# Patient Record
Sex: FEMALE | Race: WHITE | Employment: OTHER
[De-identification: names, ages, dates, MRNs, and addresses within clinical notes are randomized per-mention and may not be internally consistent; named-entity substitution may affect disease eponyms.]

---

## 2018-04-04 ENCOUNTER — RX ONLY (RX ONLY)
Age: 73
End: 2018-04-04

## 2018-04-04 ENCOUNTER — OPTICAL OFFICE (OUTPATIENT)
Dept: URBAN - METROPOLITAN AREA CLINIC 146 | Facility: CLINIC | Age: 73
Setting detail: OPHTHALMOLOGY
End: 2018-04-04
Payer: COMMERCIAL

## 2018-04-04 ENCOUNTER — DOCTOR'S OFFICE (OUTPATIENT)
Dept: URBAN - METROPOLITAN AREA CLINIC 137 | Facility: CLINIC | Age: 73
Setting detail: OPHTHALMOLOGY
End: 2018-04-04
Payer: COMMERCIAL

## 2018-04-04 DIAGNOSIS — H52.4: ICD-10-CM

## 2018-04-04 DIAGNOSIS — H52.223: ICD-10-CM

## 2018-04-04 PROCEDURE — 92004 COMPRE OPH EXAM NEW PT 1/>: CPT | Performed by: OPHTHALMOLOGY

## 2018-04-04 PROCEDURE — V2020 VISION SVCS FRAMES PURCHASES: HCPCS | Performed by: OPHTHALMOLOGY

## 2018-04-04 PROCEDURE — V2203 LENS SPHCYL BIFOCAL 4.00D/.1: HCPCS | Performed by: OPHTHALMOLOGY

## 2018-04-04 PROCEDURE — V2781 PROGRESSIVE LENS PER LENS: HCPCS | Performed by: OPHTHALMOLOGY

## 2018-04-04 PROCEDURE — V2025 EYEGLASSES DELUX FRAMES: HCPCS | Performed by: OPHTHALMOLOGY

## 2018-04-04 PROCEDURE — V2750 ANTI-REFLECTIVE COATING: HCPCS | Performed by: OPHTHALMOLOGY

## 2018-04-04 ASSESSMENT — REFRACTION_OUTSIDERX
OS_VA1: 20/25
OS_VA2: 20/25(J1)
OD_VA2: 20/25(J1)
OD_VA3: 20/
OU_VA: 20/
OD_VA1: 20/25
OD_SPHERE: -3.25
OD_CYLINDER: +0.50
OD_AXIS: 100
OD_ADD: +2.50
OS_SPHERE: -2.25
OS_CYLINDER: +0.50
OS_VA3: 20/
OS_ADD: +2.50
OS_AXIS: 150

## 2018-04-04 ASSESSMENT — REFRACTION_MANIFEST
OD_VA1: 20/
OU_VA: 20/
OU_VA: 20/
OS_VA1: 20/
OS_VA2: 20/
OS_VA1: 20/
OD_VA3: 20/
OD_VA2: 20/
OD_VA2: 20/
OD_VA3: 20/
OS_VA3: 20/
OS_VA2: 20/
OD_VA1: 20/
OS_VA3: 20/

## 2018-04-04 ASSESSMENT — REFRACTION_CURRENTRX
OS_AXIS: 161
OS_OVR_VA: 20/
OD_CYLINDER: +1.00
OD_OVR_VA: 20/
OS_CYLINDER: +0.25
OD_SPHERE: -3.75
OS_SPHERE: -2.25
OS_OVR_VA: 20/
OD_OVR_VA: 20/
OD_OVR_VA: 20/
OS_OVR_VA: 20/
OD_AXIS: 122

## 2018-04-04 ASSESSMENT — VISUAL ACUITY
OD_BCVA: 20/30-1
OS_BCVA: 20/30-2

## 2018-04-04 ASSESSMENT — REFRACTION_AUTOREFRACTION
OS_SPHERE: -2.25
OD_AXIS: 092
OS_AXIS: 148
OD_CYLINDER: +0.25
OS_CYLINDER: +0.50
OD_SPHERE: -3.25

## 2018-04-04 ASSESSMENT — SPHEQUIV_DERIVED
OS_SPHEQUIV: -2
OD_SPHEQUIV: -3.125

## 2018-04-04 ASSESSMENT — CONFRONTATIONAL VISUAL FIELD TEST (CVF)
OD_FINDINGS: FULL
OS_FINDINGS: FULL

## 2018-06-19 ENCOUNTER — TRANSCRIBE ORDERS (OUTPATIENT)
Dept: ADMINISTRATIVE | Facility: HOSPITAL | Age: 73
End: 2018-06-19

## 2018-06-19 DIAGNOSIS — R06.02 SOB (SHORTNESS OF BREATH): Primary | ICD-10-CM

## 2018-06-29 ENCOUNTER — HOSPITAL ENCOUNTER (OUTPATIENT)
Dept: NON INVASIVE DIAGNOSTICS | Facility: CLINIC | Age: 73
Discharge: HOME | End: 2018-06-29
Payer: COMMERCIAL

## 2018-06-29 DIAGNOSIS — R06.02 SOB (SHORTNESS OF BREATH): ICD-10-CM

## 2018-06-29 PROCEDURE — 93306 TTE W/DOPPLER COMPLETE: CPT | Performed by: INTERNAL MEDICINE

## 2018-06-29 PROCEDURE — 93306 TTE W/DOPPLER COMPLETE: CPT

## 2018-08-20 ENCOUNTER — TRANSCRIBE ORDERS (OUTPATIENT)
Dept: ADMINISTRATIVE | Facility: HOSPITAL | Age: 73
End: 2018-08-20

## 2018-08-20 DIAGNOSIS — I71.2 THORACIC AORTIC ANEURYSM WITHOUT RUPTURE (HCC): Primary | ICD-10-CM

## 2018-08-28 ENCOUNTER — DOCTOR'S OFFICE (OUTPATIENT)
Dept: URBAN - METROPOLITAN AREA CLINIC 137 | Facility: CLINIC | Age: 73
Setting detail: OPHTHALMOLOGY
End: 2018-08-28
Payer: MEDICARE

## 2018-08-28 DIAGNOSIS — H02.403: ICD-10-CM

## 2018-08-28 PROCEDURE — 92083 EXTENDED VISUAL FIELD XM: CPT | Performed by: OPHTHALMOLOGY

## 2018-09-11 ENCOUNTER — HOSPITAL ENCOUNTER (OUTPATIENT)
Dept: CT IMAGING | Facility: HOSPITAL | Age: 73
Discharge: HOME | End: 2018-09-11
Payer: COMMERCIAL

## 2018-09-11 DIAGNOSIS — I71.2 THORACIC AORTIC ANEURYSM WITHOUT RUPTURE (HCC): ICD-10-CM

## 2018-09-11 DIAGNOSIS — Z82.49 FAMILY HX OF AORTIC ANEURYSM: ICD-10-CM

## 2018-09-11 PROCEDURE — 74160 CT ABDOMEN W/CONTRAST: CPT

## 2018-09-11 RX ADMIN — IOHEXOL 100 ML: 350 INJECTION, SOLUTION INTRAVENOUS at 14:00

## 2018-09-19 ENCOUNTER — TRANSCRIBE ORDERS (OUTPATIENT)
Dept: ADMINISTRATIVE | Facility: HOSPITAL | Age: 73
End: 2018-09-19

## 2018-09-19 DIAGNOSIS — R92.2 BREAST DENSITY: Primary | ICD-10-CM

## 2018-09-28 ENCOUNTER — TRANSCRIBE ORDERS (OUTPATIENT)
Dept: MAMMOGRAPHY | Facility: CLINIC | Age: 73
End: 2018-09-28

## 2018-09-28 ENCOUNTER — HOSPITAL ENCOUNTER (OUTPATIENT)
Dept: ULTRASOUND IMAGING | Facility: CLINIC | Age: 73
Discharge: HOME | End: 2018-09-28
Payer: COMMERCIAL

## 2018-09-28 ENCOUNTER — HOSPITAL ENCOUNTER (OUTPATIENT)
Dept: MAMMOGRAPHY | Facility: CLINIC | Age: 73
Discharge: HOME | End: 2018-09-28
Payer: COMMERCIAL

## 2018-09-28 DIAGNOSIS — R92.8 ABNORMAL MAMMOGRAM: Primary | ICD-10-CM

## 2018-09-28 DIAGNOSIS — R92.2 BREAST DENSITY: ICD-10-CM

## 2018-09-28 PROCEDURE — 76642 ULTRASOUND BREAST LIMITED: CPT

## 2018-09-28 PROCEDURE — 77066 DX MAMMO INCL CAD BI: CPT

## 2019-03-29 ENCOUNTER — HOSPITAL ENCOUNTER (OUTPATIENT)
Dept: MAMMOGRAPHY | Facility: CLINIC | Age: 74
Discharge: HOME | End: 2019-03-29
Payer: COMMERCIAL

## 2019-03-29 ENCOUNTER — HOSPITAL ENCOUNTER (OUTPATIENT)
Dept: ULTRASOUND IMAGING | Facility: CLINIC | Age: 74
Discharge: HOME | End: 2019-03-29
Payer: COMMERCIAL

## 2019-03-29 VITALS — HEIGHT: 62 IN | WEIGHT: 205 LBS | BODY MASS INDEX: 37.73 KG/M2

## 2019-03-29 DIAGNOSIS — R92.8 ABNORMAL MAMMOGRAM: ICD-10-CM

## 2019-03-29 DIAGNOSIS — R92.8 ABNORMAL FINDINGS ON DIAGNOSTIC IMAGING OF BREAST: ICD-10-CM

## 2019-03-29 PROCEDURE — 77065 DX MAMMO INCL CAD UNI: CPT

## 2019-03-29 PROCEDURE — 76642 ULTRASOUND BREAST LIMITED: CPT

## 2020-05-11 ENCOUNTER — APPOINTMENT (EMERGENCY)
Dept: RADIOLOGY | Facility: HOSPITAL | Age: 75
End: 2020-05-11
Payer: COMMERCIAL

## 2020-05-11 ENCOUNTER — HOSPITAL ENCOUNTER (EMERGENCY)
Facility: HOSPITAL | Age: 75
Discharge: HOME | End: 2020-05-11
Attending: EMERGENCY MEDICINE | Admitting: EMERGENCY MEDICINE
Payer: COMMERCIAL

## 2020-05-11 VITALS
TEMPERATURE: 97.8 F | SYSTOLIC BLOOD PRESSURE: 111 MMHG | RESPIRATION RATE: 18 BRPM | HEIGHT: 62 IN | DIASTOLIC BLOOD PRESSURE: 54 MMHG | WEIGHT: 226.19 LBS | OXYGEN SATURATION: 93 % | HEART RATE: 52 BPM | BODY MASS INDEX: 41.62 KG/M2

## 2020-05-11 DIAGNOSIS — R06.00 DYSPNEA: Primary | ICD-10-CM

## 2020-05-11 DIAGNOSIS — R07.9 CHEST PAIN: ICD-10-CM

## 2020-05-11 LAB
ANION GAP SERPL CALCULATED.3IONS-SCNC: 10 MMOL/L (ref 4–13)
APTT PPP: 27 SECONDS (ref 23–37)
BASOPHILS # BLD AUTO: 0.02 THOUSANDS/ΜL (ref 0–0.1)
BASOPHILS NFR BLD AUTO: 0 % (ref 0–1)
BUN SERPL-MCNC: 38 MG/DL (ref 5–25)
CALCIUM SERPL-MCNC: 9.2 MG/DL (ref 8.3–10.1)
CHLORIDE SERPL-SCNC: 104 MMOL/L (ref 100–108)
CO2 SERPL-SCNC: 28 MMOL/L (ref 21–32)
CREAT SERPL-MCNC: 1.4 MG/DL (ref 0.6–1.3)
EOSINOPHIL # BLD AUTO: 0.1 THOUSAND/ΜL (ref 0–0.61)
EOSINOPHIL NFR BLD AUTO: 2 % (ref 0–6)
ERYTHROCYTE [DISTWIDTH] IN BLOOD BY AUTOMATED COUNT: 15 % (ref 11.6–15.1)
GFR SERPL CREATININE-BSD FRML MDRD: 37 ML/MIN/1.73SQ M
GLUCOSE SERPL-MCNC: 142 MG/DL (ref 65–140)
HCT VFR BLD AUTO: 40.9 % (ref 34.8–46.1)
HGB BLD-MCNC: 13.3 G/DL (ref 11.5–15.4)
IMM GRANULOCYTES # BLD AUTO: 0.02 THOUSAND/UL (ref 0–0.2)
IMM GRANULOCYTES NFR BLD AUTO: 0 % (ref 0–2)
INR PPP: 1.1 (ref 0.84–1.19)
LYMPHOCYTES # BLD AUTO: 1.09 THOUSANDS/ΜL (ref 0.6–4.47)
LYMPHOCYTES NFR BLD AUTO: 19 % (ref 14–44)
MCH RBC QN AUTO: 30.6 PG (ref 26.8–34.3)
MCHC RBC AUTO-ENTMCNC: 32.5 G/DL (ref 31.4–37.4)
MCV RBC AUTO: 94 FL (ref 82–98)
MONOCYTES # BLD AUTO: 0.49 THOUSAND/ΜL (ref 0.17–1.22)
MONOCYTES NFR BLD AUTO: 8 % (ref 4–12)
NEUTROPHILS # BLD AUTO: 4.13 THOUSANDS/ΜL (ref 1.85–7.62)
NEUTS SEG NFR BLD AUTO: 71 % (ref 43–75)
NRBC BLD AUTO-RTO: 0 /100 WBCS
PLATELET # BLD AUTO: 147 THOUSANDS/UL (ref 149–390)
PMV BLD AUTO: 10.7 FL (ref 8.9–12.7)
POTASSIUM SERPL-SCNC: 3.5 MMOL/L (ref 3.5–5.3)
PROTHROMBIN TIME: 13.6 SECONDS (ref 11.6–14.5)
RBC # BLD AUTO: 4.35 MILLION/UL (ref 3.81–5.12)
SARS-COV-2 RNA RESP QL NAA+PROBE: NEGATIVE
SODIUM SERPL-SCNC: 142 MMOL/L (ref 136–145)
TROPONIN I SERPL-MCNC: <0.02 NG/ML
WBC # BLD AUTO: 5.85 THOUSAND/UL (ref 4.31–10.16)

## 2020-05-11 PROCEDURE — 85025 COMPLETE CBC W/AUTO DIFF WBC: CPT | Performed by: EMERGENCY MEDICINE

## 2020-05-11 PROCEDURE — 85730 THROMBOPLASTIN TIME PARTIAL: CPT | Performed by: EMERGENCY MEDICINE

## 2020-05-11 PROCEDURE — 87635 SARS-COV-2 COVID-19 AMP PRB: CPT | Performed by: EMERGENCY MEDICINE

## 2020-05-11 PROCEDURE — 93005 ELECTROCARDIOGRAM TRACING: CPT

## 2020-05-11 PROCEDURE — 36415 COLL VENOUS BLD VENIPUNCTURE: CPT | Performed by: EMERGENCY MEDICINE

## 2020-05-11 PROCEDURE — 84484 ASSAY OF TROPONIN QUANT: CPT | Performed by: EMERGENCY MEDICINE

## 2020-05-11 PROCEDURE — 99283 EMERGENCY DEPT VISIT LOW MDM: CPT | Performed by: EMERGENCY MEDICINE

## 2020-05-11 PROCEDURE — 80048 BASIC METABOLIC PNL TOTAL CA: CPT | Performed by: EMERGENCY MEDICINE

## 2020-05-11 PROCEDURE — 71045 X-RAY EXAM CHEST 1 VIEW: CPT

## 2020-05-11 PROCEDURE — 99285 EMERGENCY DEPT VISIT HI MDM: CPT

## 2020-05-11 PROCEDURE — 85610 PROTHROMBIN TIME: CPT | Performed by: EMERGENCY MEDICINE

## 2020-05-11 RX ORDER — TRIAMTERENE AND HYDROCHLOROTHIAZIDE 37.5; 25 MG/1; MG/1
1 TABLET ORAL DAILY
COMMUNITY

## 2020-05-11 RX ORDER — ALBUTEROL SULFATE 90 UG/1
2 AEROSOL, METERED RESPIRATORY (INHALATION) EVERY 4 HOURS PRN
COMMUNITY

## 2020-05-11 RX ORDER — ATORVASTATIN CALCIUM 20 MG/1
20 TABLET, FILM COATED ORAL DAILY
COMMUNITY

## 2020-05-11 RX ORDER — METOPROLOL SUCCINATE 25 MG/1
25 TABLET, EXTENDED RELEASE ORAL DAILY
COMMUNITY
End: 2020-06-18

## 2020-05-11 RX ORDER — AMLODIPINE BESYLATE 10 MG/1
5 TABLET ORAL DAILY
COMMUNITY
End: 2020-09-21 | Stop reason: DRUGHIGH

## 2020-05-11 RX ORDER — LOSARTAN POTASSIUM 100 MG/1
25 TABLET ORAL DAILY
COMMUNITY

## 2020-05-11 RX ORDER — ASPIRIN 81 MG/1
81 TABLET ORAL DAILY
COMMUNITY

## 2020-05-11 RX ORDER — LEVOTHYROXINE SODIUM 0.12 MG/1
125 TABLET ORAL DAILY
COMMUNITY

## 2020-05-13 LAB
ATRIAL RATE: 59 BPM
P AXIS: 55 DEGREES
PR INTERVAL: 182 MS
QRS AXIS: -25 DEGREES
QRSD INTERVAL: 102 MS
QT INTERVAL: 424 MS
QTC INTERVAL: 419 MS
T WAVE AXIS: 36 DEGREES
VENTRICULAR RATE: 59 BPM

## 2020-05-13 PROCEDURE — 93010 ELECTROCARDIOGRAM REPORT: CPT | Performed by: INTERNAL MEDICINE

## 2020-05-19 ENCOUNTER — TRANSCRIBE ORDERS (OUTPATIENT)
Dept: ADMINISTRATIVE | Facility: HOSPITAL | Age: 75
End: 2020-05-19

## 2020-05-19 DIAGNOSIS — I25.10 ATHEROSCLEROSIS OF NATIVE CORONARY ARTERY OF NATIVE HEART WITHOUT ANGINA PECTORIS: Primary | ICD-10-CM

## 2020-05-28 ENCOUNTER — TRANSCRIBE ORDERS (OUTPATIENT)
Dept: ADMINISTRATIVE | Facility: HOSPITAL | Age: 75
End: 2020-05-28

## 2020-05-28 DIAGNOSIS — I25.10 ATHEROSCLEROSIS OF NATIVE CORONARY ARTERY OF NATIVE HEART WITHOUT ANGINA PECTORIS: Primary | ICD-10-CM

## 2020-06-01 ENCOUNTER — HOSPITAL ENCOUNTER (OUTPATIENT)
Dept: NON INVASIVE DIAGNOSTICS | Facility: CLINIC | Age: 75
Discharge: HOME | End: 2020-06-01
Payer: COMMERCIAL

## 2020-06-01 DIAGNOSIS — I25.10 ATHEROSCLEROSIS OF NATIVE CORONARY ARTERY OF NATIVE HEART WITHOUT ANGINA PECTORIS: ICD-10-CM

## 2020-06-01 PROCEDURE — A9502 TC99M TETROFOSMIN: HCPCS

## 2020-06-01 PROCEDURE — 93016 CV STRESS TEST SUPVJ ONLY: CPT | Performed by: INTERNAL MEDICINE

## 2020-06-01 PROCEDURE — 78452 HT MUSCLE IMAGE SPECT MULT: CPT

## 2020-06-01 PROCEDURE — 78452 HT MUSCLE IMAGE SPECT MULT: CPT | Performed by: INTERNAL MEDICINE

## 2020-06-01 PROCEDURE — 93017 CV STRESS TEST TRACING ONLY: CPT

## 2020-06-01 PROCEDURE — 93018 CV STRESS TEST I&R ONLY: CPT | Performed by: INTERNAL MEDICINE

## 2020-06-01 RX ADMIN — REGADENOSON 0.4 MG: 0.08 INJECTION, SOLUTION INTRAVENOUS at 13:58

## 2020-06-05 LAB
CHEST PAIN STATEMENT: NORMAL
MAX DIASTOLIC BP: 80 MMHG
MAX HEART RATE: 110 BPM
MAX PREDICTED HEART RATE: 146 BPM
MAX. SYSTOLIC BP: 180 MMHG
PROTOCOL NAME: NORMAL
REASON FOR TERMINATION: NORMAL
TARGET HR FORMULA: NORMAL
TEST INDICATION: NORMAL
TIME IN EXERCISE PHASE: NORMAL

## 2020-06-18 ENCOUNTER — OFFICE VISIT (OUTPATIENT)
Dept: CARDIOLOGY CLINIC | Facility: CLINIC | Age: 75
End: 2020-06-18
Payer: COMMERCIAL

## 2020-06-18 VITALS
HEART RATE: 68 BPM | HEIGHT: 62 IN | WEIGHT: 215 LBS | DIASTOLIC BLOOD PRESSURE: 64 MMHG | OXYGEN SATURATION: 97 % | BODY MASS INDEX: 39.56 KG/M2 | SYSTOLIC BLOOD PRESSURE: 144 MMHG

## 2020-06-18 DIAGNOSIS — I25.118 CORONARY ARTERY DISEASE INVOLVING NATIVE HEART WITH OTHER FORM OF ANGINA PECTORIS, UNSPECIFIED VESSEL OR LESION TYPE (HCC): ICD-10-CM

## 2020-06-18 DIAGNOSIS — J45.909 MODERATE ASTHMA WITHOUT COMPLICATION, UNSPECIFIED WHETHER PERSISTENT: ICD-10-CM

## 2020-06-18 DIAGNOSIS — R06.02 SOB (SHORTNESS OF BREATH): Primary | ICD-10-CM

## 2020-06-18 DIAGNOSIS — E78.5 DYSLIPIDEMIA: ICD-10-CM

## 2020-06-18 DIAGNOSIS — I10 ESSENTIAL HYPERTENSION: ICD-10-CM

## 2020-06-18 DIAGNOSIS — I35.0 NONRHEUMATIC AORTIC VALVE STENOSIS: ICD-10-CM

## 2020-06-18 PROBLEM — I25.10 CAD (CORONARY ARTERY DISEASE): Status: ACTIVE | Noted: 2020-06-18

## 2020-06-18 PROCEDURE — 99204 OFFICE O/P NEW MOD 45 MIN: CPT | Performed by: INTERNAL MEDICINE

## 2020-06-18 PROCEDURE — 93000 ELECTROCARDIOGRAM COMPLETE: CPT | Performed by: INTERNAL MEDICINE

## 2020-06-18 RX ORDER — CLONIDINE 0.2 MG/24H
1 PATCH, EXTENDED RELEASE TRANSDERMAL WEEKLY
COMMUNITY

## 2020-06-18 RX ORDER — FLUTICASONE PROPIONATE 50 MCG
1 SPRAY, SUSPENSION (ML) NASAL DAILY
COMMUNITY

## 2020-07-21 ENCOUNTER — TELEPHONE (OUTPATIENT)
Dept: PULMONOLOGY | Facility: CLINIC | Age: 75
End: 2020-07-21

## 2020-07-21 PROBLEM — R06.02 SHORTNESS OF BREATH: Status: ACTIVE | Noted: 2020-07-21

## 2020-07-21 NOTE — PROGRESS NOTES
Assessment/Plan:  Problem List Items Addressed This Visit        Cardiovascular and Mediastinum    CAD (coronary artery disease)    Nonrheumatic aortic valve stenosis       Other    Shortness of breath - Primary    Relevant Medications    fluticasone-vilanterol (BREO ELLIPTA) 100-25 mcg/inh inhaler    Other Relevant Orders    Complete PFT with post Bronchodilator and Six Minute walk      Other Visit Diagnoses     Uncomplicated asthma, unspecified asthma severity, unspecified whether persistent        Relevant Medications    fluticasone-vilanterol (BREO ELLIPTA) 100-25 mcg/inh inhaler    Other Relevant Orders    POCT spirometry            Plan for Today and Follow-up needs for next visit:    In office spirometry:  Demonstrates suggestive restriction as well as obstructive process    Shortness of breath / probable asthmatic process:  -obtain complete pulmonary function test  -start Breo 100 1 puff daily  -use rescue inhaler as needed  -obtain complete pulmonary function test  -if restrictive process will then pursue HRCT    Moderate aortic stenosis:  -obtain up-to-date echocardiogram  -follow-up evaluation with Cardiology    History sarcoidosis:  -likely noncontributory  -can obtain follow-up CT scan if see PFT demonstrated of restrictive process    Follow-up in approximately 1 month        Return in about 1 month (around 8/22/2020)  All questions are answered to the patient's satisfaction and understanding  She verbalizes understanding  She is encouraged to call with any further questions or concerns  ______________________________________________________________________    Chief Complaint:   Chief Complaint   Patient presents with    \[facility]\"" Care        Patient ID: Svitlana Novoa is a 76 y o  y o  female has a past medical history of CAD (coronary artery disease), Cancer ([address] ), H/O heart artery stent, History of sarcoidosis, Hyperlipidemia, and Hypertension      7/22/2020  She presents today for initial evaluation: For shortness of breath  She has had ongoing shortness of breath last 2 years with notable remote history of coronary artery disease post stent placement approximately 9-10 years ago by Dr Parisa Ashford at [facility] after having MI during nuclear medicine stress test   Remotely diagnosed history of sarcoidosis 35 years ago by lymph node biopsied [facility] due to lung crystallization on x-ray scan  She reports she was remotely treated with steroids and never had another problem with it after that  Also has a history of occasional bronchitis having been hospitalized twice in the last 10 years  She has a personal history of thyroid cancer with partial thyroid resection approximately 15 years ago  N she states she has a history of sporadic asthma but never had any issues prior to her diagnosis sarcoid  She was diagnosed when she was approximately 36years old  Non never smoker  She is not on any chronic inhaler therapy, however, she reports that on average over the last 6 months she uses her Ventolin inhaler approximately 4 days out of the week morning and nighttime for shortness of breath  She also has a history of obstructive sleep apnea which she reports she was diagnosed with approximately 10-15 years ago and asleep standard [facility] and has been on auto CPAP 10-12 cm for the last several years and uses it religiously  She reports that this is managed through her PCP  Her DME company is [employer]  She also reports she has been following up the breast center for a shadow on her breast on the left side  which she is scheduled for follow-up ultrasound for  She specifically denies weight loss, night sweats, mucus production, wheezing, hemoptysis, or skin rashes  History positive for leg swelling improved with her Maxzide diuretic therapy  Family history thyroid cancer/family history of HOCM      She has ongoing shortness of breath worsening over the last 2 years and particularly worsened over the last 6 months  She has particular difficulty navigating up and down stairs without becoming severely shortness of breath which she reports improves with resting and resolves completely  She has notable difficulty ambulating even moderate distances and describes becoming short of breath just walking across the parking lot to the doctor's office  She recently did follow-up with cardiologist Dr Moni So and overall had a normal nuclear medicine stress test in June of 2019  She did have a repeat echocardiogram performed at [facility] in May of 2020 showing findings consistent with moderate aortic stenosis with aortic valve area 1 2 cm non comparable with previous echocardiogram due to not well visualized aortic area  She did however have significant increase in her peak gradient with peak valvular gradients at 41 mm Hg as opposed her prior echocardiogram with peak gradient of 23 back in 2018  She also does make note that she has gained significant weight over the years and is marginally morbidly obese with a BMI of 39 9  We discussed the possibilities of asthma after review of her in office spirometry with her best attempt with an FEV1/FVC ratio of 63%, FEV1 1 43 L or 73%, FVC 2 26 L or 86% pre bronchodilator  We did discuss trialing inhaler therapy with Breo 100 mics 1 puff daily and obtainining complete pulmonary function test with 6 minutes walk and diffusion capacity  Most recent echocardiogram had only mildly elevated pulmonaryHPI  Pulmonary pressures with mild to moderate tricuspid regurgitation and mild mitral valvular incompetency  We discussed obtaining complete pulmonary function test with 6 minutes walk due to abnormal office spirometry and overall if presents with elements of restrictive defect can further pursue with high-resolution CT scan of the chest for evaluation      I do think that further progression of her peak gradients need possible further evaluation if she still presents with symptomatic dyspnea despite treatment  From a pulmonary standpoint her symptoms are out of proportion to her examination as she has no wheezing  She does have a significant systolic ejection murmur  Ongoing follow-up warranted  Smoking history: Non / Never smoker  Occupational history:   at [employer] History:  No significant environmental exposures  Travel history:  No recent travel or sick contacts  Respiratory History:  History of sarcoidosis diagnosed approximately 35 years ago at [facility] via biopsy lymph node  Oxygen Therapy:  No prior oxygen therapy  PAP Therapy:  Auto CPAP 10-12 cm water times 15 years  DME Company:  [id number]: [id number]  PCP:  Dr Vanessa Langston    Review of Systems   Constitutional: Positive for fatigue  Negative for activity change, appetite change, chills and fever  HENT: Negative for postnasal drip, rhinorrhea, sinus pressure and sinus pain  Eyes: Negative for visual disturbance  Respiratory: Positive for shortness of breath  Negative for apnea, cough, chest tightness, wheezing and stridor  Cardiovascular: Positive for leg swelling  Negative for chest pain  Gastrointestinal: Negative for diarrhea, nausea and vomiting  Endocrine: Negative for cold intolerance and heat intolerance  Musculoskeletal: Negative for arthralgias, back pain, joint swelling and myalgias  Skin: Negative for color change and rash  Allergic/Immunologic: Negative for environmental allergies  Neurological: Negative for syncope and light-headedness  Hematological: Negative for adenopathy  Psychiatric/Behavioral: Negative for confusion and sleep disturbance         The following portions of the patient's history were reviewed and updated as appropriate: allergies, current medications, past family history, past medical history, past social history, past surgical history and problem list     Smoking history: She reports that she has never smoked  She has never used smokeless tobacco   Social history: She reports that she has never smoked  She has never used smokeless tobacco  She reports that she does not use drugs  Past Medical History:   Diagnosis Date    Cancer [facility])     thyroid     Past Surgical History:   Procedure Laterality Date    HYSTERECTOMY      age 37    OOPHORECTOMY Bilateral     age 37     No family history on file  There is no immunization history on file for this patient  Current Outpatient Medications   Medication Sig Dispense Refill    albuterol (PROVENTIL HFA,VENTOLIN HFA) 90 mcg/act inhaler Inhale 2 puffs every 4 (four) hours as needed for wheezing or shortness of breath      amLODIPine (NORVASC) 10 mg tablet Take 10 mg by mouth daily       aspirin (ECOTRIN LOW STRENGTH) 81 mg EC tablet Take 81 mg by mouth daily      atorvastatin (LIPITOR) 20 mg tablet Take 20 mg by mouth daily      cloNIDine (CATAPRES-TTS-2) 0 2 mg/24 hr Place 1 patch on the skin once a week      fluticasone (FLONASE) 50 mcg/act nasal spray 1 spray into each nostril daily      levothyroxine 125 mcg tablet Take 125 mcg by mouth daily      losartan (COZAAR) 100 MG tablet Take 25 mg by mouth daily      triamterene-hydrochlorothiazide (MAXZIDE-25) 37 5-25 mg per tablet Take 1 tablet by mouth daily       No current facility-administered medications for this visit  Allergies: Patient has no known allergies  Objective: There were no vitals filed for this visit     Wt Readings from Last 3 Encounters:   06/18/20 97 5 kg (215 lb)   05/11/20 103 kg (226 lb 3 1 oz)   03/29/19 93 kg (205 lb)     There is no height or weight on file to calculate BMI  Physical Exam   Constitutional: She is oriented to person, place, and time  She appears well-developed and well-nourished  HENT:   Head: Normocephalic and atraumatic  Eyes: Pupils are equal, round, and reactive to light  Conjunctivae are normal    Neck: Normal range of motion  Neck supple  Cardiovascular: Normal rate and regular rhythm  Murmur heard  Systolic murmur is present  Pulmonary/Chest: Effort normal  No respiratory distress  She has decreased breath sounds in the right upper field, the right middle field, the right lower field, the left upper field, the left middle field and the left lower field  She has no wheezes  She has no rales  She exhibits no tenderness  Abdominal: Soft  Bowel sounds are normal    Musculoskeletal: Normal range of motion  Right lower leg: She exhibits edema  Left lower leg: She exhibits edema  +1 lower extremity edema   Neurological: She is alert and oriented to person, place, and time  Skin: Skin is warm and dry  Numerous skin tags   Psychiatric: She has a normal mood and affect  Lab Review: Invalid input(s):  EOSPCT                        ABG:    VBG:          Diagnostics:    No orders to display     2020    CHEST      INDICATION:   Sob with chest tightness      COMPARISON:  None     EXAM PERFORMED/VIEWS:  XR CHEST PORTABLE        FINDINGS:     Cardiomegaly is noted      The lungs are clear    No pneumothorax or pleural effusion      Osseous structures appear within normal limits for patient age      IMPRESSION:     No acute cardiopulmonary disease        PFT:    Echo:    May 2020:  OSLO:    See results in media section    Prior most recent echo 2018  [address]  [phone]     Transthoracic Echocardiogram  2D, M-mode, Doppler, and Color Doppler     Study date:  2018     Patient: Hector Edwards  MR number: [id number]  Account number: [de-identified]  : 1945  Age: 67 years  Gender: Female  Status: Outpatient  Location: [address] [place] [address] [facility]  Height: 62 in  Weight: 213 lb  BP:     Indications: Shortness of breath     Diagnoses: R06 02 - Shortness of breath     Sonographer:  ED Cornejo  Referring Physician:  Bob Bronson MD  Group:  [facility]  Interpreting Physician:  Demetrio Walsh MD     SUMMARY     LEFT VENTRICLE:  Systolic function was vigorous  Ejection fraction was estimated to be 65 %  There were no regional wall motion abnormalities  Wall thickness was increased  Concentric hypertrophy was present  Doppler parameters were consistent with abnormal left ventricular relaxation (grade 1 diastolic dysfunction)      AORTIC VALVE:  There was mild stenosis  Vmax 2 4 m/s, mean gradient 13 mm Hg, maximum gradient 23 mm Hg  There was trace to mild regurgitation      HISTORY: PRIOR HISTORY: Hypertension, high cholesterol, coronary artery disease, stent     PROCEDURE: The study was performed in the [address] [facility] [place]  This was a routine study  The transthoracic approach was used  The study included complete 2D imaging, M-mode, complete spectral Doppler, and color Doppler  This  was a technically difficult study      LEFT VENTRICLE: Size was normal  Systolic function was vigorous  Ejection fraction was estimated to be 65 %  There were no regional wall motion abnormalities  Wall thickness was increased  Concentric hypertrophy was present  DOPPLER: There  was an increased relative contribution of atrial contraction to ventricular filling  Doppler parameters were consistent with abnormal left ventricular relaxation (grade 1 diastolic dysfunction)      RIGHT VENTRICLE: The size was normal  Systolic function was normal      LEFT ATRIUM: Size was normal      RIGHT ATRIUM: Size was normal      MITRAL VALVE: There was annular calcification  Valve structure was normal  There was normal leaflet separation  DOPPLER: There was no evidence for stenosis  There was trace regurgitation      AORTIC VALVE: Leaflets exhibited mildly to moderately increased thickness and mild to moderate calcification  The valve was not well visualized  DOPPLER: There was mild stenosis  Vmax 2 4 m/s, mean gradient 13 mm Hg, maximum gradient 23 mm  Hg  There was trace to mild regurgitation      TRICUSPID VALVE: The valve structure was normal  There was normal leaflet separation  DOPPLER: There was no evidence for stenosis  There was no regurgitation      PULMONIC VALVE: Leaflets exhibited normal thickness, no calcification, and normal cuspal separation  DOPPLER: The transpulmonic velocity was within the normal range  There was mild regurgitation      PERICARDIUM: There was no pericardial effusion  The pericardium was normal in appearance      AORTA: The root exhibited normal size  Not well visualized      SYSTEMIC VEINS: IVC: The inferior vena cava was normal in size and course  Respirophasic changes were normal         [place] [address], [address] [facility]  [phone]     [facility]     Patient: Miya Kohli  MR number: [id number]  Account number: [de-identified]  : 1945  Age: 76 years  Gender: Female  Status: Outpatient  Location: [facility]  Height: 62 in  Weight: 226 lb  BP: 112/ 70 mmHg     Allergies: NO KNOWN ALLERGIES     Diagnosis: I25 10 - Atherosclerotic heart disease of native coronary artery without angina pectoris     Primary Physician:  Gomez Le MD  RN:  Mar Merino  Referring Physician:  Gomez Le MD  Techn[facility]:  [facility]  Group:  [facility]  Report Prepared By[de-identified]  Mar Merino  Interpreting Physician:  Vicki Brown MD     INDICATIONS: Evaluation of known coronary artery disease      HISTORY: The patient is a 76year old  female  Chest pain status: no chest pain  Other symptoms: dyspnea Coronary artery disease risk factors: dyslipidemia, hypertension, and post-menopausal state  Cardiovascular history: coronary  artery disease  Prior cardiovascular procedures: percutaneous transluminal coronary angioplasty (on )  Medications: a beta blocker, a calcium channel blocker, an ACE inhibitor/ARB, a diuretic, aspirin, a lipid lowering agent, and  thyroid medications      PHYSICAL EXAM: Baseline physical exam screening: normal      REST ECG: Normal sinus rhythm      PROCEDURE: The study was performed in the the [facility]  A regadenoson infusion pharmacologic stress test was performed  Systolic blood pressure was 112 mmHg, at the start of the study  Diastolic blood pressure was  70 mmHg, at the start of the study  The heart rate was 62 bpm, at the start of the study  IV double checked  Regadenoson protocol:  HR bpm SBP mmHg DBP mmHg Symptoms  Baseline 62 112 70 none  1 min 110 180 90 mild dyspnea  4 min 86 112 70 none  No medications or fluids given  The patient also performed low level exercise      STRESS SUMMARY: Duration of pharmacologic stress was 3 min  The patient exercised to protocol stage 1  Maximal heart rate during stress was 110 bpm  The heart rate response to stress was normal  There was normal resting blood pressure with  an appropriate response to stress  There was no chest pain during stress  The stress test was terminated due to protocol completion  Pre oxygen saturation: 96 %  Peak oxygen saturation: 96 %  The stress ECG was negative for ischemia and  normal  Arrhythmia during stress: isolated premature ventricular beats      ISOTOPE ADMINISTRATION:  Resting isotope administration Stress isotope administration  Agent Tetrofosmin Tetrofosmin  Dose 10 2 mCi 32 9 mCi  Date 06/01/2020 06/01/2020  Injection-image interval 45 min 34 min     The radiopharmaceutical was injected at the peak effect of pharmacologic stress      MYOCARDIAL PERFUSION IMAGING:  The image quality was good  Rotating projection images reveal mild breast attenuation   Left ventricular size was normal  The TID ratio was 0 88      PERFUSION DEFECTS:  -  There were no perfusion defects      GATED SPECT:  The calculated left ventricular ejection fraction was 75 %  Left ventricular ejection fraction was within normal limits by visual estimate  There was no left ventricular regional abnormality      SUMMARY:  -  Stress results: There was no chest pain during stress  -  ECG conclusions: The stress ECG was negative for ischemia and normal   -  Perfusion imaging: There were no perfusion defects   -  Gated SPECT: The calculated left ventricular ejection fraction was 75 %  Left ventricular ejection fraction was within normal limits by visual estimate  There was no left ventricular regional abnormality      IMPRESSIONS: Normal study  Myocardial perfusion imaging was normal at rest and with stress  Left ventricular systolic function was normal      Prepared and signed by     Nikolas Kirby MD  Signed 06/01/2020 17:11:16        PFT/DICK:     ESS:    No results found  EKG/ECHO:    Portions of the record may have been created with voice recognition software  Occasional wrong word or "sound a like" substitutions may have occurred due to the inherent limitations of voice recognition software  Read the chart carefully and recognize, using context, where substitutions have occurred      Electronically Signed by Refugio Prieto PA-C

## 2020-07-22 ENCOUNTER — OFFICE VISIT (OUTPATIENT)
Dept: PULMONOLOGY | Facility: CLINIC | Age: 75
End: 2020-07-22
Payer: COMMERCIAL

## 2020-07-22 VITALS
HEIGHT: 62 IN | TEMPERATURE: 97.4 F | BODY MASS INDEX: 40.15 KG/M2 | DIASTOLIC BLOOD PRESSURE: 76 MMHG | OXYGEN SATURATION: 96 % | SYSTOLIC BLOOD PRESSURE: 136 MMHG | WEIGHT: 218.2 LBS | HEART RATE: 64 BPM

## 2020-07-22 DIAGNOSIS — J45.909 UNCOMPLICATED ASTHMA, UNSPECIFIED ASTHMA SEVERITY, UNSPECIFIED WHETHER PERSISTENT: ICD-10-CM

## 2020-07-22 DIAGNOSIS — I35.0 NONRHEUMATIC AORTIC VALVE STENOSIS: ICD-10-CM

## 2020-07-22 DIAGNOSIS — R06.02 SHORTNESS OF BREATH: Primary | ICD-10-CM

## 2020-07-22 DIAGNOSIS — I25.10 CORONARY ARTERY DISEASE INVOLVING NATIVE CORONARY ARTERY OF NATIVE HEART WITHOUT ANGINA PECTORIS: ICD-10-CM

## 2020-07-22 PROCEDURE — 99204 OFFICE O/P NEW MOD 45 MIN: CPT | Performed by: INTERNAL MEDICINE

## 2020-07-22 PROCEDURE — 94010 BREATHING CAPACITY TEST: CPT | Performed by: INTERNAL MEDICINE

## 2020-07-22 RX ORDER — FLUTICASONE FUROATE AND VILANTEROL 100; 25 UG/1; UG/1
1 POWDER RESPIRATORY (INHALATION) DAILY
Qty: 1 INHALER | Refills: 3 | Status: SHIPPED | OUTPATIENT
Start: 2020-07-22 | End: 2020-08-03 | Stop reason: SDUPTHER

## 2020-07-22 NOTE — LETTER
July 22, 2020     Kenia Finn MD  [address] [place] [id number]    Patient: Brandi Biggs   YOB: 1945   Date of Visit: 7/22/2020       Dear Dr Kellee Shen: Thank you for referring Brandi Biggs to me for evaluation  Below are my notes for this consultation  If you have questions, please do not hesitate to call me  I look forward to following your patient along with you  Sincerely,        Vinita Cheadle, DO        CC: MD Michelle Adame PA-C  7/22/2020 11:07 AM  Attested  Assessment/Plan:  Problem List Items Addressed This Visit        Cardiovascular and Mediastinum    CAD (coronary artery disease)    Nonrheumatic aortic valve stenosis       Other    Shortness of breath - Primary    Relevant Medications    fluticasone-vilanterol (BREO ELLIPTA) 100-25 mcg/inh inhaler    Other Relevant Orders    Complete PFT with post Bronchodilator and Six Minute walk      Other Visit Diagnoses     Uncomplicated asthma, unspecified asthma severity, unspecified whether persistent        Relevant Medications    fluticasone-vilanterol (BREO ELLIPTA) 100-25 mcg/inh inhaler    Other Relevant Orders    POCT spirometry            Plan for Today and Follow-up needs for next visit:    In office spirometry:  Demonstrates suggestive restriction as well as obstructive process    Shortness of breath / probable asthmatic process:  -obtain complete pulmonary function test  -start Breo 100 1 puff daily  -use rescue inhaler as needed  -obtain complete pulmonary function test  -if restrictive process will then pursue HRCT    Moderate aortic stenosis:  -obtain up-to-date echocardiogram  -follow-up evaluation with Cardiology    History sarcoidosis:  -likely noncontributory  -can obtain follow-up CT scan if see PFT demonstrated of restrictive process    Follow-up in approximately 1 month        Return in about 1 month (around 8/22/2020)    All questions are answered to the patient's satisfaction and understanding  She verbalizes understanding  She is encouraged to call with any further questions or concerns  ______________________________________________________________________    Chief Complaint:   Chief Complaint   Patient presents with    [facility] [place] [facility]        Patient ID: Edgar Delacruz is a 76 y o  y o  female has a past medical history of CAD (coronary artery disease), Cancer ([address] ), H/O heart artery stent, History of sarcoidosis, Hyperlipidemia, and Hypertension  7/22/2020  She presents today for initial evaluation:  For shortness of breath  She has had ongoing shortness of breath last 2 years with notable remote history of coronary artery disease post stent placement approximately 9-10 years ago by Dr Monty De La Fuente at [facility] after having MI during nuclear medicine stress test   Remotely diagnosed history of sarcoidosis 35 years ago by lymph node biopsied [facility] due to lung crystallization on x-ray scan  She reports she was remotely treated with steroids and never had another problem with it after that  Also has a history of occasional bronchitis having been hospitalized twice in the last 10 years  She has a personal history of thyroid cancer with partial thyroid resection approximately 15 years ago  N she states she has a history of sporadic asthma but never had any issues prior to her diagnosis sarcoid  She was diagnosed when she was approximately 36years old  Non never smoker  She is not on any chronic inhaler therapy, however, she reports that on average over the last 6 months she uses her Ventolin inhaler approximately 4 days out of the week morning and nighttime for shortness of breath  She also has a history of obstructive sleep apnea which she reports she was diagnosed with approximately 10-15 years ago and asleep standard [address] and has been on auto CPAP 10-12 cm for the last several years and uses it religiously    She reports that this is managed through her PCP  Her DME company is [address]  She also reports she has been following up the breast center for a shadow on her breast on the left side  which she is scheduled for follow-up ultrasound for  She specifically denies weight loss, night sweats, mucus production, wheezing, hemoptysis, or skin rashes  History positive for leg swelling improved with her Maxzide diuretic therapy  Family history thyroid cancer/family history of HOCM  She has ongoing shortness of breath worsening over the last 2 years and particularly worsened over the last 6 months  She has particular difficulty navigating up and down stairs without becoming severely shortness of breath which she reports improves with resting and resolves completely  She has notable difficulty ambulating even moderate distances and describes becoming short of breath just walking across the parking lot to the doctor's office  She recently did follow-up with cardiologist Dr Etelvina Fischer and overall had a normal nuclear medicine stress test in June of 2019  She did have a repeat echocardiogram performed at [facility] in May of 2020 showing findings consistent with moderate aortic stenosis with aortic valve area 1 2 cm non comparable with previous echocardiogram due to not well visualized aortic area  She did however have significant increase in her peak gradient with peak valvular gradients at 41 mm Hg as opposed her prior echocardiogram with peak gradient of 23 back in 2018  She also does make note that she has gained significant weight over the years and is marginally morbidly obese with a BMI of 39 9  We discussed the possibilities of asthma after review of her in office spirometry with her best attempt with an FEV1/FVC ratio of 63%, FEV1 1 43 L or 73%, FVC 2 26 L or 86% pre bronchodilator    We did discuss trialing inhaler therapy with Breo 100 mics 1 puff daily and obtainining complete pulmonary function test with 6 minutes walk and diffusion capacity  Most recent echocardiogram had only mildly elevated pulmonaryHPI  Pulmonary pressures with mild to moderate tricuspid regurgitation and mild mitral valvular incompetency  We discussed obtaining complete pulmonary function test with 6 minutes walk due to abnormal office spirometry and overall if presents with elements of restrictive defect can further pursue with high-resolution CT scan of the chest for evaluation  I do think that further progression of her peak gradients need possible further evaluation if she still presents with symptomatic dyspnea despite treatment  From a pulmonary standpoint her symptoms are out of proportion to her examination as she has no wheezing  She does have a significant systolic ejection murmur  Ongoing follow-up warranted  Smoking history: Non / Never smoker  Occupational history:   at [employer] History:  No significant environmental exposures  Travel history:  No recent travel or sick contacts  Respiratory History:  History of sarcoidosis diagnosed approximately 35 years ago at [facility] via biopsy lymph node  Oxygen Therapy:  No prior oxygen therapy  PAP Therapy:  Auto CPAP 10-12 cm water times 15 years  DME Company:  [employer]: [id number]  PCP:  Dr Ernesto Waite    Review of Systems   Constitutional: Positive for fatigue  Negative for activity change, appetite change, chills and fever  HENT: Negative for postnasal drip, rhinorrhea, sinus pressure and sinus pain  Eyes: Negative for visual disturbance  Respiratory: Positive for shortness of breath  Negative for apnea, cough, chest tightness, wheezing and stridor  Cardiovascular: Positive for leg swelling  Negative for chest pain  Gastrointestinal: Negative for diarrhea, nausea and vomiting  Endocrine: Negative for cold intolerance and heat intolerance     Musculoskeletal: Negative for arthralgias, back pain, joint swelling and myalgias  Skin: Negative for color change and rash  Allergic/Immunologic: Negative for environmental allergies  Neurological: Negative for syncope and light-headedness  Hematological: Negative for adenopathy  Psychiatric/Behavioral: Negative for confusion and sleep disturbance  The following portions of the patient's history were reviewed and updated as appropriate: allergies, current medications, past family history, past medical history, past social history, past surgical history and problem list     Smoking history: She reports that she has never smoked  She has never used smokeless tobacco   Social history: She reports that she has never smoked  She has never used smokeless tobacco  She reports that she does not use drugs  Past Medical History:   Diagnosis Date    Cancer [facility])     thyroid     Past Surgical History:   Procedure Laterality Date    HYSTERECTOMY      age 37    OOPHORECTOMY Bilateral     age 37     No family history on file  There is no immunization history on file for this patient  Current Outpatient Medications   Medication Sig Dispense Refill    albuterol (PROVENTIL HFA,VENTOLIN HFA) 90 mcg/act inhaler Inhale 2 puffs every 4 (four) hours as needed for wheezing or shortness of breath      amLODIPine (NORVASC) 10 mg tablet Take 10 mg by mouth daily       aspirin (ECOTRIN LOW STRENGTH) 81 mg EC tablet Take 81 mg by mouth daily      atorvastatin (LIPITOR) 20 mg tablet Take 20 mg by mouth daily      cloNIDine (CATAPRES-TTS-2) 0 2 mg/24 hr Place 1 patch on the skin once a week      fluticasone (FLONASE) 50 mcg/act nasal spray 1 spray into each nostril daily      levothyroxine 125 mcg tablet Take 125 mcg by mouth daily      losartan (COZAAR) 100 MG tablet Take 25 mg by mouth daily      triamterene-hydrochlorothiazide (MAXZIDE-25) 37 5-25 mg per tablet Take 1 tablet by mouth daily       No current facility-administered medications for this visit  Allergies: Patient has no known allergies  Objective: There were no vitals filed for this visit     Wt Readings from Last 3 Encounters:   06/18/20 97 5 kg (215 lb)   05/11/20 103 kg (226 lb 3 1 oz)   03/29/19 93 kg (205 lb)     There is no height or weight on file to calculate BMI  Physical Exam   Constitutional: She is oriented to person, place, and time  She appears well-developed and well-nourished  HENT:   Head: Normocephalic and atraumatic  Eyes: Pupils are equal, round, and reactive to light  Conjunctivae are normal    Neck: Normal range of motion  Neck supple  Cardiovascular: Normal rate and regular rhythm  Murmur heard  Systolic murmur is present  Pulmonary/Chest: Effort normal  No respiratory distress  She has decreased breath sounds in the right upper field, the right middle field, the right lower field, the left upper field, the left middle field and the left lower field  She has no wheezes  She has no rales  She exhibits no tenderness  Abdominal: Soft  Bowel sounds are normal    Musculoskeletal: Normal range of motion  Right lower leg: She exhibits edema  Left lower leg: She exhibits edema  +1 lower extremity edema   Neurological: She is alert and oriented to person, place, and time  Skin: Skin is warm and dry  Numerous skin tags   Psychiatric: She has a normal mood and affect  Lab Review: Invalid input(s):  EOSPCT                        ABG:    VBG:          Diagnostics:    No orders to display     5/11/2020    CHEST      INDICATION:   Sob with chest tightness      COMPARISON:  None     EXAM PERFORMED/VIEWS:  XR CHEST PORTABLE        FINDINGS:     Cardiomegaly is noted      The lungs are clear    No pneumothorax or pleural effusion      Osseous structures appear within normal limits for patient age      IMPRESSION:     No acute cardiopulmonary disease        PFT:    Echo:    May 2020:  OSLO:    See results in media section    Prior most recent echo   [facility] [address], [address] [facility]  [phone]     Transthoracic Echocardiogram  2D, M-mode, Doppler, and Color Doppler     Study date:  2018     Patient: Twila London  MR number: [id number]  Account number: [de-identified]  : 1945  Age: 67 years  Gender: Female  Status: Outpatient  Location: [address] [place] [address] [facility] [place]  Height: 62 in  Weight: 213 lb  BP:     Indications: Shortness of breath     Diagnoses: R06 02 - Shortness of breath     Sonographer:  ED Cortes  Referring Physician:  Latha Tellez MD  Group:  [facility]  Interpreting Physician:  Dru Gray MD     SUMMARY     LEFT VENTRICLE:  Systolic function was vigorous  Ejection fraction was estimated to be 65 %  There were no regional wall motion abnormalities  Wall thickness was increased  Concentric hypertrophy was present  Doppler parameters were consistent with abnormal left ventricular relaxation (grade 1 diastolic dysfunction)      AORTIC VALVE:  There was mild stenosis  Vmax 2 4 m/s, mean gradient 13 mm Hg, maximum gradient 23 mm Hg  There was trace to mild regurgitation      HISTORY: PRIOR HISTORY: Hypertension, high cholesterol, coronary artery disease, stent     PROCEDURE: The study was performed in the [address] [facility] [place]  This was a routine study  The transthoracic approach was used  The study included complete 2D imaging, M-mode, complete spectral Doppler, and color Doppler  This  was a technically difficult study      LEFT VENTRICLE: Size was normal  Systolic function was vigorous  Ejection fraction was estimated to be 65 %  There were no regional wall motion abnormalities  Wall thickness was increased  Concentric hypertrophy was present  DOPPLER: There  was an increased relative contribution of atrial contraction to ventricular filling   Doppler parameters were consistent with abnormal left ventricular relaxation (grade 1 diastolic dysfunction)      RIGHT VENTRICLE: The size was normal  Systolic function was normal      LEFT ATRIUM: Size was normal      RIGHT ATRIUM: Size was normal      MITRAL VALVE: There was annular calcification  Valve structure was normal  There was normal leaflet separation  DOPPLER: There was no evidence for stenosis  There was trace regurgitation      AORTIC VALVE: Leaflets exhibited mildly to moderately increased thickness and mild to moderate calcification  The valve was not well visualized  DOPPLER: There was mild stenosis  Vmax 2 4 m/s, mean gradient 13 mm Hg, maximum gradient 23 mm  Hg  There was trace to mild regurgitation      TRICUSPID VALVE: The valve structure was normal  There was normal leaflet separation  DOPPLER: There was no evidence for stenosis  There was no regurgitation      PULMONIC VALVE: Leaflets exhibited normal thickness, no calcification, and normal cuspal separation  DOPPLER: The transpulmonic velocity was within the normal range  There was mild regurgitation      PERICARDIUM: There was no pericardial effusion  The pericardium was normal in appearance      AORTA: The root exhibited normal size  Not well visualized      SYSTEMIC VEINS: IVC: The inferior vena cava was normal in size and course   Respirophasic changes were normal         [place] [address], [address] [place] [address]  [phone]     [facility]     Patient: Annalise Cramer  MR number: [id number]  Account number: [de-identified]  : 1945  Age: 76 years  Gender: Female  Status: Outpatient  Location: [facility]  Height: 62 in  Weight: 226 lb  BP: 112/ 70 mmHg     Allergies: NO KNOWN ALLERGIES     Diagnosis: I25 10 - Atherosclerotic heart disease of native coronary artery without angina pectoris     Primary Physician:  Gwyn Lamas MD  RN:  Aysha Joya  Referring Physician:  Gwyn Lamas MD  Technician:  Robert Casanova  Group:  [facility]  Report Prepared By[de-identified]  Lesly Gray  Interpreting Physician:  Dawn Mcneal MD     INDICATIONS: Evaluation of known coronary artery disease      HISTORY: The patient is a 76year old  female  Chest pain status: no chest pain  Other symptoms: dyspnea Coronary artery disease risk factors: dyslipidemia, hypertension, and post-menopausal state  Cardiovascular history: coronary  artery disease  Prior cardiovascular procedures: percutaneous transluminal coronary angioplasty (on 2008)  Medications: a beta blocker, a calcium channel blocker, an ACE inhibitor/ARB, a diuretic, aspirin, a lipid lowering agent, and  thyroid medications      PHYSICAL EXAM: Baseline physical exam screening: normal      REST ECG: Normal sinus rhythm      PROCEDURE: The study was performed in the the [facility]  A regadenoson infusion pharmacologic stress test was performed  Systolic blood pressure was 112 mmHg, at the start of the study  Diastolic blood pressure was  70 mmHg, at the start of the study  The heart rate was 62 bpm, at the start of the study  IV double checked  Regadenoson protocol:  HR bpm SBP mmHg DBP mmHg Symptoms  Baseline 62 112 70 none  1 min 110 180 90 mild dyspnea  4 min 86 112 70 none  No medications or fluids given  The patient also performed low level exercise      STRESS SUMMARY: Duration of pharmacologic stress was 3 min  The patient exercised to protocol stage 1  Maximal heart rate during stress was 110 bpm  The heart rate response to stress was normal  There was normal resting blood pressure with  an appropriate response to stress  There was no chest pain during stress  The stress test was terminated due to protocol completion  Pre oxygen saturation: 96 %  Peak oxygen saturation: 96 %   The stress ECG was negative for ischemia and  normal  Arrhythmia during stress: isolated premature ventricular beats      ISOTOPE ADMINISTRATION:  Resting isotope administration Stress isotope administration  Agent Tetrofosmin Tetrofosmin  Dose 10 2 mCi 32 9 mCi  Date 06/01/2020 06/01/2020  Injection-image interval 45 min 34 min     The radiopharmaceutical was injected at the peak effect of pharmacologic stress      MYOCARDIAL PERFUSION IMAGING:  The image quality was good  Rotating projection images reveal mild breast attenuation  Left ventricular size was normal  The TID ratio was 0 88      PERFUSION DEFECTS:  -  There were no perfusion defects      GATED SPECT:  The calculated left ventricular ejection fraction was 75 %  Left ventricular ejection fraction was within normal limits by visual estimate  There was no left ventricular regional abnormality      SUMMARY:  -  Stress results: There was no chest pain during stress  -  ECG conclusions: The stress ECG was negative for ischemia and normal   -  Perfusion imaging: There were no perfusion defects   -  Gated SPECT: The calculated left ventricular ejection fraction was 75 %  Left ventricular ejection fraction was within normal limits by visual estimate  There was no left ventricular regional abnormality      IMPRESSIONS: Normal study  Myocardial perfusion imaging was normal at rest and with stress  Left ventricular systolic function was normal      Prepared and signed by     Nikolas Kirby MD  Signed 06/01/2020 17:11:16        PFT/DICK:     ESS:    No results found  EKG/ECHO:    Portions of the record may have been created with voice recognition software  Occasional wrong word or "sound a like" substitutions may have occurred due to the inherent limitations of voice recognition software  Read the chart carefully and recognize, using context, where substitutions have occurred  Electronically Signed by Refugio Prieto PA-C  Attestation signed by Robert Car DO at 7/22/2020 11:07 AM:    Patient was seen examined  Agree with findings, assessment plan as outlined  Labs and radiographs were personally reviewed    Patient has progressively worsening shortness of breath over the past few years  She reads referred by cardiology for further evaluation  She denies dyspnea at rest   She has a Ventolin inhaler which she use up to 2 times daily which provides minimal relief  She has no cough, wheeze or sputum production  She denies chest pain  She has occasional lower extremity edema  She denies palpitations  She denies history of DVT /PE  She has a remote diagnosis of sarcoidosis obtained via lymph node biopsy  She briefly completed a course of prednisone, but no specific treatment since that time  Review of the data shows evidence of moderate stenosis with significant increase in the gradient over the past 2 years  No significant associated pulmonary hypertension  In office spirometry today shows moderate obstruction  Bronchodilator was not administered  Chest x-ray is clear  Assessment/plan:     Dyspnea on exertion of unclear etiology  This is likely multifactorial  Due to underlying cardiac and pulmonary disease  She has evidence of  obstruction on in office spirometry, however I would like to obtain complete PFTs with 6 minutes walk test for further characterization  In the meantime, we will prescribe Breo Ellipta to use once daily to see if it helps with her symptoms  I am concerned that there is underlying cardiac element, particularly given degree of aortic stenosis  She is following with cardiology in that regard  I do not believe that sarcoidosis is playing a role  However, if there is any significant restriction or diffusion impairment on PFTs, could consider high-resolution chest CT for further evaluation  Remainder of plan as outlined  She will follow-up after PFTs

## 2020-07-22 NOTE — PATIENT INSTRUCTIONS
Shortness of breath / probable asthmatic process:  -obtain complete pulmonary function test  -start Breo 100 1 puff daily  -use rescue inhaler as needed  -obtain complete pulmonary function test  -if restrictive process will then pursue HRCT    Moderate aortic stenosis:  -obtain up-to-date echocardiogram  -follow-up evaluation with Cardiology    History sarcoidosis:  -likely noncontributory  -can obtain follow-up CT scan if see PFT demonstrated of restrictive process    Follow-up in approximately 1 month

## 2020-07-27 ENCOUNTER — DOCTOR'S OFFICE (OUTPATIENT)
Dept: URBAN - METROPOLITAN AREA CLINIC 137 | Facility: CLINIC | Age: 75
Setting detail: OPHTHALMOLOGY
End: 2020-07-27
Payer: MEDICARE

## 2020-07-27 DIAGNOSIS — H25.13: ICD-10-CM

## 2020-07-27 DIAGNOSIS — H35.3132: ICD-10-CM

## 2020-07-27 PROBLEM — H52.4 PRESBYOPIA ; BOTH EYES: Status: ACTIVE | Noted: 2018-04-04

## 2020-07-27 PROBLEM — H52.13 MYOPIA OU; BOTH EYES: Status: ACTIVE | Noted: 2020-07-27

## 2020-07-27 PROCEDURE — 92014 COMPRE OPH EXAM EST PT 1/>: CPT | Performed by: OPTOMETRIST

## 2020-07-27 ASSESSMENT — CONFRONTATIONAL VISUAL FIELD TEST (CVF)
OD_FINDINGS: FULL
OS_FINDINGS: FULL

## 2020-07-29 ENCOUNTER — TRANSCRIBE ORDERS (OUTPATIENT)
Dept: ADMINISTRATIVE | Facility: HOSPITAL | Age: 75
End: 2020-07-29

## 2020-07-29 DIAGNOSIS — N63.0 LUMP, BREAST: ICD-10-CM

## 2020-07-29 DIAGNOSIS — R92.8 ABNORMAL MAMMOGRAM: Primary | ICD-10-CM

## 2020-08-03 DIAGNOSIS — R06.02 SHORTNESS OF BREATH: ICD-10-CM

## 2020-08-03 DIAGNOSIS — J45.909 UNCOMPLICATED ASTHMA, UNSPECIFIED ASTHMA SEVERITY, UNSPECIFIED WHETHER PERSISTENT: ICD-10-CM

## 2020-08-03 RX ORDER — FLUTICASONE FUROATE AND VILANTEROL 100; 25 UG/1; UG/1
1 POWDER RESPIRATORY (INHALATION) DAILY
Qty: 3 INHALER | Refills: 3 | Status: SHIPPED | OUTPATIENT
Start: 2020-08-03

## 2020-08-10 ENCOUNTER — HOSPITAL ENCOUNTER (OUTPATIENT)
Dept: ULTRASOUND IMAGING | Facility: CLINIC | Age: 75
Discharge: HOME | End: 2020-08-10
Payer: COMMERCIAL

## 2020-08-10 ENCOUNTER — HOSPITAL ENCOUNTER (OUTPATIENT)
Dept: MAMMOGRAPHY | Facility: CLINIC | Age: 75
Discharge: HOME | End: 2020-08-10
Payer: COMMERCIAL

## 2020-08-10 VITALS — HEIGHT: 62 IN | WEIGHT: 218 LBS | BODY MASS INDEX: 40.12 KG/M2

## 2020-08-10 DIAGNOSIS — R92.8 ABNORMAL MAMMOGRAM: ICD-10-CM

## 2020-08-10 PROCEDURE — G0279 TOMOSYNTHESIS, MAMMO: HCPCS

## 2020-08-10 PROCEDURE — 76642 ULTRASOUND BREAST LIMITED: CPT

## 2020-08-10 PROCEDURE — 77066 DX MAMMO INCL CAD BI: CPT

## 2020-08-10 ASSESSMENT — REFRACTION_MANIFEST
OD_VA2: 20/25(J1)
OD_AXIS: 010
OD_CYLINDER: -0.50
OS_VA2: 20/25(J1)
OS_VA1: 20/25
OS_VA2: 20/25(J1)
OS_ADD: +2.50
OS_VA1: 20/25
OD_SPHERE: -3.25
OS_CYLINDER: -0.50
OD_VA2: 20/25(J1)
OD_SPHERE: -2.75
OS_SPHERE: -2.25
OS_AXIS: 060
OS_AXIS: 150
OD_VA1: 20/25
OD_AXIS: 100
OS_ADD: +2.50
OD_ADD: +2.50
OS_CYLINDER: +0.50
OD_ADD: +2.50
OS_SPHERE: -1.75
OD_VA1: 20/25
OD_CYLINDER: +0.50

## 2020-08-10 ASSESSMENT — REFRACTION_CURRENTRX
OD_OVR_VA: 20/
OD_CYLINDER: -0.75
OS_AXIS: 065
OD_ADD: +2.50
OD_VPRISM_DIRECTION: PROGS
OS_ADD: +2.50
OS_CYLINDER: -0.50
OD_AXIS: 012
OS_SPHERE: -1.75
OD_SPHERE: -2.75
OS_VPRISM_DIRECTION: PROGS
OS_OVR_VA: 20/

## 2020-08-10 ASSESSMENT — REFRACTION_AUTOREFRACTION
OD_CYLINDER: -1.50
OS_AXIS: 162
OS_CYLINDER: -0.25
OD_SPHERE: -2.25
OS_SPHERE: -1.75
OD_AXIS: 008

## 2020-08-10 ASSESSMENT — SPHEQUIV_DERIVED
OS_SPHEQUIV: -2
OS_SPHEQUIV: -2
OD_SPHEQUIV: -3
OS_SPHEQUIV: -1.875

## 2020-08-10 ASSESSMENT — VISUAL ACUITY
OD_BCVA: 20/50
OS_BCVA: 20/40

## 2020-08-24 ENCOUNTER — TELEPHONE (OUTPATIENT)
Dept: ENDOCRINOLOGY | Facility: CLINIC | Age: 75
End: 2020-08-24

## 2020-09-10 ENCOUNTER — HOSPITAL ENCOUNTER (OUTPATIENT)
Dept: PULMONOLOGY | Facility: HOSPITAL | Age: 75
Discharge: HOME | End: 2020-09-10
Payer: COMMERCIAL

## 2020-09-10 DIAGNOSIS — R06.02 SHORTNESS OF BREATH: ICD-10-CM

## 2020-09-10 PROCEDURE — 94761 N-INVAS EAR/PLS OXIMETRY MLT: CPT

## 2020-09-10 PROCEDURE — 94060 EVALUATION OF WHEEZING: CPT | Performed by: INTERNAL MEDICINE

## 2020-09-10 PROCEDURE — 94729 DIFFUSING CAPACITY: CPT | Performed by: INTERNAL MEDICINE

## 2020-09-10 PROCEDURE — 94726 PLETHYSMOGRAPHY LUNG VOLUMES: CPT | Performed by: INTERNAL MEDICINE

## 2020-09-10 PROCEDURE — 94760 N-INVAS EAR/PLS OXIMETRY 1: CPT

## 2020-09-10 PROCEDURE — 94726 PLETHYSMOGRAPHY LUNG VOLUMES: CPT

## 2020-09-10 PROCEDURE — 94618 PULMONARY STRESS TESTING: CPT | Performed by: INTERNAL MEDICINE

## 2020-09-10 PROCEDURE — 94060 EVALUATION OF WHEEZING: CPT

## 2020-09-10 PROCEDURE — 94729 DIFFUSING CAPACITY: CPT

## 2020-09-11 ENCOUNTER — TELEPHONE (OUTPATIENT)
Dept: OTHER | Facility: HOSPITAL | Age: 75
End: 2020-09-11

## 2020-09-11 NOTE — TELEPHONE ENCOUNTER
Discussed results in entirety as listed below per pulmonary function test results  Telephone conversation of 13 min 42 sec  Discussing results, answering, questions, explaining physiology  Patient reports she feels her breathing is half as labored, and definitely improved on Breo  Discussed no significant obstructive defect  Discussed pharmacologic and physiologic affects of bronchodilators and the risks and benefits  Discussed that some of this shortness of breath is partially weight mediated and that weight loss would likely significantly improved respiratory status  She does report that she is currently on a diet and has recently lost 7 lb  Applauded and encouraged her to continue weight loss  She has scheduled follow-up on  with Dr Parvin Ramos  She confirms that she is going to attend her appointment  Discussed further management to be planned out at next office appointment and decision as to whether not to remain on medication  Discussed that for now she can continue Breo until her next office visit  Pulmonary Function Test Report  VMEMKFSC Long 76 y o  female MRN: [id number]     Date of Testin/10/20     Date of Interpretation: 9/10/20     Requesting Physician: Jaden Enriquez  Reason for Testing: Shortness of breath     Reference set for interpretation: NHANES III     Procedure: The patient was taken to pulmonary function testing laboratory  The patient demonstrated good effort and cooperation  The results of this test meet ATS standards for acceptability and repeatability    Data set appears appropriate for interpretation      Post bronchodilator testing performed after the administration of 2 5mg albuterol in 3cc normal saline administered via nebulizer per bronchodilator protocol      Results:  FEV1/FVC Ratio: 82 %  Forced Vital Capacity: 2 07 L    79 % predicted  FEV1: 1 69 L     80 % predicted     Lung volumes by body plethysmography:   Total Lung Capacity 79 % predicted   Residual volume 76 % predicted     DLCO corrected for patients hemoglobin level: 65 %        6 Minute Walk:  The patient's starting pulse ox was 93% with a heart rate of 64 and Prakash dyspnea score of 0/10  They walked a total of 340m in 6minutes without stopping  At completion the pulse ox was 89% with a heart rate of 110 and Prakash dyspnea of score of 4/10      Interpretation:     · Normal Spirometry     · Mild restrictive lung disease as indicated by decreased TLC     · Mildly reduced diffusion capacity           SHARIF Mistry    [facility]

## 2020-09-18 ENCOUNTER — TELEPHONE (OUTPATIENT)
Dept: PULMONOLOGY | Facility: CLINIC | Age: 75
End: 2020-09-18

## 2020-09-21 ENCOUNTER — OFFICE VISIT (OUTPATIENT)
Dept: PULMONOLOGY | Facility: CLINIC | Age: 75
End: 2020-09-21
Payer: COMMERCIAL

## 2020-09-21 VITALS
SYSTOLIC BLOOD PRESSURE: 134 MMHG | OXYGEN SATURATION: 98 % | TEMPERATURE: 97.6 F | HEART RATE: 64 BPM | WEIGHT: 216.2 LBS | HEIGHT: 63 IN | DIASTOLIC BLOOD PRESSURE: 74 MMHG | BODY MASS INDEX: 38.31 KG/M2

## 2020-09-21 DIAGNOSIS — R06.02 SHORTNESS OF BREATH: ICD-10-CM

## 2020-09-21 DIAGNOSIS — J45.30 MILD PERSISTENT ASTHMA WITHOUT COMPLICATION: ICD-10-CM

## 2020-09-21 DIAGNOSIS — J98.4 RESTRICTIVE LUNG DISEASE: Primary | ICD-10-CM

## 2020-09-21 PROCEDURE — 99214 OFFICE O/P EST MOD 30 MIN: CPT | Performed by: INTERNAL MEDICINE

## 2020-09-21 RX ORDER — AMLODIPINE BESYLATE 5 MG/1
5 TABLET ORAL DAILY
COMMUNITY

## 2020-09-21 NOTE — ASSESSMENT & PLAN NOTE
The patient's shortness of breath is multifactorial due to both underlying cardiac and pulmonary disease and obesity  She seems to be doing better with weight loss as well  In the event her symptoms worsen, I would strongly consider high-resolution chest CT and also updating the echocardiogram to exclude development of pulmonary hypertension    She follows with Dr Sondra Alcantar  From a cardiac perspective

## 2020-09-21 NOTE — ASSESSMENT & PLAN NOTE
Patient has intermittent episodes of cough, shortness of breath and wheezing, all consistent with asthma  She finds Breo Ellipta to be beneficial during her times of gratis difficulty  Is certainly reasonable to treat with daily [place] only during those winter months and otherwise use p r n  albuterol  There are some studies to support that p r n   ICS/LABA is also reasonable

## 2020-09-21 NOTE — PROGRESS NOTES
Pulmonary Follow Up Note   Nadeem Ashton 76 y o  female MRN: [id number]  9/21/2020      Assessment/Plan:     Restrictive lung disease    Patient has very mild restriction based on PFTs  She has a remote history of sarcoidosis and very possibly has underlying scarring  We discussed the option of pursuing high-resolution chest CT for further characterization, however given that we would be unlikely to change our treatment course, patient would like to hold off  We will plan to repeat PFTs in 1 year  If she develops worsening or symptoms prior to that, she was instructed to call the office  Mild persistent asthma without complication    Patient has intermittent episodes of cough, shortness of breath and wheezing, all consistent with asthma  She finds Breo Ellipta to be beneficial during her times of gratis difficulty  Is certainly reasonable to treat with daily Yen Basket only during those winter months and otherwise use p r n  albuterol  There are some studies to support that p r n  ICS/LABA is also reasonable    Shortness of breath   The patient's shortness of breath is multifactorial due to both underlying cardiac and pulmonary disease and obesity  She seems to be doing better with weight loss as well  In the event her symptoms worsen, I would strongly consider high-resolution chest CT and also updating the echocardiogram to exclude development of pulmonary hypertension  She follows with Dr Kerri Chavez  From a cardiac perspective      Visit orders:    Diagnoses and all orders for this visit:    Restrictive lung disease  -     Complete PFT with post bronchodilator; Future    Mild persistent asthma without complication    Shortness of breath    Other orders  -     amLODIPine (NORVASC) 5 mg tablet; Take 5 mg by mouth daily      No follow-ups on file  History of Present Illness   HPI:  Monserrat Leon is a 76 y o  female who is here today for follow-up regarding asthma and to review PFT results     Patient has been doing well from pulmonary perspective  She has been following a keto diet and has successfully lost 6 lb  She feels this has improved her breathing  She uses [facility] on a sporadic basis, typically when she is having symptoms of cough or shortness of breath  She has very sporadic wheezing as well  She finds that her most troublesome times are in the months of January through February  Review of Systems   Constitutional: Negative for chills, fever and unexpected weight change  HENT: Negative for postnasal drip and sore throat  Eyes: Negative for visual disturbance  Respiratory:        As noted in HPI   Cardiovascular: Negative for chest pain  Gastrointestinal: Negative for abdominal pain, diarrhea and vomiting  Genitourinary: Negative for difficulty urinating  Skin: Negative for rash  Neurological: Negative for headaches  Hematological: Negative for adenopathy  Psychiatric/Behavioral: Negative  All other systems reviewed and are negative        Medical, Family and Social history reviewed and updated as appropriate    Historical Information   Past Medical History:   Diagnosis Date    Abnormal ultrasound of breast     CAD (coronary artery disease)     Cancer ([address] )     thyroid    H/O heart artery stent     History of sarcoidosis     Hyperlipidemia     Hypertension      Past Surgical History:   Procedure Laterality Date    HYSTERECTOMY      age 37    OOPHORECTOMY Bilateral     age 37     Family History   Problem Relation Age of Onset    No Known Problems Mother     Thyroid cancer Father     No Known Problems Sister     No Known Problems Daughter     Cancer Maternal Grandmother         ear    No Known Problems Maternal Grandfather     No Known Problems Paternal Grandmother     No Known Problems Paternal Grandfather     No Known Problems Maternal Aunt     No Known Problems Maternal Aunt     Leukemia Paternal Aunt        Social History     Tobacco Use   Smoking Status Never Smoker   Smokeless Tobacco Never Used     Meds/Allergies     Current Outpatient Medications:     albuterol (PROVENTIL HFA,VENTOLIN HFA) 90 mcg/act inhaler, Inhale 2 puffs every 4 (four) hours as needed for wheezing or shortness of breath, Disp: , Rfl:     amLODIPine (NORVASC) 5 mg tablet, Take 5 mg by mouth daily, Disp: , Rfl:     aspirin (ECOTRIN LOW STRENGTH) 81 mg EC tablet, Take 81 mg by mouth daily, Disp: , Rfl:     atorvastatin (LIPITOR) 20 mg tablet, Take 20 mg by mouth daily, Disp: , Rfl:     cloNIDine (CATAPRES-TTS-2) 0 2 mg/24 hr, Place 1 patch on the skin once a week, Disp: , Rfl:     fluticasone (FLONASE) 50 mcg/act nasal spray, 1 spray into each nostril daily, Disp: , Rfl:     fluticasone-vilanterol (BREO ELLIPTA) 100-25 mcg/inh inhaler, Inhale 1 puff daily Rinse mouth after use , Disp: 3 Inhaler, Rfl: 3    levothyroxine 125 mcg tablet, Take 125 mcg by mouth daily, Disp: , Rfl:     losartan (COZAAR) 100 MG tablet, Take 25 mg by mouth daily, Disp: , Rfl:     Multiple Vitamins-Minerals (CENTRUM ADULTS PO), Take by mouth daily, Disp: , Rfl:     triamterene-hydrochlorothiazide (MAXZIDE-25) 37 5-25 mg per tablet, Take 1 tablet by mouth daily, Disp: , Rfl:   No Known Allergies    Vitals: Blood pressure 134/74, pulse 64, temperature 97 6 °F (36 4 °C), temperature source Temporal, height 5' 3" (1 6 m), weight 98 1 kg (216 lb 3 2 oz), SpO2 98 %  Body mass index is 38 3 kg/m²  Oxygen Therapy  SpO2: 98 %  Oxygen Therapy: None (Room air)    Physical Exam   Physical Exam  Constitutional:       General: She is not in acute distress  HENT:      Head: Normocephalic  Mouth/Throat:      Pharynx: No oropharyngeal exudate  Eyes:      General: No scleral icterus  Pupils: Pupils are equal, round, and reactive to light  Neck:      Musculoskeletal: Neck supple  Vascular: No JVD  Cardiovascular:      Rate and Rhythm: Normal rate and regular rhythm  Pulmonary:      Breath sounds:  No wheezing or rhonchi  Abdominal:      Palpations: Abdomen is soft  Tenderness: There is no abdominal tenderness  Lymphadenopathy:      Cervical: No cervical adenopathy  Skin:     General: Skin is warm and dry  Neurological:      Mental Status: She is alert and oriented to person, place, and time  Labs: I have personally reviewed pertinent lab results  Lab Results   Component Value Date    WBC 5 85 05/11/2020    HGB 13 3 05/11/2020    HCT 40 9 05/11/2020    MCV 94 05/11/2020     (L) 05/11/2020     Lab Results   Component Value Date    CALCIUM 9 2 05/11/2020    K 3 5 05/11/2020    CO2 28 05/11/2020     05/11/2020    BUN 38 (H) 05/11/2020    CREATININE 1 40 (H) 05/11/2020     No results found for: IGE  No results found for: ALT, AST, GGT, ALKPHOS, BILITOT    Imaging and other studies: I have personally reviewed pertinent reports  and I have personally reviewed pertinent films in PACS   Chest x-ray from 5/11/20 shows clear lungs    Pulmonary function testing:  Performed 9/10/2020 and personally interpreted  FEV1/FVC ratio 82%   FEV1 80% predicted  FVC 79% predicted  TLC 79 % predicted  RV 76 % predicted  DLCO corrected for hemoglobin 65 % predicted  PFTs with very mild restriction and mild diffusion impairment     6 minute walk test performed on same day of PFTs showed mild oxygen desaturation down to 89% on room air

## 2020-09-21 NOTE — ASSESSMENT & PLAN NOTE
Patient has very mild restriction based on PFTs  She has a remote history of sarcoidosis and very possibly has underlying scarring  We discussed the option of pursuing high-resolution chest CT for further characterization, however given that we would be unlikely to change our treatment course, patient would like to hold off  We will plan to repeat PFTs in 1 year  If she develops worsening or symptoms prior to that, she was instructed to call the office

## 2021-11-19 ENCOUNTER — HOSPITAL ENCOUNTER (OUTPATIENT)
Dept: NON INVASIVE DIAGNOSTICS | Facility: CLINIC | Age: 76
Discharge: HOME | End: 2021-11-19
Payer: COMMERCIAL

## 2021-11-19 ENCOUNTER — OFFICE VISIT (OUTPATIENT)
Dept: LAB | Facility: CLINIC | Age: 76
End: 2021-11-19
Payer: COMMERCIAL

## 2021-11-19 VITALS
WEIGHT: 216 LBS | BODY MASS INDEX: 38.27 KG/M2 | DIASTOLIC BLOOD PRESSURE: 74 MMHG | SYSTOLIC BLOOD PRESSURE: 134 MMHG | HEIGHT: 63 IN | HEART RATE: 75 BPM

## 2021-11-19 DIAGNOSIS — I35.0 NON-RHEUMATIC AORTIC STENOSIS: ICD-10-CM

## 2021-11-19 DIAGNOSIS — I35.0 NONRHEUMATIC AORTIC (VALVE) STENOSIS: ICD-10-CM

## 2021-11-19 LAB
AORTIC ROOT: 3.1 CM
AORTIC VALVE MEAN VELOCITY: 21.7 M/S
APICAL FOUR CHAMBER EJECTION FRACTION: 73 %
ASCENDING AORTA: 3.9 CM
AV AREA BY CONTINUOUS VTI: 2.1 CM2
AV AREA PEAK VELOCITY: 1.9 CM2
AV LVOT MEAN GRADIENT: 8 MMHG
AV LVOT PEAK GRADIENT: 15 MMHG
AV MEAN GRADIENT: 21 MMHG
AV PEAK GRADIENT: 35 MMHG
AV VALVE AREA: 2.05 CM2
DOP CALC AO VTI: 59.13 CM
DOP CALC LVOT AREA: 2.83 CM2
DOP CALC LVOT DIAMETER: 1.9 CM
DOP CALC LVOT PEAK VEL VTI: 42.76 CM
DOP CALC LVOT PEAK VEL: 1.96 M/S
DOP CALC LVOT STROKE INDEX: 62 ML/M2
DOP CALC LVOT STROKE VOLUME: 121.18 CM3
E WAVE DECELERATION TIME: 244 MS
FRACTIONAL SHORTENING: 26 % (ref 28–44)
INTERVENTRICULAR SEPTUM IN DIASTOLE (PARASTERNAL SHORT AXIS VIEW): 1.3 CM
LEFT ATRIUM AREA SYSTOLE SINGLE PLANE A4C: 14.4 CM2
LEFT INTERNAL DIMENSION IN SYSTOLE: 2.6 CM (ref 2.1–4)
LEFT VENTRICULAR INTERNAL DIMENSION IN DIASTOLE: 3.5 CM (ref 5.64–8.4)
LEFT VENTRICULAR POSTERIOR WALL IN END DIASTOLE: 1.3 CM
LEFT VENTRICULAR STROKE VOLUME: 27 ML
MV E'TISSUE VEL-SEP: 6 CM/S
MV PEAK A VEL: 1.4 M/S
MV PEAK E VEL: 64 CM/S
MV STENOSIS PRESSURE HALF TIME: 0 MS
RIGHT ATRIUM AREA SYSTOLE A4C: 11.6 CM2
RIGHT VENTRICLE ID DIMENSION: 2.8 CM
SL CV LV EF: 70
SL CV PED ECHO LEFT VENTRICLE DIASTOLIC VOLUME (MOD BIPLANE) 2D: 52 ML
SL CV PED ECHO LEFT VENTRICLE SYSTOLIC VOLUME (MOD BIPLANE) 2D: 25 ML
TRICUSPID VALVE PEAK REGURGITATION VELOCITY: 2.54 M/S
TRICUSPID VALVE S': 2 CM/S
TV PEAK GRADIENT: 26 MMHG
Z-SCORE OF LEFT VENTRICULAR DIMENSION IN END SYSTOLE: -6.76

## 2021-11-19 PROCEDURE — 93306 TTE W/DOPPLER COMPLETE: CPT

## 2021-11-19 PROCEDURE — 93306 TTE W/DOPPLER COMPLETE: CPT | Performed by: INTERNAL MEDICINE

## 2021-11-19 PROCEDURE — 93005 ELECTROCARDIOGRAM TRACING: CPT

## 2021-11-22 LAB
ATRIAL RATE: 66 BPM
P AXIS: 60 DEGREES
PR INTERVAL: 170 MS
QRS AXIS: -43 DEGREES
QRSD INTERVAL: 84 MS
QT INTERVAL: 420 MS
QTC INTERVAL: 440 MS
T WAVE AXIS: 48 DEGREES
VENTRICULAR RATE: 66 BPM

## 2021-11-22 PROCEDURE — 93010 ELECTROCARDIOGRAM REPORT: CPT | Performed by: INTERNAL MEDICINE

## 2021-11-23 ENCOUNTER — HOSPITAL ENCOUNTER (OUTPATIENT)
Dept: ULTRASOUND IMAGING | Facility: HOSPITAL | Age: 76
Discharge: HOME | End: 2021-11-23
Attending: INTERNAL MEDICINE
Payer: COMMERCIAL

## 2021-11-23 DIAGNOSIS — K76.0 FATTY (CHANGE OF) LIVER, NOT ELSEWHERE CLASSIFIED: ICD-10-CM

## 2021-11-23 PROCEDURE — 76700 US EXAM ABDOM COMPLETE: CPT

## 2022-06-08 ENCOUNTER — HOSPITAL ENCOUNTER (OUTPATIENT)
Dept: ULTRASOUND IMAGING | Facility: HOSPITAL | Age: 77
Discharge: HOME | End: 2022-06-08
Attending: INTERNAL MEDICINE
Payer: COMMERCIAL

## 2022-06-08 DIAGNOSIS — K86.2 PANCREATIC CYST: ICD-10-CM

## 2022-06-08 PROCEDURE — 76700 US EXAM ABDOM COMPLETE: CPT

## 2022-06-15 ENCOUNTER — HOSPITAL ENCOUNTER (OUTPATIENT)
Dept: NON INVASIVE DIAGNOSTICS | Facility: HOSPITAL | Age: 77
Discharge: HOME | End: 2022-06-15
Attending: INTERNAL MEDICINE
Payer: COMMERCIAL

## 2022-06-15 VITALS
DIASTOLIC BLOOD PRESSURE: 74 MMHG | SYSTOLIC BLOOD PRESSURE: 134 MMHG | HEART RATE: 68 BPM | BODY MASS INDEX: 38.27 KG/M2 | WEIGHT: 216 LBS | HEIGHT: 63 IN

## 2022-06-15 DIAGNOSIS — I35.0 NONRHEUMATIC AORTIC (VALVE) STENOSIS: ICD-10-CM

## 2022-06-15 LAB
AORTIC ROOT: 3.7 CM
AORTIC VALVE MEAN VELOCITY: 21.9 M/S
APICAL FOUR CHAMBER EJECTION FRACTION: 72 %
ASCENDING AORTA: 4 CM
AV AREA BY CONTINUOUS VTI: 1.5 CM2
AV AREA PEAK VELOCITY: 1.6 CM2
AV LVOT MEAN GRADIENT: 6 MMHG
AV LVOT PEAK GRADIENT: 11 MMHG
AV MEAN GRADIENT: 21 MMHG
AV PEAK GRADIENT: 36 MMHG
AV VALVE AREA: 1.5 CM2
AV VELOCITY RATIO: 0.56
DOP CALC AO PEAK VEL: 2.99 M/S
DOP CALC AO VTI: 71.31 CM
DOP CALC LVOT AREA: 2.83 CM2
DOP CALC LVOT DIAMETER: 1.9 CM
DOP CALC LVOT PEAK VEL VTI: 37.87 CM
DOP CALC LVOT PEAK VEL: 1.68 M/S
DOP CALC LVOT STROKE INDEX: 54.5 ML/M2
DOP CALC LVOT STROKE VOLUME: 107.32
E WAVE DECELERATION TIME: 355 MS
FRACTIONAL SHORTENING: 38 (ref 28–44)
INTERVENTRICULAR SEPTUM IN DIASTOLE (PARASTERNAL SHORT AXIS VIEW): 1.5 CM
INTERVENTRICULAR SEPTUM: 1.5 CM (ref 0.6–1.1)
LAAS-AP2: 21.6 CM2
LAAS-AP4: 21.3 CM2
LEFT ATRIUM SIZE: 4.5 CM
LEFT INTERNAL DIMENSION IN SYSTOLE: 2.6 CM (ref 2.1–4)
LEFT VENTRICULAR INTERNAL DIMENSION IN DIASTOLE: 4.2 CM (ref 3.5–6)
LEFT VENTRICULAR POSTERIOR WALL IN END DIASTOLE: 1.3 CM
LEFT VENTRICULAR STROKE VOLUME: 53 ML
LVSV (TEICH): 53 ML
MV E'TISSUE VEL-SEP: 7 CM/S
MV PEAK A VEL: 1.24 M/S
MV PEAK E VEL: 96 CM/S
MV STENOSIS PRESSURE HALF TIME: 103 MS
MV VALVE AREA P 1/2 METHOD: 2.14
RA PRESSURE ESTIMATED: 4 MMHG
RIGHT ATRIUM AREA SYSTOLE A4C: 11.1 CM2
RIGHT VENTRICLE ID DIMENSION: 3.3 CM
RV PSP: 28 MMHG
SL CV LEFT ATRIUM LENGTH A2C: 5.7 CM
SL CV LV EF: 70
SL CV PED ECHO LEFT VENTRICLE DIASTOLIC VOLUME (MOD BIPLANE) 2D: 77 ML
SL CV PED ECHO LEFT VENTRICLE SYSTOLIC VOLUME (MOD BIPLANE) 2D: 24 ML
TR MAX PG: 24 MMHG
TR PEAK VELOCITY: 2.5 M/S
TRICUSPID VALVE PEAK REGURGITATION VELOCITY: 2.45 M/S

## 2022-06-15 PROCEDURE — 93306 TTE W/DOPPLER COMPLETE: CPT

## 2022-06-15 PROCEDURE — 93306 TTE W/DOPPLER COMPLETE: CPT | Performed by: INTERNAL MEDICINE

## 2023-02-27 ENCOUNTER — HOSPITAL ENCOUNTER (OUTPATIENT)
Dept: NON INVASIVE DIAGNOSTICS | Facility: HOSPITAL | Age: 78
Discharge: HOME | End: 2023-02-27
Attending: INTERNAL MEDICINE

## 2023-02-27 ENCOUNTER — HOSPITAL ENCOUNTER (OUTPATIENT)
Dept: ULTRASOUND IMAGING | Facility: HOSPITAL | Age: 78
Discharge: HOME | End: 2023-02-27
Attending: INTERNAL MEDICINE

## 2023-02-27 VITALS
WEIGHT: 216 LBS | HEART RATE: 68 BPM | HEIGHT: 63 IN | DIASTOLIC BLOOD PRESSURE: 74 MMHG | SYSTOLIC BLOOD PRESSURE: 134 MMHG | BODY MASS INDEX: 38.27 KG/M2

## 2023-02-27 DIAGNOSIS — K86.2 CYST OF PANCREAS: ICD-10-CM

## 2023-02-27 DIAGNOSIS — I35.0 AORTIC VALVE STENOSIS, ETIOLOGY OF CARDIAC VALVE DISEASE UNSPECIFIED: ICD-10-CM

## 2023-02-27 LAB
AORTIC ROOT: 3.5 CM
AORTIC VALVE MEAN VELOCITY: 21.7 M/S
APICAL FOUR CHAMBER EJECTION FRACTION: 75 %
ASCENDING AORTA: 4.1 CM
AV AREA BY CONTINUOUS VTI: 1.6 CM2
AV AREA PEAK VELOCITY: 1.4 CM2
AV LVOT MEAN GRADIENT: 3 MMHG
AV LVOT PEAK GRADIENT: 7 MMHG
AV MEAN GRADIENT: 21 MMHG
AV PEAK GRADIENT: 37 MMHG
AV VALVE AREA: 1.56 CM2
AV VELOCITY RATIO: 0.43
DOP CALC AO PEAK VEL: 3.05 M/S
DOP CALC AO VTI: 71.18 CM
DOP CALC LVOT AREA: 3.14 CM2
DOP CALC LVOT DIAMETER: 2 CM
DOP CALC LVOT PEAK VEL VTI: 35.45 CM
DOP CALC LVOT PEAK VEL: 1.32 M/S
DOP CALC LVOT STROKE INDEX: 54.5 ML/M2
DOP CALC LVOT STROKE VOLUME: 111.31
E WAVE DECELERATION TIME: 420 MS
FRACTIONAL SHORTENING: 42 (ref 28–44)
INTERVENTRICULAR SEPTUM IN DIASTOLE (PARASTERNAL SHORT AXIS VIEW): 1.6 CM
INTERVENTRICULAR SEPTUM: 1.6 CM (ref 0.6–1.1)
LAAS-AP2: 19.1 CM2
LAAS-AP4: 17.6 CM2
LEFT ATRIUM SIZE: 4.3 CM
LEFT INTERNAL DIMENSION IN SYSTOLE: 2.1 CM (ref 2.1–4)
LEFT VENTRICULAR INTERNAL DIMENSION IN DIASTOLE: 3.6 CM (ref 3.5–6)
LEFT VENTRICULAR POSTERIOR WALL IN END DIASTOLE: 1.3 CM
LEFT VENTRICULAR STROKE VOLUME: 39 ML
LVSV (TEICH): 39 ML
MV E'TISSUE VEL-SEP: 6 CM/S
MV PEAK A VEL: 1.18 M/S
MV PEAK E VEL: 75 CM/S
MV STENOSIS PRESSURE HALF TIME: 122 MS
MV VALVE AREA P 1/2 METHOD: 1.8
RIGHT ATRIUM AREA SYSTOLE A4C: 8 CM2
RIGHT VENTRICLE ID DIMENSION: 2.5 CM
SL CV LEFT ATRIUM LENGTH A2C: 5.6 CM
SL CV LV EF: 70
SL CV PED ECHO LEFT VENTRICLE DIASTOLIC VOLUME (MOD BIPLANE) 2D: 54 ML
SL CV PED ECHO LEFT VENTRICLE SYSTOLIC VOLUME (MOD BIPLANE) 2D: 15 ML
TR MAX PG: 10 MMHG
TR PEAK VELOCITY: 1.6 M/S
TRICUSPID ANNULAR PLANE SYSTOLIC EXCURSION: 2.1 CM
TRICUSPID VALVE PEAK REGURGITATION VELOCITY: 1.58 M/S

## 2024-03-09 ENCOUNTER — HOSPITAL ENCOUNTER (EMERGENCY)
Facility: HOSPITAL | Age: 79
Discharge: HOME | End: 2024-03-09
Attending: INTERNAL MEDICINE | Admitting: INTERNAL MEDICINE
Payer: COMMERCIAL

## 2024-03-09 VITALS
HEART RATE: 62 BPM | OXYGEN SATURATION: 96 % | TEMPERATURE: 98.3 F | DIASTOLIC BLOOD PRESSURE: 71 MMHG | BODY MASS INDEX: 33.74 KG/M2 | SYSTOLIC BLOOD PRESSURE: 178 MMHG | WEIGHT: 190.48 LBS | RESPIRATION RATE: 20 BRPM

## 2024-03-09 DIAGNOSIS — E11.65 TYPE 2 DIABETES MELLITUS WITH HYPERGLYCEMIA, WITHOUT LONG-TERM CURRENT USE OF INSULIN (HCC): Primary | ICD-10-CM

## 2024-03-09 LAB
ALBUMIN SERPL BCP-MCNC: 4.4 G/DL (ref 3.5–5)
ALP SERPL-CCNC: 68 U/L (ref 34–104)
ALT SERPL W P-5'-P-CCNC: 21 U/L (ref 7–52)
ANION GAP SERPL CALCULATED.3IONS-SCNC: 10 MMOL/L
ANISOCYTOSIS BLD QL SMEAR: PRESENT
AST SERPL W P-5'-P-CCNC: 17 U/L (ref 13–39)
BACTERIA UR QL AUTO: ABNORMAL /HPF
BASOPHILS # BLD MANUAL: 0 THOUSAND/UL (ref 0–0.1)
BASOPHILS NFR MAR MANUAL: 0 % (ref 0–1)
BETA-HYDROXYBUTYRATE: 0.1 MMOL/L
BILIRUB SERPL-MCNC: 0.85 MG/DL (ref 0.2–1)
BILIRUB UR QL STRIP: NEGATIVE
BUN SERPL-MCNC: 24 MG/DL (ref 5–25)
CALCIUM SERPL-MCNC: 9.8 MG/DL (ref 8.4–10.2)
CHLORIDE SERPL-SCNC: 99 MMOL/L (ref 96–108)
CLARITY UR: CLEAR
CO2 SERPL-SCNC: 24 MMOL/L (ref 21–32)
COLOR UR: YELLOW
CREAT SERPL-MCNC: 0.92 MG/DL (ref 0.6–1.3)
EOSINOPHIL # BLD MANUAL: 0 THOUSAND/UL (ref 0–0.4)
EOSINOPHIL NFR BLD MANUAL: 0 % (ref 0–6)
ERYTHROCYTE [DISTWIDTH] IN BLOOD BY AUTOMATED COUNT: 14.2 % (ref 11.6–15.1)
EST. AVERAGE GLUCOSE BLD GHB EST-MCNC: 298 MG/DL
GFR SERPL CREATININE-BSD FRML MDRD: 59 ML/MIN/1.73SQ M
GLUCOSE SERPL-MCNC: 351 MG/DL (ref 65–140)
GLUCOSE SERPL-MCNC: 409 MG/DL (ref 65–140)
GLUCOSE SERPL-MCNC: 410 MG/DL (ref 65–140)
GLUCOSE UR STRIP-MCNC: ABNORMAL MG/DL
HBA1C MFR BLD: 12 %
HCT VFR BLD AUTO: 40 % (ref 34.8–46.1)
HGB BLD-MCNC: 13.4 G/DL (ref 11.5–15.4)
HGB UR QL STRIP.AUTO: NEGATIVE
KETONES UR STRIP-MCNC: NEGATIVE MG/DL
LEUKOCYTE ESTERASE UR QL STRIP: NEGATIVE
LIPASE SERPL-CCNC: 15 U/L (ref 11–82)
LYMPHOCYTES # BLD AUTO: 1.37 THOUSAND/UL (ref 0.6–4.47)
LYMPHOCYTES # BLD AUTO: 38 % (ref 14–44)
MAGNESIUM SERPL-MCNC: 2 MG/DL (ref 1.9–2.7)
MCH RBC QN AUTO: 30.6 PG (ref 26.8–34.3)
MCHC RBC AUTO-ENTMCNC: 33.5 G/DL (ref 31.4–37.4)
MCV RBC AUTO: 91 FL (ref 82–98)
MONOCYTES # BLD AUTO: 0.2 THOUSAND/UL (ref 0–1.22)
MONOCYTES NFR BLD: 6 % (ref 4–12)
NEUTROPHILS # BLD MANUAL: 1.77 THOUSAND/UL (ref 1.85–7.62)
NEUTS BAND NFR BLD MANUAL: 2 % (ref 0–8)
NEUTS SEG NFR BLD AUTO: 51 % (ref 43–75)
NITRITE UR QL STRIP: NEGATIVE
NON-SQ EPI CELLS URNS QL MICRO: ABNORMAL /HPF
OVALOCYTES BLD QL SMEAR: PRESENT
PH UR STRIP.AUTO: 7 [PH]
PLATELET # BLD AUTO: 116 THOUSANDS/UL (ref 149–390)
PLATELET BLD QL SMEAR: ADEQUATE
PMV BLD AUTO: 10.4 FL (ref 8.9–12.7)
POIKILOCYTOSIS BLD QL SMEAR: PRESENT
POLYCHROMASIA BLD QL SMEAR: PRESENT
POTASSIUM SERPL-SCNC: 4 MMOL/L (ref 3.5–5.3)
PROT SERPL-MCNC: 7.5 G/DL (ref 6.4–8.4)
PROT UR STRIP-MCNC: ABNORMAL MG/DL
RBC # BLD AUTO: 4.38 MILLION/UL (ref 3.81–5.12)
RBC #/AREA URNS AUTO: ABNORMAL /HPF
RBC MORPH BLD: PRESENT
SODIUM SERPL-SCNC: 133 MMOL/L (ref 135–147)
SP GR UR STRIP.AUTO: 1.01 (ref 1–1.03)
UROBILINOGEN UR QL STRIP.AUTO: 0.2 E.U./DL
VARIANT LYMPHS # BLD AUTO: 3 %
WBC # BLD AUTO: 3.34 THOUSAND/UL (ref 4.31–10.16)
WBC #/AREA URNS AUTO: ABNORMAL /HPF

## 2024-03-09 PROCEDURE — 81001 URINALYSIS AUTO W/SCOPE: CPT | Performed by: PHYSICIAN ASSISTANT

## 2024-03-09 PROCEDURE — 83735 ASSAY OF MAGNESIUM: CPT | Performed by: PHYSICIAN ASSISTANT

## 2024-03-09 PROCEDURE — 83036 HEMOGLOBIN GLYCOSYLATED A1C: CPT | Performed by: PHYSICIAN ASSISTANT

## 2024-03-09 PROCEDURE — 85027 COMPLETE CBC AUTOMATED: CPT | Performed by: PHYSICIAN ASSISTANT

## 2024-03-09 PROCEDURE — 85007 BL SMEAR W/DIFF WBC COUNT: CPT | Performed by: PHYSICIAN ASSISTANT

## 2024-03-09 PROCEDURE — 83690 ASSAY OF LIPASE: CPT | Performed by: PHYSICIAN ASSISTANT

## 2024-03-09 PROCEDURE — 82010 KETONE BODYS QUAN: CPT | Performed by: PHYSICIAN ASSISTANT

## 2024-03-09 PROCEDURE — 96361 HYDRATE IV INFUSION ADD-ON: CPT

## 2024-03-09 PROCEDURE — 96360 HYDRATION IV INFUSION INIT: CPT

## 2024-03-09 PROCEDURE — 36415 COLL VENOUS BLD VENIPUNCTURE: CPT | Performed by: PHYSICIAN ASSISTANT

## 2024-03-09 PROCEDURE — 80053 COMPREHEN METABOLIC PANEL: CPT | Performed by: PHYSICIAN ASSISTANT

## 2024-03-09 PROCEDURE — 82948 REAGENT STRIP/BLOOD GLUCOSE: CPT

## 2024-03-09 PROCEDURE — 99284 EMERGENCY DEPT VISIT MOD MDM: CPT | Performed by: PHYSICIAN ASSISTANT

## 2024-03-09 PROCEDURE — 99283 EMERGENCY DEPT VISIT LOW MDM: CPT

## 2024-03-09 RX ORDER — PYRIDOXINE HCL (VITAMIN B6) 100 MG
TABLET ORAL
COMMUNITY

## 2024-03-09 RX ADMIN — SODIUM CHLORIDE 2000 ML: 0.9 INJECTION, SOLUTION INTRAVENOUS at 10:12

## 2024-03-09 RX ADMIN — METFORMIN HYDROCHLORIDE 500 MG: 500 TABLET ORAL at 11:28

## 2024-03-09 NOTE — ED NOTES
Patient weight entered after standing on the scale as per ordered by the AIMEE Trent, CARIN  03/09/24 [id number]

## 2024-03-09 NOTE — ED PROVIDER NOTES
"History  Chief Complaint   Patient presents with    Hyperglycemia - no symptoms     Pt \" I have been having symptoms for awhile now; I have been wanting ice water. I have also been having some blurred vision but I thought it was just me needing cataracts. My PCP has been on me with my AIC being high but I didn't do well on the Metformin. My  has DM and I thought maybe I should check my sugars at home and it was over 400\"       Past Medical History: CAD, Thyroid cancer, H/O heart artery stent, sarcoidosis, Hyperlipidemia, HTN,   Past Surgical History: HYSTERECTOMY, B/L OOPHORECTOMY     Pt presents to ED c/o hyperglycemia, in 400's today when she checked, c/o polydipsia, nausea, \"not feeling well\".  Pt reports h/o DM, was on Metformin x 3 months but did not like the symptoms of nausea and not feeling well so stopped. PCP wanted to put her on Jardiance but she read about the side effects of increasing urinary issues and she already has some mild incontinence so did not want to take that so has not been taking anything.  Patient had some blurred vision so follow-up with eye doctor who wants to do cataract surgery, but the patient was concerned because of elevated blood sugar so came into the emergency department.    No fever, uri sx, cp, sob, NV, rash, HA, bowel changes, urinary complaints        Prior to Admission Medications   Prescriptions Last Dose Informant Patient Reported? Taking?   Bioflavonoid Products (Vitamin C ER) 1000-100 MG TBCR   Yes Yes   Sig: Take by mouth   Multiple Vitamins-Minerals (CENTRUM ADULTS PO)  Self Yes Yes   Sig: Take by mouth daily   Synthroid 150 MCG tablet   Yes Yes   albuterol (PROVENTIL HFA,VENTOLIN HFA) 90 mcg/act inhaler  Self Yes Yes   Sig: Inhale 2 puffs every 4 (four) hours as needed for wheezing or shortness of breath   amLODIPine (NORVASC) 5 mg tablet   Yes Yes   Sig: Take 5 mg by mouth daily   aspirin (ECOTRIN LOW STRENGTH) 81 mg EC tablet  Self Yes Yes   Sig: Take 81 mg " by mouth daily   atorvastatin (LIPITOR) 20 mg tablet  Self Yes Yes   Sig: Take 20 mg by mouth daily   fluticasone (FLONASE) 50 mcg/act nasal spray  Self Yes Yes   Si spray into each nostril daily   fluticasone-vilanterol (BREO ELLIPTA) 100-25 mcg/inh inhaler  Self No Yes   Sig: Inhale 1 puff daily Rinse mouth after use.   losartan (COZAAR) 100 MG tablet  Self Yes Yes   Sig: Take 25 mg by mouth daily   triamterene-hydrochlorothiazide (MAXZIDE-25) 37.5-25 mg per tablet  Self Yes Yes   Sig: Take 1 tablet by mouth daily      Facility-Administered Medications: None       Past Medical History:   Diagnosis Date    Abnormal ultrasound of breast     CAD (coronary artery disease)     Cancer (HCC)     thyroid    Cataract     H/O heart artery stent     History of sarcoidosis     Hyperlipidemia     Hypertension     Sleep apnea        Past Surgical History:   Procedure Laterality Date    APPENDECTOMY      CHOLECYSTECTOMY      CORONARY ANGIOPLASTY WITH STENT PLACEMENT      HYSTERECTOMY      age 43    KNEE SURGERY Bilateral     OOPHORECTOMY Bilateral     age 43       Family History   Problem Relation Age of Onset    No Known Problems Mother     Thyroid cancer Father     No Known Problems Sister     No Known Problems Daughter     Cancer Maternal Grandmother         ear    No Known Problems Maternal Grandfather     No Known Problems Paternal Grandmother     No Known Problems Paternal Grandfather     No Known Problems Maternal Aunt     No Known Problems Maternal Aunt     Leukemia Paternal Aunt      I have reviewed and agree with the history as documented.    E-Cigarette/Vaping    E-Cigarette Use Never User      E-Cigarette/Vaping Substances     Social History     Tobacco Use    Smoking status: Never    Smokeless tobacco: Never   Vaping Use    Vaping status: Never Used   Substance Use Topics    Alcohol use: Not Currently    Drug use: Never       Review of Systems   Constitutional:  Negative for fever.   HENT:  Negative for hearing  loss and sore throat.    Eyes:  Positive for visual disturbance.   Respiratory:  Negative for cough and shortness of breath.    Cardiovascular:  Negative for chest pain and leg swelling.   Gastrointestinal:  Negative for abdominal pain and vomiting.   Endocrine: Positive for polydipsia and polyuria.   Genitourinary:  Negative for dysuria and frequency.   Musculoskeletal:  Negative for arthralgias and myalgias.   Skin:  Negative for rash.   Neurological:  Negative for dizziness and weakness.   Psychiatric/Behavioral:  Negative for behavioral problems.    All other systems reviewed and are negative.      Physical Exam  Physical Exam  Vitals and nursing note reviewed.   Constitutional:       General: She is not in acute distress.     Appearance: She is well-developed. She is obese. She is not ill-appearing.   HENT:      Head: Normocephalic and atraumatic.      Nose: Nose normal.      Mouth/Throat:      Mouth: Mucous membranes are moist.      Pharynx: Oropharynx is clear.   Eyes:      Conjunctiva/sclera: Conjunctivae normal.   Cardiovascular:      Rate and Rhythm: Normal rate and regular rhythm.   Pulmonary:      Effort: Pulmonary effort is normal. No respiratory distress.      Breath sounds: Normal breath sounds.   Abdominal:      General: Bowel sounds are normal.      Palpations: Abdomen is soft.   Musculoskeletal:         General: Normal range of motion.      Cervical back: Normal range of motion.   Skin:     General: Skin is warm and dry.      Findings: No rash.   Neurological:      General: No focal deficit present.      Mental Status: She is alert.   Psychiatric:         Behavior: Behavior normal.         Vital Signs  ED Triage Vitals   Temperature Pulse Respirations Blood Pressure SpO2   03/09/24 0932 03/09/24 0932 03/09/24 0932 03/09/24 0932 03/09/24 0932   97.5 °F (36.4 °C) 72 18 (!) 187/78 94 %      Temp Source Heart Rate Source Patient Position - Orthostatic VS BP Location FiO2 (%)   03/09/24 0932 03/09/24  0932 03/09/24 0932 03/09/24 0932 --   Oral Monitor Sitting Left arm       Pain Score       03/09/24 0937       No Pain           Vitals:    03/09/24 0932 03/09/24 0937 03/09/24 1128   BP: (!) 187/78 166/73 (!) 178/71   Pulse: 72 70 62   Patient Position - Orthostatic VS: Sitting Sitting Sitting         Visual Acuity      ED Medications  Medications   sodium chloride 0.9 % bolus 2,000 mL (0 mL Intravenous Stopped 3/9/24 1213)   metFORMIN (GLUCOPHAGE) tablet 500 mg (500 mg Oral Given 3/9/24 1128)       Diagnostic Studies  Results Reviewed       Procedure Component Value Units Date/Time    Fingerstick Glucose (POCT) [[id number]]  (Abnormal) Collected: 03/09/24 1209    Lab Status: Final result Updated: 03/09/24 1210     POC Glucose 351 mg/dl     RBC Morphology Reflex Test [[id number]] Collected: 03/09/24 1012    Lab Status: Final result Specimen: Blood from Arm, Right Updated: 03/09/24 1201    Urine Microscopic [[id number]]  (Abnormal) Collected: 03/09/24 1019    Lab Status: Final result Specimen: Urine, Clean Catch Updated: 03/09/24 1120     RBC, UA 0-5 /hpf      WBC, UA 0-5 /hpf      Epithelial Cells Moderate /hpf      Bacteria, UA Occasional /hpf     CBC and differential [[id number]]  (Abnormal) Collected: 03/09/24 1012    Lab Status: Final result Specimen: Blood from Arm, Right Updated: 03/09/24 1114     WBC 3.34 Thousand/uL      RBC 4.38 Million/uL      Hemoglobin 13.4 g/dL      Hematocrit 40.0 %      MCV 91 fL      MCH 30.6 pg      MCHC 33.5 g/dL      RDW 14.2 %      MPV 10.4 fL      Platelets 116 Thousands/uL     Narrative:      This is an appended report.  These results have been appended to a previously verified report.    Manual Differential(PHLEBS Do Not Order) [[id number]]  (Abnormal) Collected: 03/09/24 1012    Lab Status: Final result Specimen: Blood from Arm, Right Updated: 03/09/24 1114     Segmented % 51 %      Bands % 2 %      Lymphocytes % 38 %      Monocytes % 6 %      Eosinophils, % 0 %      Basophils %  0 %      Atypical Lymphocytes % 3 %      Absolute Neutrophils 1.77 Thousand/uL      Lymphocytes Absolute 1.37 Thousand/uL      Monocytes Absolute 0.20 Thousand/uL      Eosinophils Absolute 0.00 Thousand/uL      Basophils Absolute 0.00 Thousand/uL      Total Counted --     RBC Morphology Present     Platelet Estimate Adequate     Anisocytosis Present     Ovalocytes Present     Poikilocytes Present     Polychromasia Present    Comprehensive metabolic panel [[id number]]  (Abnormal) Collected: 03/09/24 1012    Lab Status: Final result Specimen: Blood from Arm, Right Updated: 03/09/24 1053     Sodium 133 mmol/L      Potassium 4.0 mmol/L      Chloride 99 mmol/L      CO2 24 mmol/L      ANION GAP 10 mmol/L      BUN 24 mg/dL      Creatinine 0.92 mg/dL      Glucose 409 mg/dL      Calcium 9.8 mg/dL      AST 17 U/L      ALT 21 U/L      Alkaline Phosphatase 68 U/L      Total Protein 7.5 g/dL      Albumin 4.4 g/dL      Total Bilirubin 0.85 mg/dL      eGFR 59 ml/min/1.73sq m     Narrative:      [employer] guidelines for Chronic Kidney Disease (CKD):     Stage 1 with normal or high GFR (GFR > 90 mL/min/1.73 square meters)    Stage 2 Mild CKD (GFR = 60-89 mL/min/1.73 square meters)    Stage 3A Moderate CKD (GFR = 45-59 mL/min/1.73 square meters)    Stage 3B Moderate CKD (GFR = 30-44 mL/min/1.73 square meters)    Stage 4 Severe CKD (GFR = 15-29 mL/min/1.73 square meters)    Stage 5 End Stage CKD (GFR <15 mL/min/1.73 square meters)  Note: GFR calculation is accurate only with a steady state creatinine    Lipase [[id number]]  (Normal) Collected: 03/09/24 1012    Lab Status: Final result Specimen: Blood from Arm, Right Updated: 03/09/24 1048     Lipase 15 u/L     Magnesium [[id number]]  (Normal) Collected: 03/09/24 1012    Lab Status: Final result Specimen: Blood from Arm, Right Updated: 03/09/24 1048     Magnesium 2.0 mg/dL     UA w Reflex to Microscopic w Reflex to Culture [[id number]]  (Abnormal) Collected:  03/09/24 1019    Lab Status: Final result Specimen: Urine, Clean Catch Updated: 03/09/24 1034     Color, UA Yellow     Clarity, UA Clear     Specific Gravity, UA 1.015     pH, UA 7.0     Leukocytes, UA Negative     Nitrite, UA Negative     Protein, UA Trace mg/dl      Glucose, UA 3+ mg/dl      Ketones, UA Negative mg/dl      Urobilinogen, UA 0.2 E.U./dl      Bilirubin, UA Negative     Occult Blood, UA Negative    Beta Hydroxybutyrate [[id number]]  (Normal) Collected: 03/09/24 1012    Lab Status: Final result Specimen: Blood from Arm, Right Updated: 03/09/24 1029     BETA-HYDROXYBUTYRATE 0.1 mmol/L     Hemoglobin A1C [[id number]] Collected: 03/09/24 1012    Lab Status: In process Specimen: Blood from Arm, Right Updated: 03/09/24 1019    Fingerstick Glucose (POCT) [[id number]]  (Abnormal) Collected: 03/09/24 0930    Lab Status: Final result Updated: 03/09/24 0932     POC Glucose 410 mg/dl                    No orders to display              Procedures  Procedures         ED Course  ED Course as of 03/09/24 1318   Sat Mar 09, 2024   1031 Beta- Hydroxybutyrate: 0.1  normal   1032 Cbc unremarkable   1035 POCT URINE PROTEIN(!): Trace   1035 Glucose, UA(!): 3+   1059 MAGNESIUM: 2.0  normal   1059 LIPASE: 15  normal                               SBIRT 20yo+      Flowsheet Row Most Recent Value   Initial Alcohol Screen: US AUDIT-C     1. How often do you have a drink containing alcohol? 0 Filed at: 03/09/2024 0940   2. How many drinks containing alcohol do you have on a typical day you are drinking?  0 Filed at: 03/09/2024 0940   3b. FEMALE Any Age, or MALE 65+: How often do you have 4 or more drinks on one occassion? 0 Filed at: 03/09/2024 0940   Audit-C Score 0 Filed at: 03/09/2024 0940   DURAN: How many times in the past year have you...    Used an illegal drug or used a prescription medication for non-medical reasons? Never Filed at: 03/09/2024 0940                      Medical Decision Making  Patient with h/o diabetes for  "which she was not taking any medication for \"was trying to manage on her own\" checked her blood sugar today was 400 came to emergency department.  Patient is well-appearing, vital signs stable, hyperglycemia noted with no other acute lab abnormalities will treat with IV fluids discussed treatment options and the need to follow-up with her PCP to continue ongoing chronic treatment of her hyperglycemia, agreed to start Metformin from ED today.    Amount and/or Complexity of Data Reviewed  External Data Reviewed: labs and notes.  Labs: ordered. Decision-making details documented in ED Course.    Risk  Prescription drug management.             Disposition  Final diagnoses:   Type 2 diabetes mellitus with hyperglycemia, without long-term current use of insulin (HCC)     Time reflects when diagnosis was documented in both MDM as applicable and the Disposition within this note       Time User Action Codes Description Comment    3/9/2024 11:08 AM Brie Unger Add [E11.65] Type 2 diabetes mellitus with hyperglycemia, without long-term current use of insulin (HCC)           ED Disposition       ED Disposition   Discharge    Condition   Stable    Date/Time   Sat Mar 9, 2024 1124    Comment   Nadeem Ashton discharge to home/self care.                   Follow-up Information       Follow up With Specialties Details Why Contact Info    Celine England MD Internal Medicine In 2 days  [address] [address] [phone]              Discharge Medication List as of 3/9/2024 11:26 AM        START taking these medications    Details   metFORMIN (GLUCOPHAGE) 500 mg tablet Take 1 tablet (500 mg total) by mouth 2 (two) times a day with meals, Starting Sat 3/9/2024, Until Mon 4/8/2024, Normal           CONTINUE these medications which have NOT CHANGED    Details   albuterol (PROVENTIL HFA,VENTOLIN HFA) 90 mcg/act inhaler Inhale 2 puffs every 4 (four) hours as needed for wheezing or shortness of breath, Historical " Med      amLODIPine (NORVASC) 5 mg tablet Take 5 mg by mouth daily, Historical Med      aspirin (ECOTRIN LOW STRENGTH) 81 mg EC tablet Take 81 mg by mouth daily, Historical Med      atorvastatin (LIPITOR) 20 mg tablet Take 20 mg by mouth daily, Historical Med      Bioflavonoid Products (Vitamin C ER) 1000-100 MG TBCR Take by mouth, Historical Med      fluticasone (FLONASE) 50 mcg/act nasal spray 1 spray into each nostril daily, Historical Med      fluticasone-vilanterol (BREO ELLIPTA) 100-25 mcg/inh inhaler Inhale 1 puff daily Rinse mouth after use., Starting Mon 8/3/2020, Normal      losartan (COZAAR) 100 MG tablet Take 25 mg by mouth daily, Historical Med      Multiple Vitamins-Minerals (CENTRUM ADULTS PO) Take by mouth daily, Historical Med      Synthroid 150 MCG tablet Starting Sat 5/28/2022, Historical Med      triamterene-hydrochlorothiazide (MAXZIDE-25) 37.5-25 mg per tablet Take 1 tablet by mouth daily, Historical Med             No discharge procedures on file.    PDMP Review       None            ED Provider  Electronically Signed by             Brie Unger PA-C  03/1945     normal...

## 2024-06-06 ENCOUNTER — HOSPITAL ENCOUNTER (OUTPATIENT)
Dept: ULTRASOUND IMAGING | Facility: HOSPITAL | Age: 79
End: 2024-06-06
Attending: INTERNAL MEDICINE
Payer: COMMERCIAL

## 2024-06-06 DIAGNOSIS — K86.2 CYST OF PANCREAS: ICD-10-CM

## 2024-06-06 PROCEDURE — 76700 US EXAM ABDOM COMPLETE: CPT

## 2024-06-26 ENCOUNTER — HOSPITAL ENCOUNTER (OUTPATIENT)
Dept: NON INVASIVE DIAGNOSTICS | Facility: HOSPITAL | Age: 79
Discharge: HOME | End: 2024-06-26
Attending: INTERNAL MEDICINE
Payer: COMMERCIAL

## 2024-06-26 VITALS
BODY MASS INDEX: 33.66 KG/M2 | WEIGHT: 190 LBS | DIASTOLIC BLOOD PRESSURE: 71 MMHG | HEIGHT: 63 IN | SYSTOLIC BLOOD PRESSURE: 178 MMHG | HEART RATE: 62 BPM

## 2024-06-26 DIAGNOSIS — I35.0 NONRHEUMATIC AORTIC (VALVE) STENOSIS: ICD-10-CM

## 2024-06-26 LAB
AORTIC ROOT: 3.6 CM
AORTIC VALVE MEAN VELOCITY: 23.9 M/S
APICAL FOUR CHAMBER EJECTION FRACTION: 70 %
ASCENDING AORTA: 4.2 CM
AV AREA BY CONTINUOUS VTI: 1.3 CM2
AV AREA PEAK VELOCITY: 1.4 CM2
AV LVOT MEAN GRADIENT: 5 MMHG
AV LVOT PEAK GRADIENT: 6 MMHG
AV MEAN GRADIENT: 28 MMHG
AV PEAK GRADIENT: 39 MMHG
AV VALVE AREA: 1.32 CM2
AV VELOCITY RATIO: 0.43
BSA FOR ECHO PROCEDURE: 1.89 M2
DOP CALC AO PEAK VEL: 2.95 M/S
DOP CALC AO VTI: 68.47 CM
DOP CALC LVOT AREA: 3.14 CM2
DOP CALC LVOT CARDIAC INDEX: 3.25 L/MIN/M2
DOP CALC LVOT CARDIAC OUTPUT: 6.14 L/MIN
DOP CALC LVOT DIAMETER: 2 CM
DOP CALC LVOT PEAK VEL VTI: 28.72 CM
DOP CALC LVOT PEAK VEL: 1.27 M/S
DOP CALC LVOT STROKE INDEX: 47.6 ML/M2
DOP CALC LVOT STROKE VOLUME: 90.18
E WAVE DECELERATION TIME: 338 MS
E/A RATIO: 0.43
FRACTIONAL SHORTENING: 33 (ref 28–44)
INTERVENTRICULAR SEPTUM IN DIASTOLE (PARASTERNAL SHORT AXIS VIEW): 1.6 CM
INTERVENTRICULAR SEPTUM: 1.6 CM (ref 0.6–1.1)
LAAS-AP2: 18.1 CM2
LAAS-AP4: 18.6 CM2
LEFT ATRIUM SIZE: 4.6 CM
LEFT ATRIUM VOLUME (MOD BIPLANE): 56 ML
LEFT ATRIUM VOLUME INDEX (MOD BIPLANE): 29.6 ML/M2
LEFT INTERNAL DIMENSION IN SYSTOLE: 2.8 CM (ref 2.1–4)
LEFT VENTRICULAR INTERNAL DIMENSION IN DIASTOLE: 4.2 CM (ref 3.5–6)
LEFT VENTRICULAR POSTERIOR WALL IN END DIASTOLE: 1.4 CM
LEFT VENTRICULAR STROKE VOLUME: 50 ML
LVSV (TEICH): 50 ML
MV E'TISSUE VEL-SEP: 6 CM/S
MV PEAK A VEL: 1.56 M/S
MV PEAK E VEL: 67 CM/S
MV STENOSIS PRESSURE HALF TIME: 98 MS
MV VALVE AREA P 1/2 METHOD: 2.24
RIGHT ATRIAL 2D VOLUME: 35 ML
RIGHT ATRIUM AREA SYSTOLE A4C: 14.8 CM2
RIGHT VENTRICLE ID DIMENSION: 3.1 CM
SL CV ECHO LV DYNAMIC OBSTRUCTION PEAK GRADIENT (REST): 62 MMHG
SL CV ECHO LV DYNAMIC OBSTRUCTION PEAK GRADIENT (VALSAL: 72 MMHG
SL CV LEFT ATRIUM LENGTH A2C: 5.7 CM
SL CV LV EF: 70
SL CV PED ECHO LEFT VENTRICLE DIASTOLIC VOLUME (MOD BIPLANE) 2D: 80 ML
SL CV PED ECHO LEFT VENTRICLE SYSTOLIC VOLUME (MOD BIPLANE) 2D: 29 ML
TRICUSPID ANNULAR PLANE SYSTOLIC EXCURSION: 1.9 CM

## 2024-06-26 PROCEDURE — 93306 TTE W/DOPPLER COMPLETE: CPT

## 2024-06-26 PROCEDURE — 93306 TTE W/DOPPLER COMPLETE: CPT | Performed by: INTERNAL MEDICINE

## 2024-07-10 ENCOUNTER — TELEPHONE (OUTPATIENT)
Dept: CARDIOLOGY CLINIC | Facility: CLINIC | Age: 79
End: 2024-07-10

## 2024-07-10 NOTE — TELEPHONE ENCOUNTER
Dr England's office called to set appt w Dr Pearson for patient. I told them I would call to set it up  L/M/M asking patient to call [phone] to schedule an appt w Dr Pearson, referred by Dr England

## 2024-07-18 ENCOUNTER — HOSPITAL ENCOUNTER (OUTPATIENT)
Dept: NON INVASIVE DIAGNOSTICS | Facility: HOSPITAL | Age: 79
Discharge: HOME | End: 2024-07-18
Attending: INTERNAL MEDICINE
Payer: COMMERCIAL

## 2024-07-18 DIAGNOSIS — I42.1 OBSTRUCTIVE HYPERTROPHIC CARDIOMYOPATHY (HCC): ICD-10-CM

## 2024-07-18 PROCEDURE — 93225 XTRNL ECG REC<48 HRS REC: CPT

## 2024-07-18 PROCEDURE — 93226 XTRNL ECG REC<48 HR SCAN A/R: CPT

## 2024-07-24 PROCEDURE — 93227 XTRNL ECG REC<48 HR R&I: CPT | Performed by: INTERNAL MEDICINE

## 2024-07-29 DIAGNOSIS — I42.2 HYPERTROPHIC CARDIOMYOPATHY (HCC): Primary | ICD-10-CM

## 2024-08-12 ENCOUNTER — TELEPHONE (OUTPATIENT)
Age: 79
End: 2024-08-12

## 2024-08-12 ENCOUNTER — HOSPITAL ENCOUNTER (OUTPATIENT)
Dept: NON INVASIVE DIAGNOSTICS | Facility: HOSPITAL | Age: 79
Discharge: HOME | End: 2024-08-12
Payer: COMMERCIAL

## 2024-08-12 VITALS
HEART RATE: 65 BPM | HEIGHT: 63 IN | WEIGHT: 190 LBS | BODY MASS INDEX: 33.66 KG/M2 | OXYGEN SATURATION: 95 % | DIASTOLIC BLOOD PRESSURE: 78 MMHG | SYSTOLIC BLOOD PRESSURE: 168 MMHG | RESPIRATION RATE: 20 BRPM

## 2024-08-12 DIAGNOSIS — I42.2 HYPERTROPHIC CARDIOMYOPATHY (HCC): Primary | ICD-10-CM

## 2024-08-12 DIAGNOSIS — I42.2 HYPERTROPHIC CARDIOMYOPATHY (HCC): ICD-10-CM

## 2024-08-12 LAB
AORTIC VALVE MEAN VELOCITY: 24.8 M/S
AV AREA BY CONTINUOUS VTI: 1.4 CM2
AV AREA PEAK VELOCITY: 1.2 CM2
AV LVOT MEAN GRADIENT: 5 MMHG
AV LVOT PEAK GRADIENT: 8 MMHG
AV MEAN GRADIENT: 26 MMHG
AV PEAK GRADIENT: 43 MMHG
AV VALVE AREA: 1.39 CM2
AV VELOCITY RATIO: 0.42
DOP CALC AO PEAK VEL: 3.28 M/S
DOP CALC AO VTI: 82.91 CM
DOP CALC LVOT AREA: 2.83 CM2
DOP CALC LVOT CARDIAC INDEX: 3.96 L/MIN/M2
DOP CALC LVOT CARDIAC OUTPUT: 7.49 L/MIN
DOP CALC LVOT DIAMETER: 1.9 CM
DOP CALC LVOT PEAK VEL VTI: 40.72 CM
DOP CALC LVOT PEAK VEL: 1.38 M/S
DOP CALC LVOT STROKE INDEX: 61.9 ML/M2
DOP CALC LVOT STROKE VOLUME: 115.39
GLOBAL LONGITUIDAL STRAIN: -16 %
MAX HR PERCENT: 79 %
MAX HR: 112 BPM
RATE PRESSURE PRODUCT: NORMAL
SL CV ECHO LV DYNAMIC OBSTRUCTION PEAK GRADIENT (REST): 53 MMHG
SL CV ECHO LV DYNAMIC OBSTRUCTION PEAK GRADIENT (VALSAL: 67 MMHG
SL CV LV EF: 70
SL CV STRESS RECOVERY BP: 168 MMHG
SL CV STRESS RECOVERY HR: 75 BPM
SL CV STRESS RECOVERY O2 SAT: 95 %
STRESS ANGINA INDEX: 0
STRESS BASELINE BP: NORMAL MMHG
STRESS BASELINE HR: 65 BPM
STRESS O2 SAT REST: 95 %
STRESS PEAK HR: 112 BPM
STRESS POST ESTIMATED WORKLOAD: 3 METS
STRESS POST EXERCISE DUR MIN: 4 MIN
STRESS POST EXERCISE DUR SEC: 0 SEC
STRESS POST O2 SAT PEAK: 91 %
STRESS POST PEAK BP: 222 MMHG

## 2024-08-12 PROCEDURE — 93356 MYOCRD STRAIN IMG SPCKL TRCK: CPT | Performed by: INTERNAL MEDICINE

## 2024-08-12 PROCEDURE — 93356 MYOCRD STRAIN IMG SPCKL TRCK: CPT

## 2024-08-12 PROCEDURE — 93350 STRESS TTE ONLY: CPT | Performed by: INTERNAL MEDICINE

## 2024-08-12 PROCEDURE — 93350 STRESS TTE ONLY: CPT

## 2024-08-12 RX ORDER — VERAPAMIL HYDROCHLORIDE 180 MG/1
180 TABLET, EXTENDED RELEASE ORAL
Qty: 30 TABLET | Refills: 1 | Status: SHIPPED | OUTPATIENT
Start: 2024-08-12 | End: 2024-08-19 | Stop reason: DRUGHIGH

## 2024-08-12 NOTE — TELEPHONE ENCOUNTER
"Received call from pt wanting to clarify the instructions given by Dr. Pearson after her stress test this am. Informed her per Dr. Pearson, \"Due to her obstructive HCM, I stopped her vasodilators and diuretics and asked her to:  Discontinue amlodipine  Dicontinue losartan  Discontinue triamterene-HCTZ  Continue atenolol 25 mg daily in the morning  Start verapamil 180 mg every night  Measure blood pressure and heart rate twice daily as dose adjustment would need to be made     We will follow up with her closely to monitor her blood pressure response and symptoms.\"    Pt voiced understanding. No further questions.   "

## 2024-08-13 NOTE — TELEPHONE ENCOUNTER
Pt called stating she missed a call from someone and she was calling back.     Spoke to Tash who states she called pt but then saw that pt already had medication instructions provided to her yesterday.     Pt understood and had no further questions

## 2024-08-15 LAB
CHEST PAIN STATEMENT: NORMAL
MAX DIASTOLIC BP: 80 MMHG
MAX PREDICTED HEART RATE: 141 BPM
PROTOCOL NAME: NORMAL
REASON FOR TERMINATION: NORMAL
STRESS POST EXERCISE DUR MIN: 4 MIN
STRESS POST EXERCISE DUR SEC: 0 SEC
STRESS POST PEAK HR: 112 BPM
STRESS POST PEAK SYSTOLIC BP: 222 MMHG
TARGET HR FORMULA: NORMAL
TEST INDICATION: NORMAL

## 2024-08-19 ENCOUNTER — TELEPHONE (OUTPATIENT)
Age: 79
End: 2024-08-19

## 2024-08-19 ENCOUNTER — TELEPHONE (OUTPATIENT)
Dept: CARDIOLOGY CLINIC | Facility: CLINIC | Age: 79
End: 2024-08-19

## 2024-08-19 DIAGNOSIS — I42.2 HYPERTROPHIC CARDIOMYOPATHY (HCC): Primary | ICD-10-CM

## 2024-08-19 RX ORDER — VERAPAMIL HYDROCHLORIDE 240 MG/1
240 TABLET, FILM COATED, EXTENDED RELEASE ORAL
Qty: 30 TABLET | Refills: 2 | Status: SHIPPED | OUTPATIENT
Start: 2024-08-19

## 2024-08-19 NOTE — TELEPHONE ENCOUNTER
Spoke w patient- per Dr Pearson-sent a new script for verapamil 240 mg to be taken at night.  Keep bp & hr log. Give the readings 8/26/24.

## 2024-08-19 NOTE — TELEPHONE ENCOUNTER
Chief Complaint: Pt called with medication and BP concerns   /59, 52 this morning taken with an automatic arm cuff  BP spikes in the evening  177/76, 61  Taking verapamil at 9 pm, atenolol 25 mg daily and asking if she should continue taking ASA daily, and asking if atorvastatin interferes with verapamil?  PCP prescribed glipizide recently and metformin 1000 mg BID.    History of Present Illness (HPI): HCM    VS/Weight: 177/76, 145/59, HR 52, 61    Pain: No    Recent Testing: Stress/echo    Medicine: verapamil 180 mg, metformin 1000 mg BID, atorvastatin, ASA 81 mg.     Upcoming Office Visit: Yes, 9/24/24

## 2024-08-27 ENCOUNTER — TELEPHONE (OUTPATIENT)
Age: 79
End: 2024-08-27

## 2024-08-27 ENCOUNTER — TELEPHONE (OUTPATIENT)
Dept: CARDIOLOGY CLINIC | Facility: CLINIC | Age: 79
End: 2024-08-27

## 2024-08-27 DIAGNOSIS — I10 PRIMARY HYPERTENSION: ICD-10-CM

## 2024-08-27 DIAGNOSIS — I10 PRIMARY HYPERTENSION: Primary | ICD-10-CM

## 2024-08-27 DIAGNOSIS — I42.2 HYPERTROPHIC CARDIOMYOPATHY (HCC): Primary | ICD-10-CM

## 2024-08-27 RX ORDER — LOSARTAN POTASSIUM 25 MG/1
25 TABLET ORAL DAILY
Qty: 30 TABLET | Refills: 2 | Status: SHIPPED | OUTPATIENT
Start: 2024-08-27 | End: 2024-11-25

## 2024-08-27 NOTE — TELEPHONE ENCOUNTER
Per Dr Pearson- ordered 25 mg of losartan daily to be taken in the morning. Also there is an order for BMP in 1 week. Please ask her to continue to monitor her blood pressure and heart rate twice daily and share it with us in 1 week to see if further adjustment id needed relayed message to patient via phone call- pt verbally acknowledged

## 2024-08-27 NOTE — TELEPHONE ENCOUNTER
Received call from pt, she states she spoke with Tash yesterday regarding her BP readings.  She states last night her BP was high, and she would like to discuss this with Tash and see what instructions there are.  Attempted to warm transfer, but unable so reach Tash.  Advised pt I would send her a message.

## 2024-08-29 ENCOUNTER — TELEPHONE (OUTPATIENT)
Dept: CARDIOLOGY CLINIC | Facility: CLINIC | Age: 79
End: 2024-08-29

## 2024-08-29 ENCOUNTER — NURSE TRIAGE (OUTPATIENT)
Age: 79
End: 2024-08-29

## 2024-08-29 NOTE — TELEPHONE ENCOUNTER
"Spoke with patient regarding elevated BP. Inquiring if the Losartan should be increased? She has only taken 2 doses and previously was on 100mg. Just concerned going into a holiday weekend.    -190/80's, HR 60's    Please advise [phone]          Reason for Disposition   Systolic BP >= 130 OR Diastolic >= 80, and is taking BP medications     Medication adjustment    Answer Assessment - Initial Assessment Questions  1. BLOOD PRESSURE: \"What is the blood pressure?\" \"Did you take at least two measurements 5 minutes apart?\"      170-190/80's  2. ONSET: \"When did you take your blood pressure?\"      AM & PM  3. HOW: \"How did you obtain the blood pressure?\" (e.g., visiting nurse, automatic home BP monitor)      Home machine  4. HISTORY: \"Do you have a history of high blood pressure?\"      yes  5. MEDICATIONS: \"Are you taking any medications for blood pressure?\" \"Have you missed any doses recently?\"      Losartan 25mg, Verapamil 240mg bedtime  6. OTHER SYMPTOMS: \"Do you have any symptoms?\" (e.g., headache, chest pain, blurred vision, difficulty breathing, weakness)      denies     In process of medication adjustments for BP    Protocols used: Blood Pressure - High-ADULT-OH    "

## 2024-08-29 NOTE — TELEPHONE ENCOUNTER
Per- Dr Pearson  increase losartan to 25 mg twice daily -can take 2 tablet in the morning  Pt verbally understood

## 2024-09-04 ENCOUNTER — APPOINTMENT (OUTPATIENT)
Dept: LAB | Facility: HOSPITAL | Age: 79
End: 2024-09-04
Payer: COMMERCIAL

## 2024-09-04 ENCOUNTER — TELEPHONE (OUTPATIENT)
Dept: CARDIOLOGY CLINIC | Facility: CLINIC | Age: 79
End: 2024-09-04

## 2024-09-04 DIAGNOSIS — I10 PRIMARY HYPERTENSION: ICD-10-CM

## 2024-09-04 LAB
ANION GAP SERPL CALCULATED.3IONS-SCNC: 10 MMOL/L (ref 4–13)
BUN SERPL-MCNC: 23 MG/DL (ref 5–25)
CALCIUM SERPL-MCNC: 9.8 MG/DL (ref 8.4–10.2)
CHLORIDE SERPL-SCNC: 104 MMOL/L (ref 96–108)
CO2 SERPL-SCNC: 25 MMOL/L (ref 21–32)
CREAT SERPL-MCNC: 0.92 MG/DL (ref 0.6–1.3)
GFR SERPL CREATININE-BSD FRML MDRD: 59 ML/MIN/1.73SQ M
GLUCOSE P FAST SERPL-MCNC: 135 MG/DL (ref 65–99)
POTASSIUM SERPL-SCNC: 4.4 MMOL/L (ref 3.5–5.3)
SODIUM SERPL-SCNC: 139 MMOL/L (ref 135–147)

## 2024-09-04 PROCEDURE — 36415 COLL VENOUS BLD VENIPUNCTURE: CPT

## 2024-09-04 PROCEDURE — 80048 BASIC METABOLIC PNL TOTAL CA: CPT

## 2024-09-04 NOTE — TELEPHONE ENCOUNTER
Leon Silver. She will be bringing her bp cuff in on Monday @ 9am so Dr Pearson can see how it is running.    Tdap given with VIS- pt tolerated well.

## 2024-09-09 DIAGNOSIS — I10 PRIMARY HYPERTENSION: Primary | ICD-10-CM

## 2024-09-09 DIAGNOSIS — I35.0 NONRHEUMATIC AORTIC VALVE STENOSIS: ICD-10-CM

## 2024-09-09 RX ORDER — LOSARTAN POTASSIUM 100 MG/1
100 TABLET ORAL DAILY
Qty: 30 TABLET | Refills: 2 | Status: SHIPPED | OUTPATIENT
Start: 2024-09-09 | End: 2024-12-08

## 2024-09-18 ENCOUNTER — TELEPHONE (OUTPATIENT)
Age: 79
End: 2024-09-18

## 2024-09-18 ENCOUNTER — TELEPHONE (OUTPATIENT)
Dept: CARDIOLOGY CLINIC | Facility: CLINIC | Age: 79
End: 2024-09-18

## 2024-09-18 NOTE — TELEPHONE ENCOUNTER
Nadeem  called looking to speak to Tash.     Transferred call.     Please call if did not receive at [phone]

## 2024-09-18 NOTE — TELEPHONE ENCOUNTER
Per patient Dr England changed the time of doses of verapamil  Pt current doses:  Morning- 50 mg losartan                 25 mg Atenolol (reported by             patient-not found on med list)  Lunchtime-50 mg losartan                    240 mg verapamil    The patient gave a few bp readings said she felt good, no issues  9/17-  morning 145/63  hr-56            Evening  130/59  hr-52  9/18- morning 157/64  hr 57    Overall they are running lower than before   Posterior cranium/yes

## 2024-09-20 NOTE — PROGRESS NOTES
I saw Ms Ashton when she came to [facility] for exercise stress echocardiogram. She had reduced exercise tolerance and was quite symptomatic during low level exercise with fatigue and dyspnea. 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 It is often difficult to determine the true severity of valvular aortic stenosis in the presence of LVOT obstruction. She would certainly be in need of septal reduction therapy due to the severity of her symptoms and limitations of her activities. A transesophageal echocardiogram with 3D assessment would be most useful to assess the aortic valve directly in order to decide wether catheter-based a surgical approach would best serve her at this time.    Jacob Pearson MD

## 2024-09-24 ENCOUNTER — OFFICE VISIT (OUTPATIENT)
Dept: CARDIOLOGY CLINIC | Facility: CLINIC | Age: 79
End: 2024-09-24
Payer: COMMERCIAL

## 2024-09-24 VITALS
DIASTOLIC BLOOD PRESSURE: 80 MMHG | BODY MASS INDEX: 36.04 KG/M2 | HEIGHT: 63 IN | WEIGHT: 203.4 LBS | HEART RATE: 58 BPM | SYSTOLIC BLOOD PRESSURE: 194 MMHG | OXYGEN SATURATION: 97 %

## 2024-09-24 DIAGNOSIS — R06.02 SHORTNESS OF BREATH: ICD-10-CM

## 2024-09-24 DIAGNOSIS — I10 PRIMARY HYPERTENSION: Primary | ICD-10-CM

## 2024-09-24 DIAGNOSIS — I25.10 CORONARY ARTERY DISEASE INVOLVING NATIVE CORONARY ARTERY OF NATIVE HEART WITHOUT ANGINA PECTORIS: ICD-10-CM

## 2024-09-24 DIAGNOSIS — N18.31 STAGE 3A CHRONIC KIDNEY DISEASE (HCC): ICD-10-CM

## 2024-09-24 DIAGNOSIS — I35.0 NONRHEUMATIC AORTIC VALVE STENOSIS: ICD-10-CM

## 2024-09-24 DIAGNOSIS — E78.5 DYSLIPIDEMIA: ICD-10-CM

## 2024-09-24 DIAGNOSIS — I10 ESSENTIAL HYPERTENSION: ICD-10-CM

## 2024-09-24 DIAGNOSIS — I42.2 HYPERTROPHIC CARDIOMYOPATHY (HCC): ICD-10-CM

## 2024-09-24 PROCEDURE — 93000 ELECTROCARDIOGRAM COMPLETE: CPT | Performed by: INTERNAL MEDICINE

## 2024-09-24 PROCEDURE — 99205 OFFICE O/P NEW HI 60 MIN: CPT | Performed by: INTERNAL MEDICINE

## 2024-09-24 RX ORDER — ATENOLOL 25 MG/1
25 TABLET ORAL DAILY
COMMUNITY
Start: 2024-09-02

## 2024-09-24 NOTE — H&P (VIEW-ONLY)
HCM Consultation - Cardiology   Nadeem Ashton 79 y.o. female MRN: [id number]  Unit/Bed#:  Encounter: [id number]        Patient Active Problem List    Diagnosis Date Noted    Restrictive lung disease 09/21/2020    Shortness of breath 07/21/2020    CAD (coronary artery disease) 06/18/2020    Nonrheumatic aortic valve stenosis 06/18/2020    Essential hypertension 06/18/2020    Dyslipidemia 06/18/2020    Mild persistent asthma without complication 06/18/2020     Plan: Ms Nadeem Ashton has been referred to H[facility] due to symptomatic hypertrophic obstructivr cardiomyopathy. I did see her also when she came to [facility] for exercise stress echocardiogram. She had reduced exercise tolerance and was quite symptomatic during low level exercise with fatigue and dyspnea. 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 It is often difficult to determine the true severity of valvular aortic stenosis in the presence of LVOT obstruction. She would certainly be in need of septal reduction therapy due to the severity of her symptoms and limitations of her activities. A transesophageal echocardiogram with 3D assessment would be most useful to assess the aortic valve directly in order to decide wether catheter-based a surgical approach would best serve her at this time. Her ENRICO is scheduled for 10-2-2024. She also has resistant hypertension and her HOCM and valvular AS limit the pharmacologic choices for BP management. She does have relatively high operative risk (aortic valve replacement and myectomy) and has had prior proximal LAD stenting that would likely affect possibility of alcohol septal ablation. The ENRICO would help decide if mavacamten with potential TAVR later on would be an option to pursue. Elimination of LVOT gradients would help management of her hypertension as well if aortic stenosis is not severe or nearly severe. She has very  high BP and treatment of it is limited due to HOCM and AS. She is on Atenolol, Verapamil and Losartan. She was asked to check BP at home and share the results in one week. She was also educated on low salt diet options. She is scheduled for abdominal MRI for abdominal fullness. We discussed of genetic testing and she is interested. We will start process today.   An ECG performed in the office today showed sinus bradycardia and left axis deviation:        Physician Requesting Consult: Celine England MD  Reason for Consult / Principal Problem: HCM, Aortic Stenosis    HPI: Ms Nadeem Ashton is a 79 year old woman with past medical history of hypertension, diabetes, hypercholesterolemia, coronary artery disease (PCI LAD 2008), aortic stenosis, obesity and chronic lung disease (asthma, restrictive disease with remote history of sarcoidosis) who has been experiencing progressive shortness of breath in recent months. Recent workup has included:    ECG: NSR, LAE, LAFB, non-specific T wave changes        Echo 6-:      Left Ventricle: Left ventricular cavity size is normal. Wall thickness is increased. There is mild to moderate concentric hypertrophy. The left ventricular ejection fraction is 70% by visual estimation. Systolic function is vigorous. Diastolic function is mildly abnormal, consistent with grade I (abnormal) relaxation. There is  with a peak gradient of 62.0 mmHg. There is outflow tract dynamic obstruction with valsalva with a peak gradient of 72.0 mmHg.    Right Ventricle: Systolic function is normal.    Aortic Valve: The aortic valve is trileaflet. The leaflets are moderately calcified. There is moderately reduced mobility. There is mild regurgitation. There is moderate stenosis. The aortic valve mean gradient is 28.0 mmHg. The dimensionless velocity index is 0.43. The aortic valve area is 1.32 cm2. The aortic valve velocity is increased in the setting of stenosis and increased flow.    Mitral Valve: There is  mild annular calcification.    Aorta: The aortic root is normal in size. The ascending aorta is mildly dilated. The ascending aorta is 4.2 cm.    Prior TTE study available for comparison. Prior study date: 2/27/2023. Changes noted when compared to prior study. Changes include: Slight progression of aortic stenosis noted.    Cardiac monitor (24 hour):  The patient was in sinus rhythm throughout the duration of the study with an average heart rate 59 bpm  No ventricular ectopic activity  Rare supraventricular ectopic activity in the form of single premature atrial contractions  No ventricular or supraventricular arrhythmias  Symptoms did not correlate with any significant ectopy or arrhythmia    Exercise stress echo 8-:      Left Ventricle: Wall thickness is severely increased. 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 The left ventricular ejection fraction is 70%. Systolic function is hyperdynamic. Global longitudinal strain is reduced at -16%. Wall motion is normal. Diastolic function is mildly abnormal, consistent with grade I (abnormal) relaxation. There is  outflow tract dynamic obstruction at rest with a peak gradient of 53.0 mmHg. There is outflow tract dynamic obstruction with valsalva with a peak gradient of 67.0 mmHg.    Left Atrium: The atrium is mildly dilated.    Aortic Valve: The aortic valve is trileaflet. The leaflets are moderately thickened. The leaflets are moderately calcified. There is moderately reduced mobility. There is moderate stenosis. The aortic valve mean gradient is 26 mmHg. The dimensionless velocity index is 0.42. The aortic valve area is 1.39 cm2.    Mitral Valve: There is systolic anterior motion of the chordal apparatus with late peaking gradient.    Stress ECG: No ST deviation is noted. There were no arrhythmias during recovery. . The stress ECG is negative for ischemia after submaximal exercise, without reproduction of symptoms.    Peak Stress Echo: The peak  stress echo showed normal wall motion.    A bicycle protocol stress test was performed. Overall, the patient's exercise capacity was moderately impaired for their age. The patient after exercising for 4 min and 0 sec and had a maximal HR of 112 bpm (79% of MPHR) and 3.0 METS. The patient experienced no angina during the test. The test was stopped because the patient experienced fatigue and dyspnea. The patient reported dyspnea and fatigue during the stress test. Symptoms began during stress and ended during recovery. Blood pressure demonstrated a hypertensive response and heart rate demonstrated a normal response to stress.     Risk stratification:    Nonsustained ventricular tachycardia - No  Severe left ventricular hypertrophy - No  Family history of sudden death - No  Unexplained syncope - No  LVOT obstruction - Yes  Atrial fibrillation and left atrial dilation - No  Age - 79  NYHA - II-III  Myocardial fibrosis - No CMR  LV systolic dysfunction - No  Apical aneurysm - No    Review of systems: Has shortness of breath during routine daily activities, denies chest pain, dizziness, syncope, palpitation or lower extremity swelling.    Family history:   Mother:  in her 80s. She had heart disease.  Father:  in age late 70s from thyroid cancer.   Sister:  at age 80 from cardiac arrest. Had cardiac surgery at age 40 with some postoperative complications. Pt does not know if she had HCM.   Brother: Age 55 Has lymphoma.   Children: Son (Samuel) is 55 and has HCM diagnosed during high-school after fainting spell. Daughter (Shruthi Collins) is a retired RN and has Afib (follows with Dr. Meza).   There are 6 grandchildren and 1 great grandson.     Genetic testing: none done yet.     Devices: none.     Historical Information   Past Medical History:   Diagnosis Date    Abnormal ultrasound of breast     CAD (coronary artery disease)     Cancer (HCC)     thyroid    Cataract     H/O heart artery stent     History of  sarcoidosis     Hyperlipidemia     Hypertension     Sleep apnea      Past Surgical History:   Procedure Laterality Date    APPENDECTOMY      CHOLECYSTECTOMY      CORONARY ANGIOPLASTY WITH STENT PLACEMENT      HYSTERECTOMY      age 43    KNEE SURGERY Bilateral     OOPHORECTOMY Bilateral     age 43     Family History   Problem Relation Age of Onset    No Known Problems Mother     Thyroid cancer Father     No Known Problems Sister     No Known Problems Daughter     Cancer Maternal Grandmother         ear    No Known Problems Maternal Grandfather     No Known Problems Paternal Grandmother     No Known Problems Paternal Grandfather     No Known Problems Maternal Aunt     No Known Problems Maternal Aunt     Leukemia Paternal Aunt      Current Outpatient Medications on File Prior to Visit   Medication Sig Dispense Refill    albuterol (PROVENTIL HFA,VENTOLIN HFA) 90 mcg/act inhaler Inhale 2 puffs every 4 (four) hours as needed for wheezing or shortness of breath      aspirin (ECOTRIN LOW STRENGTH) 81 mg EC tablet Take 81 mg by mouth daily      atorvastatin (LIPITOR) 20 mg tablet Take 20 mg by mouth daily      Bioflavonoid Products (Vitamin C ER) 1000-100 MG TBCR Take by mouth      fluticasone (FLONASE) 50 mcg/act nasal spray 1 spray into each nostril daily      fluticasone-vilanterol (BREO ELLIPTA) 100-25 mcg/inh inhaler Inhale 1 puff daily Rinse mouth after use. 3 Inhaler 3    losartan (COZAAR) 100 MG tablet Take 1 tablet (100 mg total) by mouth daily 30 tablet 2    metFORMIN (GLUCOPHAGE) 500 mg tablet Take 1 tablet (500 mg total) by mouth 2 (two) times a day with meals 60 tablet 0    Multiple Vitamins-Minerals (CENTRUM ADULTS PO) Take by mouth daily      Synthroid 150 MCG tablet       verapamil (CALAN-SR) 240 mg CR tablet Take 1 tablet (240 mg total) by mouth daily at bedtime 30 tablet 2     No current facility-administered medications on file prior to visit.     No Known Allergies  Social History     Substance and Sexual  "Activity   Alcohol Use Not Currently     Social History     Substance and Sexual Activity   Drug Use Never     Social History     Tobacco Use   Smoking Status Never   Smokeless Tobacco Never     Objective   Vitals:   Vitals:    09/24/24 1420   BP: (!) 194/80   BP Location: Right arm   Patient Position: Sitting   Cuff Size: Large   Pulse: 58   SpO2: 97%   Weight: 92.3 kg (203 lb 6.4 oz)   Height: 5' 3\" (1.6 m)   Body surface area is 1.95 meters squared.  Body mass index is 36.03 kg/m².    Invasive Devices       None                 Physical Exam:  GEN: Nadeem Ashton appears well, alert and oriented x 3, pleasant and cooperative   HEENT: pupils equal, round, and reactive to light; extraocular muscles intact  NECK: supple, no carotid bruits   HEART: regular rhythm, normal S1 and S2, + systolic ejection murmurs, clicks, gallops or rubs   LUNGS: clear to auscultation bilaterally; no wheezes, rales, or rhonchi   ABDOMEN: normal bowel sounds, soft, no tenderness, no distention  EXTREMITIES: peripheral pulses normal; no clubbing, cyanosis, or edema  NEURO: no focal findings   SKIN: normal without suspicious lesions on exposed skin    Lab Results:   Lab Results   Component Value Date    WBC 3.34 (L) 03/09/2024    RBC 4.38 03/09/2024    HGB 13.4 03/09/2024    HCT 40.0 03/09/2024    MCV 91 03/09/2024     (L) 03/09/2024    RDW 14.2 03/09/2024     Lab Results   Component Value Date    K 4.4 09/04/2024     09/04/2024    CO2 25 09/04/2024    BUN 23 09/04/2024    CREATININE 0.92 09/04/2024    EGFR 59 09/04/2024    CALCIUM 9.8 09/04/2024    AST 17 03/09/2024    ALT 21 03/09/2024    ALKPHOS 68 03/09/2024     Lab Results   Component Value Date    MG 2.0 03/09/2024     No results found for: \"CHOL\", \"HDL\", \"TRIG\", \"LDLCALC\"  No results found for: \"GPO6QHDTZYRL\", \"T3FREE\", \"FREET4\", \"B2TVBUU\", \"Q1HLQLL\"    Imaging:   I have personally reviewed pertinent films in PACS    Cardiac testing:   Results for orders placed during the " hospital encounter of 18    Echo complete with contrast if indicated    Narrative  [place] [phone]    Transthoracic Echocardiogram  2D, M-mode, Doppler, and Color Doppler    Study date:  2018    Patient: ARSENIO ETIENNE  MR number: [id number]  Account number: [id number]  : 1945  Age: 72 years  Gender: Female  Status: Outpatient  Location: [address] [place] [address] [facility]  Height: 62 in  Weight: 213 lb  BP:    Indications: Shortness of breath    Diagnoses: R06.02 - Shortness of breath    Sonographer:  ED Mtz  Referring Physician:  Celine England MD  Group:  [facility]  Interpreting Physician:  Shanita Hua MD    SUMMARY    LEFT VENTRICLE:  Systolic function was vigorous. Ejection fraction was estimated to be 65 %.  There were no regional wall motion abnormalities.  Wall thickness was increased.  Concentric hypertrophy was present.  Doppler parameters were consistent with abnormal left ventricular relaxation (grade 1 diastolic dysfunction).    AORTIC VALVE:  There was mild stenosis. Vmax 2.4 m/s, mean gradient 13 mm Hg, maximum gradient 23 mm Hg.  There was trace to mild regurgitation.    HISTORY: PRIOR HISTORY: Hypertension, high cholesterol, coronary artery disease, stent    PROCEDURE: The study was performed in the [address] [place] [address]. This was a routine study. The transthoracic approach was used. The study included complete 2D imaging, M-mode, complete spectral Doppler, and color Doppler. This  was a technically difficult study.    LEFT VENTRICLE: Size was normal. Systolic function was vigorous. Ejection fraction was estimated to be 65 %. There were no regional wall motion abnormalities. Wall thickness was increased. Concentric hypertrophy was present. DOPPLER: There  was an increased relative contribution of atrial contraction to ventricular filling. Doppler  parameters were consistent with abnormal left ventricular relaxation (grade 1 diastolic dysfunction).    RIGHT VENTRICLE: The size was normal. Systolic function was normal.    LEFT ATRIUM: Size was normal.    RIGHT ATRIUM: Size was normal.    MITRAL VALVE: There was annular calcification. Valve structure was normal. There was normal leaflet separation. DOPPLER: There was no evidence for stenosis. There was trace regurgitation.    AORTIC VALVE: Leaflets exhibited mildly to moderately increased thickness and mild to moderate calcification. The valve was not well visualized. DOPPLER: There was mild stenosis. Vmax 2.4 m/s, mean gradient 13 mm Hg, maximum gradient 23 mm  Hg. There was trace to mild regurgitation.    TRICUSPID VALVE: The valve structure was normal. There was normal leaflet separation. DOPPLER: There was no evidence for stenosis. There was no regurgitation.    PULMONIC VALVE: Leaflets exhibited normal thickness, no calcification, and normal cuspal separation. DOPPLER: The transpulmonic velocity was within the normal range. There was mild regurgitation.    PERICARDIUM: There was no pericardial effusion. The pericardium was normal in appearance.    AORTA: The root exhibited normal size. Not well visualized.    SYSTEMIC VEINS: IVC: The inferior vena cava was normal in size and course. Respirophasic changes were normal.    SYSTEM MEASUREMENT TABLES    2D  %FS: 29.01 %  AV Diam: 3.47 cm  EDV(Teich): 77.21 ml  EF(Cube): 64.22 %  EF(Teich): 56.15 %  ESV(Cube): 25.92 ml  ESV(Teich): 33.86 ml  IVSd: 1.2 cm  LA Area: 17.81 cm2  LA Diam: 3.74 cm  LVEDV MOD A4C: 126.24 ml  LVEF MOD A4C: 70.82 %  LVESV MOD A4C: 36.84 ml  LVIDd: 4.17 cm  LVIDs: 2.96 cm  LVLd A4C: 8.96 cm  LVLs A4C: 7.25 cm  LVOT Diam: 1.94 cm  LVPWd: 1.13 cm  RA Area: 10.23 cm2  RV Diam.: 2.95 cm  SV MOD A4C: 89.41 ml  SV(Cube): 46.53 ml  SV(Teich): 43.35 ml    CW  AV Env.Ti: 353.67 ms  AV SV: 574.44 ml  AV VTI: 60.82 cm  AV Vmax: 2.51 m/s  AV Vmean:  "1.72 m/s  AV maxP.35 mmHg  AV meanP.44 mmHg    MM  TAPSE: 2.26 cm    PW  JETHRO (VTI): 1.71 cm2  JETHRO Vmax: 1.55 cm2  E': 0.07 m/s  E/E': 13.05  LVOT Env.Ti: 427.6 ms  LVOT VTI: 35.44 cm  LVOT Vmax: 1.32 m/s  LVOT Vmean: 0.83 m/s  LVOT maxP.01 mmHg  LVOT meanPG: 3.32 mmHg  LVSV Dopp: 104.27 ml  MV A Raul: 1.03 m/s  MV Dec Bonner: 2.91 m/s2  MV DecT: 295.77 ms  MV E Raul: 0.86 m/s  MV E/A Ratio: 0.84    [facility] Acc[facility]    Prepared and electronically signed by    Shanita Hua MD  Signed 2018 11:14:07    Name: Nadeem Ashton                       : 1945  MRN: [id number]                       Age: 79 y.o.  Patient Status: Outpatient          Gender: female  Echo complete    Height: 5' 3\" (1.6 m)   Weight: 86.2 kg (190 lb)   BSA: 1.89 m²   Blood Pressure: 178/71    Date of Study: 24   Ordering Provider: Celine England MD   Clinical Indications: Nonrheumatic aortic (valve) stenosis [I35.0 (ICD-10-CM)]       Reading Physicians  Performing Staff   Cardiology: Jordi Moreno MD    Tech: Marie Carter Friwinstonzi        Vitals    Height Weight BSA (Calculated - m2) BP Pulse   5' 3\" (1.6 m) 86.2 kg (190 lb) 1.89 sq meters 178/71 62     PACS Images     Show images for Echo complete w/ contrast if indicated  Study Details    This transthoracic echocardiogram was performed in the echo lab. This was a routine, outpatient study. Study quality was poor. This was a technically difficult study due to decreased penetration and lung interference. A complete 2D, color flow Doppler, spectral Doppler, 2D, color flow Doppler and spectral Doppler transthoracic echocardiogram was performed.  The apical, parasternal, subcostal and suprasternal views were obtained. Technical difficulties due to patient's body habitus.     History    CAD, nonrheumatic AS, HTN, dyslipidemia, asthma, SOB, restrictive lung disease     Interpretation Summary  Show Result Comparison     Left Ventricle: Left " ventricular cavity size is normal. Wall thickness is increased. There is mild to moderate concentric hypertrophy. The left ventricular ejection fraction is 70% by visual estimation. Systolic function is vigorous. Diastolic function is mildly abnormal, consistent with grade I (abnormal) relaxation. There is  with a peak gradient of 62.0 mmHg. There is outflow tract dynamic obstruction with valsalva with a peak gradient of 72.0 mmHg.    Right Ventricle: Systolic function is normal.    Aortic Valve: The aortic valve is trileaflet. The leaflets are moderately calcified. There is moderately reduced mobility. There is mild regurgitation. There is moderate stenosis. The aortic valve mean gradient is 28.0 mmHg. The dimensionless velocity index is 0.43. The aortic valve area is 1.32 cm2. The aortic valve velocity is increased in the setting of stenosis and increased flow.    Mitral Valve: There is mild annular calcification.    Aorta: The aortic root is normal in size. The ascending aorta is mildly dilated. The ascending aorta is 4.2 cm.    Prior TTE study available for comparison. Prior study date: 2/27/2023. Changes noted when compared to prior study. Changes include: Slight progression of aortic stenosis noted..     Findings    Left Ventricle Left ventricular cavity size is normal. Wall thickness is increased. There is mild to moderate concentric hypertrophy. The left ventricular ejection fraction is 70% by visual estimation. Systolic function is vigorous. Although no diagnostic regional wall motion abnormality was identified, this possibility cannot be completely excluded on the basis of this study. Diastolic function is mildly abnormal, consistent with grade I (abnormal) relaxation.  There is  with a peak gradient of 62.0 mmHg. There is outflow tract dynamic obstruction with valsalva with a peak gradient of 72.0 mmHg.   Right Ventricle Right ventricular cavity size is normal. Systolic function is normal.   Left Atrium  The atrium is normal in size.   Right Atrium The atrium is normal in size.   Aortic Valve The aortic valve was not well visualized. The aortic valve is trileaflet. The leaflets are moderately calcified. There is moderately reduced mobility. There is mild regurgitation. There is moderate stenosis. The aortic valve mean gradient is 28.0 mmHg. The dimensionless velocity index is 0.43. The aortic valve area is 1.32 cm2. The aortic valve velocity is increased in the setting of stenosis and increased flow.   Mitral Valve There is mild annular calcification.  There is trace regurgitation. There is no evidence of stenosis.   Tricuspid Valve Tricuspid valve structure is normal. There is trace regurgitation. There is no evidence of stenosis. Right ventricular systolic pressure could not be assessed.   Pulmonic Valve The pulmonic valve was not well visualized. There is trace regurgitation. There is no evidence of stenosis.   Ascending Aorta The aortic root is normal in size. The ascending aorta is mildly dilated. The ascending aorta is 4.2 cm.   IVC/SVC The inferior vena cava is normal in size. Respirophasic changes were normal.   Pericardium There is no pericardial effusion.     Left Ventricle Measurements    Function/Volumes   A4C EF 70 %         LVOT stroke volume 90.18         LVOT stroke volume index 47.6 ml/m2         Left ventricular stroke volume (2D) 50 mL         LVOT Cardiac Output 6.14 l/min         LVOT Cardiac Index 3.25 l/min/m2         Dimensions   LVIDd 4.2 cm         LVIDS 2.8 cm         IVSd 1.6 cm         LVPWd 1.4 cm         LVOT area 3.14 cm2         FS 33         Diastolic Filling   MV E' Tissue Velocity Septal 6 cm/s         LA Volume Index (BP) 29.6 mL/m2         E/A ratio 0.43         E wave deceleration time 338 ms         MV Peak E Raul 67 cm/s         MV Peak A Raul 1.56 m/s          Report Measurements   AV LVOT peak gradient 6 mmHg         Other Measurements   Peak gradient (Rest) 62 mmHg          Peak Gradient (Valsalva) 72 mmHg              Interventricular Septum Measurements    Shunt Ratio   LVOT peak VTI 28.72 cm         LVOT peak arie 1.27 m/s              Right Ventricle Measurements    Dimensions   RVID d 3.1 cm         Tricuspid annular plane systolic excursion 1.9 cm               Left Atrium Measurements    Dimensions   LA size 4.6 cm         LA length (A2C) 5.7 cm         Volumes   LA volume (BP) 56 mL         LA Volume Index (BP) 29.6 mL/m2               Right Atrium Measurements    Dimensions   RA 2D Volume 35 mL         RAA A4C 14.8 cm2               Atrial Septum Measurements    Shunt Ratio   LVOT peak VTI 28.72 cm         LVOT peak arie 1.27 m/s               Aortic Valve Measurements    Stenosis   Aortic valve peak velocity 2.95 m/s         LVOT peak arie 1.27 m/s         Ao VTI 68.47 cm         LVOT peak VTI 28.72 cm         AV mean gradient 28 mmHg         LVOT mn grad 5 mmHg         AV peak gradient 39 mmHg         AV LVOT peak gradient 6 mmHg         Area/Dimensions   DVI 0.43         AV valve area 1.32 cm2         AV area by cont VTI 1.3 cm2         AV area peak arie 1.4 cm2         LVOT diameter 2 cm         LVOT area 3.14 cm2               Mitral Valve Measurements    Stenosis   MV stenosis pressure 1/2 time 98 ms         MV valve area p 1/2 method 2.24               Aorta Measurements    Aortic Dimensions   Ao root 3.6 cm         Asc Ao 4.2 cm               Exam Details    Performed Procedure Technologist Supporting Staff Performing Physician   Echo complete Marie Daigle           Appointment Date/Status Modality Department    2024     Completed EA ECHO 1 EA CAR NON INV           Begin Exam End Exam  End Exam Questionnaires   2024  8:02 AM 2024  8:55 AM  PATIENT EDUCATION            Signed    Electronically signed by Jordi Moreno MD on 24 at 1023 EDT     Name: Nadeem Ashton                       : 1945  MRN: [id number]                       Age: 79  "y.o.  Patient Status: Outpatient          Gender: female  Echo stress test, exercise w/ strain    Height: 5' 3\" (1.6 m)   Weight: 86.2 kg (190 lb)   BSA: 1.89 m²   Blood Pressure: 168/78    Date of Study: 8/12/24   Ordering Provider: Jacob Pearson MD   Clinical Indications: Cardiomyopathy       Reading Physicians  Performing Staff   Cardiology: Jacob Pearson MD    Tech: Hlaina Yadav RN   Support Staff: CARIN Randle, DALJIT        Height & Weight    Height Weight BSA (Calculated - m2)   5' 3\" (1.6 m) 86.2 kg (190 lb) 1.89 sq meters     PACS Images     Show images for Echo stress test, exercise  Study Details    Study quality was adequate. The apical and parasternal views were obtained.     History    Htn, stents, cad, hld, dm 2, sleep apnea, asthma, thyroid ca     Interpretation Summary  Show Result Comparison     Left Ventricle: Wall thickness is severely increased. 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 The left ventricular ejection fraction is 70%. Systolic function is hyperdynamic. Global longitudinal strain is reduced at -16%. Wall motion is normal. Diastolic function is mildly abnormal, consistent with grade I (abnormal) relaxation. There is  outflow tract dynamic obstruction at rest with a peak gradient of 53.0 mmHg. There is outflow tract dynamic obstruction with valsalva with a peak gradient of 67.0 mmHg.    Left Atrium: The atrium is mildly dilated.    Aortic Valve: The aortic valve is trileaflet. The leaflets are moderately thickened. The leaflets are moderately calcified. There is moderately reduced mobility. There is moderate stenosis. The aortic valve mean gradient is 26 mmHg. The dimensionless velocity index is 0.42. The aortic valve area is 1.39 cm2.    Mitral Valve: There is systolic anterior motion of the chordal apparatus with late peaking gradient.    Stress ECG: No ST deviation is noted. There were no arrhythmias during recovery. . The stress ECG " is negative for ischemia after submaximal exercise, without reproduction of symptoms.    Peak Stress Echo: The peak stress echo showed normal wall motion.     Strain was performed to quantify interventricular dyssynchrony and evaluate components of myocardial function due to HCM. Results from the utilization of Strain Analysis are listed in the report below.     Stress Findings    Stress Findings A bicycle protocol stress test was performed. Overall, the patient's exercise capacity was moderately impaired for their age. The patient after exercising for 4 min and 0 sec and had a maximal HR of 112 bpm (79% of MPHR) and 3.0 METS. The patient experienced no angina during the test. The test was stopped because the patient experienced fatigue and dyspnea. The patient reported dyspnea and fatigue during the stress test. Symptoms began during stress and ended during recovery. Blood pressure demonstrated a hypertensive response and heart rate demonstrated a normal response to stress.     Findings    Left Ventricle Left ventricular cavity size is small. Wall thickness is severely increased. 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 The left ventricular ejection fraction is 70%. Systolic function is hyperdynamic. Global longitudinal strain is reduced at -16%.  Wall motion is normal. Diastolic function is mildly abnormal, consistent with grade I (abnormal) relaxation.  There is  outflow tract dynamic obstruction at rest with a peak gradient of 53.0 mmHg. There is outflow tract dynamic obstruction with valsalva with a peak gradient of 67.0 mmHg.   Right Ventricle Right ventricular cavity size is normal. Systolic function is normal. Wall thickness is normal.   Left Atrium The atrium is mildly dilated.   Right Atrium The atrium is normal in size.   Aortic Valve The aortic valve is trileaflet. The leaflets are moderately thickened. The leaflets are moderately calcified. There is moderately reduced mobility. There  is no evidence of regurgitation. There is moderate stenosis. The aortic valve mean gradient is 26 mmHg. The dimensionless velocity index is 0.42. The aortic valve area is 1.39 cm2.   Mitral Valve Mitral valve structure is normal.  There is no evidence of regurgitation. There is no evidence of stenosis. There is systolic anterior motion of the chordal apparatus with late peaking gradient.   Tricuspid Valve Tricuspid valve structure is normal. There is no evidence of regurgitation. There is no evidence of stenosis.   Pulmonic Valve Pulmonic valve structure is normal. There is no evidence of regurgitation. There is no evidence of stenosis.   Ascending Aorta The aortic root is normal in size.   Pericardium There is no pericardial effusion. The pericardium is normal in appearance.     Stress Echo Findings    Peak Stress Left ventricle cavity is small at peak stress. The left ventricle systolic function is hyperdynamic at peak stress. The peak stress echo showed normal wall motion.     Stress Measurements    Baseline Vitals   Baseline HR 65 bpm         Baseline /78 mmHg         O2 sat rest 95 %         Peak Stress Vitals   Stress peak  bpm         Post peak  mmHg         O2 sat peak 91 %         Max HR Percent 79 %         Max  bpm         Recovery Vitals   Recovery HR 75 bpm         Recovery  mmHg         O2 sat recovery 95 %          Exercise Data   Max  bpm         Exercise duration (min) 4 min         Exercise duration (sec) 0 sec         Estimated workload 3 METS         Angina Index 0         Rate Pressure Product 24,864              Stage Data 8/12/24  8:28 AM--8/12/24  9:00 AM    Date/Time Stage BP HR O2 RPP Bicycle - Abraham Symptoms   08/12/24 0828 BASELINE 168/78 65 bpm 95 % 44546 -- --   08/12/24 0848 STAGE 1 202/84 89 bpm 91 % 71737 25 mild sob/fatigue   08/12/24 0850 STAGE 2 222/80 106 bpm 91 % 75168 50 mod sob/ fatigue   08/12/24 0852 IMMEDIATE/PEAK 220/72 91 bpm 91 % 20020  -- mod sob / fatigue   08/12/24 0854 RECOVERY 1 174/72 76 bpm 95 % 57492 -- symptoms resolving   08/12/24 0858 RECOVERY 2 162/84 75 bpm -- 46747 -- none     Left Ventricle Measurements    Function/Volumes   LVOT stroke volume 115.39         LVOT stroke volume index 61.9 ml/m2         LVOT Cardiac Output 7.49 l/min         LVOT Cardiac Index 3.96 l/min/m2         Dimensions   LVOT area 2.83 cm2         Strain   GLS -16 %          Report Measurements   AV LVOT peak gradient 8 mmHg         Other Measurements   Peak gradient (Rest) 53 mmHg         Peak Gradient (Valsalva) 67 mmHg              Interventricular Septum Measurements    Shunt Ratio   LVOT peak VTI 40.72 cm         LVOT peak arie 1.38 m/s              Atrial Septum Measurements    Shunt Ratio   LVOT peak VTI 40.72 cm         LVOT peak arie 1.38 m/s               Aortic Valve Measurements    Stenosis   Aortic valve peak velocity 3.28 m/s         LVOT peak arie 1.38 m/s         Ao VTI 82.91 cm         LVOT peak VTI 40.72 cm         AV mean gradient 26 mmHg         LVOT mn grad 5 mmHg         AV peak gradient 43 mmHg         AV LVOT peak gradient 8 mmHg         Area/Dimensions   DVI 0.42         AV valve area 1.39 cm2         AV area by cont VTI 1.4 cm2         AV area peak arie 1.2 cm2         LVOT diameter 1.9 cm         LVOT area 2.83 cm2               Exam Details    Performed Procedure Technologist Supporting Staff Performing Physician   Echo stress test, exercise w/ strain Halina Yadav, CARIN Hubbard, RN  Will Robertson, RS Jacob Pearson MD         Appointment Date/Status Modality Department    8/12/2024     Completed BE STRESS 1 BE CAR NON INV           Begin Exam End Exam Begin Exam Questionnaires End Exam Questionnaires   8/12/2024  8:28 AM 8/12/2024  9:00 AM CV STRESS QUESTIONNAIRE PATIENT EDUCATION            All Reviewers List    Jacob Pearson MD on 8/13/2024  7:03 AM     Signed    Electronically signed by Jacob Pearson MD on 8/12/24 at  1307 EDT     Counseling / Coordination of Care  Total floor / unit time spent today 65 minutes.  Greater than 50% of total time was spent with the patient and / or family counseling and / or coordination of care.

## 2024-09-24 NOTE — PROGRESS NOTES
HCM Consultation - Cardiology   Nadeem Ashton 79 y.o. female MRN: [id number]  Unit/Bed#:  Encounter: [id number]        Patient Active Problem List    Diagnosis Date Noted    Restrictive lung disease 09/21/2020    Shortness of breath 07/21/2020    CAD (coronary artery disease) 06/18/2020    Nonrheumatic aortic valve stenosis 06/18/2020    Essential hypertension 06/18/2020    Dyslipidemia 06/18/2020    Mild persistent asthma without complication 06/18/2020     Plan: Ms Nadeem Ashton has been referred to [facility] due to symptomatic hypertrophic obstructivr cardiomyopathy. I did see her also when she came to [facility] for exercise stress echocardiogram. She had reduced exercise tolerance and was quite symptomatic during low level exercise with fatigue and dyspnea. 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 It is often difficult to determine the true severity of valvular aortic stenosis in the presence of LVOT obstruction. She would certainly be in need of septal reduction therapy due to the severity of her symptoms and limitations of her activities. A transesophageal echocardiogram with 3D assessment would be most useful to assess the aortic valve directly in order to decide wether catheter-based a surgical approach would best serve her at this time. Her ENRICO is scheduled for 10-2-2024. She also has resistant hypertension and her HOCM and valvular AS limit the pharmacologic choices for BP management. She does have relatively high operative risk (aortic valve replacement and myectomy) and has had prior proximal LAD stenting that would likely affect possibility of alcohol septal ablation. The ENRICO would help decide if mavacamten with potential TAVR later on would be an option to pursue. Elimination of LVOT gradients would help management of her hypertension as well if aortic stenosis is not severe or nearly severe. She has very  high BP and treatment of it is limited due to HOCM and AS. She is on Atenolol, Verapamil and Losartan. She was asked to check BP at home and share the results in one week. She was also educated on low salt diet options. She is scheduled for abdominal MRI for abdominal fullness. We discussed of genetic testing and she is interested. We will start process today.   An ECG performed in the office today showed sinus bradycardia and left axis deviation:        Physician Requesting Consult: Celine England MD  Reason for Consult / Principal Problem: HCM, Aortic Stenosis    HPI: Ms Nadeem Ashton is a 79 year old woman with past medical history of hypertension, diabetes, hypercholesterolemia, coronary artery disease (PCI LAD 2008), aortic stenosis, obesity and chronic lung disease (asthma, restrictive disease with remote history of sarcoidosis) who has been experiencing progressive shortness of breath in recent months. Recent workup has included:    ECG: NSR, LAE, LAFB, non-specific T wave changes        Echo 6-:      Left Ventricle: Left ventricular cavity size is normal. Wall thickness is increased. There is mild to moderate concentric hypertrophy. The left ventricular ejection fraction is 70% by visual estimation. Systolic function is vigorous. Diastolic function is mildly abnormal, consistent with grade I (abnormal) relaxation. There is  with a peak gradient of 62.0 mmHg. There is outflow tract dynamic obstruction with valsalva with a peak gradient of 72.0 mmHg.    Right Ventricle: Systolic function is normal.    Aortic Valve: The aortic valve is trileaflet. The leaflets are moderately calcified. There is moderately reduced mobility. There is mild regurgitation. There is moderate stenosis. The aortic valve mean gradient is 28.0 mmHg. The dimensionless velocity index is 0.43. The aortic valve area is 1.32 cm2. The aortic valve velocity is increased in the setting of stenosis and increased flow.    Mitral Valve: There is  mild annular calcification.    Aorta: The aortic root is normal in size. The ascending aorta is mildly dilated. The ascending aorta is 4.2 cm.    Prior TTE study available for comparison. Prior study date: 2/27/2023. Changes noted when compared to prior study. Changes include: Slight progression of aortic stenosis noted.    Cardiac monitor (24 hour):  The patient was in sinus rhythm throughout the duration of the study with an average heart rate 59 bpm  No ventricular ectopic activity  Rare supraventricular ectopic activity in the form of single premature atrial contractions  No ventricular or supraventricular arrhythmias  Symptoms did not correlate with any significant ectopy or arrhythmia    Exercise stress echo 8-:      Left Ventricle: Wall thickness is severely increased. 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 The left ventricular ejection fraction is 70%. Systolic function is hyperdynamic. Global longitudinal strain is reduced at -16%. Wall motion is normal. Diastolic function is mildly abnormal, consistent with grade I (abnormal) relaxation. There is  outflow tract dynamic obstruction at rest with a peak gradient of 53.0 mmHg. There is outflow tract dynamic obstruction with valsalva with a peak gradient of 67.0 mmHg.    Left Atrium: The atrium is mildly dilated.    Aortic Valve: The aortic valve is trileaflet. The leaflets are moderately thickened. The leaflets are moderately calcified. There is moderately reduced mobility. There is moderate stenosis. The aortic valve mean gradient is 26 mmHg. The dimensionless velocity index is 0.42. The aortic valve area is 1.39 cm2.    Mitral Valve: There is systolic anterior motion of the chordal apparatus with late peaking gradient.    Stress ECG: No ST deviation is noted. There were no arrhythmias during recovery. . The stress ECG is negative for ischemia after submaximal exercise, without reproduction of symptoms.    Peak Stress Echo: The peak  stress echo showed normal wall motion.    A bicycle protocol stress test was performed. Overall, the patient's exercise capacity was moderately impaired for their age. The patient after exercising for 4 min and 0 sec and had a maximal HR of 112 bpm (79% of MPHR) and 3.0 METS. The patient experienced no angina during the test. The test was stopped because the patient experienced fatigue and dyspnea. The patient reported dyspnea and fatigue during the stress test. Symptoms began during stress and ended during recovery. Blood pressure demonstrated a hypertensive response and heart rate demonstrated a normal response to stress.     Risk stratification:    Nonsustained ventricular tachycardia - No  Severe left ventricular hypertrophy - No  Family history of sudden death - No  Unexplained syncope - No  LVOT obstruction - Yes  Atrial fibrillation and left atrial dilation - No  Age - 79  NYHA - II-III  Myocardial fibrosis - No CMR  LV systolic dysfunction - No  Apical aneurysm - No    Review of systems: Has shortness of breath during routine daily activities, denies chest pain, dizziness, syncope, palpitation or lower extremity swelling.    Family history:   Mother:  in her 80s. She had heart disease.  Father:  in age late 70s from thyroid cancer.   Sister:  at age 80 from cardiac arrest. Had cardiac surgery at age 40 with some postoperative complications. Pt does not know if she had HCM.   Brother: Age 55 Has lymphoma.   Children: Son (Samuel) is 55 and has HCM diagnosed during high-school after fainting spell. Daughter (Shruthi Collins) is a retired RN and has Afib (follows with Dr. Meza).   There are 6 grandchildren and 1 great grandson.     Genetic testing: none done yet.     Devices: none.     Historical Information   Past Medical History:   Diagnosis Date    Abnormal ultrasound of breast     CAD (coronary artery disease)     Cancer (HCC)     thyroid    Cataract     H/O heart artery stent     History of  sarcoidosis     Hyperlipidemia     Hypertension     Sleep apnea      Past Surgical History:   Procedure Laterality Date    APPENDECTOMY      CHOLECYSTECTOMY      CORONARY ANGIOPLASTY WITH STENT PLACEMENT      HYSTERECTOMY      age 43    KNEE SURGERY Bilateral     OOPHORECTOMY Bilateral     age 43     Family History   Problem Relation Age of Onset    No Known Problems Mother     Thyroid cancer Father     No Known Problems Sister     No Known Problems Daughter     Cancer Maternal Grandmother         ear    No Known Problems Maternal Grandfather     No Known Problems Paternal Grandmother     No Known Problems Paternal Grandfather     No Known Problems Maternal Aunt     No Known Problems Maternal Aunt     Leukemia Paternal Aunt      Current Outpatient Medications on File Prior to Visit   Medication Sig Dispense Refill    albuterol (PROVENTIL HFA,VENTOLIN HFA) 90 mcg/act inhaler Inhale 2 puffs every 4 (four) hours as needed for wheezing or shortness of breath      aspirin (ECOTRIN LOW STRENGTH) 81 mg EC tablet Take 81 mg by mouth daily      atorvastatin (LIPITOR) 20 mg tablet Take 20 mg by mouth daily      Bioflavonoid Products (Vitamin C ER) 1000-100 MG TBCR Take by mouth      fluticasone (FLONASE) 50 mcg/act nasal spray 1 spray into each nostril daily      fluticasone-vilanterol (BREO ELLIPTA) 100-25 mcg/inh inhaler Inhale 1 puff daily Rinse mouth after use. 3 Inhaler 3    losartan (COZAAR) 100 MG tablet Take 1 tablet (100 mg total) by mouth daily 30 tablet 2    metFORMIN (GLUCOPHAGE) 500 mg tablet Take 1 tablet (500 mg total) by mouth 2 (two) times a day with meals 60 tablet 0    Multiple Vitamins-Minerals (CENTRUM ADULTS PO) Take by mouth daily      Synthroid 150 MCG tablet       verapamil (CALAN-SR) 240 mg CR tablet Take 1 tablet (240 mg total) by mouth daily at bedtime 30 tablet 2     No current facility-administered medications on file prior to visit.     No Known Allergies  Social History     Substance and Sexual  "Activity   Alcohol Use Not Currently     Social History     Substance and Sexual Activity   Drug Use Never     Social History     Tobacco Use   Smoking Status Never   Smokeless Tobacco Never     Objective   Vitals:   Vitals:    09/24/24 1420   BP: (!) 194/80   BP Location: Right arm   Patient Position: Sitting   Cuff Size: Large   Pulse: 58   SpO2: 97%   Weight: 92.3 kg (203 lb 6.4 oz)   Height: 5' 3\" (1.6 m)   Body surface area is 1.95 meters squared.  Body mass index is 36.03 kg/m².    Invasive Devices       None                 Physical Exam:  GEN: Nadeem Ashton appears well, alert and oriented x 3, pleasant and cooperative   HEENT: pupils equal, round, and reactive to light; extraocular muscles intact  NECK: supple, no carotid bruits   HEART: regular rhythm, normal S1 and S2, + systolic ejection murmurs, clicks, gallops or rubs   LUNGS: clear to auscultation bilaterally; no wheezes, rales, or rhonchi   ABDOMEN: normal bowel sounds, soft, no tenderness, no distention  EXTREMITIES: peripheral pulses normal; no clubbing, cyanosis, or edema  NEURO: no focal findings   SKIN: normal without suspicious lesions on exposed skin    Lab Results:   Lab Results   Component Value Date    WBC 3.34 (L) 03/09/2024    RBC 4.38 03/09/2024    HGB 13.4 03/09/2024    HCT 40.0 03/09/2024    MCV 91 03/09/2024     (L) 03/09/2024    RDW 14.2 03/09/2024     Lab Results   Component Value Date    K 4.4 09/04/2024     09/04/2024    CO2 25 09/04/2024    BUN 23 09/04/2024    CREATININE 0.92 09/04/2024    EGFR 59 09/04/2024    CALCIUM 9.8 09/04/2024    AST 17 03/09/2024    ALT 21 03/09/2024    ALKPHOS 68 03/09/2024     Lab Results   Component Value Date    MG 2.0 03/09/2024     No results found for: \"CHOL\", \"HDL\", \"TRIG\", \"LDLCALC\"  No results found for: \"CGV5LHZGCXWI\", \"T3FREE\", \"FREET4\", \"X7INTPX\", \"K3BDRLN\"    Imaging:   I have personally reviewed pertinent films in PACS    Cardiac testing:   Results for orders placed during the " hospital encounter of 18    Echo complete with contrast if indicated    Narrative  [place] [phone]    Transthoracic Echocardiogram  2D, M-mode, Doppler, and Color Doppler    Study date:  2018    Patient: ARSENIO ETIENNE  MR number: [id number]  Account number: [id number]  : 1945  Age: 72 years  Gender: Female  Status: Outpatient  Location: [address] [place] [address] [facility]  Height: 62 in  Weight: 213 lb  BP:    Indications: Shortness of breath    Diagnoses: R06.02 - Shortness of breath    Sonographer:  ED Mtz  Referring Physician:  Celine England MD  Group:  [facility]  Interpreting Physician:  Shanita Hua MD    SUMMARY    LEFT VENTRICLE:  Systolic function was vigorous. Ejection fraction was estimated to be 65 %.  There were no regional wall motion abnormalities.  Wall thickness was increased.  Concentric hypertrophy was present.  Doppler parameters were consistent with abnormal left ventricular relaxation (grade 1 diastolic dysfunction).    AORTIC VALVE:  There was mild stenosis. Vmax 2.4 m/s, mean gradient 13 mm Hg, maximum gradient 23 mm Hg.  There was trace to mild regurgitation.    HISTORY: PRIOR HISTORY: Hypertension, high cholesterol, coronary artery disease, stent    PROCEDURE: The study was performed in the [address] [place] [address]. This was a routine study. The transthoracic approach was used. The study included complete 2D imaging, M-mode, complete spectral Doppler, and color Doppler. This  was a technically difficult study.    LEFT VENTRICLE: Size was normal. Systolic function was vigorous. Ejection fraction was estimated to be 65 %. There were no regional wall motion abnormalities. Wall thickness was increased. Concentric hypertrophy was present. DOPPLER: There  was an increased relative contribution of atrial contraction to ventricular filling. Doppler  parameters were consistent with abnormal left ventricular relaxation (grade 1 diastolic dysfunction).    RIGHT VENTRICLE: The size was normal. Systolic function was normal.    LEFT ATRIUM: Size was normal.    RIGHT ATRIUM: Size was normal.    MITRAL VALVE: There was annular calcification. Valve structure was normal. There was normal leaflet separation. DOPPLER: There was no evidence for stenosis. There was trace regurgitation.    AORTIC VALVE: Leaflets exhibited mildly to moderately increased thickness and mild to moderate calcification. The valve was not well visualized. DOPPLER: There was mild stenosis. Vmax 2.4 m/s, mean gradient 13 mm Hg, maximum gradient 23 mm  Hg. There was trace to mild regurgitation.    TRICUSPID VALVE: The valve structure was normal. There was normal leaflet separation. DOPPLER: There was no evidence for stenosis. There was no regurgitation.    PULMONIC VALVE: Leaflets exhibited normal thickness, no calcification, and normal cuspal separation. DOPPLER: The transpulmonic velocity was within the normal range. There was mild regurgitation.    PERICARDIUM: There was no pericardial effusion. The pericardium was normal in appearance.    AORTA: The root exhibited normal size. Not well visualized.    SYSTEMIC VEINS: IVC: The inferior vena cava was normal in size and course. Respirophasic changes were normal.    SYSTEM MEASUREMENT TABLES    2D  %FS: 29.01 %  AV Diam: 3.47 cm  EDV(Teich): 77.21 ml  EF(Cube): 64.22 %  EF(Teich): 56.15 %  ESV(Cube): 25.92 ml  ESV(Teich): 33.86 ml  IVSd: 1.2 cm  LA Area: 17.81 cm2  LA Diam: 3.74 cm  LVEDV MOD A4C: 126.24 ml  LVEF MOD A4C: 70.82 %  LVESV MOD A4C: 36.84 ml  LVIDd: 4.17 cm  LVIDs: 2.96 cm  LVLd A4C: 8.96 cm  LVLs A4C: 7.25 cm  LVOT Diam: 1.94 cm  LVPWd: 1.13 cm  RA Area: 10.23 cm2  RV Diam.: 2.95 cm  SV MOD A4C: 89.41 ml  SV(Cube): 46.53 ml  SV(Teich): 43.35 ml    CW  AV Env.Ti: 353.67 ms  AV SV: 574.44 ml  AV VTI: 60.82 cm  AV Vmax: 2.51 m/s  AV Vmean:  "1.72 m/s  AV maxP.35 mmHg  AV meanP.44 mmHg    MM  TAPSE: 2.26 cm    PW  JETHRO (VTI): 1.71 cm2  JETHRO Vmax: 1.55 cm2  E': 0.07 m/s  E/E': 13.05  LVOT Env.Ti: 427.6 ms  LVOT VTI: 35.44 cm  LVOT Vmax: 1.32 m/s  LVOT Vmean: 0.83 m/s  LVOT maxP.01 mmHg  LVOT meanPG: 3.32 mmHg  LVSV Dopp: 104.27 ml  MV A Raul: 1.03 m/s  MV Dec [place]: 2.91 m/s2  MV DecT: 295.77 ms  MV E Raul: 0.86 m/s  MV E/A Ratio: 0.84    [facility]    Prepared and electronically signed by    Shanita Hua MD  Signed 2018 11:14:07    Name: Nadeem Ashton                       : 1945  MRN: [id number]                       Age: 79 y.o.  Patient Status: Outpatient          Gender: female  Echo complete    Height: 5' 3\" (1.6 m)   Weight: 86.2 kg (190 lb)   BSA: 1.89 m²   Blood Pressure: 178/71    Date of Study: 24   Ordering Provider: Celine England MD   Clinical Indications: Nonrheumatic aortic (valve) stenosis [I35.0 (ICD-10-CM)]       Reading Physicians  Performing Staff   Cardiology: Jordi Moreno MD    Tech: Marie Carter Friwinstonzi        Vitals    Height Weight BSA (Calculated - m2) BP Pulse   5' 3\" (1.6 m) 86.2 kg (190 lb) 1.89 sq meters 178/71 62     PACS Images     Show images for Echo complete w/ contrast if indicated  Study Details    This transthoracic echocardiogram was performed in the echo lab. This was a routine, outpatient study. Study quality was poor. This was a technically difficult study due to decreased penetration and lung interference. A complete 2D, color flow Doppler, spectral Doppler, 2D, color flow Doppler and spectral Doppler transthoracic echocardiogram was performed.  The apical, parasternal, subcostal and suprasternal views were obtained. Technical difficulties due to patient's body habitus.     History    CAD, nonrheumatic AS, HTN, dyslipidemia, asthma, SOB, restrictive lung disease     Interpretation Summary  Show Result Comparison     Left Ventricle: Left " ventricular cavity size is normal. Wall thickness is increased. There is mild to moderate concentric hypertrophy. The left ventricular ejection fraction is 70% by visual estimation. Systolic function is vigorous. Diastolic function is mildly abnormal, consistent with grade I (abnormal) relaxation. There is  with a peak gradient of 62.0 mmHg. There is outflow tract dynamic obstruction with valsalva with a peak gradient of 72.0 mmHg.    Right Ventricle: Systolic function is normal.    Aortic Valve: The aortic valve is trileaflet. The leaflets are moderately calcified. There is moderately reduced mobility. There is mild regurgitation. There is moderate stenosis. The aortic valve mean gradient is 28.0 mmHg. The dimensionless velocity index is 0.43. The aortic valve area is 1.32 cm2. The aortic valve velocity is increased in the setting of stenosis and increased flow.    Mitral Valve: There is mild annular calcification.    Aorta: The aortic root is normal in size. The ascending aorta is mildly dilated. The ascending aorta is 4.2 cm.    Prior TTE study available for comparison. Prior study date: 2/27/2023. Changes noted when compared to prior study. Changes include: Slight progression of aortic stenosis noted..     Findings    Left Ventricle Left ventricular cavity size is normal. Wall thickness is increased. There is mild to moderate concentric hypertrophy. The left ventricular ejection fraction is 70% by visual estimation. Systolic function is vigorous. Although no diagnostic regional wall motion abnormality was identified, this possibility cannot be completely excluded on the basis of this study. Diastolic function is mildly abnormal, consistent with grade I (abnormal) relaxation.  There is  with a peak gradient of 62.0 mmHg. There is outflow tract dynamic obstruction with valsalva with a peak gradient of 72.0 mmHg.   Right Ventricle Right ventricular cavity size is normal. Systolic function is normal.   Left Atrium  The atrium is normal in size.   Right Atrium The atrium is normal in size.   Aortic Valve The aortic valve was not well visualized. The aortic valve is trileaflet. The leaflets are moderately calcified. There is moderately reduced mobility. There is mild regurgitation. There is moderate stenosis. The aortic valve mean gradient is 28.0 mmHg. The dimensionless velocity index is 0.43. The aortic valve area is 1.32 cm2. The aortic valve velocity is increased in the setting of stenosis and increased flow.   Mitral Valve There is mild annular calcification.  There is trace regurgitation. There is no evidence of stenosis.   Tricuspid Valve Tricuspid valve structure is normal. There is trace regurgitation. There is no evidence of stenosis. Right ventricular systolic pressure could not be assessed.   Pulmonic Valve The pulmonic valve was not well visualized. There is trace regurgitation. There is no evidence of stenosis.   Ascending Aorta The aortic root is normal in size. The ascending aorta is mildly dilated. The ascending aorta is 4.2 cm.   IVC/SVC The inferior vena cava is normal in size. Respirophasic changes were normal.   Pericardium There is no pericardial effusion.     Left Ventricle Measurements    Function/Volumes   A4C EF 70 %         LVOT stroke volume 90.18         LVOT stroke volume index 47.6 ml/m2         Left ventricular stroke volume (2D) 50 mL         LVOT Cardiac Output 6.14 l/min         LVOT Cardiac Index 3.25 l/min/m2         Dimensions   LVIDd 4.2 cm         LVIDS 2.8 cm         IVSd 1.6 cm         LVPWd 1.4 cm         LVOT area 3.14 cm2         FS 33         Diastolic Filling   MV E' Tissue Velocity Septal 6 cm/s         LA Volume Index (BP) 29.6 mL/m2         E/A ratio 0.43         E wave deceleration time 338 ms         MV Peak E Raul 67 cm/s         MV Peak A Raul 1.56 m/s          Report Measurements   AV LVOT peak gradient 6 mmHg         Other Measurements   Peak gradient (Rest) 62 mmHg          Peak Gradient (Valsalva) 72 mmHg              Interventricular Septum Measurements    Shunt Ratio   LVOT peak VTI 28.72 cm         LVOT peak arie 1.27 m/s              Right Ventricle Measurements    Dimensions   RVID d 3.1 cm         Tricuspid annular plane systolic excursion 1.9 cm               Left Atrium Measurements    Dimensions   LA size 4.6 cm         LA length (A2C) 5.7 cm         Volumes   LA volume (BP) 56 mL         LA Volume Index (BP) 29.6 mL/m2               Right Atrium Measurements    Dimensions   RA 2D Volume 35 mL         RAA A4C 14.8 cm2               Atrial Septum Measurements    Shunt Ratio   LVOT peak VTI 28.72 cm         LVOT peak arie 1.27 m/s               Aortic Valve Measurements    Stenosis   Aortic valve peak velocity 2.95 m/s         LVOT peak arie 1.27 m/s         Ao VTI 68.47 cm         LVOT peak VTI 28.72 cm         AV mean gradient 28 mmHg         LVOT mn grad 5 mmHg         AV peak gradient 39 mmHg         AV LVOT peak gradient 6 mmHg         Area/Dimensions   DVI 0.43         AV valve area 1.32 cm2         AV area by cont VTI 1.3 cm2         AV area peak arie 1.4 cm2         LVOT diameter 2 cm         LVOT area 3.14 cm2               Mitral Valve Measurements    Stenosis   MV stenosis pressure 1/2 time 98 ms         MV valve area p 1/2 method 2.24               Aorta Measurements    Aortic Dimensions   Ao root 3.6 cm         Asc Ao 4.2 cm               Exam Details    Performed Procedure Technologist Supporting Staff Performing Physician   Echo complete Marie Daigle           Appointment Date/Status Modality Department    2024     Completed EA ECHO 1 EA CAR NON INV           Begin Exam End Exam  End Exam Questionnaires   2024  8:02 AM 2024  8:55 AM  PATIENT EDUCATION            Signed    Electronically signed by Jordi Moreno MD on 24 at 1023 EDT     Name: Nadeem Ashton                       : 1945  MRN: [id number]                       Age: 79  "y.o.  Patient Status: Outpatient          Gender: female  Echo stress test, exercise w/ strain    Height: 5' 3\" (1.6 m)   Weight: 86.2 kg (190 lb)   BSA: 1.89 m²   Blood Pressure: 168/78    Date of Study: 8/12/24   Ordering Provider: Jacob Pearson MD   Clinical Indications: Cardiomyopathy       Reading Physicians  Performing Staff   Cardiology: Jacob Pearson MD    Tech: Halina Yadav RN   Support Staff: CARIN Randle, DALJIT        Height & Weight    Height Weight BSA (Calculated - m2)   5' 3\" (1.6 m) 86.2 kg (190 lb) 1.89 sq meters     PACS Images     Show images for Echo stress test, exercise  Study Details    Study quality was adequate. The apical and parasternal views were obtained.     History    Htn, stents, cad, hld, dm 2, sleep apnea, asthma, thyroid ca     Interpretation Summary  Show Result Comparison     Left Ventricle: Wall thickness is severely increased. 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 The left ventricular ejection fraction is 70%. Systolic function is hyperdynamic. Global longitudinal strain is reduced at -16%. Wall motion is normal. Diastolic function is mildly abnormal, consistent with grade I (abnormal) relaxation. There is  outflow tract dynamic obstruction at rest with a peak gradient of 53.0 mmHg. There is outflow tract dynamic obstruction with valsalva with a peak gradient of 67.0 mmHg.    Left Atrium: The atrium is mildly dilated.    Aortic Valve: The aortic valve is trileaflet. The leaflets are moderately thickened. The leaflets are moderately calcified. There is moderately reduced mobility. There is moderate stenosis. The aortic valve mean gradient is 26 mmHg. The dimensionless velocity index is 0.42. The aortic valve area is 1.39 cm2.    Mitral Valve: There is systolic anterior motion of the chordal apparatus with late peaking gradient.    Stress ECG: No ST deviation is noted. There were no arrhythmias during recovery. . The stress ECG " is negative for ischemia after submaximal exercise, without reproduction of symptoms.    Peak Stress Echo: The peak stress echo showed normal wall motion.     Strain was performed to quantify interventricular dyssynchrony and evaluate components of myocardial function due to HCM. Results from the utilization of Strain Analysis are listed in the report below.     Stress Findings    Stress Findings A bicycle protocol stress test was performed. Overall, the patient's exercise capacity was moderately impaired for their age. The patient after exercising for 4 min and 0 sec and had a maximal HR of 112 bpm (79% of MPHR) and 3.0 METS. The patient experienced no angina during the test. The test was stopped because the patient experienced fatigue and dyspnea. The patient reported dyspnea and fatigue during the stress test. Symptoms began during stress and ended during recovery. Blood pressure demonstrated a hypertensive response and heart rate demonstrated a normal response to stress.     Findings    Left Ventricle Left ventricular cavity size is small. Wall thickness is severely increased. 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 The left ventricular ejection fraction is 70%. Systolic function is hyperdynamic. Global longitudinal strain is reduced at -16%.  Wall motion is normal. Diastolic function is mildly abnormal, consistent with grade I (abnormal) relaxation.  There is  outflow tract dynamic obstruction at rest with a peak gradient of 53.0 mmHg. There is outflow tract dynamic obstruction with valsalva with a peak gradient of 67.0 mmHg.   Right Ventricle Right ventricular cavity size is normal. Systolic function is normal. Wall thickness is normal.   Left Atrium The atrium is mildly dilated.   Right Atrium The atrium is normal in size.   Aortic Valve The aortic valve is trileaflet. The leaflets are moderately thickened. The leaflets are moderately calcified. There is moderately reduced mobility. There  is no evidence of regurgitation. There is moderate stenosis. The aortic valve mean gradient is 26 mmHg. The dimensionless velocity index is 0.42. The aortic valve area is 1.39 cm2.   Mitral Valve Mitral valve structure is normal.  There is no evidence of regurgitation. There is no evidence of stenosis. There is systolic anterior motion of the chordal apparatus with late peaking gradient.   Tricuspid Valve Tricuspid valve structure is normal. There is no evidence of regurgitation. There is no evidence of stenosis.   Pulmonic Valve Pulmonic valve structure is normal. There is no evidence of regurgitation. There is no evidence of stenosis.   Ascending Aorta The aortic root is normal in size.   Pericardium There is no pericardial effusion. The pericardium is normal in appearance.     Stress Echo Findings    Peak Stress Left ventricle cavity is small at peak stress. The left ventricle systolic function is hyperdynamic at peak stress. The peak stress echo showed normal wall motion.     Stress Measurements    Baseline Vitals   Baseline HR 65 bpm         Baseline /78 mmHg         O2 sat rest 95 %         Peak Stress Vitals   Stress peak  bpm         Post peak  mmHg         O2 sat peak 91 %         Max HR Percent 79 %         Max  bpm         Recovery Vitals   Recovery HR 75 bpm         Recovery  mmHg         O2 sat recovery 95 %          Exercise Data   Max  bpm         Exercise duration (min) 4 min         Exercise duration (sec) 0 sec         Estimated workload 3 METS         Angina Index 0         Rate Pressure Product 24,864              Stage Data 8/12/24  8:28 AM--8/12/24  9:00 AM    Date/Time Stage BP HR O2 RPP Bicycle - Abraham Symptoms   08/12/24 0828 BASELINE 168/78 65 bpm 95 % 80098 -- --   08/12/24 0848 STAGE 1 202/84 89 bpm 91 % 95312 25 mild sob/fatigue   08/12/24 0850 STAGE 2 222/80 106 bpm 91 % 86933 50 mod sob/ fatigue   08/12/24 0852 IMMEDIATE/PEAK 220/72 91 bpm 91 % 20020  -- mod sob / fatigue   08/12/24 0854 RECOVERY 1 174/72 76 bpm 95 % 90123 -- symptoms resolving   08/12/24 0858 RECOVERY 2 162/84 75 bpm -- 18337 -- none     Left Ventricle Measurements    Function/Volumes   LVOT stroke volume 115.39         LVOT stroke volume index 61.9 ml/m2         LVOT Cardiac Output 7.49 l/min         LVOT Cardiac Index 3.96 l/min/m2         Dimensions   LVOT area 2.83 cm2         Strain   GLS -16 %          Report Measurements   AV LVOT peak gradient 8 mmHg         Other Measurements   Peak gradient (Rest) 53 mmHg         Peak Gradient (Valsalva) 67 mmHg              Interventricular Septum Measurements    Shunt Ratio   LVOT peak VTI 40.72 cm         LVOT peak arie 1.38 m/s              Atrial Septum Measurements    Shunt Ratio   LVOT peak VTI 40.72 cm         LVOT peak arie 1.38 m/s               Aortic Valve Measurements    Stenosis   Aortic valve peak velocity 3.28 m/s         LVOT peak arie 1.38 m/s         Ao VTI 82.91 cm         LVOT peak VTI 40.72 cm         AV mean gradient 26 mmHg         LVOT mn grad 5 mmHg         AV peak gradient 43 mmHg         AV LVOT peak gradient 8 mmHg         Area/Dimensions   DVI 0.42         AV valve area 1.39 cm2         AV area by cont VTI 1.4 cm2         AV area peak arie 1.2 cm2         LVOT diameter 1.9 cm         LVOT area 2.83 cm2               Exam Details    Performed Procedure Technologist Supporting Staff Performing Physician   Echo stress test, exercise w/ strain Halina Yadav, CARIN Hubbard, RN  Will Robertson, RS Jacob Pearson MD         Appointment Date/Status Modality Department    8/12/2024     Completed BE STRESS 1 BE CAR NON INV           Begin Exam End Exam Begin Exam Questionnaires End Exam Questionnaires   8/12/2024  8:28 AM 8/12/2024  9:00 AM CV STRESS QUESTIONNAIRE PATIENT EDUCATION            All Reviewers List    Jacob Pearson MD on 8/13/2024  7:03 AM     Signed    Electronically signed by Jacob Pearson MD on 8/12/24 at  1307 EDT     Counseling / Coordination of Care  Total floor / unit time spent today 65 minutes.  Greater than 50% of total time was spent with the patient and / or family counseling and / or coordination of care.

## 2024-09-25 ENCOUNTER — APPOINTMENT (OUTPATIENT)
Dept: LAB | Facility: HOSPITAL | Age: 79
End: 2024-09-25
Attending: INTERNAL MEDICINE
Payer: COMMERCIAL

## 2024-09-25 ENCOUNTER — DOCUMENTATION (OUTPATIENT)
Dept: CARDIOLOGY CLINIC | Facility: CLINIC | Age: 79
End: 2024-09-25

## 2024-09-25 ENCOUNTER — TELEPHONE (OUTPATIENT)
Dept: CARDIOLOGY CLINIC | Facility: CLINIC | Age: 79
End: 2024-09-25

## 2024-09-25 DIAGNOSIS — I25.10 CVD (CARDIOVASCULAR DISEASE): ICD-10-CM

## 2024-09-25 DIAGNOSIS — E03.9 ACQUIRED HYPOTHYROIDISM: ICD-10-CM

## 2024-09-25 DIAGNOSIS — C73 MALIGNANT NEOPLASM OF THYROID GLAND (HCC): ICD-10-CM

## 2024-09-25 DIAGNOSIS — E11.9 DIABETES MELLITUS WITHOUT COMPLICATION (HCC): ICD-10-CM

## 2024-09-25 DIAGNOSIS — I10 ESSENTIAL HYPERTENSION, MALIGNANT: ICD-10-CM

## 2024-09-25 LAB
ALBUMIN SERPL BCG-MCNC: 4.3 G/DL (ref 3.5–5)
ALP SERPL-CCNC: 63 U/L (ref 34–104)
ALT SERPL W P-5'-P-CCNC: 13 U/L (ref 7–52)
ANION GAP SERPL CALCULATED.3IONS-SCNC: 8 MMOL/L (ref 4–13)
AST SERPL W P-5'-P-CCNC: 14 U/L (ref 13–39)
BASOPHILS # BLD AUTO: 0.01 THOUSANDS/ΜL (ref 0–0.1)
BASOPHILS NFR BLD AUTO: 0 % (ref 0–1)
BILIRUB SERPL-MCNC: 0.56 MG/DL (ref 0.2–1)
BUN SERPL-MCNC: 18 MG/DL (ref 5–25)
CALCIUM SERPL-MCNC: 9.7 MG/DL (ref 8.4–10.2)
CHLORIDE SERPL-SCNC: 104 MMOL/L (ref 96–108)
CO2 SERPL-SCNC: 28 MMOL/L (ref 21–32)
CREAT SERPL-MCNC: 0.91 MG/DL (ref 0.6–1.3)
EOSINOPHIL # BLD AUTO: 0.03 THOUSAND/ΜL (ref 0–0.61)
EOSINOPHIL NFR BLD AUTO: 1 % (ref 0–6)
ERYTHROCYTE [DISTWIDTH] IN BLOOD BY AUTOMATED COUNT: 15 % (ref 11.6–15.1)
EST. AVERAGE GLUCOSE BLD GHB EST-MCNC: 140 MG/DL
GFR SERPL CREATININE-BSD FRML MDRD: 60 ML/MIN/1.73SQ M
GLUCOSE P FAST SERPL-MCNC: 126 MG/DL (ref 65–99)
HBA1C MFR BLD: 6.5 %
HCT VFR BLD AUTO: 37.9 % (ref 34.8–46.1)
HGB BLD-MCNC: 11.9 G/DL (ref 11.5–15.4)
IMM GRANULOCYTES # BLD AUTO: 0 THOUSAND/UL (ref 0–0.2)
IMM GRANULOCYTES NFR BLD AUTO: 0 % (ref 0–2)
LYMPHOCYTES # BLD AUTO: 1.27 THOUSANDS/ΜL (ref 0.6–4.47)
LYMPHOCYTES NFR BLD AUTO: 39 % (ref 14–44)
MCH RBC QN AUTO: 29.7 PG (ref 26.8–34.3)
MCHC RBC AUTO-ENTMCNC: 31.4 G/DL (ref 31.4–37.4)
MCV RBC AUTO: 95 FL (ref 82–98)
MONOCYTES # BLD AUTO: 0.27 THOUSAND/ΜL (ref 0.17–1.22)
MONOCYTES NFR BLD AUTO: 8 % (ref 4–12)
NEUTROPHILS # BLD AUTO: 1.66 THOUSANDS/ΜL (ref 1.85–7.62)
NEUTS SEG NFR BLD AUTO: 52 % (ref 43–75)
NRBC BLD AUTO-RTO: 0 /100 WBCS
PLATELET # BLD AUTO: 108 THOUSANDS/UL (ref 149–390)
PMV BLD AUTO: 9.7 FL (ref 8.9–12.7)
POTASSIUM SERPL-SCNC: 3.9 MMOL/L (ref 3.5–5.3)
PROT SERPL-MCNC: 7.5 G/DL (ref 6.4–8.4)
RBC # BLD AUTO: 4.01 MILLION/UL (ref 3.81–5.12)
SODIUM SERPL-SCNC: 140 MMOL/L (ref 135–147)
TSH SERPL DL<=0.05 MIU/L-ACNC: 4.38 UIU/ML (ref 0.45–4.5)
WBC # BLD AUTO: 3.24 THOUSAND/UL (ref 4.31–10.16)

## 2024-09-25 PROCEDURE — 80053 COMPREHEN METABOLIC PANEL: CPT

## 2024-09-25 PROCEDURE — 36415 COLL VENOUS BLD VENIPUNCTURE: CPT

## 2024-09-25 PROCEDURE — 84443 ASSAY THYROID STIM HORMONE: CPT

## 2024-09-25 PROCEDURE — 85025 COMPLETE CBC W/AUTO DIFF WBC: CPT

## 2024-09-25 PROCEDURE — 83036 HEMOGLOBIN GLYCOSYLATED A1C: CPT

## 2024-10-02 ENCOUNTER — ANESTHESIA EVENT (OUTPATIENT)
Dept: NON INVASIVE DIAGNOSTICS | Facility: HOSPITAL | Age: 79
End: 2024-10-02
Payer: COMMERCIAL

## 2024-10-03 ENCOUNTER — HOSPITAL ENCOUNTER (OUTPATIENT)
Dept: NON INVASIVE DIAGNOSTICS | Facility: HOSPITAL | Age: 79
Discharge: HOME | End: 2024-10-03
Payer: COMMERCIAL

## 2024-10-03 ENCOUNTER — TELEPHONE (OUTPATIENT)
Dept: CARDIOLOGY CLINIC | Facility: CLINIC | Age: 79
End: 2024-10-03

## 2024-10-03 ENCOUNTER — ANESTHESIA (OUTPATIENT)
Dept: NON INVASIVE DIAGNOSTICS | Facility: HOSPITAL | Age: 79
End: 2024-10-03
Payer: COMMERCIAL

## 2024-10-03 ENCOUNTER — PREP FOR PROCEDURE (OUTPATIENT)
Dept: CARDIOLOGY CLINIC | Facility: CLINIC | Age: 79
End: 2024-10-03

## 2024-10-03 VITALS
OXYGEN SATURATION: 97 % | WEIGHT: 203 LBS | HEART RATE: 52 BPM | HEIGHT: 63 IN | DIASTOLIC BLOOD PRESSURE: 62 MMHG | BODY MASS INDEX: 35.97 KG/M2 | RESPIRATION RATE: 16 BRPM | SYSTOLIC BLOOD PRESSURE: 164 MMHG

## 2024-10-03 DIAGNOSIS — I35.0 NONRHEUMATIC AORTIC VALVE STENOSIS: ICD-10-CM

## 2024-10-03 DIAGNOSIS — I42.2 HYPERTROPHIC CARDIOMYOPATHY (HCC): ICD-10-CM

## 2024-10-03 DIAGNOSIS — I35.0 SEVERE AORTIC STENOSIS: Primary | ICD-10-CM

## 2024-10-03 PROBLEM — G47.30 SLEEP APNEA: Status: ACTIVE | Noted: 2024-10-03

## 2024-10-03 PROBLEM — K76.0 FATTY LIVER: Status: ACTIVE | Noted: 2024-10-03

## 2024-10-03 PROBLEM — E89.0 POSTOPERATIVE HYPOTHYROIDISM: Status: ACTIVE | Noted: 2024-10-03

## 2024-10-03 PROBLEM — I42.1 HYPERTROPHIC OBSTRUCTIVE CARDIOMYOPATHY (HCC): Status: ACTIVE | Noted: 2024-10-03

## 2024-10-03 PROCEDURE — 93312 ECHO TRANSESOPHAGEAL: CPT

## 2024-10-03 PROCEDURE — 93312 ECHO TRANSESOPHAGEAL: CPT | Performed by: INTERNAL MEDICINE

## 2024-10-03 RX ORDER — PROPOFOL 10 MG/ML
INJECTION, EMULSION INTRAVENOUS CONTINUOUS PRN
Status: DISCONTINUED | OUTPATIENT
Start: 2024-10-03 | End: 2024-10-03

## 2024-10-03 RX ORDER — SODIUM CHLORIDE 9 MG/ML
INJECTION, SOLUTION INTRAVENOUS CONTINUOUS PRN
Status: DISCONTINUED | OUTPATIENT
Start: 2024-10-03 | End: 2024-10-03

## 2024-10-03 RX ORDER — LIDOCAINE HYDROCHLORIDE 10 MG/ML
INJECTION, SOLUTION EPIDURAL; INFILTRATION; INTRACAUDAL; PERINEURAL AS NEEDED
Status: DISCONTINUED | OUTPATIENT
Start: 2024-10-03 | End: 2024-10-03

## 2024-10-03 RX ORDER — VERAPAMIL HYDROCHLORIDE 180 MG/1
360 TABLET, EXTENDED RELEASE ORAL
Qty: 60 TABLET | Refills: 1 | Status: SHIPPED | OUTPATIENT
Start: 2024-10-03 | End: 2024-12-02

## 2024-10-03 RX ADMIN — PROPOFOL 100 MCG/KG/MIN: 10 INJECTION, EMULSION INTRAVENOUS at 12:19

## 2024-10-03 RX ADMIN — PROPOFOL 80 MG: 10 INJECTION, EMULSION INTRAVENOUS at 12:18

## 2024-10-03 RX ADMIN — SODIUM CHLORIDE: 9 INJECTION, SOLUTION INTRAVENOUS at 12:12

## 2024-10-03 RX ADMIN — LIDOCAINE HYDROCHLORIDE 100 MG: 10 INJECTION, SOLUTION EPIDURAL; INFILTRATION; INTRACAUDAL; PERINEURAL at 12:18

## 2024-10-03 NOTE — TELEPHONE ENCOUNTER
Spoke janeth mesa- _Per Dr Pearson, he ordered 180 mg of verapamil. 360 mg total to be taken. Pt can take 1 every 12 hours or  2 at bedtime. Instructed her to take same way every day/night. Tonight patient to take one 240 mg since script could not be filled at pharmacy today  Patient verbally acknowledged

## 2024-10-03 NOTE — ANESTHESIA POSTPROCEDURE EVALUATION
Post-Op Assessment Note            Anethesia notable event occurred.    Staff: Anesthesiologist           Patient notified of suspicion for laryngospasm leading to desaturation episode on removal of ENRICO probe.   at bedside.  Provided an opportunity for them to ask questions, which were answered.    /71 (10/03/24 1313)    Temp      Pulse (!) 53 (10/03/24 1313)   Resp 16 (10/03/24 1313)    SpO2 97 % (10/03/24 1313)

## 2024-10-03 NOTE — ANESTHESIA POSTPROCEDURE EVALUATION
Post-Op Assessment Note    CV Status:  Stable    Pain management: adequate       Mental Status:  Awake   Hydration Status:  Stable   PONV Controlled:  Controlled   Airway Patency:  Patent     Post Op Vitals Reviewed: Yes    No anethesia notable event occurred.    Staff: Anesthesiologist, CRNA               BP   151/82   Temp      Pulse  68   Resp   22   SpO2   96

## 2024-10-03 NOTE — ANESTHESIA PREPROCEDURE EVALUATION
Procedure:  EN[place]    Relevant Problems   ANESTHESIA (within normal limits)      CARDIO   (+) CAD (coronary artery disease)   (+) Essential hypertension   (+) Nonrheumatic aortic valve stenosis      ENDO  Hx of thyroid CA   (+) Postoperative hypothyroidism      GI/HEPATIC  Confirmed NPO appropriate   (+) Fatty liver      /RENAL   (+) Stage 3a chronic kidney disease (HCC)      HEMATOLOGY (within normal limits)      NEURO/PSYCH (within normal limits)      PULMONARY  Uses albuterol inhaler seasonally, has not needed in 6 months  Diagnosed with sarcoidosis 30 years ago, required steroids at that time but has not needed them since then   (+) Mild persistent asthma without complication   (+) Shortness of breath   (+) Sleep apnea   (-) Smoking   (-) URI (upper respiratory infection)      Cardiovascular/Peripheral Vascular   (+) Hypertrophic obstructive cardiomyopathy (HCC)      Respiratory/Allergy   (+) Restrictive lung disease      Other   (+) BMI 36.0-36.9,adult   (+) Dyslipidemia        Physical Exam    Airway    Mallampati score: II  TM Distance: >3 FB  Neck ROM: limited     Dental   No notable dental hx     Cardiovascular  Rhythm: regular, Rate: normal, Murmur    Pulmonary   Breath sounds clear to auscultation    Other Findings  post-pubertal.      Anesthesia Plan  ASA Score- 3     Anesthesia Type- IV sedation with anesthesia with ASA Monitors.         Additional Monitors:     Airway Plan:     Comment: I discussed the risks and benefits of IV sedation anesthesia including the possibility of the need to convert to general anesthesia and the potential risk of awareness.  The patient was given the opportunity to ask questions, which were answered..       Plan Factors-Exercise tolerance (METS): >4 METS.    Chart reviewed. EKG reviewed.  Existing labs reviewed.     Patient is not a current smoker.      Obstructive sleep apnea risk education given perioperatively.        Induction- intravenous.    Postoperative Plan-          Informed Consent- Anesthetic plan and risks discussed with patient.  I personally reviewed this patient with the CRNA. Discussed and agreed on the Anesthesia Plan with the CRNA..      Complete PFTs 7/22/20:  Interpretation:  Normal Spirometry  Mild restrictive lung disease as indicated by decreased TLC  Mildly reduced diffusion capacity      Stress Echo 8/12/24:  Interpretation Summary    Left Ventricle: Wall thickness is severely increased. 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 The left ventricular ejection fraction is 70%. Systolic function is hyperdynamic. Global longitudinal strain is reduced at -16%. Wall motion is normal. Diastolic function is mildly abnormal, consistent with grade I (abnormal) relaxation. There is  outflow tract dynamic obstruction at rest with a peak gradient of 53.0 mmHg. There is outflow tract dynamic obstruction with valsalva with a peak gradient of 67.0 mmHg.    Left Atrium: The atrium is mildly dilated.    Aortic Valve: The aortic valve is trileaflet. The leaflets are moderately thickened. The leaflets are moderately calcified. There is moderately reduced mobility. There is moderate stenosis. The aortic valve mean gradient is 26 mmHg. The dimensionless velocity index is 0.42. The aortic valve area is 1.39 cm2.    Mitral Valve: There is systolic anterior motion of the chordal apparatus with late peaking gradient.    Stress ECG: No ST deviation is noted. There were no arrhythmias during recovery. . The stress ECG is negative for ischemia after submaximal exercise, without reproduction of symptoms.    Peak Stress Echo: The peak stress echo showed normal wall motion.      Lab Results   Component Value Date    WBC 3.24 (L) 09/25/2024    HGB 11.9 09/25/2024    HCT 37.9 09/25/2024    MCV 95 09/25/2024     (L) 09/25/2024     Lab Results   Component Value Date    SODIUM 140 09/25/2024    K 3.9 09/25/2024     09/25/2024    CO2 28 09/25/2024    BUN 18  09/25/2024    CREATININE 0.91 09/25/2024    GLUC 409 (HH) 03/09/2024    CALCIUM 9.7 09/25/2024     Lab Results   Component Value Date    ALT 13 09/25/2024    AST 14 09/25/2024    ALKPHOS 63 09/25/2024     Lab Results   Component Value Date    INR 1.10 05/11/2020    PROTIME 13.6 05/11/2020     Lab Results   Component Value Date    HGBA1C 6.5 (H) 09/25/2024

## 2024-10-03 NOTE — TELEPHONE ENCOUNTER
Patient scheduled for LEFT Heart Cath on 10/8/24 at [facility] with Dr. STALLWORTH.      Instructions sent to patient through [employer].        Patient aware of all general instructions.    Medication holds:   Losartan - Do not take the morning of procedure.      Metformin - Take your regular dose day/evening before procedure and do not take morning of procedure.      Labs to be done on 9/25/24  CMP / CBC (fasting 8 hours)

## 2024-10-03 NOTE — ANESTHESIA PROCEDURE NOTES
Anesthesia Notable Event    Date/Time: 10/3/2024 12:35 PM    Patient location during procedure: OR procedure room  Performed by: Halina Randolph MD  Authorized by: Halina Randolph MD    Anesthesia notable event Intraoperative:laryngospasm

## 2024-10-04 ENCOUNTER — TELEPHONE (OUTPATIENT)
Dept: NON INVASIVE DIAGNOSTICS | Facility: HOSPITAL | Age: 79
End: 2024-10-04

## 2024-10-04 LAB
AORTIC VALVE MEAN VELOCITY: 24.4 M/S
AV AREA BY CONTINUOUS VTI: 1.5 CM2
AV AREA PEAK VELOCITY: 1.4 CM2
AV LVOT MEAN GRADIENT: 4 MMHG
AV LVOT PEAK GRADIENT: 7 MMHG
AV MEAN GRADIENT: 38 MMHG
AV PEAK GRADIENT: 71 MMHG
AV VALVE AREA: 0.67 CM2
AV VELOCITY RATIO: 0.26
DOP CALC AO PEAK VEL: 4.2 M/S
DOP CALC AO VTI: 117 CM
DOP CALC LVOT AREA: 2.54 CM2
DOP CALC LVOT DIAMETER: 1.8 CM
DOP CALC LVOT PEAK VEL VTI: 31 CM
DOP CALC LVOT PEAK VEL: 1.1 M/S
DOP CALC LVOT STROKE INDEX: 71.8 ML/M2
DOP CALC LVOT STROKE VOLUME: 78.85 CM3
FRACTIONAL SHORTENING: 28 (ref 28–44)
INTERVENTRICULAR SEPTUM IN DIASTOLE (PARASTERNAL SHORT AXIS VIEW): 2 CM
INTERVENTRICULAR SEPTUM: 2 CM (ref 0.6–1.1)
LEFT INTERNAL DIMENSION IN SYSTOLE: 2.6 CM (ref 2.1–4)
LEFT VENTRICULAR INTERNAL DIMENSION IN DIASTOLE: 3.6 CM (ref 3.5–6)
LEFT VENTRICULAR POSTERIOR WALL IN END DIASTOLE: 1.8 CM
LEFT VENTRICULAR STROKE VOLUME: 32 ML
LVSV (TEICH): 32 ML
SL CV LV EF: 70
SL CV PED ECHO LEFT VENTRICLE DIASTOLIC VOLUME (MOD BIPLANE) 2D: 56 ML
SL CV PED ECHO LEFT VENTRICLE SYSTOLIC VOLUME (MOD BIPLANE) 2D: 24 ML

## 2024-10-07 ENCOUNTER — TELEPHONE (OUTPATIENT)
Dept: NON INVASIVE DIAGNOSTICS | Facility: HOSPITAL | Age: 79
End: 2024-10-07

## 2024-10-08 ENCOUNTER — HOSPITAL ENCOUNTER (OUTPATIENT)
Facility: HOSPITAL | Age: 79
Setting detail: OUTPATIENT SURGERY
Discharge: HOME | End: 2024-10-09
Attending: INTERNAL MEDICINE | Admitting: INTERNAL MEDICINE
Payer: COMMERCIAL

## 2024-10-08 DIAGNOSIS — E11.65 TYPE 2 DIABETES MELLITUS WITH HYPERGLYCEMIA, WITHOUT LONG-TERM CURRENT USE OF INSULIN (HCC): ICD-10-CM

## 2024-10-08 DIAGNOSIS — I35.0 SEVERE AORTIC STENOSIS: ICD-10-CM

## 2024-10-08 DIAGNOSIS — I42.2 HYPERTROPHIC CARDIOMYOPATHY (HCC): ICD-10-CM

## 2024-10-08 LAB
ATRIAL RATE: 47 BPM
GLUCOSE SERPL-MCNC: 152 MG/DL (ref 65–140)
GLUCOSE SERPL-MCNC: 87 MG/DL (ref 65–140)
P AXIS: 18 DEGREES
PR INTERVAL: 208 MS
QRS AXIS: -34 DEGREES
QRSD INTERVAL: 92 MS
QT INTERVAL: 488 MS
QTC INTERVAL: 431 MS
T WAVE AXIS: 42 DEGREES
VENTRICULAR RATE: 47 BPM

## 2024-10-08 PROCEDURE — C1894 INTRO/SHEATH, NON-LASER: HCPCS | Performed by: INTERNAL MEDICINE

## 2024-10-08 PROCEDURE — 99152 MOD SED SAME PHYS/QHP 5/>YRS: CPT | Performed by: INTERNAL MEDICINE

## 2024-10-08 PROCEDURE — 99153 MOD SED SAME PHYS/QHP EA: CPT | Performed by: INTERNAL MEDICINE

## 2024-10-08 PROCEDURE — C1769 GUIDE WIRE: HCPCS | Performed by: INTERNAL MEDICINE

## 2024-10-08 PROCEDURE — 93458 L HRT ARTERY/VENTRICLE ANGIO: CPT | Performed by: INTERNAL MEDICINE

## 2024-10-08 PROCEDURE — C1887 CATHETER, GUIDING: HCPCS | Performed by: INTERNAL MEDICINE

## 2024-10-08 PROCEDURE — C1760 CLOSURE DEV, VASC: HCPCS | Performed by: INTERNAL MEDICINE

## 2024-10-08 PROCEDURE — 82948 REAGENT STRIP/BLOOD GLUCOSE: CPT

## 2024-10-08 PROCEDURE — 93010 ELECTROCARDIOGRAM REPORT: CPT | Performed by: INTERNAL MEDICINE

## 2024-10-08 PROCEDURE — 93005 ELECTROCARDIOGRAM TRACING: CPT

## 2024-10-08 DEVICE — ANGIO-SEAL VIP VASCULAR CLOSURE DEVICE
Type: IMPLANTABLE DEVICE | Site: GROIN | Status: FUNCTIONAL
Brand: ANGIO-SEAL

## 2024-10-08 RX ORDER — NITROGLYCERIN 20 MG/100ML
INJECTION INTRAVENOUS CODE/TRAUMA/SEDATION MEDICATION
Status: DISCONTINUED | OUTPATIENT
Start: 2024-10-08 | End: 2024-10-08 | Stop reason: HOSPADM

## 2024-10-08 RX ORDER — LOSARTAN POTASSIUM 50 MG/1
50 TABLET ORAL 2 TIMES DAILY
Status: DISCONTINUED | OUTPATIENT
Start: 2024-10-09 | End: 2024-10-09 | Stop reason: HOSPADM

## 2024-10-08 RX ORDER — SODIUM CHLORIDE 9 MG/ML
50 INJECTION, SOLUTION INTRAVENOUS CONTINUOUS
Status: DISPENSED | OUTPATIENT
Start: 2024-10-08 | End: 2024-10-08

## 2024-10-08 RX ORDER — LIDOCAINE HYDROCHLORIDE 10 MG/ML
INJECTION, SOLUTION EPIDURAL; INFILTRATION; INTRACAUDAL; PERINEURAL CODE/TRAUMA/SEDATION MEDICATION
Status: DISCONTINUED | OUTPATIENT
Start: 2024-10-08 | End: 2024-10-08 | Stop reason: HOSPADM

## 2024-10-08 RX ORDER — ATENOLOL 50 MG/1
25 TABLET ORAL DAILY
Status: DISCONTINUED | OUTPATIENT
Start: 2024-10-09 | End: 2024-10-09 | Stop reason: HOSPADM

## 2024-10-08 RX ORDER — VERAPAMIL HYDROCHLORIDE 2.5 MG/ML
INJECTION, SOLUTION INTRAVENOUS CODE/TRAUMA/SEDATION MEDICATION
Status: DISCONTINUED | OUTPATIENT
Start: 2024-10-08 | End: 2024-10-08 | Stop reason: HOSPADM

## 2024-10-08 RX ORDER — HEPARIN SODIUM 1000 [USP'U]/ML
INJECTION, SOLUTION INTRAVENOUS; SUBCUTANEOUS CODE/TRAUMA/SEDATION MEDICATION
Status: DISCONTINUED | OUTPATIENT
Start: 2024-10-08 | End: 2024-10-08 | Stop reason: HOSPADM

## 2024-10-08 RX ORDER — VERAPAMIL HYDROCHLORIDE 180 MG/1
180 TABLET, EXTENDED RELEASE ORAL 2 TIMES DAILY
Status: DISCONTINUED | OUTPATIENT
Start: 2024-10-08 | End: 2024-10-09 | Stop reason: HOSPADM

## 2024-10-08 RX ORDER — LEVOTHYROXINE SODIUM 75 UG/1
150 TABLET ORAL DAILY
Status: DISCONTINUED | OUTPATIENT
Start: 2024-10-09 | End: 2024-10-09 | Stop reason: HOSPADM

## 2024-10-08 RX ORDER — NITROGLYCERIN 0.4 MG/1
0.4 TABLET SUBLINGUAL ONCE AS NEEDED
Status: DISCONTINUED | OUTPATIENT
Start: 2024-10-08 | End: 2024-10-09 | Stop reason: HOSPADM

## 2024-10-08 RX ORDER — VERAPAMIL HYDROCHLORIDE 180 MG/1
360 TABLET, EXTENDED RELEASE ORAL
Status: DISCONTINUED | OUTPATIENT
Start: 2024-10-08 | End: 2024-10-08

## 2024-10-08 RX ORDER — MIDAZOLAM HYDROCHLORIDE 2 MG/2ML
INJECTION, SOLUTION INTRAMUSCULAR; INTRAVENOUS CODE/TRAUMA/SEDATION MEDICATION
Status: DISCONTINUED | OUTPATIENT
Start: 2024-10-08 | End: 2024-10-08 | Stop reason: HOSPADM

## 2024-10-08 RX ORDER — FENTANYL CITRATE 50 UG/ML
INJECTION, SOLUTION INTRAMUSCULAR; INTRAVENOUS CODE/TRAUMA/SEDATION MEDICATION
Status: DISCONTINUED | OUTPATIENT
Start: 2024-10-08 | End: 2024-10-08 | Stop reason: HOSPADM

## 2024-10-08 RX ORDER — ACETAMINOPHEN 325 MG/1
975 TABLET ORAL ONCE AS NEEDED
Status: DISCONTINUED | OUTPATIENT
Start: 2024-10-08 | End: 2024-10-09 | Stop reason: HOSPADM

## 2024-10-08 RX ORDER — ATORVASTATIN CALCIUM 20 MG/1
20 TABLET, FILM COATED ORAL DAILY
Status: DISCONTINUED | OUTPATIENT
Start: 2024-10-09 | End: 2024-10-09 | Stop reason: HOSPADM

## 2024-10-08 RX ORDER — INSULIN LISPRO 100 [IU]/ML
2-12 INJECTION, SOLUTION INTRAVENOUS; SUBCUTANEOUS
Status: DISCONTINUED | OUTPATIENT
Start: 2024-10-08 | End: 2024-10-09 | Stop reason: HOSPADM

## 2024-10-08 RX ORDER — SODIUM CHLORIDE 9 MG/ML
100 INJECTION, SOLUTION INTRAVENOUS CONTINUOUS
Status: DISCONTINUED | OUTPATIENT
Start: 2024-10-08 | End: 2024-10-08

## 2024-10-08 RX ORDER — SODIUM CHLORIDE 9 MG/ML
125 INJECTION, SOLUTION INTRAVENOUS CONTINUOUS
Status: DISCONTINUED | OUTPATIENT
Start: 2024-10-08 | End: 2024-10-08

## 2024-10-08 RX ORDER — ONDANSETRON 2 MG/ML
4 INJECTION INTRAMUSCULAR; INTRAVENOUS ONCE AS NEEDED
Status: DISCONTINUED | OUTPATIENT
Start: 2024-10-08 | End: 2024-10-09 | Stop reason: HOSPADM

## 2024-10-08 RX ORDER — ALBUTEROL SULFATE 90 UG/1
2 INHALANT RESPIRATORY (INHALATION) EVERY 4 HOURS PRN
Status: DISCONTINUED | OUTPATIENT
Start: 2024-10-08 | End: 2024-10-09 | Stop reason: HOSPADM

## 2024-10-08 RX ORDER — SODIUM CHLORIDE 9 MG/ML
50 INJECTION, SOLUTION INTRAVENOUS CONTINUOUS
Status: DISCONTINUED | OUTPATIENT
Start: 2024-10-08 | End: 2024-10-08

## 2024-10-08 RX ORDER — ASPIRIN 81 MG/1
324 TABLET, CHEWABLE ORAL ONCE
Status: COMPLETED | OUTPATIENT
Start: 2024-10-08 | End: 2024-10-08

## 2024-10-08 RX ADMIN — ASPIRIN 81 MG CHEWABLE TABLET 243 MG: 81 TABLET CHEWABLE at 10:55

## 2024-10-08 RX ADMIN — VERAPAMIL HYDROCHLORIDE 180 MG: 180 TABLET ORAL at 22:27

## 2024-10-08 RX ADMIN — SODIUM CHLORIDE 50 ML/HR: 0.9 INJECTION, SOLUTION INTRAVENOUS at 17:05

## 2024-10-08 RX ADMIN — SODIUM CHLORIDE 125 ML/HR: 0.9 INJECTION, SOLUTION INTRAVENOUS at 10:56

## 2024-10-08 NOTE — INTERVAL H&P NOTE
Vitals:    10/08/24 1052   BP: 120/56   Pulse: (!) 47   Resp: 16   Temp: (!) 97.2 °F (36.2 °C)   SpO2: 93%       Patient seen and examined at bedside.    Brief overview of procedure discussed with patient and .   Indication, risk, benefits and alternatives discussed with patient who verbalized understanding.  All questions addressed fully.  Patient elected to proceed with planned procedure, consent completed.    Patient remains clinically stable.  Renal function, coagulation profile, and hemoglobin all within normal limits.  Patient has no planned upcoming surgical procedures at this time.    We will proceed with planned procedure.    Myra Watkins

## 2024-10-08 NOTE — DISCHARGE INSTR - AVS FIRST PAGE
1. Please see the post cardiac catheterization dishcarge instructions.   No heavy lifting, greater than 10 lbs. or strenuous  activity for 48 hrs.    2.Remove band aid tomorrow.  Shower and wash area- wrist gently with soap and water- beginning tomorrow. Rinse and pat dry.  Apply new water seal band aid.  Repeat this process for 5 days. No powders, creams lotions or antibiotic ointments  for 5 days.  No tub baths, hot tubs or swimming for 5 days.     3. Please call our office ([phone]) if you have any fever, redness, swelling, discharge from your wrist access site.    4.No driving for 1 day

## 2024-10-08 NOTE — Clinical Note
The right coronary artery was injected and visualized. Multiple views of the injected vessel were taken. - - -

## 2024-10-09 VITALS
SYSTOLIC BLOOD PRESSURE: 151 MMHG | BODY MASS INDEX: 36.44 KG/M2 | HEIGHT: 62 IN | HEART RATE: 63 BPM | DIASTOLIC BLOOD PRESSURE: 61 MMHG | OXYGEN SATURATION: 92 % | WEIGHT: 198 LBS | RESPIRATION RATE: 20 BRPM | TEMPERATURE: 98.5 F

## 2024-10-09 LAB
ANION GAP SERPL CALCULATED.3IONS-SCNC: 11 MMOL/L (ref 4–13)
BUN SERPL-MCNC: 18 MG/DL (ref 5–25)
CALCIUM SERPL-MCNC: 9 MG/DL (ref 8.4–10.2)
CHLORIDE SERPL-SCNC: 104 MMOL/L (ref 96–108)
CO2 SERPL-SCNC: 24 MMOL/L (ref 21–32)
CREAT SERPL-MCNC: 0.81 MG/DL (ref 0.6–1.3)
ERYTHROCYTE [DISTWIDTH] IN BLOOD BY AUTOMATED COUNT: 15.4 % (ref 11.6–15.1)
GFR SERPL CREATININE-BSD FRML MDRD: 69 ML/MIN/1.73SQ M
GLUCOSE P FAST SERPL-MCNC: 115 MG/DL (ref 65–99)
GLUCOSE SERPL-MCNC: 115 MG/DL (ref 65–140)
GLUCOSE SERPL-MCNC: 168 MG/DL (ref 65–140)
HCT VFR BLD AUTO: 36.1 % (ref 34.8–46.1)
HGB BLD-MCNC: 11.5 G/DL (ref 11.5–15.4)
MCH RBC QN AUTO: 29.6 PG (ref 26.8–34.3)
MCHC RBC AUTO-ENTMCNC: 31.9 G/DL (ref 31.4–37.4)
MCV RBC AUTO: 93 FL (ref 82–98)
PLATELET # BLD AUTO: 102 THOUSANDS/UL (ref 149–390)
PMV BLD AUTO: 10 FL (ref 8.9–12.7)
POTASSIUM SERPL-SCNC: 3.5 MMOL/L (ref 3.5–5.3)
RBC # BLD AUTO: 3.89 MILLION/UL (ref 3.81–5.12)
SODIUM SERPL-SCNC: 139 MMOL/L (ref 135–147)
WBC # BLD AUTO: 3.5 THOUSAND/UL (ref 4.31–10.16)

## 2024-10-09 PROCEDURE — 82948 REAGENT STRIP/BLOOD GLUCOSE: CPT

## 2024-10-09 PROCEDURE — 80048 BASIC METABOLIC PNL TOTAL CA: CPT

## 2024-10-09 PROCEDURE — 85027 COMPLETE CBC AUTOMATED: CPT

## 2024-10-09 PROCEDURE — NC001 PR NO CHARGE

## 2024-10-09 RX ADMIN — ASPIRIN 81 MG: 81 TABLET, COATED ORAL at 08:34

## 2024-10-09 RX ADMIN — ATENOLOL 25 MG: 50 TABLET ORAL at 08:35

## 2024-10-09 RX ADMIN — INSULIN LISPRO 2 UNITS: 100 INJECTION, SOLUTION INTRAVENOUS; SUBCUTANEOUS at 08:36

## 2024-10-09 RX ADMIN — LOSARTAN POTASSIUM 50 MG: 50 TABLET, FILM COATED ORAL at 08:34

## 2024-10-09 RX ADMIN — VERAPAMIL HYDROCHLORIDE 180 MG: 180 TABLET ORAL at 08:35

## 2024-10-09 RX ADMIN — ATORVASTATIN CALCIUM 20 MG: 20 TABLET, FILM COATED ORAL at 08:34

## 2024-10-09 RX ADMIN — LEVOTHYROXINE SODIUM 150 MCG: 75 TABLET ORAL at 08:34

## 2024-10-09 NOTE — DISCHARGE SUMMARY
"Discharge Summary - Nadeem Asthon 79 y.o. female MRN: [id number]    Unit/Bed#: [place] 430-01 Encounter: [id number]    Admission Date: 10/8/2024   Discharge Date:  10/9/2024    Disposition: Home    Condition at Discharge: good     PCP:Celine England MD    OP Cardiologist: Dr. Pearson    Interventional cardiologist: Dr. Gonzalez    Admitting Diagnosis:  Hypertrophic cardiomyopathy      Secondary Diagnoses:   Coronary artery disease  Aortic valve stenosis  Hypertension  Dyslipidemia  Mild persistent asthma  Restrictive lung disease      Discharge Diagnosis:          /55   Pulse 59   Temp 98.5 °F (36.9 °C)   Resp 20   Ht 5' 2\" (1.575 m)   Wt 89.8 kg (198 lb)   SpO2 95%   BMI 36.21 kg/m²       Review of Systems   Constitutional: Negative.  Negative for chills and fever.   HENT: Negative.     Respiratory:  Positive for shortness of breath. Negative for chest tightness.    Cardiovascular:  Negative for chest pain, palpitations and leg swelling.   Gastrointestinal: Negative.    Endocrine: Negative.    Genitourinary: Negative.  Negative for difficulty urinating.   Musculoskeletal: Negative.    Allergic/Immunologic: Negative.    Neurological: Negative.  Negative for dizziness and light-headedness.   Hematological: Negative.    Psychiatric/Behavioral: Negative.         Physical Exam  Constitutional:       General: She is not in acute distress.     Appearance: Normal appearance. She is obese. She is not ill-appearing.   HENT:      Head: Normocephalic.      Nose: Nose normal.      Mouth/Throat:      Mouth: Mucous membranes are moist.   Eyes:      Conjunctiva/sclera: Conjunctivae normal.   Cardiovascular:      Rate and Rhythm: Normal rate and regular rhythm.      Pulses: Normal pulses.      Heart sounds: Murmur heard.      No friction rub. No gallop.   Pulmonary:      Effort: Pulmonary effort is normal.      Breath sounds: Normal breath sounds. No wheezing, rhonchi or rales.   Abdominal:      General: Bowel sounds are " "normal. There is no distension.      Palpations: Abdomen is soft.   Musculoskeletal:      Cervical back: Neck supple.      Right lower leg: No edema.      Left lower leg: No edema.   Skin:     General: Skin is warm.      Capillary Refill: Capillary refill takes less than 2 seconds.   Neurological:      Mental Status: She is alert and oriented to person, place, and time.   Psychiatric:         Mood and Affect: Mood normal.         Behavior: Behavior normal.         Judgment: Judgment normal.       /61   Pulse 63   Temp 98.5 °F (36.9 °C)   Resp 20   Ht 5' 2\" (1.575 m)   Wt 89.8 kg (198 lb)   SpO2 92%   BMI 36.21 kg/m²         HPI and Hospital Course: Nadeem Ashton, a 79-year-old female, presented to [facility] for outpatient elective cardiac catheterization for evaluation of symptomatic hypertrophic obstructive cardiomyopathy, aortic stenosis and coronary artery disease.  She has experienced reduced exercise tolerance with low-level exercise associated with fatigue and dyspnea.    Echocardiogram showed obstructive hypertrophic cardiomyopathy in addition to moderate valvular aortic stenosis.  A transesophageal echocardiogram with 3D assessment, performed on 10/3/2024 showed left ventricular ejection fraction 70%.  Systolic function is hyperdynamic.  Wall motion is normal.  Diastolic function is mildly abnormal, consistent with grade 1 diastolic dysfunction.  There is an outflow tract dynamic obstruction at rest.  The peak resting gradient is 36 mmHg, however blood patient's blood pressure was elevated during time of ENRICO.  Obstruction is caused by a thickening shelf present at the distal cord I and the tip of the papillary muscle roughly 28 mm caudal to the aortic valve.  There is severe aortic stenosis with mean gradient 38 mmHg.  Aortic valve area 0.67 cm².    Cardiac catheterization was performed via right radial artery.  Unfortunately due to tortuosity, unable to complete cardiac catheterization " via right radial artery.  Right femoral artery was utilized for completion of the procedure.  Findings: Moderate nonobstructive coronary artery disease.   Mild-moderate AS.   Colby remained hemodynamically stable throughout the admission.      Discharge instructions  Continue all previsous medications.    Dr Pearson will reach out next week regarding next appt.                 Current Facility-Administered Medications:     acetaminophen (TYLENOL) tablet 975 mg, Once PRN    albuterol (PROVENTIL HFA,VENTOLIN HFA) inhaler 2 puff, Q4H PRN    aspirin (ECOTRIN LOW STRENGTH) EC tablet 81 mg, Daily    atenolol (TENORMIN) tablet 25 mg, Daily    atorvastatin (LIPITOR) tablet 20 mg, Daily    insulin lispro (HumALOG/ADMELOG) 100 units/mL subcutaneous injection 2-12 Units, TID AC **AND** Fingerstick Glucose (POCT), TID AC    levothyroxine tablet 150 mcg, Daily    losartan (COZAAR) tablet 50 mg, BID    nitroglycerin (NITROSTAT) SL tablet 0.4 mg, Once PRN    ondansetron (ZOFRAN) injection 4 mg, Once PRN    verapamil (CALAN-SR) CR tablet 180 mg, BID    Pertinent Labs/diagnostics:        Lab Ressults:  Recent Results (from the past 24 hour(s))   ECG 12 lead    Collection Time: 10/08/24 10:44 AM   Result Value Ref Range    Ventricular Rate 47 BPM    Atrial Rate 47 BPM    IL Interval 208 ms    QRSD Interval 92 ms    QT Interval 488 ms    QTC Interval 431 ms    P [place] 18 degrees    QRS Axis -34 degrees    T Wave Axis 42 degrees   Fingerstick Glucose (POCT)    Collection Time: 10/08/24  6:51 PM   Result Value Ref Range    POC Glucose 87 65 - 140 mg/dl   Fingerstick Glucose (POCT)    Collection Time: 10/08/24  8:51 PM   Result Value Ref Range    POC Glucose 152 (H) 65 - 140 mg/dl   Basic metabolic panel    Collection Time: 10/09/24  4:58 AM   Result Value Ref Range    Sodium 139 135 - 147 mmol/L    Potassium 3.5 3.5 - 5.3 mmol/L    Chloride 104 96 - 108 mmol/L    CO2 24 21 - 32 mmol/L    ANION GAP 11 4 - 13 mmol/L    BUN 18 5 - 25 mg/dL  "   Creatinine 0.81 0.60 - 1.30 mg/dL    Glucose 115 65 - 140 mg/dL    Glucose, Fasting 115 (H) 65 - 99 mg/dL    Calcium 9.0 8.4 - 10.2 mg/dL    eGFR 69 ml/min/1.73sq m   CBC and Platelet    Collection Time: 10/09/24  4:58 AM   Result Value Ref Range    WBC 3.50 (L) 4.31 - 10.16 Thousand/uL    RBC 3.89 3.81 - 5.12 Million/uL    Hemoglobin 11.5 11.5 - 15.4 g/dL    Hematocrit 36.1 34.8 - 46.1 %    MCV 93 82 - 98 fL    MCH 29.6 26.8 - 34.3 pg    MCHC 31.9 31.4 - 37.4 g/dL    RDW 15.4 (H) 11.6 - 15.1 %    Platelets 102 (L) 149 - 390 Thousands/uL    MPV 10.0 8.9 - 12.7 fL           Lipid Profile:   No results found for: \"CHOL\"  No results found for: \"HDL\"  No results found for: \"LDLCALC\"  No results found for: \"TRIG\"      Cardiac testing:   Results for orders placed during the hospital encounter of 18    Echo complete with contrast if indicated    Narrative  [place] [id number] [phone]    Transthoracic Echocardiogram  2D, M-mode, Doppler, and Color Doppler    Study date:  2018    Patient: ARSENIO ETIENNE  MR number: [id number]  Account number: [id number]  : 1945  Age: 72 years  Gender: Female  Status: Outpatient  Location: [address] [place] [address] [facility]  Height: 62 in  Weight: 213 lb  BP:    Indications: Shortness of breath    Diagnoses: R06.02 - Shortness of breath    Sonographer:  ED Mtz  Referring Physician:  Celine England MD  Group:  [facility]  Interpreting Physician:  Shanita Hua MD    SUMMARY    LEFT VENTRICLE:  Systolic function was vigorous. Ejection fraction was estimated to be 65 %.  There were no regional wall motion abnormalities.  Wall thickness was increased.  Concentric hypertrophy was present.  Doppler parameters were consistent with abnormal left ventricular relaxation (grade 1 diastolic dysfunction).    AORTIC VALVE:  There was mild stenosis. Vmax 2.4 m/s, mean gradient 13 mm " Hg, maximum gradient 23 mm Hg.  There was trace to mild regurgitation.    HISTORY: PRIOR HISTORY: Hypertension, high cholesterol, coronary artery disease, stent    PROCEDURE: The study was performed in the [address] [place] [address] [facility]. This was a routine study. The transthoracic approach was used. The study included complete 2D imaging, M-mode, complete spectral Doppler, and color Doppler. This  was a technically difficult study.    LEFT VENTRICLE: Size was normal. Systolic function was vigorous. Ejection fraction was estimated to be 65 %. There were no regional wall motion abnormalities. Wall thickness was increased. Concentric hypertrophy was present. DOPPLER: There  was an increased relative contribution of atrial contraction to ventricular filling. Doppler parameters were consistent with abnormal left ventricular relaxation (grade 1 diastolic dysfunction).    RIGHT VENTRICLE: The size was normal. Systolic function was normal.    LEFT ATRIUM: Size was normal.    RIGHT ATRIUM: Size was normal.    MITRAL VALVE: There was annular calcification. Valve structure was normal. There was normal leaflet separation. DOPPLER: There was no evidence for stenosis. There was trace regurgitation.    AORTIC VALVE: Leaflets exhibited mildly to moderately increased thickness and mild to moderate calcification. The valve was not well visualized. DOPPLER: There was mild stenosis. Vmax 2.4 m/s, mean gradient 13 mm Hg, maximum gradient 23 mm  Hg. There was trace to mild regurgitation.    TRICUSPID VALVE: The valve structure was normal. There was normal leaflet separation. DOPPLER: There was no evidence for stenosis. There was no regurgitation.    PULMONIC VALVE: Leaflets exhibited normal thickness, no calcification, and normal cuspal separation. DOPPLER: The transpulmonic velocity was within the normal range. There was mild regurgitation.    PERICARDIUM: There was no pericardial effusion. The pericardium was normal in  appearance.    AORTA: The root exhibited normal size. Not well visualized.    SYSTEMIC VEINS: IVC: The inferior vena cava was normal in size and course. Respirophasic changes were normal.    SYSTEM MEASUREMENT TABLES    2D  %FS: 29.01 %  AV Diam: 3.47 cm  EDV(Teich): 77.21 ml  EF(Cube): 64.22 %  EF(Teich): 56.15 %  ESV(Cube): 25.92 ml  ESV(Teich): 33.86 ml  IVSd: 1.2 cm  LA Area: 17.81 cm2  LA Diam: 3.74 cm  LVEDV MOD A4C: 126.24 ml  LVEF MOD A4C: 70.82 %  LVESV MOD A4C: 36.84 ml  LVIDd: 4.17 cm  LVIDs: 2.96 cm  LVLd A4C: 8.96 cm  LVLs A4C: 7.25 cm  LVOT Diam: 1.94 cm  LVPWd: 1.13 cm  RA Area: 10.23 cm2  RV Diam.: 2.95 cm  SV MOD A4C: 89.41 ml  SV(Cube): 46.53 ml  SV(Teich): 43.35 ml    CW  AV Env.Ti: 353.67 ms  AV SV: 574.44 ml  AV VTI: 60.82 cm  AV Vmax: 2.51 m/s  AV Vmean: 1.72 m/s  AV maxP.35 mmHg  AV meanP.44 mmHg    MM  TAPSE: 2.26 cm    PW  JETHRO (VTI): 1.71 cm2  JETHRO Vmax: 1.55 cm2  E': 0.07 m/s  E/E': 13.05  LVOT Env.Ti: 427.6 ms  LVOT VTI: 35.44 cm  LVOT Vmax: 1.32 m/s  LVOT Vmean: 0.83 m/s  LVOT maxP.01 mmHg  LVOT meanPG: 3.32 mmHg  LVSV Dopp: 104.27 ml  MV A Raul: 1.03 m/s  MV Dec [place]: 2.91 m/s2  MV DecT: 295.77 ms  MV E Raul: 0.86 m/s  MV E/A Ratio: 0.84    [employer] Accredited Echocardiography Laboratory    Prepared and electronically signed by    Shanita Hua MD  Signed 2018 11:14:07    No results found for this or any previous visit.    No valid procedures specified.  No results found for this or any previous visit.      Tele:  NSR -Sinus bradycardia.         Discharge instructions/Information to patient and family:   See after visit summary for information provided to patient and family.      Provisions for Follow-Up Care:  See after visit summary for information related to follow-up care and any pertinent home health orders.      Planned Readmission: YES alcohol septal ablation     Discharge Statement:  I spent 1 hour minutes discharging the patient. This time was  spent on the day of discharge. I had direct contact with the patient on the day of discharge. Additional documentation is required if more than 30 minutes were spent on discharge.         ** Please Note: Fluency Direct Dictation voice to text software may have been used in the creation of this document. **

## 2024-10-14 ENCOUNTER — HOSPITAL ENCOUNTER (OUTPATIENT)
Dept: MRI IMAGING | Facility: HOSPITAL | Age: 79
Discharge: HOME | End: 2024-10-14
Attending: INTERNAL MEDICINE
Payer: COMMERCIAL

## 2024-10-14 DIAGNOSIS — K86.2 CYST OF PANCREAS: ICD-10-CM

## 2024-10-14 PROCEDURE — 74183 MRI ABD W/O CNTR FLWD CNTR: CPT

## 2024-10-14 PROCEDURE — A9585 GADOBUTROL INJECTION: HCPCS | Performed by: INTERNAL MEDICINE

## 2024-10-14 RX ORDER — GADOBUTROL 604.72 MG/ML
8 INJECTION INTRAVENOUS
Status: COMPLETED | OUTPATIENT
Start: 2024-10-14 | End: 2024-10-14

## 2024-10-14 RX ADMIN — GADOBUTROL 8 ML: 604.72 INJECTION INTRAVENOUS at 14:30

## 2024-10-21 ENCOUNTER — TELEPHONE (OUTPATIENT)
Dept: CARDIOLOGY CLINIC | Facility: CLINIC | Age: 79
End: 2024-10-21

## 2024-10-21 NOTE — TELEPHONE ENCOUNTER
Spoke with patient in regard a cardiac septal ablation procedure referred. Per Vikas Morales call on 10/16/204.  Patient and her daughter have many questions and concern in regard the procedure.  They would like to have a conversation with the performing provider.  They will appreciate a phone call or they will also open to have a consult, ( virtual/person).    Dr Jerry or Dr Pearson, if any of you are available to speak with the patient she will appreciate.    I mentioned to patient I will place a hold on 12/11/24 when this procedure will be perform and will call her back to follow up and confirm procedure date see if she will have it done on this date.

## 2024-11-05 ENCOUNTER — HOSPITAL ENCOUNTER (OUTPATIENT)
Dept: RADIOLOGY | Facility: HOSPITAL | Age: 79
Discharge: HOME | End: 2024-11-05
Payer: COMMERCIAL

## 2024-11-05 DIAGNOSIS — R06.02 SHORTNESS OF BREATH: ICD-10-CM

## 2024-11-05 PROCEDURE — 71046 X-RAY EXAM CHEST 2 VIEWS: CPT

## 2024-11-11 ENCOUNTER — APPOINTMENT (EMERGENCY)
Dept: RADIOLOGY | Facility: HOSPITAL | Age: 79
DRG: 286 | End: 2024-11-11
Payer: COMMERCIAL

## 2024-11-11 ENCOUNTER — HOSPITAL ENCOUNTER (INPATIENT)
Facility: HOSPITAL | Age: 79
LOS: 7 days | Discharge: HOME | DRG: 286 | End: 2024-11-18
Attending: EMERGENCY MEDICINE | Admitting: INTERNAL MEDICINE
Payer: COMMERCIAL

## 2024-11-11 DIAGNOSIS — M89.8X9 BONE DISEASE, METABOLIC: ICD-10-CM

## 2024-11-11 DIAGNOSIS — C79.51 CANCER, METASTATIC TO BONE (HCC): ICD-10-CM

## 2024-11-11 DIAGNOSIS — I50.33 ACUTE ON CHRONIC DIASTOLIC CONGESTIVE HEART FAILURE (HCC): ICD-10-CM

## 2024-11-11 DIAGNOSIS — Z71.89 GOALS OF CARE, COUNSELING/DISCUSSION: ICD-10-CM

## 2024-11-11 DIAGNOSIS — M89.9 LESION OF BONE OF THORACIC SPINE: ICD-10-CM

## 2024-11-11 DIAGNOSIS — I35.0 SEVERE AORTIC STENOSIS: ICD-10-CM

## 2024-11-11 DIAGNOSIS — I10 HYPERTENSION: ICD-10-CM

## 2024-11-11 DIAGNOSIS — C79.51 MALIGNANT NEOPLASM METASTATIC TO BONE (HCC): ICD-10-CM

## 2024-11-11 DIAGNOSIS — I50.9 HEART FAILURE (HCC): ICD-10-CM

## 2024-11-11 DIAGNOSIS — R91.8 LUNG MASS: ICD-10-CM

## 2024-11-11 DIAGNOSIS — I35.0 NONRHEUMATIC AORTIC VALVE STENOSIS: ICD-10-CM

## 2024-11-11 DIAGNOSIS — I42.2 HYPERTROPHIC CARDIOMYOPATHY (HCC): ICD-10-CM

## 2024-11-11 DIAGNOSIS — I42.1 HOCM (HYPERTROPHIC OBSTRUCTIVE CARDIOMYOPATHY) (HCC): Primary | ICD-10-CM

## 2024-11-11 DIAGNOSIS — Z51.5 PALLIATIVE CARE ENCOUNTER: ICD-10-CM

## 2024-11-11 LAB
2HR DELTA HS TROPONIN: 1 NG/L
ALBUMIN SERPL BCG-MCNC: 4.1 G/DL (ref 3.5–5)
ALP SERPL-CCNC: 61 U/L (ref 34–104)
ALT SERPL W P-5'-P-CCNC: 9 U/L (ref 7–52)
ANION GAP SERPL CALCULATED.3IONS-SCNC: 7 MMOL/L (ref 4–13)
AST SERPL W P-5'-P-CCNC: 13 U/L (ref 13–39)
ATRIAL RATE: 50 BPM
BASOPHILS # BLD AUTO: 0.01 THOUSANDS/ÂΜL (ref 0–0.1)
BASOPHILS NFR BLD AUTO: 0 % (ref 0–1)
BILIRUB SERPL-MCNC: 0.63 MG/DL (ref 0.2–1)
BNP SERPL-MCNC: 268 PG/ML (ref 0–100)
BUN SERPL-MCNC: 23 MG/DL (ref 5–25)
CALCIUM SERPL-MCNC: 9.1 MG/DL (ref 8.4–10.2)
CARDIAC TROPONIN I PNL SERPL HS: 6 NG/L
CARDIAC TROPONIN I PNL SERPL HS: 7 NG/L
CHLORIDE SERPL-SCNC: 108 MMOL/L (ref 96–108)
CO2 SERPL-SCNC: 26 MMOL/L (ref 21–32)
CREAT SERPL-MCNC: 0.87 MG/DL (ref 0.6–1.3)
EOSINOPHIL # BLD AUTO: 0.02 THOUSAND/ÂΜL (ref 0–0.61)
EOSINOPHIL NFR BLD AUTO: 1 % (ref 0–6)
ERYTHROCYTE [DISTWIDTH] IN BLOOD BY AUTOMATED COUNT: 15.2 % (ref 11.6–15.1)
GFR SERPL CREATININE-BSD FRML MDRD: 63 ML/MIN/1.73SQ M
GLUCOSE SERPL-MCNC: 90 MG/DL (ref 65–140)
HCT VFR BLD AUTO: 34.1 % (ref 34.8–46.1)
HGB BLD-MCNC: 10.9 G/DL (ref 11.5–15.4)
IMM GRANULOCYTES # BLD AUTO: 0.01 THOUSAND/UL (ref 0–0.2)
IMM GRANULOCYTES NFR BLD AUTO: 0 % (ref 0–2)
LYMPHOCYTES # BLD AUTO: 1.31 THOUSANDS/ÂΜL (ref 0.6–4.47)
LYMPHOCYTES NFR BLD AUTO: 37 % (ref 14–44)
MCH RBC QN AUTO: 29.8 PG (ref 26.8–34.3)
MCHC RBC AUTO-ENTMCNC: 32 G/DL (ref 31.4–37.4)
MCV RBC AUTO: 93 FL (ref 82–98)
MONOCYTES # BLD AUTO: 0.25 THOUSAND/ÂΜL (ref 0.17–1.22)
MONOCYTES NFR BLD AUTO: 7 % (ref 4–12)
NEUTROPHILS # BLD AUTO: 1.91 THOUSANDS/ÂΜL (ref 1.85–7.62)
NEUTS SEG NFR BLD AUTO: 55 % (ref 43–75)
NRBC BLD AUTO-RTO: 0 /100 WBCS
P AXIS: 33 DEGREES
PLATELET # BLD AUTO: 102 THOUSANDS/UL (ref 149–390)
PMV BLD AUTO: 9.3 FL (ref 8.9–12.7)
POTASSIUM SERPL-SCNC: 3.6 MMOL/L (ref 3.5–5.3)
PR INTERVAL: 184 MS
PROT SERPL-MCNC: 7 G/DL (ref 6.4–8.4)
QRS AXIS: -35 DEGREES
QRSD INTERVAL: 94 MS
QT INTERVAL: 476 MS
QTC INTERVAL: 433 MS
RBC # BLD AUTO: 3.66 MILLION/UL (ref 3.81–5.12)
SODIUM SERPL-SCNC: 141 MMOL/L (ref 135–147)
T WAVE AXIS: 56 DEGREES
VENTRICULAR RATE: 50 BPM
WBC # BLD AUTO: 3.51 THOUSAND/UL (ref 4.31–10.16)

## 2024-11-11 PROCEDURE — 36415 COLL VENOUS BLD VENIPUNCTURE: CPT

## 2024-11-11 PROCEDURE — 84484 ASSAY OF TROPONIN QUANT: CPT | Performed by: EMERGENCY MEDICINE

## 2024-11-11 PROCEDURE — 80053 COMPREHEN METABOLIC PANEL: CPT | Performed by: EMERGENCY MEDICINE

## 2024-11-11 PROCEDURE — 99285 EMERGENCY DEPT VISIT HI MDM: CPT

## 2024-11-11 PROCEDURE — 96374 THER/PROPH/DIAG INJ IV PUSH: CPT

## 2024-11-11 PROCEDURE — 83880 ASSAY OF NATRIURETIC PEPTIDE: CPT | Performed by: EMERGENCY MEDICINE

## 2024-11-11 PROCEDURE — 85025 COMPLETE CBC W/AUTO DIFF WBC: CPT | Performed by: EMERGENCY MEDICINE

## 2024-11-11 PROCEDURE — 99223 1ST HOSP IP/OBS HIGH 75: CPT | Performed by: INTERNAL MEDICINE

## 2024-11-11 PROCEDURE — 93005 ELECTROCARDIOGRAM TRACING: CPT

## 2024-11-11 PROCEDURE — 71045 X-RAY EXAM CHEST 1 VIEW: CPT

## 2024-11-11 PROCEDURE — 99285 EMERGENCY DEPT VISIT HI MDM: CPT | Performed by: EMERGENCY MEDICINE

## 2024-11-11 PROCEDURE — 93010 ELECTROCARDIOGRAM REPORT: CPT | Performed by: INTERNAL MEDICINE

## 2024-11-11 RX ORDER — ENOXAPARIN SODIUM 100 MG/ML
40 INJECTION SUBCUTANEOUS DAILY
Status: DISCONTINUED | OUTPATIENT
Start: 2024-11-12 | End: 2024-11-15

## 2024-11-11 RX ORDER — ATENOLOL 25 MG/1
25 TABLET ORAL DAILY
Status: DISCONTINUED | OUTPATIENT
Start: 2024-11-12 | End: 2024-11-18 | Stop reason: HOSPADM

## 2024-11-11 RX ORDER — ASPIRIN 81 MG/1
81 TABLET ORAL DAILY
Status: DISCONTINUED | OUTPATIENT
Start: 2024-11-12 | End: 2024-11-15

## 2024-11-11 RX ORDER — ALBUTEROL SULFATE 90 UG/1
2 INHALANT RESPIRATORY (INHALATION) EVERY 4 HOURS PRN
Status: DISCONTINUED | OUTPATIENT
Start: 2024-11-11 | End: 2024-11-18 | Stop reason: HOSPADM

## 2024-11-11 RX ORDER — LEVOTHYROXINE SODIUM 75 UG/1
150 TABLET ORAL
Status: DISCONTINUED | OUTPATIENT
Start: 2024-11-12 | End: 2024-11-18 | Stop reason: HOSPADM

## 2024-11-11 RX ORDER — INSULIN LISPRO 100 [IU]/ML
2-12 INJECTION, SOLUTION INTRAVENOUS; SUBCUTANEOUS
Status: DISCONTINUED | OUTPATIENT
Start: 2024-11-12 | End: 2024-11-18 | Stop reason: HOSPADM

## 2024-11-11 RX ORDER — VERAPAMIL HYDROCHLORIDE 180 MG/1
180 TABLET, EXTENDED RELEASE ORAL 2 TIMES DAILY
Status: DISCONTINUED | OUTPATIENT
Start: 2024-11-12 | End: 2024-11-18 | Stop reason: HOSPADM

## 2024-11-11 RX ORDER — ATORVASTATIN CALCIUM 20 MG/1
20 TABLET, FILM COATED ORAL
Status: DISCONTINUED | OUTPATIENT
Start: 2024-11-12 | End: 2024-11-18 | Stop reason: HOSPADM

## 2024-11-11 RX ORDER — VERAPAMIL HYDROCHLORIDE 180 MG/1
360 TABLET, EXTENDED RELEASE ORAL
Status: DISCONTINUED | OUTPATIENT
Start: 2024-11-12 | End: 2024-11-11

## 2024-11-11 RX ORDER — FUROSEMIDE 10 MG/ML
10 INJECTION INTRAMUSCULAR; INTRAVENOUS ONCE
Status: COMPLETED | OUTPATIENT
Start: 2024-11-11 | End: 2024-11-11

## 2024-11-11 RX ORDER — LOSARTAN POTASSIUM 50 MG/1
100 TABLET ORAL DAILY
Status: DISCONTINUED | OUTPATIENT
Start: 2024-11-12 | End: 2024-11-15

## 2024-11-11 RX ADMIN — FUROSEMIDE 10 MG: 10 INJECTION, SOLUTION INTRAMUSCULAR; INTRAVENOUS at 20:59

## 2024-11-11 NOTE — Clinical Note
Case was discussed with   and the patient's admission status was agreed to be Admission Status: observation status to the service of Dr. Hall

## 2024-11-12 ENCOUNTER — APPOINTMENT (INPATIENT)
Dept: NON INVASIVE DIAGNOSTICS | Facility: HOSPITAL | Age: 79
DRG: 286 | End: 2024-11-12
Payer: COMMERCIAL

## 2024-11-12 PROBLEM — I16.0 HYPERTENSIVE URGENCY: Status: ACTIVE | Noted: 2024-11-12

## 2024-11-12 PROBLEM — I50.9 CONGESTIVE HEART FAILURE (CHF) (HCC): Status: ACTIVE | Noted: 2024-11-12

## 2024-11-12 LAB
ANION GAP SERPL CALCULATED.3IONS-SCNC: 10 MMOL/L (ref 4–13)
ANISOCYTOSIS BLD QL SMEAR: PRESENT
AORTIC ROOT: 2.6 CM
AORTIC VALVE MEAN VELOCITY: 28 M/S
APICAL FOUR CHAMBER EJECTION FRACTION: 74 %
AV AREA BY CONTINUOUS VTI: 1.2 CM2
AV AREA PEAK VELOCITY: 1.2 CM2
AV LVOT MEAN GRADIENT: 4.7 MMHG
AV LVOT PEAK GRADIENT: 8 MMHG
AV MEAN GRADIENT: 32 MMHG
AV PEAK GRADIENT: 60 MMHG
AV VALVE AREA: 1.02 CM2
AV VELOCITY RATIO: 0.37
BASOPHILS # BLD AUTO: 0.01 THOUSANDS/ÂΜL (ref 0–0.1)
BASOPHILS NFR BLD AUTO: 0 % (ref 0–1)
BSA FOR ECHO PROCEDURE: 1.9 M2
BUN SERPL-MCNC: 22 MG/DL (ref 5–25)
BURR CELLS BLD QL SMEAR: PRESENT
CALCIUM SERPL-MCNC: 8.9 MG/DL (ref 8.4–10.2)
CHLORIDE SERPL-SCNC: 103 MMOL/L (ref 96–108)
CO2 SERPL-SCNC: 27 MMOL/L (ref 21–32)
CREAT SERPL-MCNC: 0.92 MG/DL (ref 0.6–1.3)
DACRYOCYTES BLD QL SMEAR: PRESENT
DOP CALC AO PEAK VEL: 3.8 M/S
DOP CALC AO VTI: 105 CM
DOP CALC LVOT AREA: 2.54 CM2
DOP CALC LVOT CARDIAC INDEX: 2.97 L/MIN/M2
DOP CALC LVOT CARDIAC OUTPUT: 5.63 L/MIN
DOP CALC LVOT DIAMETER: 1.8 CM
DOP CALC LVOT PEAK VEL VTI: 42 CM
DOP CALC LVOT PEAK VEL: 1.4 M/S
DOP CALC LVOT STROKE INDEX: 62.1 ML/M2
DOP CALC LVOT STROKE VOLUME: 106.82 CM3
E WAVE DECELERATION TIME: 376 MS
E/A RATIO: 0.76
EOSINOPHIL # BLD AUTO: 0.02 THOUSAND/ÂΜL (ref 0–0.61)
EOSINOPHIL NFR BLD AUTO: 1 % (ref 0–6)
ERYTHROCYTE [DISTWIDTH] IN BLOOD BY AUTOMATED COUNT: 15.4 % (ref 11.6–15.1)
FRACTIONAL SHORTENING: 31 (ref 28–44)
GFR SERPL CREATININE-BSD FRML MDRD: 59 ML/MIN/1.73SQ M
GLUCOSE SERPL-MCNC: 102 MG/DL (ref 65–140)
GLUCOSE SERPL-MCNC: 105 MG/DL (ref 65–140)
GLUCOSE SERPL-MCNC: 111 MG/DL (ref 65–140)
GLUCOSE SERPL-MCNC: 129 MG/DL (ref 65–140)
GLUCOSE SERPL-MCNC: 99 MG/DL (ref 65–140)
HCT VFR BLD AUTO: 34.3 % (ref 34.8–46.1)
HGB BLD-MCNC: 10.8 G/DL (ref 11.5–15.4)
IMM GRANULOCYTES # BLD AUTO: 0.01 THOUSAND/UL (ref 0–0.2)
IMM GRANULOCYTES NFR BLD AUTO: 0 % (ref 0–2)
INTERVENTRICULAR SEPTUM IN DIASTOLE (PARASTERNAL SHORT AXIS VIEW): 1.7 CM
INTERVENTRICULAR SEPTUM: 1.7 CM (ref 0.6–1.1)
LEFT ATRIUM SIZE: 4.5 CM
LEFT INTERNAL DIMENSION IN SYSTOLE: 3.3 CM (ref 2.1–4)
LEFT VENTRICULAR INTERNAL DIMENSION IN DIASTOLE: 4.8 CM (ref 3.5–6)
LEFT VENTRICULAR POSTERIOR WALL IN END DIASTOLE: 1.5 CM
LEFT VENTRICULAR STROKE VOLUME: 64 ML
LVSV (TEICH): 64 ML
LYMPHOCYTES # BLD AUTO: 1.15 THOUSANDS/ÂΜL (ref 0.6–4.47)
LYMPHOCYTES NFR BLD AUTO: 33 % (ref 14–44)
MCH RBC QN AUTO: 29.3 PG (ref 26.8–34.3)
MCHC RBC AUTO-ENTMCNC: 31.5 G/DL (ref 31.4–37.4)
MCV RBC AUTO: 93 FL (ref 82–98)
MONOCYTES # BLD AUTO: 0.28 THOUSAND/ÂΜL (ref 0.17–1.22)
MONOCYTES NFR BLD AUTO: 8 % (ref 4–12)
MV E'TISSUE VEL-SEP: 6 CM/S
MV PEAK A VEL: 1.33 M/S
MV PEAK E VEL: 101 CM/S
MV STENOSIS PRESSURE HALF TIME: 109 MS
MV VALVE AREA P 1/2 METHOD: 2.02
NEUTROPHILS # BLD AUTO: 1.99 THOUSANDS/ÂΜL (ref 1.85–7.62)
NEUTS SEG NFR BLD AUTO: 58 % (ref 43–75)
NRBC BLD AUTO-RTO: 0 /100 WBCS
OVALOCYTES BLD QL SMEAR: PRESENT
PLATELET # BLD AUTO: 98 THOUSANDS/UL (ref 149–390)
PLATELET BLD QL SMEAR: ABNORMAL
PMV BLD AUTO: 9.7 FL (ref 8.9–12.7)
POTASSIUM SERPL-SCNC: 3.5 MMOL/L (ref 3.5–5.3)
RA PRESSURE ESTIMATED: 3 MMHG
RBC # BLD AUTO: 3.69 MILLION/UL (ref 3.81–5.12)
RBC MORPH BLD: PRESENT
RV PSP: 26 MMHG
SL CV ECHO LV DYNAMIC OBSTRUCTION PEAK GRADIENT (REST): 34 MMHG
SL CV ECHO LV DYNAMIC OBSTRUCTION PEAK GRADIENT (VALSAL: 47 MMHG
SL CV LV EF: 70
SL CV PED ECHO LEFT VENTRICLE DIASTOLIC VOLUME (MOD BIPLANE) 2D: 109 ML
SL CV PED ECHO LEFT VENTRICLE SYSTOLIC VOLUME (MOD BIPLANE) 2D: 45 ML
SODIUM SERPL-SCNC: 140 MMOL/L (ref 135–147)
TR MAX PG: 23 MMHG
TR PEAK VELOCITY: 2.4 M/S
TRICUSPID VALVE PEAK REGURGITATION VELOCITY: 2.42 M/S
WBC # BLD AUTO: 3.46 THOUSAND/UL (ref 4.31–10.16)

## 2024-11-12 PROCEDURE — 93325 DOPPLER ECHO COLOR FLOW MAPG: CPT | Performed by: INTERNAL MEDICINE

## 2024-11-12 PROCEDURE — 93321 DOPPLER ECHO F-UP/LMTD STD: CPT | Performed by: INTERNAL MEDICINE

## 2024-11-12 PROCEDURE — 36415 COLL VENOUS BLD VENIPUNCTURE: CPT

## 2024-11-12 PROCEDURE — 80048 BASIC METABOLIC PNL TOTAL CA: CPT

## 2024-11-12 PROCEDURE — 93308 TTE F-UP OR LMTD: CPT | Performed by: INTERNAL MEDICINE

## 2024-11-12 PROCEDURE — 82948 REAGENT STRIP/BLOOD GLUCOSE: CPT

## 2024-11-12 PROCEDURE — 99222 1ST HOSP IP/OBS MODERATE 55: CPT | Performed by: INTERNAL MEDICINE

## 2024-11-12 PROCEDURE — 99232 SBSQ HOSP IP/OBS MODERATE 35: CPT | Performed by: INTERNAL MEDICINE

## 2024-11-12 PROCEDURE — 93321 DOPPLER ECHO F-UP/LMTD STD: CPT

## 2024-11-12 PROCEDURE — 93325 DOPPLER ECHO COLOR FLOW MAPG: CPT

## 2024-11-12 PROCEDURE — 93308 TTE F-UP OR LMTD: CPT

## 2024-11-12 PROCEDURE — 85025 COMPLETE CBC W/AUTO DIFF WBC: CPT

## 2024-11-12 RX ORDER — FUROSEMIDE 10 MG/ML
20 INJECTION INTRAMUSCULAR; INTRAVENOUS ONCE
Status: COMPLETED | OUTPATIENT
Start: 2024-11-12 | End: 2024-11-12

## 2024-11-12 RX ORDER — FLUTICASONE PROPIONATE 50 MCG
1 SPRAY, SUSPENSION (ML) NASAL DAILY
COMMUNITY

## 2024-11-12 RX ADMIN — ASPIRIN 81 MG: 81 TABLET, COATED ORAL at 08:23

## 2024-11-12 RX ADMIN — VERAPAMIL HYDROCHLORIDE 180 MG: 180 TABLET ORAL at 08:23

## 2024-11-12 RX ADMIN — FUROSEMIDE 20 MG: 10 INJECTION, SOLUTION INTRAMUSCULAR; INTRAVENOUS at 20:31

## 2024-11-12 RX ADMIN — FUROSEMIDE 20 MG: 10 INJECTION, SOLUTION INTRAMUSCULAR; INTRAVENOUS at 12:04

## 2024-11-12 RX ADMIN — LOSARTAN POTASSIUM 100 MG: 50 TABLET, FILM COATED ORAL at 08:23

## 2024-11-12 RX ADMIN — ALBUTEROL SULFATE 2 PUFF: 90 AEROSOL, METERED RESPIRATORY (INHALATION) at 17:23

## 2024-11-12 RX ADMIN — VERAPAMIL HYDROCHLORIDE 180 MG: 180 TABLET ORAL at 20:31

## 2024-11-12 RX ADMIN — ATENOLOL 25 MG: 25 TABLET ORAL at 08:23

## 2024-11-12 RX ADMIN — LEVOTHYROXINE SODIUM 150 MCG: 75 TABLET ORAL at 05:32

## 2024-11-12 RX ADMIN — ATORVASTATIN CALCIUM 20 MG: 20 TABLET, FILM COATED ORAL at 17:20

## 2024-11-12 NOTE — ASSESSMENT & PLAN NOTE
Wt Readings from Last 3 Encounters:   10/08/24 89.8 kg (198 lb)   10/03/24 92.1 kg (203 lb)   09/24/24 92.3 kg (203 lb 6.4 oz)     Likely acute congestive heart failure with preserved fraction secondary to HCM.  Plan as noted above.

## 2024-11-12 NOTE — ASSESSMENT & PLAN NOTE
For diagnosis of HCM in August of this year following stress TTE that showed evidence of severe asymmetric hypertrophy of the septal wall, EF 70%, grade 1 diastolic dysfunction, LVOT obstruction, and moderate AV stenosis.  The patient underwent further cardiac workup with ENRICO on 10/24 in attempt to better delineate the extent of her valvular aortic stenosis versus HCM and for further assessment of the structure of her AV.  This showed evidence of severely increased wall thickness of the left ventricle, severe stenosis of the AV.  Additionally she also underwent left heart catheterization that showed no evidence of significant obstructive CAD.  Cardiology plans for the patient to receive ablation in December for HCM.    She presented with worsening shortness of breath, orthopnea, dyspnea on exertion, bilateral lower extremity swelling in the setting of pre-existing HCM and severe AS, likely secondary to mild congestive heart failure in the setting of a preserved ejection fraction.  The patient reports improvement in swelling of bilateral lower extremities following Lasix.  Physical exam was significant for 1+ pitting edema of the bilateral extremities however was negative for rales suggestive of mild exacerbation.  Additionally chest x-ray similar to prior which showed evidence of mild pulmonary vascular congestion with trace pleural effusions, also suggesting a mild heart failure exacerbation.    Plan:  -Continue antihypertensive medication  -Continue telemetry  -Fluid restriction diet  -Daily standing weights  -Monitor I's and O's  -PT/OT eval  -Per cardiology consult, Lasix 20 mg IV today was started and repeat echo ordered to evaluate LVEF with discussion of moving up septal ablation to a sooner date. Can consider 10mg lasix daily but will re-evaluate tomorrow.

## 2024-11-12 NOTE — H&P
H&P - Internal Medicine   Name: Nadeem Ashton 79 y.o. female I MRN: [id number]  Unit/Bed#: ED 28 I Date of Admission: 11/11/2024   Date of Service: 11/12/2024 I Hospital Day: 1     Assessment & Plan  Hypertensive urgency  Patient with history of hypertension on multiple antihypertensive medications in the past including amlodipine, HCTZ, losartan, atenolol, verapamil.  Home regimen includes losartan 100 mg, verapamil 180 mg twice daily, atenolol 25 mg, which was ultimately decided upon given a new diagnosis of HCM.  Review of medical documentation shows the patient has had difficulty controlling blood pressure following shift away from HCTZ and amlodipine especially in the evening. Presents without symptoms of hypertensive emergency, but with systolic over 200. Status post 1 dose of Lasix 10 mg and self-medication with verapamil, patient blood pressure has dropped to 140s over 60s.    Plan:  -Continue home antihypertensive medications  -Continue monitoring vitals    Hypertrophic cardiomyopathy (HCC)  For diagnosis of HCM in August of this year following stress TTE that showed evidence of severe asymmetric hypertrophy of the septal wall, EF 70%, grade 1 diastolic dysfunction, LVOT obstruction, and moderate AV stenosis.  The patient underwent further cardiac workup with ENRICO on 10/24 in attempt to better delineate the extent of her valvular aortic stenosis versus HCM and for further assessment of the structure of her AV.  This showed evidence of severely increased wall thickness of the left ventricle, severe stenosis of the AV.  Additionally she also underwent left heart catheterization that showed no evidence of significant obstructive CAD.  Cardiology plans for the patient to receive ablation in December for HCM.    She presented with worsening shortness of breath, orthopnea, dyspnea on exertion, bilateral lower extremity swelling in the setting of pre-existing HCM and severe AS, likely secondary to mild congestive  heart failure in the setting of a preserved ejection fraction.  The patient reports improvement in swelling of bilateral lower extremities following Lasix.  Physical exam was significant for 1+ pitting edema of the bilateral extremities however was negative for rales suggestive of mild exacerbation.  Additionally chest x-ray similar to prior which showed evidence of mild pulmonary vascular congestion, also suggesting a mild heart failure exacerbation.    Plan:  -Cardiology consult for HCM management  -Continue antihypertensive medication  -Status post 1 dose of 10 mg Lasix, will defer further diuresis at this time given underlying HCM  -Continue telemetry  -Fluid restriction diet  -Daily standing weights  -Monitor I's and O's  -Case management consult  -PT/OT eval    Congestive heart failure (CHF) ([facility])  Wt Readings from Last 3 Encounters:   10/08/24 89.8 kg (198 lb)   10/03/24 92.1 kg (203 lb)   09/24/24 92.3 kg (203 lb 6.4 oz)     Likely acute congestive heart failure with preserved fraction secondary to HCM.  Plan as noted above.    Stage 3a chronic kidney disease (HCC)  Lab Results   Component Value Date    EGFR 63 11/11/2024    EGFR 69 10/09/2024    EGFR 60 09/25/2024    CREATININE 0.87 11/11/2024    CREATININE 0.81 10/09/2024    CREATININE 0.91 09/25/2024     Patient has baseline creatinine around 0.8-0.9, with GFR suggests CKD2-3A.  Continue to monitor creatinine function and patient with BMPs.    Restrictive lung disease  Patient is previously followed with pulmonology due to restrictive lung disease, possibly due to remote history of sarcoidosis.  Upon review of medical documentation it appears that she has not seen a pulmonologist in quite some time, and was looking to establish new pulmonologist with regards to her asthma.  Reportedly in 2020 the patient had very mild restriction based off her PFTs.  Continue home albuterol as needed.    Mild persistent asthma without complication  Patient has history of  mild persistent asthma, with home albuterol as needed.  Continue albuterol inpatient.    Dyslipidemia  Continue home atorvastatin 20 mg.    Essential hypertension  Plan for HTN as noted above under hypertensive urgency.    Code Status: Level 1 - Full Code  Admission Status: INPATIENT   Disposition: Patient requires Med Surg with Telemetry    Admit to team: SOD TEAM C    History of Present Illness   Nadeem Ashton is a 79F with history of newly diagnosed HCM, hypertension, dyslipidemia, mild persistent asthma, CAD status post stenting, restrictive lung disease, CKD 2 who presents due to worsening shortness of breath in the setting of elevated blood pressures.  The patient has reportedly been having  orthopnea, dyspnea on exertion, and leg swelling for 1 to 2 weeks.  She went to see Dr. England, her PCP, as a result of the symptoms and in the office had a blood pressure of over 200 systolic and was subsequently sent to the emergency department.  She otherwise denies chest pain, fever/chills, abdominal pain, and/or nausea/vomiting,    In the ED she presented with BP of 217/81, with vitals that were otherwise within normal limits, physical exam was significant for bilateral lower extremity edema.  Labs were significant for hemoglobin of 10.9, white blood cell count of 3.51, BMP that was unremarkable, tropes that were unremarkable, and a BNP of 268 without prior to compare it to.  Chest x-ray was taken, without official read, however it does not look grossly dissimilar from prior.  The patient took her home verapamil 360 mg while waiting for care in the ED and was also treated with 10 mg of IV Lasix after being evaluated by ED staff.  Her blood pressure has since dropped to the 130s to 140s systolic over 60s diastolic.    The patient was recently diagnosed with HCM, for which she has been symptomatic as a result and has had reduced exercise tolerance and fatigue/dyspnea at baseline.  Recent workup with ENRICO and left heart  cath in early 10/2024, with left heart cath showing no significant obstructive CAD and with ENRICO showing evidence of severely thickened left ventricle with severe asymmetric hypertrophy of the basal septal wall, EF 70%, diastolic dysfunction grade 1, left atrial dilation, and severe AV stenosis.  Recently the patient has had difficulty controlling her blood pressure, with BP spiking in the evening in the 170s over 70s, following transition from previous antihypertensive regimen which included amlodipine, triamterene-HCTZ to verapamil, atenolol, losartan due to contraindications with HCM.     Review of Systems   Constitutional:  Negative for chills and fever.   HENT:  Negative for ear pain, rhinorrhea and sinus pain.    Eyes:  Negative for photophobia and pain.   Respiratory:  Positive for shortness of breath. Negative for chest tightness.    Cardiovascular:  Negative for chest pain and palpitations.   Gastrointestinal:  Negative for abdominal pain, nausea and vomiting.   Endocrine: Negative for polyuria.   Genitourinary:  Negative for dysuria and hematuria.   Musculoskeletal:  Negative for arthralgias and myalgias.   Neurological:  Negative for dizziness and light-headedness.       Objective :  Temp:  [97.6 °F (36.4 °C)] 97.6 °F (36.4 °C)  HR:  [46-66] 48  BP: (135-220)/(61-86) 156/70  Resp:  [18-22] 22  SpO2:  [91 %-97 %] 97 %  O2 Device: Nasal cannula  Nasal Cannula O2 Flow Rate (L/min):  [2 L/min] 2 L/min    Intake & Output:  I/O         11/10 0701  11/11 0700 11/11 0701  11/12 0700    Urine  1000    Total Output  1000    Net  -1000                Weights:        There is no height or weight on file to calculate BMI.  Weight (last 2 days)       None          Physical Exam  Constitutional:       General: She is not in acute distress.     Appearance: She is not ill-appearing.   HENT:      Head: Normocephalic and atraumatic.   Eyes:      General: No scleral icterus.        Right eye: No discharge.         Left eye:  "No discharge.   Cardiovascular:      Rate and Rhythm: Normal rate and regular rhythm.      Heart sounds: Murmur heard.      No friction rub. No gallop.   Pulmonary:      Effort: Pulmonary effort is normal.      Breath sounds: Normal breath sounds. No wheezing, rhonchi or rales.   Abdominal:      General: Abdomen is flat. There is no distension.      Palpations: Abdomen is soft.      Tenderness: There is no abdominal tenderness. There is no guarding.   Musculoskeletal:      Right lower leg: Edema present.      Left lower leg: Edema present.      Comments: 1 plus pitting edema to the distal shin.   Neurological:      Mental Status: She is alert.           Lab Results: I have reviewed the following results:  Recent Labs     11/11/24  1807 11/11/24 2047   WBC 3.51*  --    HGB 10.9*  --    HCT 34.1*  --    *  --    SODIUM 141  --    K 3.6  --      --    CO2 26  --    BUN 23  --    CREATININE 0.87  --    GLUC 90  --    AST 13  --    ALT 9  --    ALB 4.1  --    TBILI 0.63  --    ALKPHOS 61  --    HSTNI0 6  --    HSTNI2  --  7   *  --      Micro:  No results found for: \"BLOODCX\", \"URINECX\", \"WOUNDCULT\", \"SPUTUMCULTUR\"    Imaging Results Review: I personally reviewed the following image studies in PACS and associated radiology reports: chest xray. My interpretation of the radiology images/reports is: Normal chest x-ray.  Other Study Results Review: EKG was reviewed.     Currently Ordered Meds:   Current Facility-Administered Medications:     albuterol (PROVENTIL HFA,VENTOLIN HFA) inhaler 2 puff, Q4H PRN    aspirin (ECOTRIN LOW STRENGTH) EC tablet 81 mg, Daily    atenolol (TENORMIN) tablet 25 mg, Daily    atorvastatin (LIPITOR) tablet 20 mg, Daily With Dinner    enoxaparin (LOVENOX) subcutaneous injection 40 mg, Daily    insulin lispro (HumALOG/ADMELOG) 100 units/mL subcutaneous injection 2-12 Units, TID AC **AND** Fingerstick Glucose (POCT), TID AC    levothyroxine tablet 150 mcg, Early Morning    " "losartan (COZAAR) tablet 100 mg, Daily    verapamil (CALAN-SR) CR tablet 180 mg, BID  VTE Pharmacologic Prophylaxis: Enoxaparin (Lovenox)  VTE Mechanical Prophylaxis: sequential compression device    Administrative Statements   I have spent a total time of 60 minutes in caring for this patient on the day of the visit/encounter including Diagnostic results, Prognosis, Counseling / Coordination of care, Documenting in the medical record, Reviewing / ordering tests, medicine, procedures  , and Obtaining or reviewing history  .  Portions of the record may have been created with voice recognition software.  Occasional wrong word or \"sound a like\" substitutions may have occurred due to the inherent limitations of voice recognition software.  Read the chart carefully and recognize, using context, where substitutions have occurred.  ==  Vadim Carolina MD  [facility]  Internal Medicine Residency PGY-1  "

## 2024-11-12 NOTE — PLAN OF CARE
Problem: SAFETY ADULT  Goal: Patient will remain free of falls  Description: INTERVENTIONS:  - Educate patient/family on patient safety including physical limitations  - Instruct patient to call for assistance with activity   - Consult OT/PT to assist with strengthening/mobility   - Keep Call bell within reach  - Keep bed low and locked with side rails adjusted as appropriate  - Keep care items and personal belongings within reach  - Initiate and maintain comfort rounds  - Make Fall Risk Sign visible to staff  - Apply yellow socks and bracelet for high fall risk patients  - Consider moving patient to room near nurses station  Outcome: Progressing  Goal: Maintain or return to baseline ADL function  Description: INTERVENTIONS:  -  Assess patient's ability to carry out ADLs; assess patient's baseline for ADL function and identify physical deficits which impact ability to perform ADLs (bathing, care of mouth/teeth, toileting, grooming, dressing, etc.)  - Assess/evaluate cause of self-care deficits   - Assess range of motion  - Assess patient's mobility; develop plan if impaired  - Assess patient's need for assistive devices and provide as appropriate  - Encourage maximum independence but intervene and supervise when necessary  - Involve family in performance of ADLs  - Assess for home care needs following discharge   - Consider OT consult to assist with ADL evaluation and planning for discharge  - Provide patient education as appropriate  Outcome: Progressing  Goal: Maintains/Returns to pre admission functional level  Description: INTERVENTIONS:  - Perform AM-PAC 6 Click Basic Mobility/ Daily Activity assessment daily.  - Set and communicate daily mobility goal to care team and patient/family/caregiver.   - Collaborate with rehabilitation services on mobility goals if consulted  - Perform Range of Motion 4 times a day.  - Reposition patient every 2 hours.  - Dangle patient 4 times a day  - Stand patient 3 times a  day  - Ambulate patient 3 times a day  - Out of bed to chair 3 times a day   - Out of bed for meals 3 times a day  - Out of bed for toileting  - Record patient progress and toleration of activity level   Outcome: Progressing     Problem: DISCHARGE PLANNING  Goal: Discharge to home or other facility with appropriate resources  Description: INTERVENTIONS:  - Identify barriers to discharge w/patient and caregiver  - Arrange for needed discharge resources and transportation as appropriate  - Identify discharge learning needs (meds, wound care, etc.)  - Arrange for interpretive services to assist at discharge as needed  - Refer to Case Management Department for coordinating discharge planning if the patient needs post-hospital services based on physician/advanced practitioner order or complex needs related to functional status, cognitive ability, or social support system  Outcome: Progressing     Problem: Knowledge Deficit  Goal: Patient/family/caregiver demonstrates understanding of disease process, treatment plan, medications, and discharge instructions  Description: Complete learning assessment and assess knowledge base.  Interventions:  - Provide teaching at level of understanding  - Provide teaching via preferred learning methods  Outcome: Progressing     Problem: CARDIOVASCULAR - ADULT  Goal: Maintains optimal cardiac output and hemodynamic stability  Description: INTERVENTIONS:  - Monitor I/O, vital signs and rhythm  - Monitor for S/S and trends of decreased cardiac output  - Administer and titrate ordered vasoactive medications to optimize hemodynamic stability  - Assess quality of pulses, skin color and temperature  - Assess for signs of decreased coronary artery perfusion  - Instruct patient to report change in severity of symptoms  Outcome: Progressing  Goal: Absence of cardiac dysrhythmias or at baseline rhythm  Description: INTERVENTIONS:  - Continuous cardiac monitoring, vital signs, obtain 12 lead EKG if  ordered  - Administer antiarrhythmic and heart rate control medications as ordered  - Monitor electrolytes and administer replacement therapy as ordered  Outcome: Progressing      Private car

## 2024-11-12 NOTE — PROGRESS NOTES
Progress Note - Internal Medicine   Name: Nadeem Ashton 79 y.o. female I MRN: [id number]  Unit/Bed#: -01 I Date of Admission: 11/11/2024   Date of Service: 11/12/2024 I Hospital Day: 1     Assessment & Plan  Hypertensive urgency  Patient with history of hypertension on multiple antihypertensive medications in the past including amlodipine, HCTZ, losartan, atenolol, verapamil.  Home regimen includes losartan 100 mg, verapamil 180 mg twice daily, atenolol 25 mg, which was ultimately decided upon given a new diagnosis of HCM.  Review of medical documentation shows the patient has had difficulty controlling blood pressure following shift away from HCTZ and amlodipine especially in the evening. Presents without symptoms of hypertensive emergency, but with systolic over 200. Status post 1 dose of Lasix 10 mg and self-medication with verapamil, patient blood pressure has dropped to 140s over 60s.    Plan:  -Continue home antihypertensive medications  -Continue monitoring vitals    Hypertrophic cardiomyopathy (HCC)  For diagnosis of HCM in August of this year following stress TTE that showed evidence of severe asymmetric hypertrophy of the septal wall, EF 70%, grade 1 diastolic dysfunction, LVOT obstruction, and moderate AV stenosis.  The patient underwent further cardiac workup with ENRICO on 10/24 in attempt to better delineate the extent of her valvular aortic stenosis versus HCM and for further assessment of the structure of her AV.  This showed evidence of severely increased wall thickness of the left ventricle, severe stenosis of the AV.  Additionally she also underwent left heart catheterization that showed no evidence of significant obstructive CAD.  Cardiology plans for the patient to receive ablation in December for HCM.    She presented with worsening shortness of breath, orthopnea, dyspnea on exertion, bilateral lower extremity swelling in the setting of pre-existing HCM and severe AS, likely secondary to  mild congestive heart failure in the setting of a preserved ejection fraction.  The patient reports improvement in swelling of bilateral lower extremities following Lasix.  Physical exam was significant for 1+ pitting edema of the bilateral extremities however was negative for rales suggestive of mild exacerbation.  Additionally chest x-ray similar to prior which showed evidence of mild pulmonary vascular congestion with trace pleural effusions, also suggesting a mild heart failure exacerbation.    Plan:  -Continue antihypertensive medication  -Continue telemetry  -Fluid restriction diet  -Daily standing weights  -Monitor I's and O's  -PT/OT eval  -Per cardiology consult, Lasix 20 mg IV today was started and repeat echo ordered to evaluate LVEF with discussion of moving up septal ablation to a sooner date. Can consider 10mg lasix daily but will re-evaluate tomorrow.    Congestive heart failure (CHF) (HCC)  Wt Readings from Last 3 Encounters:   11/12/24 89.1 kg (196 lb 6.9 oz)   10/08/24 89.8 kg (198 lb)   10/03/24 92.1 kg (203 lb)     Likely acute congestive heart failure with preserved fraction secondary to HCM.  Plan as noted above.    Stage 3a chronic kidney disease (HCC)  Lab Results   Component Value Date    EGFR 59 11/12/2024    EGFR 63 11/11/2024    EGFR 69 10/09/2024    CREATININE 0.92 11/12/2024    CREATININE 0.87 11/11/2024    CREATININE 0.81 10/09/2024     Patient has baseline creatinine around 0.8-0.9, with GFR suggests CKD2-3A.  Continue to monitor creatinine function and patient with BMPs.    Restrictive lung disease  Patient is previously followed with pulmonology due to restrictive lung disease, possibly due to remote history of sarcoidosis.  Upon review of medical documentation it appears that she has not seen a pulmonologist in quite some time, and was looking to establish new pulmonologist with regards to her asthma.  Reportedly in 2020 the patient had very mild restriction based off her PFTs.   Continue home albuterol as needed.    Mild persistent asthma without complication  Patient has history of mild persistent asthma, with home albuterol as needed.  Continue albuterol inpatient.    Dyslipidemia  Continue home atorvastatin 20 mg.    Essential hypertension  Plan for HTN as noted above under hypertensive urgency.    Disposition: Pending stabilization    Team: SOD TEAM C    Subjective   Patient seen and examined. No acute events overnight. Patient denies chest pain, headache, nausea or vomiting at this time. Shortness of breath has improved since last night. Patient states her bilateral leg swelling has improved as well.    Objective :  Temp:  [97.5 °F (36.4 °C)-97.7 °F (36.5 °C)] 97.7 °F (36.5 °C)  HR:  [43-66] 43  BP: (125-220)/(57-86) 125/57  Resp:  [18-22] 18  SpO2:  [91 %-98 %] 93 %  O2 Device: None (Room air)  Nasal Cannula O2 Flow Rate (L/min):  [2 L/min] 2 L/min    I/O         11/10 0701  11/11 0700 11/11 0701  11/12 0700 11/12 0701  11/13 0700    Urine (mL/kg/hr)  1300     Total Output  1300     Net  -1300                  Weights:   IBW (Ideal Body Weight): 50.1 kg    Body mass index is 35.93 kg/m².  Weight (last 2 days)       Date/Time Weight    11/12/24 08:52:17 89.1 (196.43)            Physical Exam  Vitals and nursing note reviewed.   Constitutional:       General: She is not in acute distress.     Appearance: She is well-developed.   HENT:      Head: Normocephalic and atraumatic.   Eyes:      Conjunctiva/sclera: Conjunctivae normal.   Cardiovascular:      Rate and Rhythm: Normal rate and regular rhythm.      Heart sounds: Murmur heard.      Comments: Significant aortic stenosis murmur with radiation to the carotid arteries on ascultation. Increased JVD noted.    Pulmonary:      Effort: Pulmonary effort is normal. No respiratory distress.      Breath sounds: Normal breath sounds.   Abdominal:      Palpations: Abdomen is soft.      Tenderness: There is no abdominal tenderness.    Musculoskeletal:      Cervical back: Neck supple.      Right lower leg: Edema present.      Left lower leg: Edema present.      Comments: +1 pitting edema B/L   Skin:     General: Skin is warm and dry.      Capillary Refill: Capillary refill takes less than 2 seconds.   Neurological:      Mental Status: She is alert.   Psychiatric:         Mood and Affect: Mood normal.           Lab Results: I have reviewed the following results:  Recent Labs     11/11/24  1807 11/11/24  2047 11/12/24  0440   WBC 3.51*  --  3.46*   HGB 10.9*  --  10.8*   HCT 34.1*  --  34.3*   *  --  98*   SODIUM 141  --  140   K 3.6  --  3.5     --  103   CO2 26  --  27   BUN 23  --  22   CREATININE 0.87  --  0.92   GLUC 90  --  111   AST 13  --   --    ALT 9  --   --    ALB 4.1  --   --    TBILI 0.63  --   --    ALKPHOS 61  --   --    HSTNI0 6  --   --    HSTNI2  --  7  --    *  --   --              Currently Ordered Meds:   Current Facility-Administered Medications:     albuterol (PROVENTIL HFA,VENTOLIN HFA) inhaler 2 puff, Q4H PRN    aspirin (ECOTRIN LOW STRENGTH) EC tablet 81 mg, Daily    atenolol (TENORMIN) tablet 25 mg, Daily    atorvastatin (LIPITOR) tablet 20 mg, Daily With Dinner    enoxaparin (LOVENOX) subcutaneous injection 40 mg, Daily    insulin lispro (HumALOG/ADMELOG) 100 units/mL subcutaneous injection 2-12 Units, TID AC **AND** Fingerstick Glucose (POCT), TID AC    levothyroxine tablet 150 mcg, Early Morning    losartan (COZAAR) tablet 100 mg, Daily    verapamil (CALAN-SR) CR tablet 180 mg, BID  VTE Pharmacologic Prophylaxis: Enoxaparin (Lovenox)  VTE Mechanical Prophylaxis: sequential compression device    Administrative Statements     Portions of the record may have been created with voice recognition software.

## 2024-11-12 NOTE — ASSESSMENT & PLAN NOTE
Patient has history of mild persistent asthma, with home albuterol as needed.  Continue albuterol inpatient.

## 2024-11-12 NOTE — CONSULTS
Consultation - Cardiology  Nadeem Ashton 79 y.o. female MRN: [id number]  Unit/Bed#: -01 Encounter: [id number]      Inpatient consult to Cardiology  Consult performed by: Osvaldo Brown MD  Consult ordered by: Vadim Carolina MD          History of Present Illness   Physician Requesting Consult: Tyrell Orellana MD  Reason for Consult / Principal Problem: CHF    Assessment & Plan   Assessment/Plan:  Nadeem Ashton is a 79 y.o. year old female with PMH of hypertrophic cardiomyopathy, hypertension, CAD status post stent to LAD, FOUZIA on CPAP, hyperlipidemia, diabetes, history of sarcoidosis who presents with 2 weeks of leg swelling, shortness of breath, orthopnea consistent with acute CHF.    #acute heart failure  #HOCM  Patient presenting with 2 weeks of symptoms and found to have clinical heart failure (volume overload, orthopnea, elevated BNP, elevated JVP).  She has known HOCM with gradient of 53 mmHg at rest and 67 mmHg with Valsalva- she has been referred to Dr. Jerry for consideration of etoh septal ablation and had a [facility] last month with septal perforators that seem suitable for the procedure.  HOCM and hypertension are likely the main  of her congestive heart failure.  Her hypertension has been difficult to control given her HOCM and additional diuretic and afterload reduction has been limited.     Volume: she is clinically volume overloaded (leg swelling, elevated JVP). Will diurese carefully as we do not want to cause hypovolemia which can worsen her outflow tract obstrction.   Lasix 20mg IV x1 today. Goal net neg 1- 1.5 L.     -repeat echo to eval LVEF and outflow tract obstructive gradient.   -she is scheduled to see Dr. Jerry for etoh septal ablation- will discuss timing of this procedure.     #Hypertension  Very hypertensive on arrival SBP 200s. BP difficult to control given HOCM.   Outpatient regimen: atenolol 25mg daily, losartan 100mg, verapamil 180mg BID    Continue with  outpatient regimen and will diuresis with IV lasix as above.  Avoid further afterload reduction or volume depletion at this time.     #CAD s/p stent to LAD: PCI to LAD in 2008. No recurrent angina.   #HLD: continue home atorvastatin 20mg.   #FOUZIA on CPAP  #hx of sarcoidosis: diagnosed with sarcoidosis >30 years ago.   #DM2: A1c was 12 in march 2024, repeat 6.5%.  on metformin.   #mild restrictive lung disease: on PFTS, follows with pulm.     HPI: Nadeem Ashton is a 79 y.o. year old female with PMH of hypertrophic cardiomyopathy, hypertension, CAD status post stent to LAD, FOUZIA on CPAP, hyperlipidemia, history of sarcoidosis who presents with 2 weeks of leg swelling, shortness of breath, orthopnea consistent with acute CHF.    The patient states she has baseline dyspnea which has been stable for years now.  Over the past 2 weeks she has had worsening leg swelling, orthopnea and a nonproductive cough.  She went to her PCP who examined her as having clinical CHF and sent her to the emergency department.  On arrival to the ED, blood pressure 217/81, heart rate 50s, satting 93% on room air.    EKG showed sinus bradycardia without ischemic changes  Chest x-ray showed pulmonary vascular congestion, trace pleural effusions and cardiomegaly    Creatinine 0.9, troponin 7, .    Patient was restarted on her home antihypertensives and given Lasix IV 10 mg.    Ins and outs since yesterday: No ins recorded. 1.3 L of urine reported.    Social history:   She lives with her .  She is fairly active at baseline and able to complete all ADLs.  She watches her 2-year-old great grandson 2 times per week.    Historical Information   Past Medical History:   Diagnosis Date    Abnormal ultrasound of breast     CAD (coronary artery disease)     Cancer (HCC)     thyroid    Cataract     H/O heart artery stent     History of sarcoidosis     Hyperlipidemia     Hypertension     Sleep apnea      Past Surgical History:   Procedure  Laterality Date    APPENDECTOMY      CARDIAC CATHETERIZATION Left 10/8/2024    Procedure: Cardiac Left Heart Cath;  Surgeon: Chasity Gonzalez DO;  Location: [facility];  Service: Cardiology    CARDIAC CATHETERIZATION  10/8/2024    Procedure: Cardiac catheterization;  Surgeon: Chasity Gonzalez DO;  Location: [facility];  Service: Cardiology    CHOLECYSTECTOMY      CORONARY ANGIOPLASTY WITH STENT PLACEMENT      HYSTERECTOMY      age 43    KNEE SURGERY Bilateral     OOPHORECTOMY Bilateral     age 43     Social History     Substance and Sexual Activity   Alcohol Use Not Currently     Social History     Substance and Sexual Activity   Drug Use Never     Social History     Tobacco Use   Smoking Status Never    Passive exposure: Never   Smokeless Tobacco Never     Family History:   Mother:  in her 80s. She had heart disease.  Father:  in age late 70s from thyroid cancer.   Sister:  at age 80 from cardiac arrest. Had cardiac surgery at age 40 with some postoperative complications. Pt does not know if she had HCM.   Brother: Age 55 Has lymphoma.   Children: Son (Samuel) is 55 and has HCM diagnosed during high-school after fainting spell. Daughter (Shruthi Collins) is a retired RN and has Afib (follows with Dr. Meza).   There are 6 grandchildren and 1 great grandson.     Meds/Allergies   Hospital Medications:   Current Facility-Administered Medications:     albuterol (PROVENTIL HFA,VENTOLIN HFA) inhaler 2 puff, Q4H PRN    aspirin (ECOTRIN LOW STRENGTH) EC tablet 81 mg, Daily    atenolol (TENORMIN) tablet 25 mg, Daily    atorvastatin (LIPITOR) tablet 20 mg, Daily With Dinner    enoxaparin (LOVENOX) subcutaneous injection 40 mg, Daily    insulin lispro (HumALOG/ADMELOG) 100 units/mL subcutaneous injection 2-12 Units, TID AC **AND** Fingerstick Glucose (POCT), TID AC    levothyroxine tablet 150 mcg, Early Morning    losartan (COZAAR) tablet 100 mg, Daily    verapamil (CALAN-SR) CR tablet 180 mg,  BID  Home Medications:   Medications Prior to Admission:     albuterol (PROVENTIL HFA,VENTOLIN HFA) 90 mcg/act inhaler    aspirin (ECOTRIN LOW STRENGTH) 81 mg EC tablet    atenolol (TENORMIN) 25 mg tablet    atorvastatin (LIPITOR) 20 mg tablet    fluticasone (FLONASE) 50 mcg/act nasal spray    losartan (COZAAR) 100 MG tablet    Synthroid 150 MCG tablet    verapamil (CALAN-SR) 180 mg CR tablet    metFORMIN (GLUCOPHAGE) 500 mg tablet    No Known Allergies    Objective   Vitals: Blood pressure 125/57, pulse (!) 43, temperature 97.7 °F (36.5 °C), resp. rate 18, SpO2 93%.  Orthostatic Blood Pressures      Flowsheet Row Most Recent Value   Blood Pressure 125/57 filed at 11/12/2024 1156   Patient Position - Orthostatic VS Sitting filed at 11/12/2024 0500              Intake/Output Summary (Last 24 hours) at 11/12/2024 1401  Last data filed at 11/12/2024 0636  Gross per 24 hour   Intake --   Output 1300 ml   Net -1300 ml       Invasive Devices       Peripheral Intravenous Line  Duration             Peripheral IV 11/11/24 Left Antecubital <1 day                    Review of Systems:  ROS  ROS as noted above, otherwise 12 point review of systems was performed and is negative.     Physical Exam:   Physical Exam  Constitutional:       Appearance: Normal appearance.   HENT:      Head: Normocephalic and atraumatic.   Neck:      Vascular: JVP 14 cm  Cardiovascular:      Rate and Rhythm: Bradycardic, 4/6 systolic murmur heard best at right upper sternal border and increase with Valsalva.  Pulmonary:      Effort: CTA-b  Abdominal:      General: Abdomen is flat. Bowel sounds are normal.      Palpations: Abdomen is soft.   Musculoskeletal:   2+ lower extremity edema to calves.    Skin:     General: Skin is warm.   Neurological:      Mental Status: She is alert and oriented to person, place, and time.   Psychiatric:         Behavior: Behavior normal.       Lab Results: I have personally reviewed pertinent lab results.    Results from  last 7 days   Lab Units 11/12/24  0440 11/11/24  1807   WBC Thousand/uL 3.46* 3.51*   HEMOGLOBIN g/dL 10.8* 10.9*   HEMATOCRIT % 34.3* 34.1*   PLATELETS Thousands/uL 98* 102*     Results from last 7 days   Lab Units 11/12/24  0440 11/11/24  1807   POTASSIUM mmol/L 3.5 3.6   CHLORIDE mmol/L 103 108   CO2 mmol/L 27 26   BUN mg/dL 22 23   CREATININE mg/dL 0.92 0.87   CALCIUM mg/dL 8.9 9.1

## 2024-11-12 NOTE — UTILIZATION REVIEW
Initial Clinical Review    Admission: Date/Time/Statement:   Admission Orders (From admission, onward)       Ordered        11/11/24 2141  INPATIENT ADMISSION  Once                          Orders Placed This Encounter   Procedures    INPATIENT ADMISSION     Standing Status:   Standing     Number of Occurrences:   1     Level of Care:   Med Surg [16]     Estimated length of stay:   More than 2 Midnights     Certification:   I certify that inpatient services are medically necessary for this patient for a duration of greater than two midnights. See H&P and MD Progress Notes for additional information about the patient's course of treatment.     ED Arrival Information       Expected   -    Arrival   11/11/2024 17:15    Acuity   Emergent              Means of arrival   Walk-In    Escorted by   Family Member    Service   [facility]    Admission type   Emergency              Arrival complaint   Congestive Failure             Chief Complaint   Patient presents with    Hypertension     Sent by pcp to be admitted for HTN, getting an ablation Dec 11 and needs to get BP under control.        Initial Presentation: 79 y.o. female to ED presents for worsening shortness of breath in setting of elevated BP. Pt has reportedly been having  orthopnea, dyspnea on exertion, and leg swelling for 1 to 2 weeks. Seen by PCP, as a result of the symptoms and in the office had a BP of over 200 systolic and sent to ED. In ED, BP of 217/81. Physical exam for bilateral lower extremity edema. Labs significant for Hgb 10.9. . Pt  took her home verapamil 360 mg while waiting for care in the ED and was also treated with 10 mg of IV Lasix. BP improved to 130s to 140s systolic over 60s diastolic. Recently diagnosed with HCM, for which she has been symptomatic as a result and has had reduced exercise tolerance and fatigue/dyspnea at baseline.   Recent workup with ENRICO and left heart cath in early 10/2024, with left heart cath showing no  significant obstructive CAD and with ENRICO showing evidence of severely thickened left ventricle with severe asymmetric hypertrophy of the basal septal wall, EF 70%, diastolic dysfunction grade 1, left atrial dilation, and severe AV stenosis   PMH for newly diagnosed HCM, hypertension, dyslipidemia, mild persistent asthma, CAD status post stenting, restrictive lung disease, CKD 2.   Admit to Inpatient Dx; Hypertensive Urgency, Hypertrophic cardiomyopathy  S/p Iv Lasix 10 mg   Likely acute congestive heart failure with preserved fraction secondary to HCM   Plan; Cardiology consult. Continue home antihypertensive meds. Tele monitoring.  Tele monitoring. Fluid restriction diet.   On exam; Edema BL LE. +1 pitting edema to the distal shin    Date: 11/12   Day 2:   Heart Failure cons; Acute Heart Failure. HOCM. Hypertension  Pt is clinically volume overloaded (leg swelling, elevated JVP). Will diuresis carefully as we do not want to cause hypovolemia which can worsen her outflow tract obstrction.   Lasix 20mg IV x1 today. Goal net neg 1- 1.5 L.   Very hypertensive on arrival SBP 200s. BP difficult to control given HOCM.   Repeat Echo to eval LVEF and outflow tract obstructive gradient.   EKG showed sinus bradycardia without ischemic changes   CXR showed pulmonary vascular congestion, trace pleural effusions and cardiomegaly   Pt restarted on her home antihypertensives and given Lasix Iv 10 mg.    Progress notes; Iv Lasix 20 mg today. Echo.  Can consider 10mg lasix daily but will re-evaluate tomorrow.  Continue antihypertensive medication. Continue telemetry. Fluid restriction diet.   Shortness of breath has improved since last night. Per pt, swelling has improved.  On exam; +1 pitting edema B/L. Significant aortic stenosis murmur with radiation to the carotid arteries on ascultation. Increased JVD noted.    Date: 11/13  Day 3: Has surpassed a 2nd midnight with active treatments and services.  Iv Lasix Tid per Cardiology.  Continue antihypertensive medication  Continue telemetry. -Fluid restriction diet  Pt states her shortness or breath has slightly improved but reports she is still unable to lay flat without discomfort.  Had 1 BM since yesterday.  On exam; +1 pitting edema of LE bilaterally.     Per Cardiology; Give Iv Lasix 20 mg x2. SBP 140s this am.   Still volume up on exam. Give lasix 20mg IV TID today  Keep K> 4 and Mg > 2.  Sinus bradycardia mostly in the 50s   On exam; JVP is elevated    ED Treatment-Medication Administration from 11/11/2024 1715 to 11/12/2024 0832         Date/Time Order Dose Route Action     11/11/2024 2059 furosemide (LASIX) injection 10 mg 10 mg Intravenous Given     11/12/2024 0823 atenolol (TENORMIN) tablet 25 mg 25 mg Oral Given     11/12/2024 0823 aspirin (ECOTRIN LOW STRENGTH) EC tablet 81 mg 81 mg Oral Given     11/12/2024 0532 levothyroxine tablet 150 mcg 150 mcg Oral Given     11/12/2024 0823 losartan (COZAAR) tablet 100 mg 100 mg Oral Given     11/12/2024 0823 verapamil (CALAN-SR) CR tablet 180 mg 180 mg Oral Given            Scheduled Medications:  aspirin, 81 mg, Oral, Daily  atenolol, 25 mg, Oral, Daily  atorvastatin, 20 mg, Oral, Daily With Dinner  enoxaparin, 40 mg, Subcutaneous, Daily  furosemide, 20 mg, Intravenous, TID  insulin lispro, 2-12 Units, Subcutaneous, TID AC  levothyroxine, 150 mcg, Oral, Early Morning  losartan, 100 mg, Oral, Daily  potassium chloride, 40 mEq, Oral, BID  verapamil, 180 mg, Oral, BID      Continuous IV Infusions: None     PRN Meds:  albuterol, 2 puff, Inhalation, Q4H PRN      ED Triage Vitals   Temperature Pulse Respirations Blood Pressure SpO2 Pain Score   11/11/24 1724 11/11/24 1724 11/11/24 1724 11/11/24 1724 11/11/24 1724 11/12/24 0920   97.6 °F (36.4 °C) 56 20 (!) 217/81 93 % No Pain     Weight (last 2 days)       Date/Time Weight    11/13/24 0500 88.1 (194.23)    11/12/24 15:55:17 88.9 (196)    11/12/24 1540 89.1 (196.43)    11/12/24 08:52:17 89.1  (196.43)            Vital Signs (last 3 days)       Date/Time Temp Pulse Resp BP MAP (mmHg) SpO2 Calculated FIO2 (%) - Nasal Cannula Nasal Cannula O2 Flow Rate (L/min) O2 Device Patient Position - Orthostatic VS Pain    11/13/24 09:15:12 -- 57 -- 149/62 91 91 % -- -- -- -- --    11/13/24 07:50:56 97.9 °F (36.6 °C) 50 18 147/63 91 96 % -- -- -- -- --    11/13/24 03:26:32 -- 51 19 148/65 93 91 % -- -- -- -- --    11/13/24 0100 -- -- -- -- -- 93 % -- -- -- -- --    11/12/24 2300 -- -- -- -- -- -- -- -- -- -- No Pain    11/12/24 21:31:05 98 °F (36.7 °C) 55 15 176/85 115 91 % -- -- -- -- --    11/12/24 2031 -- -- -- 156/62 -- -- -- -- -- -- --    11/12/24 15:55:17 97.8 °F (36.6 °C) 49 20 154/61 92 93 % -- -- -- -- --    11/12/24 11:56:30 97.7 °F (36.5 °C) 43 18 125/57 80 93 % -- -- -- -- --    11/12/24 1156 -- 43 -- -- -- -- -- -- -- -- --    11/12/24 0920 -- -- -- -- -- -- -- -- None (Room air) -- No Pain    11/12/24 08:52:17 97.5 °F (36.4 °C) 52 18 163/63 96 94 % -- -- -- Lying --    11/12/24 0815 -- 52 -- -- -- 94 % -- -- None (Room air) -- --    11/12/24 0500 -- 46 20 166/70 100 98 % 28 2 L/min Nasal cannula Sitting --    11/12/24 0300 -- 48 22 156/70 100 97 % 28 2 L/min Nasal cannula Sitting --    11/12/24 0200 -- 46  22 148/61 88 96 % 28 2 L/min Nasal cannula Lying --    11/12/24 0100 -- 48 22 142/63 90 96 % 28 2 L/min Nasal cannula Lying --    11/12/24 0000 -- 51 22 135/62 89 91 % -- -- None (Room air) Lying --    11/11/24 2300 -- 54 22 161/68 98 91 % -- -- None (Room air) Sitting --    11/11/24 2215 -- 56 22 177/70 100 91 % -- -- None (Room air) Lying --    11/1945 -- 66 22 217/86 123 93 % -- -- None (Room air) Lying --    11/11/24 1915 -- 60 18 220/80 -- 96 % -- -- None (Room air) Sitting --    11/11/24 1724 97.6 °F (36.4 °C) 56 20 217/81 116 93 % -- -- None (Room air) -- --            Pertinent Labs/Diagnostic Test Results:   Radiology:  XR chest 1 view portable   Final Interpretation by Pilar Sanchez  MD Zachery (11/12 5836)      Mild pulmonary venous congestion with trace pleural effusions.            Workstation performed: [id number]           Cardiology:  Echo follow up/limited w/ contrast if indicated   Final Result by Wilner Mahoney MD (11/12 4462)        Left Ventricle: Left ventricular cavity size is normal. Wall thickness    is severely increased. There is severe asymmetric hypertrophy of the basal    septal wall. The left ventricular ejection fraction is 70% by visual    estimation. Systolic function is hyperdynamic. Wall motion is normal.    Diastolic function is mildly abnormal, consistent with grade I (abnormal)    relaxation. There is dynamic outflow tract obstruction with a resting    gradient of 34.0 mmHg, which increases to a peak gradient of 47.0 mmHg    with valsalva.     Right Ventricle: Right ventricular cavity size is normal. Systolic    function is normal.     Left Atrium: The atrium is mildly dilated.     Aortic Valve: The aortic valve is trileaflet. The leaflets are severely    thickened. The leaflets are severely calcified. There is severely reduced    mobility. There is mild regurgitation. There is moderate to severe    stenosis. The aortic valve peak velocity is 3.8 m/s. The aortic valve mean    gradient is 32.0 mmHg. The aortic valve area is 1.02 cm2.     Mitral Valve: There is mild annular calcification. There is mild    regurgitation. There is no systolic anterior motion.     Pericardium: There is a small pericardial effusion posterior to the    heart. The fluid exhibits no internal echoes. There is no    echocardiographic evidence of tamponade. The evidence against tamponade    includes: no right ventricular diastolic collapse, no right atrial    inversion and no respiratory variation.           GI:  No orders to display           Results from last 7 days   Lab Units 11/13/24  0531 11/12/24  0440 11/11/24  1807   WBC Thousand/uL 3.34* 3.46* 3.51*   HEMOGLOBIN g/dL 11.5  10.8* 10.9*   HEMATOCRIT % 35.2 34.3* 34.1*   PLATELETS Thousands/uL 108* 98* 102*   TOTAL NEUT ABS Thousands/µL 1.83* 1.99 1.91         Results from last 7 days   Lab Units 11/13/24  0531 11/12/24  0440 11/11/24  1807   SODIUM mmol/L 141 140 141   POTASSIUM mmol/L 3.5 3.5 3.6   CHLORIDE mmol/L 103 103 108   CO2 mmol/L 29 27 26   ANION GAP mmol/L 9 10 7   BUN mg/dL 25 22 23   CREATININE mg/dL 1.13 0.92 0.87   EGFR ml/min/1.73sq m 46 59 63   CALCIUM mg/dL 9.3 8.9 9.1     Results from last 7 days   Lab Units 11/11/24  1807   AST U/L 13   ALT U/L 9   ALK PHOS U/L 61   TOTAL PROTEIN g/dL 7.0   ALBUMIN g/dL 4.1   TOTAL BILIRUBIN mg/dL 0.63     Results from last 7 days   Lab Units 11/13/24  0750 11/12/24  2130 11/12/24  1630 11/12/24  1153 11/12/24  0734   POC GLUCOSE mg/dl 116 129 102 99 105     Results from last 7 days   Lab Units 11/13/24  0531 11/12/24  0440 11/11/24  1807   GLUCOSE RANDOM mg/dL 119 111 90             BETA-HYDROXYBUTYRATE   Date Value Ref Range Status   03/09/2024 0.1 <0.6 mmol/L Final        Results from last 7 days   Lab Units 11/11/24  2047 11/11/24  1807   HS TNI 0HR ng/L  --  6   HS TNI 2HR ng/L 7  --    HSTNI D2 ng/L 1  --        Results from last 7 days   Lab Units 11/11/24  1807   BNP pg/mL 268*         Past Medical History:   Diagnosis Date    Abnormal ultrasound of breast     CAD (coronary artery disease)     Cancer (HCC)     thyroid    Cataract     H/O heart artery stent     History of sarcoidosis     Hyperlipidemia     Hypertension     Sleep apnea      Present on Admission:   Essential hypertension   Hypertrophic cardiomyopathy (HCC)   Dyslipidemia   Mild persistent asthma without complication   Stage 3a chronic kidney disease (HCC)   Restrictive lung disease      Admitting Diagnosis: Hypertrophic cardiomyopathy (HCC) [I42.2]  Heart failure (HCC) [I50.9]  Hypertension [I10]  Age/Sex: 79 y.o. female    Network Utilization Review Department  ATTENTION: Please call with any questions or  concerns to [phone] and carefully listen to the prompts so that you are directed to the right person. All voicemails are confidential.   For Discharge needs, contact Care Management DC Support Team at [phone] opt. 2  Send all requests for admission clinical reviews, approved or denied determinations and any other requests to dedicated fax number below belonging to the [place] where the patient is receiving treatment. List of dedicated fax numbers for the Facilities:  FACILITY NAME UR FAX NUMBER   ADMISSION DENIALS (Administrative/Medical Necessity) [phone]   DISCHARGE SUPPORT TEAM (NETWORK) [phone]   PARENT CHILD HEALTH (Maternity/NICU/Pediatrics) [phone]   [facility] [phone]   [facility] [phone]   [facility] [phone]   [facility] [phone]   [facility] [phone]   [facility] [phone]   [facility] [phone]   [facility] [phone]   [facility] [phone]   [facility] [phone]   [facility] [phone]   [facility] [phone]

## 2024-11-12 NOTE — ASSESSMENT & PLAN NOTE
For diagnosis of HCM in August of this year following stress TTE that showed evidence of severe asymmetric hypertrophy of the septal wall, EF 70%, grade 1 diastolic dysfunction, LVOT obstruction, and moderate AV stenosis.  The patient underwent further cardiac workup with ENRICO on 10/24 in attempt to better delineate the extent of her valvular aortic stenosis versus HCM and for further assessment of the structure of her AV.  This showed evidence of severely increased wall thickness of the left ventricle, severe stenosis of the AV.  Additionally she also underwent left heart catheterization that showed no evidence of significant obstructive CAD.  Cardiology plans for the patient to receive ablation in December for HCM.    She presented with worsening shortness of breath, orthopnea, dyspnea on exertion, bilateral lower extremity swelling in the setting of pre-existing HCM and severe AS, likely secondary to mild congestive heart failure in the setting of a preserved ejection fraction.  The patient reports improvement in swelling of bilateral lower extremities following Lasix.  Physical exam was significant for 1+ pitting edema of the bilateral extremities however was negative for rales suggestive of mild exacerbation.  Additionally chest x-ray similar to prior which showed evidence of mild pulmonary vascular congestion, also suggesting a mild heart failure exacerbation.    Plan:  -Cardiology consult for HCM management  -Continue antihypertensive medication  -Status post 1 dose of 10 mg Lasix, will defer further diuresis at this time given underlying HCM  -Continue telemetry  -Fluid restriction diet  -Daily standing weights  -Monitor I's and O's  -Case management consult  -PT/OT kavon

## 2024-11-12 NOTE — ASSESSMENT & PLAN NOTE
Lab Results   Component Value Date    EGFR 63 11/11/2024    EGFR 69 10/09/2024    EGFR 60 09/25/2024    CREATININE 0.87 11/11/2024    CREATININE 0.81 10/09/2024    CREATININE 0.91 09/25/2024     Patient has baseline creatinine around 0.8-0.9, with GFR suggests CKD2-3A.  Continue to monitor creatinine function and patient with BMPs.

## 2024-11-12 NOTE — ED ATTENDING ATTESTATION
11/11/2024  I, Emilia Manuel MD, saw and evaluated the patient. I have discussed the patient with the resident/non-physician practitioner and agree with the resident's/non-physician practitioner's findings, Plan of Care, and MDM as documented in the resident's/non-physician practitioner's note, except where noted. All available labs and Radiology studies were reviewed.  I was present for key portions of any procedure(s) performed by the resident/non-physician practitioner and I was immediately available to provide assistance.       At this point I agree with the current assessment done in the Emergency Department.  I have conducted an independent evaluation of this patient a history and physical is as follows:    ED Course         Critical Care Time  Procedures    78 yo female with hx of htn, hocum, dm, hld, is to get ablation in December and sent in for bp control. Pt with swelling in legs, dyspnea on exertion. Pt pcp thought she was in heart failure.  No fever, no chills, no cp, no abdominal pain, no n/v/d.  Vss, afebrile, lungs cta, rrr, abodmen soft nontender, pedal edema. Cardiac workup, bnp, lasix, will admit for heart failure workup.

## 2024-11-12 NOTE — ASSESSMENT & PLAN NOTE
Lab Results   Component Value Date    EGFR 59 11/12/2024    EGFR 63 11/11/2024    EGFR 69 10/09/2024    CREATININE 0.92 11/12/2024    CREATININE 0.87 11/11/2024    CREATININE 0.81 10/09/2024     Patient has baseline creatinine around 0.8-0.9, with GFR suggests CKD2-3A.  Continue to monitor creatinine function and patient with BMPs.

## 2024-11-12 NOTE — ASSESSMENT & PLAN NOTE
Patient is previously followed with pulmonology due to restrictive lung disease, possibly due to remote history of sarcoidosis.  Upon review of medical documentation it appears that she has not seen a pulmonologist in quite some time, and was looking to establish new pulmonologist with regards to her asthma.  Reportedly in 2020 the patient had very mild restriction based off her PFTs.  Continue home albuterol as needed.

## 2024-11-12 NOTE — CASE MANAGEMENT
Case Management Assessment    Patient name Nadeem Ashton  Location /-01 MRN [id number]  : 1945 Date 2024       Current Admission Date: 2024  Current Admission Diagnosis:Hypertensive urgency   Patient Active Problem List    Diagnosis Date Noted Date Diagnosed    Congestive heart failure (CHF) (HCC) 2024     Hypertensive urgency 2024     Hypertrophic cardiomyopathy (HCC) 10/03/2024     Postoperative hypothyroidism 10/03/2024     Fatty liver 10/03/2024     BMI 36.0-36.9,adult 10/03/2024     Sleep apnea 10/03/2024     Stage 3a chronic kidney disease (HCC) 2024     Restrictive lung disease 2020     Shortness of breath 2020     CAD (coronary artery disease) 2020     Nonrheumatic aortic valve stenosis 2020     Essential hypertension 2020     Dyslipidemia 2020     Mild persistent asthma without complication 2020       LOS (days): 1  Geometric Mean LOS (GMLOS) (days): 4.9  Days to GMLOS:4.2     OBJECTIVE:  Risk of Unplanned Readmission Score: 10.28     Current admission status: Inpatient    Preferred Pharmacy:   [facility]/pharmacy #[id number] - [place] - [address]  [address]  [place] [address]  Phone: [phone] Fax: [phone]    Primary Care Provider: Celine England MD    Primary Insurance: [facility] REP    ASSESSMENT:  Active Health Care Proxies    There are no active Health Care Proxies on file.     Advance Directives  Does patient have a Health Care POA?: No  Does patient have Advance Directives?: No  Primary Contact: pt's  Jaxson Ashton / phone# [phone]    Patient Information  Admitted from:: Home  Mental Status: Alert  Assessment information provided by:: Patient  Primary Caregiver: Self  Support Systems: Spouse/significant other, Children  County of Residence: [place]  What city do you live in[place]  Home entry access options. Select all that apply.: Stairs  Number of steps to  enter home.: 1  Do the steps have railings?: No  Type of Current Residence: [place]  Upon entering residence, is there a bedroom on the main floor (no further steps)?: Yes  Upon entering residence, is there a bathroom on the main floor (no further steps)?: Yes  Living Arrangements: Lives w/ Spouse/significant other  Is patient a ?: No    Activities of Daily Living Prior to Admission  Functional Status: Independent  Completes ADLs independently?: Yes  Ambulates independently?: Yes  Does patient use assisted devices?: No  Does patient currently own DME?: No  Does the patient have a history of Short-Term Rehab?: No  Does patient have a history of HHC?: No    Patient Information Continued  Income Source: Pension/[employer]  Does patient have prescription coverage?: Yes  Does patient receive dialysis treatments?: No  Does patient have a history of substance abuse?: No  Does patient have a history of Mental Health Diagnosis?: No    Means of Transportation  [facility] Drives Self    Social Determinants of Health (SDOH)      Flowsheet Row Most Recent Value   Housing Stability    In the last 12 months, was there a time when you were not able to pay the mortgage or rent on time? N   In the past 12 months, how many times have you moved where you were living? 0   At any time in the past 12 months, were you homeless or living in a shelter (including now)? N   Transportation Needs    In the past 12 months, has lack of transportation kept you from medical appointments or from getting medications? no   In the past 12 months, has lack of transportation kept you from meetings, work, or from getting things needed for daily living? No   Food Insecurity    Within the past 12 months, you worried that your food would run out before you got the money to buy more. Never true   Within the past 12 months, the food you bought just didn't last and you didn't have money to get more. Never true   Utilities    In the past  12 months has the electric, gas, oil, or water company threatened to shut off services in your home? No     Admission Assessment: CM reviewed d/c planning process including the following: identifying help at home, patient preference for d/c planning needs, availability of treatment team to discuss questions or concerns patient and/or family may have regarding understanding medications and recognizing signs and symptoms once discharged. CM also encouraged patient to follow up with all recommended appointments after discharge. Patient advised of importance for patient and family to participate in managing patient’s medical well being. Patient/caregiver received discharge checklist. Content reviewed. Patient/caregiver encouraged to participate in discharge plan of care prior to discharge home.

## 2024-11-12 NOTE — ASSESSMENT & PLAN NOTE
Wt Readings from Last 3 Encounters:   11/12/24 89.1 kg (196 lb 6.9 oz)   10/08/24 89.8 kg (198 lb)   10/03/24 92.1 kg (203 lb)     Likely acute congestive heart failure with preserved fraction secondary to HCM.  Plan as noted above.

## 2024-11-12 NOTE — ED PROVIDER NOTES
Time reflects when diagnosis was documented in both MDM as applicable and the Disposition within this note       Time User Action Codes Description Comment    11/11/2024  9:39 PM Smiley Carrera Add [I10] Hypertension     11/11/2024  9:39 PM Smiley Carrera Add [I50.9] Heart failure (HCC)     11/11/2024 11:16 PM Vadim Carolina Add [I42.2] Hypertrophic cardiomyopathy (HCC)           ED Disposition       ED Disposition   Admit    Condition   Stable    Date/Time   Mon Nov 11, 2024  9:39 PM    Comment   Case was discussed with Dr. Orellana and the patient's admission status was agreed to be Admission Status: observation status to the service of Dr. Orellana  .               Assessment & Plan       Medical Decision Making  Amount and/or Complexity of Data Reviewed  Labs: ordered. Decision-making details documented in ED Course.  Radiology: ordered.    Risk  Prescription drug management.  Decision regarding hospitalization.    Patient is 79 y.o. female with PMH of aortic stenosis, HOCM, CAD, htn, hld who presents to the ED for evaluation of FOX and orthopnea. See history and physical documented below.       Differential diagnosis included but not limited to HF, deconditioning, ACS, anemia, electrolyte disturbance. Plan first nurse labs reviewed including CBC, CMP, troponin, BNP, CXR and EKG. Will give lasix for symptomatic treatment.     View ED course above for further discussion on patient workup.     On review of previous records Cardiac catheterization on 10/8/24 shows no significant obstructive CAD, LAD with three small septals that may be suitable for ASA, apical vengricual mean gradient 25-28 mmHg, mid ventricular mean gradien as high as 45 mmHg with post PVC increase in gradient consistent with HOCM, negligable LVOT gradient in would significant peak to peak gradien on LV-AO pullback.     On my interpretation CXR shows pulmonary vascular congestion slightly worse compared to prior xray.    EKG shows sinus  bradycardia 50 bpm, left axis, normal intervals, normal QRS, no UMBERTO/STD. Voltage criteria for LVH.     All labs reviewed and utilized in the medical decision making process  All radiology studies independently viewed by me and interpreted by the radiologist.  I reviewed all testing with the patient.     Case discussed with SOD, admitted for inpatient tele for heart failure.       ED Course as of 11/12/24 0130   Mon Nov 11, 2024 2048 BNP(!): 268   2048 Comprehensive metabolic panel  Unremarkable    2048 hs TnI 0hr: 6   2048 Hemoglobin(!): 10.9   2124 Delta 2hr hsTnI: 1       Medications   enoxaparin (LOVENOX) subcutaneous injection 40 mg (has no administration in time range)   atenolol (TENORMIN) tablet 25 mg (has no administration in time range)   aspirin (ECOTRIN LOW STRENGTH) EC tablet 81 mg (has no administration in time range)   levothyroxine tablet 150 mcg (has no administration in time range)   albuterol (PROVENTIL HFA,VENTOLIN HFA) inhaler 2 puff (has no administration in time range)   atorvastatin (LIPITOR) tablet 20 mg (has no administration in time range)   losartan (COZAAR) tablet 100 mg (has no administration in time range)   insulin lispro (HumALOG/ADMELOG) 100 units/mL subcutaneous injection 2-12 Units (has no administration in time range)   verapamil (CALAN-SR) CR tablet 180 mg (has no administration in time range)   furosemide (LASIX) injection 10 mg (10 mg Intravenous Given 11/11/24 2059)       ED Risk Strat Scores                                               History of Present Illness       Chief Complaint   Patient presents with    Hypertension     Sent by pcp to be admitted for HTN, getting an ablation Dec 11 and needs to get BP under control.        Past Medical History:   Diagnosis Date    Abnormal ultrasound of breast     CAD (coronary artery disease)     Cancer (HCC)     thyroid    Cataract     H/O heart artery stent     History of sarcoidosis     Hyperlipidemia     Hypertension     Sleep  apnea       Past Surgical History:   Procedure Laterality Date    APPENDECTOMY      CARDIAC CATHETERIZATION Left 10/8/2024    Procedure: Cardiac Left Heart Cath;  Surgeon: Chasity Gonzalez DO;  Location: [facility];  Service: Cardiology    CARDIAC CATHETERIZATION  10/8/2024    Procedure: Cardiac catheterization;  Surgeon: Chasity Gonzalez DO;  Location: [facility];  Service: Cardiology    CHOLECYSTECTOMY      CORONARY ANGIOPLASTY WITH STENT PLACEMENT      HYSTERECTOMY      age 43    KNEE SURGERY Bilateral     OOPHORECTOMY Bilateral     age 43      Family History   Problem Relation Age of Onset    No Known Problems Mother     Thyroid cancer Father     No Known Problems Sister     No Known Problems Daughter     Cancer Maternal Grandmother         ear    No Known Problems Maternal Grandfather     No Known Problems Paternal Grandmother     No Known Problems Paternal Grandfather     No Known Problems Maternal Aunt     No Known Problems Maternal Aunt     Leukemia Paternal Aunt       Social History     Tobacco Use    Smoking status: Never     Passive exposure: Never    Smokeless tobacco: Never   Vaping Use    Vaping status: Never Used   Substance Use Topics    Alcohol use: Not Currently    Drug use: Never      E-Cigarette/Vaping    E-Cigarette Use Never User       E-Cigarette/Vaping Substances    Nicotine No     THC No     CBD No     Flavoring No     Other No     Unknown No       I have reviewed and agree with the history as documented.     HPI  Patient is 79 y.o. female with PMH of aortic stenosis, HOCM, CAD, htn, hld who presents to the ED for evaluation of FOX and orthopnea. Patient reports sent from PCP for concern for hypertension and heart failure. Patient reports chronic hypertension which has been very high over last week max 200 systolic. Also reports cough x 2 weeks and FOX and orthopnea which is worsening. Patient reports  b/l lower extremity swelling for last week which has never happened  before. Seen by PCP and sent to ED for admission for BP control and concern for heart failure. Patient denies headache, vision changes, speech changes, focal motor weakness. Denies abdominal pain, n/v.     Review of Systems   Constitutional:  Negative for chills and fever.   HENT:  Negative for ear pain and sore throat.    Respiratory:  Positive for shortness of breath. Negative for cough.    Cardiovascular:  Positive for leg swelling. Negative for chest pain and palpitations.   Gastrointestinal:  Negative for abdominal pain, diarrhea, nausea and vomiting.   Genitourinary:  Negative for dysuria, frequency and hematuria.   Musculoskeletal:  Negative for back pain and neck pain.   Skin:  Negative for rash.   Neurological:  Negative for dizziness, light-headedness and headaches.           Objective       ED Triage Vitals   Temperature Pulse Blood Pressure Respirations SpO2 Patient Position - Orthostatic VS   11/11/24 1724 11/11/24 1724 11/11/24 1724 11/11/24 1724 11/11/24 1724 11/11/24 1915   97.6 °F (36.4 °C) 56 (!) 217/81 20 93 % Sitting      Temp Source Heart Rate Source BP Location FiO2 (%) Pain Score    11/11/24 1724 11/11/24 1724 11/11/24 1915 -- --    Temporal Monitor Left arm        Vitals      Date and Time Temp Pulse SpO2 Resp BP Pain Score FACES Pain Rating User   11/12/24 0000 -- 51 91 % 22 135/62 -- -- [place]   11/11/24 2300 -- 54 91 % 22 161/68 -- -- BABS   11/11/24 2215 -- 56 91 % 22 177/70 -- -- KS   [birth year]/1945 -- 66 93 % 22 217/86 -- -- [place]   11/11/24 1915 -- 60 96 % 18 220/80 -- -- BL   11/11/24 1724 97.6 °F (36.4 °C) 56 93 % 20 217/81 -- -- CS            Physical Exam  Vitals reviewed.   Constitutional:       General: She is awake.   HENT:      Head: Normocephalic and atraumatic.      Mouth/Throat:      Mouth: Mucous membranes are moist.   Eyes:      Extraocular Movements: Extraocular movements intact.      Right eye: No nystagmus.      Left eye: No nystagmus.      Conjunctiva/sclera: Conjunctivae  normal.      Pupils: Pupils are equal, round, and reactive to light.   Cardiovascular:      Rate and Rhythm: Regular rhythm. Bradycardia present.      Pulses: Normal pulses.      Heart sounds: S1 normal and S2 normal. Heart sounds not distant. Murmur heard.      No friction rub. No gallop.   Pulmonary:      Breath sounds: No stridor. No wheezing, rhonchi or rales.      Comments: Breath sounds slightly diminished at b/l bases, no rales  Abdominal:      General: Bowel sounds are normal.      Palpations: Abdomen is soft.      Tenderness: There is no abdominal tenderness.   Musculoskeletal:      Right lower le+ Edema present.      Left lower le+ Edema present.   Skin:     General: Skin is warm and dry.      Capillary Refill: Capillary refill takes less than 2 seconds.   Neurological:      Mental Status: She is alert and oriented to person, place, and time.      GCS: GCS eye subscore is 4. GCS verbal subscore is 5. GCS motor subscore is 6.      Cranial Nerves: Cranial nerves 2-12 are intact.      Sensory: Sensation is intact.      Motor: No weakness or pronator drift.      Coordination: Coordination normal. Finger-Nose-Finger Test normal.         Results Reviewed       Procedure Component Value Units Date/Time    HS Troponin I 2hr [[id number]]  (Normal) Collected: 24    Lab Status: Final result Specimen: Blood from Arm, Left Updated: 24     hs TnI 2hr 7 ng/L      Delta 2hr hsTnI 1 ng/L     B-Type Natriuretic Peptide(BNP) [[id number]]  (Abnormal) Collected: 24    Lab Status: Final result Specimen: Blood from Arm, Right Updated: 24 184      pg/mL     HS Troponin 0hr (reflex protocol) [[id number]]  (Normal) Collected: 24    Lab Status: Final result Specimen: Blood from Arm, Right Updated: 24 1840     hs TnI 0hr 6 ng/L     Comprehensive metabolic panel [[id number]] Collected: 24    Lab Status: Final result Specimen: Blood from Arm, Right Updated:  11/11/24 1837     Sodium 141 mmol/L      Potassium 3.6 mmol/L      Chloride 108 mmol/L      CO2 26 mmol/L      ANION GAP 7 mmol/L      BUN 23 mg/dL      Creatinine 0.87 mg/dL      Glucose 90 mg/dL      Calcium 9.1 mg/dL      AST 13 U/L      ALT 9 U/L      Alkaline Phosphatase 61 U/L      Total Protein 7.0 g/dL      Albumin 4.1 g/dL      Total Bilirubin 0.63 mg/dL      eGFR 63 ml/min/1.73sq m     Narrative:      National Kidney Disease Foundation guidelines for Chronic Kidney Disease (CKD):     Stage 1 with normal or high GFR (GFR > 90 mL/min/1.73 square meters)    Stage 2 Mild CKD (GFR = 60-89 mL/min/1.73 square meters)    Stage 3A Moderate CKD (GFR = 45-59 mL/min/1.73 square meters)    Stage 3B Moderate CKD (GFR = 30-44 mL/min/1.73 square meters)    Stage 4 Severe CKD (GFR = 15-29 mL/min/1.73 square meters)    Stage 5 End Stage CKD (GFR <15 mL/min/1.73 square meters)  Note: GFR calculation is accurate only with a steady state creatinine    CBC and differential [[id number]]  (Abnormal) Collected: 11/11/24 1807    Lab Status: Final result Specimen: Blood from Arm, Right Updated: 11/11/24 1816     WBC 3.51 Thousand/uL      RBC 3.66 Million/uL      Hemoglobin 10.9 g/dL      Hematocrit 34.1 %      MCV 93 fL      MCH 29.8 pg      MCHC 32.0 g/dL      RDW 15.2 %      MPV 9.3 fL      Platelets 102 Thousands/uL      nRBC 0 /100 WBCs      Segmented % 55 %      Immature Grans % 0 %      Lymphocytes % 37 %      Monocytes % 7 %      Eosinophils Relative 1 %      Basophils Relative 0 %      Absolute Neutrophils 1.91 Thousands/µL      Absolute Immature Grans 0.01 Thousand/uL      Absolute Lymphocytes 1.31 Thousands/µL      Absolute Monocytes 0.25 Thousand/µL      Eosinophils Absolute 0.02 Thousand/µL      Basophils Absolute 0.01 Thousands/µL             XR chest 1 view portable    (Results Pending)       Procedures    ED Medication and Procedure Management   Prior to Admission Medications   Prescriptions Last Dose Informant Patient  Reported? Taking?   Synthroid 150 MCG tablet  Self Yes No   Sig: Take 150 mcg by mouth daily   albuterol (PROVENTIL HFA,VENTOLIN HFA) 90 mcg/act inhaler  Self Yes No   Sig: Inhale 2 puffs every 4 (four) hours as needed for wheezing or shortness of breath   aspirin (ECOTRIN LOW STRENGTH) 81 mg EC tablet  Self Yes No   Sig: Take 81 mg by mouth daily   atenolol (TENORMIN) 25 mg tablet  Self Yes No   Sig: Take 25 mg by mouth daily   atorvastatin (LIPITOR) 20 mg tablet  Self Yes No   Sig: Take 20 mg by mouth daily   losartan (COZAAR) 100 MG tablet  Self No No   Sig: Take 1 tablet (100 mg total) by mouth daily   Patient taking differently: Take 100 mg by mouth daily Take one half tablet twice daily   metFORMIN (GLUCOPHAGE) 500 mg tablet  Self No No   Sig: Take 1 tablet (500 mg total) by mouth 2 (two) times a day with meals   Patient taking differently: Take 500 mg by mouth 2 (two) times a day with meals Patient is taking 3 tablets daily   verapamil (CALAN-SR) 180 mg CR tablet   No No   Sig: Take 2 tablets (360 mg total) by mouth daily at bedtime      Facility-Administered Medications: None     Patient's Medications   Discharge Prescriptions    No medications on file     No discharge procedures on file.  ED SEPSIS DOCUMENTATION   Time reflects when diagnosis was documented in both MDM as applicable and the Disposition within this note       Time User Action Codes Description Comment    11/11/2024  9:39 PM Smiley Carrera [I10] Hypertension     11/11/2024  9:39 PM Smiley Carrera [I50.9] Heart failure (HCC)     11/11/2024 11:16 PM Vadim Carolina [I42.2] Hypertrophic cardiomyopathy (HCC)                  Smiley Carrera DO  11/12/24 0253

## 2024-11-12 NOTE — ASSESSMENT & PLAN NOTE
Patient with history of hypertension on multiple antihypertensive medications in the past including amlodipine, HCTZ, losartan, atenolol, verapamil.  Home regimen includes losartan 100 mg, verapamil 180 mg twice daily, atenolol 25 mg, which was ultimately decided upon given a new diagnosis of HCM.  Review of medical documentation shows the patient has had difficulty controlling blood pressure following shift away from HCTZ and amlodipine especially in the evening. Presents without symptoms of hypertensive emergency, but with systolic over 200. Status post 1 dose of Lasix 10 mg and self-medication with verapamil, patient blood pressure has dropped to 140s over 60s.    Plan:  -Continue home antihypertensive medications  -Continue monitoring vitals

## 2024-11-13 LAB
ANION GAP SERPL CALCULATED.3IONS-SCNC: 9 MMOL/L (ref 4–13)
ANION GAP SERPL CALCULATED.3IONS-SCNC: 9 MMOL/L (ref 4–13)
BASOPHILS # BLD AUTO: 0.01 THOUSANDS/ÂΜL (ref 0–0.1)
BASOPHILS NFR BLD AUTO: 0 % (ref 0–1)
BUN SERPL-MCNC: 25 MG/DL (ref 5–25)
BUN SERPL-MCNC: 34 MG/DL (ref 5–25)
CALCIUM SERPL-MCNC: 9.1 MG/DL (ref 8.4–10.2)
CALCIUM SERPL-MCNC: 9.3 MG/DL (ref 8.4–10.2)
CHLORIDE SERPL-SCNC: 102 MMOL/L (ref 96–108)
CHLORIDE SERPL-SCNC: 103 MMOL/L (ref 96–108)
CO2 SERPL-SCNC: 29 MMOL/L (ref 21–32)
CO2 SERPL-SCNC: 29 MMOL/L (ref 21–32)
CREAT SERPL-MCNC: 1.13 MG/DL (ref 0.6–1.3)
CREAT SERPL-MCNC: 1.26 MG/DL (ref 0.6–1.3)
EOSINOPHIL # BLD AUTO: 0.03 THOUSAND/ÂΜL (ref 0–0.61)
EOSINOPHIL NFR BLD AUTO: 1 % (ref 0–6)
ERYTHROCYTE [DISTWIDTH] IN BLOOD BY AUTOMATED COUNT: 15.3 % (ref 11.6–15.1)
GFR SERPL CREATININE-BSD FRML MDRD: 40 ML/MIN/1.73SQ M
GFR SERPL CREATININE-BSD FRML MDRD: 46 ML/MIN/1.73SQ M
GLUCOSE SERPL-MCNC: 110 MG/DL (ref 65–140)
GLUCOSE SERPL-MCNC: 113 MG/DL (ref 65–140)
GLUCOSE SERPL-MCNC: 116 MG/DL (ref 65–140)
GLUCOSE SERPL-MCNC: 118 MG/DL (ref 65–140)
GLUCOSE SERPL-MCNC: 119 MG/DL (ref 65–140)
HCT VFR BLD AUTO: 35.2 % (ref 34.8–46.1)
HGB BLD-MCNC: 11.5 G/DL (ref 11.5–15.4)
IMM GRANULOCYTES # BLD AUTO: 0.01 THOUSAND/UL (ref 0–0.2)
IMM GRANULOCYTES NFR BLD AUTO: 0 % (ref 0–2)
LYMPHOCYTES # BLD AUTO: 1.26 THOUSANDS/ÂΜL (ref 0.6–4.47)
LYMPHOCYTES NFR BLD AUTO: 38 % (ref 14–44)
MCH RBC QN AUTO: 30 PG (ref 26.8–34.3)
MCHC RBC AUTO-ENTMCNC: 32.7 G/DL (ref 31.4–37.4)
MCV RBC AUTO: 92 FL (ref 82–98)
MONOCYTES # BLD AUTO: 0.2 THOUSAND/ÂΜL (ref 0.17–1.22)
MONOCYTES NFR BLD AUTO: 6 % (ref 4–12)
NEUTROPHILS # BLD AUTO: 1.83 THOUSANDS/ÂΜL (ref 1.85–7.62)
NEUTS SEG NFR BLD AUTO: 55 % (ref 43–75)
NRBC BLD AUTO-RTO: 0 /100 WBCS
PLATELET # BLD AUTO: 108 THOUSANDS/UL (ref 149–390)
PMV BLD AUTO: 10.1 FL (ref 8.9–12.7)
POTASSIUM SERPL-SCNC: 3.5 MMOL/L (ref 3.5–5.3)
POTASSIUM SERPL-SCNC: 3.8 MMOL/L (ref 3.5–5.3)
RBC # BLD AUTO: 3.83 MILLION/UL (ref 3.81–5.12)
SODIUM SERPL-SCNC: 140 MMOL/L (ref 135–147)
SODIUM SERPL-SCNC: 141 MMOL/L (ref 135–147)
WBC # BLD AUTO: 3.34 THOUSAND/UL (ref 4.31–10.16)

## 2024-11-13 PROCEDURE — 97163 PT EVAL HIGH COMPLEX 45 MIN: CPT

## 2024-11-13 PROCEDURE — 85025 COMPLETE CBC W/AUTO DIFF WBC: CPT

## 2024-11-13 PROCEDURE — 80048 BASIC METABOLIC PNL TOTAL CA: CPT

## 2024-11-13 PROCEDURE — 97166 OT EVAL MOD COMPLEX 45 MIN: CPT

## 2024-11-13 PROCEDURE — 99232 SBSQ HOSP IP/OBS MODERATE 35: CPT | Performed by: INTERNAL MEDICINE

## 2024-11-13 PROCEDURE — 82948 REAGENT STRIP/BLOOD GLUCOSE: CPT

## 2024-11-13 RX ORDER — POTASSIUM CHLORIDE 1500 MG/1
40 TABLET, EXTENDED RELEASE ORAL 2 TIMES DAILY
Status: DISCONTINUED | OUTPATIENT
Start: 2024-11-13 | End: 2024-11-16

## 2024-11-13 RX ORDER — FUROSEMIDE 10 MG/ML
20 INJECTION INTRAMUSCULAR; INTRAVENOUS 3 TIMES DAILY
Status: DISCONTINUED | OUTPATIENT
Start: 2024-11-13 | End: 2024-11-14

## 2024-11-13 RX ADMIN — FUROSEMIDE 20 MG: 10 INJECTION, SOLUTION INTRAMUSCULAR; INTRAVENOUS at 09:36

## 2024-11-13 RX ADMIN — LOSARTAN POTASSIUM 100 MG: 50 TABLET, FILM COATED ORAL at 09:16

## 2024-11-13 RX ADMIN — FUROSEMIDE 20 MG: 10 INJECTION, SOLUTION INTRAMUSCULAR; INTRAVENOUS at 19:46

## 2024-11-13 RX ADMIN — ENOXAPARIN SODIUM 40 MG: 40 INJECTION SUBCUTANEOUS at 09:16

## 2024-11-13 RX ADMIN — POTASSIUM CHLORIDE 40 MEQ: 1500 TABLET, EXTENDED RELEASE ORAL at 17:36

## 2024-11-13 RX ADMIN — ALBUTEROL SULFATE 2 PUFF: 90 AEROSOL, METERED RESPIRATORY (INHALATION) at 01:45

## 2024-11-13 RX ADMIN — POTASSIUM CHLORIDE 40 MEQ: 1500 TABLET, EXTENDED RELEASE ORAL at 09:36

## 2024-11-13 RX ADMIN — VERAPAMIL HYDROCHLORIDE 180 MG: 180 TABLET ORAL at 09:18

## 2024-11-13 RX ADMIN — ATORVASTATIN CALCIUM 20 MG: 20 TABLET, FILM COATED ORAL at 17:36

## 2024-11-13 RX ADMIN — VERAPAMIL HYDROCHLORIDE 180 MG: 180 TABLET ORAL at 22:06

## 2024-11-13 RX ADMIN — LEVOTHYROXINE SODIUM 150 MCG: 75 TABLET ORAL at 05:22

## 2024-11-13 RX ADMIN — ATENOLOL 25 MG: 25 TABLET ORAL at 09:16

## 2024-11-13 RX ADMIN — ASPIRIN 81 MG: 81 TABLET, COATED ORAL at 09:16

## 2024-11-13 NOTE — PLAN OF CARE
Problem: SAFETY ADULT  Goal: Patient will remain free of falls  Description: INTERVENTIONS:  - Educate patient/family on patient safety including physical limitations  - Instruct patient to call for assistance with activity   - Consult OT/PT to assist with strengthening/mobility   - Keep Call bell within reach  - Keep bed low and locked with side rails adjusted as appropriate  - Keep care items and personal belongings within reach  - Initiate and maintain comfort rounds  - Make Fall Risk Sign visible to staff  - Offer Toileting every 2 Hours, in advance of need  - Initiate/Maintain no alarm  - Obtain necessary fall risk management equipment: no  - Apply yellow socks and bracelet for high fall risk patients  - Consider moving patient to room near nurses station  11/12/2024 2351 by Baldomero Robles, RN  Outcome: Progressing  11/12/2024 2351 by Baldomero Robles, RN  Outcome: Progressing  Goal: Maintain or return to baseline ADL function  Description: INTERVENTIONS:  -  Assess patient's ability to carry out ADLs; assess patient's baseline for ADL function and identify physical deficits which impact ability to perform ADLs (bathing, care of mouth/teeth, toileting, grooming, dressing, etc.)  - Assess/evaluate cause of self-care deficits   - Assess range of motion  - Assess patient's mobility; develop plan if impaired  - Assess patient's need for assistive devices and provide as appropriate  - Encourage maximum independence but intervene and supervise when necessary  - Involve family in performance of ADLs  - Assess for home care needs following discharge   - Consider OT consult to assist with ADL evaluation and planning for discharge  - Provide patient education as appropriate  11/12/2024 2351 by Baldomero Robles RN  Outcome: Progressing  11/12/2024 2351 by Baldomero Robles, RN  Outcome: Progressing  Goal: Maintains/Returns to pre admission functional level  Description: INTERVENTIONS:  - Perform AM-PAC 6 Click Basic Mobility/  Daily Activity assessment daily.  - Set and communicate daily mobility goal to care team and patient/family/caregiver.   - Collaborate with rehabilitation services on mobility goals if consulted  - Perform Range of Motion 2 times a day.  - Reposition patient every 2 hours.  - Dangle patient 2 times a day  - Stand patient 2 times a day  - Ambulate patient 2 times a day  - Out of bed to chair 2 times a day   - Out of bed for meals 2 times a day  - Out of bed for toileting  - Record patient progress and toleration of activity level   11/12/2024 2351 by Baldomero Robles RN  Outcome: Progressing  11/12/2024 2351 by Baldomero Robles RN  Outcome: Progressing     Problem: DISCHARGE PLANNING  Goal: Discharge to home or other facility with appropriate resources  Description: INTERVENTIONS:  - Identify barriers to discharge w/patient and caregiver  - Arrange for needed discharge resources and transportation as appropriate  - Identify discharge learning needs (meds, wound care, etc.)  - Arrange for interpretive services to assist at discharge as needed  - Refer to Case Management Department for coordinating discharge planning if the patient needs post-hospital services based on physician/advanced practitioner order or complex needs related to functional status, cognitive ability, or social support system  11/12/2024 2351 by Baldomero Robles RN  Outcome: Progressing  11/12/2024 2351 by Baldomero Robles RN  Outcome: Progressing     Problem: Knowledge Deficit  Goal: Patient/family/caregiver demonstrates understanding of disease process, treatment plan, medications, and discharge instructions  Description: Complete learning assessment and assess knowledge base.  Interventions:  - Provide teaching at level of understanding  - Provide teaching via preferred learning methods  11/12/2024 2351 by Baldomero Robles RN  Outcome: Progressing  11/12/2024 2351 by Baldomero Robles RN  Outcome: Progressing     Problem: CARDIOVASCULAR - ADULT  Goal:  Maintains optimal cardiac output and hemodynamic stability  Description: INTERVENTIONS:  - Monitor I/O, vital signs and rhythm  - Monitor for S/S and trends of decreased cardiac output  - Administer and titrate ordered vasoactive medications to optimize hemodynamic stability  - Assess quality of pulses, skin color and temperature  - Assess for signs of decreased coronary artery perfusion  - Instruct patient to report change in severity of symptoms  11/12/2024 2351 by Baldomero Robles RN  Outcome: Progressing  11/12/2024 2351 by Baldomero Robles RN  Outcome: Progressing  Goal: Absence of cardiac dysrhythmias or at baseline rhythm  Description: INTERVENTIONS:  - Continuous cardiac monitoring, vital signs, obtain 12 lead EKG if ordered  - Administer antiarrhythmic and heart rate control medications as ordered  - Monitor electrolytes and administer replacement therapy as ordered  11/12/2024 2351 by Baldomero Robles RN  Outcome: Progressing  11/12/2024 2351 by Baldomero Robles RN  Outcome: Progressing

## 2024-11-13 NOTE — PHYSICAL THERAPY NOTE
Physical Therapy Evaluation    Patient's Name: Nadeem Ashton    Admitting Diagnosis  Hypertrophic cardiomyopathy (HCC) [I42.2]  Heart failure (HCC) [I50.9]  Hypertension [I10]    Problem List  Patient Active Problem List   Diagnosis    CAD (coronary artery disease)    Nonrheumatic aortic valve stenosis    Essential hypertension    Dyslipidemia    Mild persistent asthma without complication    Shortness of breath    Restrictive lung disease    Stage 3a chronic kidney disease (HCC)    Hypertrophic cardiomyopathy (HCC)    Postoperative hypothyroidism    Fatty liver    BMI 36.0-36.9,adult    Sleep apnea    Congestive heart failure (CHF) (HCC)    Hypertensive urgency       Past Medical History  Past Medical History:   Diagnosis Date    Abnormal ultrasound of breast     CAD (coronary artery disease)     Cancer (HCC)     thyroid    Cataract     H/O heart artery stent     History of sarcoidosis     Hyperlipidemia     Hypertension     Sleep apnea        Past Surgical History  Past Surgical History:   Procedure Laterality Date    APPENDECTOMY      CARDIAC CATHETERIZATION Left 10/8/2024    Procedure: Cardiac Left Heart Cath;  Surgeon: Chasity Gonzalez DO;  Location: [facility];  Service: Cardiology    CARDIAC CATHETERIZATION  10/8/2024    Procedure: Cardiac catheterization;  Surgeon: Chasity Gonzalez DO;  Location: [facility];  Service: Cardiology    CHOLECYSTECTOMY      CORONARY ANGIOPLASTY WITH STENT PLACEMENT      HYSTERECTOMY      age 43    KNEE SURGERY Bilateral     OOPHORECTOMY Bilateral     age 43          11/13/24 0845   PT Last Visit   PT Visit Date 11/13/24   Note Type   Note type Evaluation   Pain Assessment   Pain Assessment Tool 0-10   Pain Score 2   Pain Location/Orientation Location: Generalized   Patient's Stated Pain Goal No pain   Hospital Pain Intervention(s) Ambulation/increased activity   Restrictions/Precautions   Weight Bearing Precautions Per Order No   Other Precautions  Multiple lines;Telemetry;Pain;Fall Risk   Home Living   Type of Home House   Additional Comments Resides w/  in 1 story home, 1 UMBERTO.  Indep self care, ambulates w. out device   Prior Function   Level of [place] Independent with ADLs;Independent with functional mobility   Falls in the last 6 months 0   General   Family/Caregiver Present No   Cognition   Overall Cognitive Status WFL   Arousal/Participation Responsive   Orientation Level Oriented X4   Memory Unable to assess   Following Commands Follows one step commands without difficulty   Subjective   Subjective states she feels better overall.  some fatigue, and notes some continued LE edema.   RLE Assessment   RLE Assessment   (strength grossly 4/5- assessed w/ mobility)   LLE Assessment   LLE Assessment   (strength grossly 4/5- assessed w/ mobility)   Coordination   Movements are Fluid and Coordinated 1   Transfers   Sit to Stand 7  Independent   Additional items Increased time required;Verbal cues   Stand to Sit 7  Independent   Additional items Increased time required;Verbal cues   Ambulation/Elevation   Gait pattern   (slow, wide VIRGILIO, mild ataxia, short step length)   Gait Assistance 5  Supervision   Additional items Assist x 1   Assistive Device None   Distance 250' w/ standing rest   Balance   Static Sitting Normal   Dynamic Sitting Good   Static Standing Fair +   Dynamic Standing Fair +   Ambulatory Fair   Endurance Deficit   Endurance Deficit Yes   Endurance Deficit Description mild SOB   Activity Tolerance   Activity Tolerance Patient limited by pain;Patient limited by fatigue;Patient tolerated treatment well   Nurse Made Aware yes   Assessment   Prognosis Good   Assessment Pt seen for physical therapy evaluation, portion of which was performed as a co-evaluation w/ OT due to concerns re: medical stability.  PT portion of evaluation focused on mobility assessment where OT portion focused more on ADLs, self care.  Pt is a 78 y/o female w/  history/comorbidities og recent diagnosis of hypertrophic cardiomyopathy, HTN, dyslipidemia, asthma, CAD, RLD, CKD who is now admitted w/ FOX, LE edema x 1-2 wks.    dx include hypertensive urgency, hypertrophic CM, CHF.  Due to acute medical issues, pain, fall risk, note unstable clinical picture.  PT consulted to assess mobility, d/c needs.  At present time, despite acute medical issues, note pt to be functioning close to or at mobility baseline.  No continued skilled acute care PT needs identified.  will sign off.  Pt may ambulate ad jennifer vs w/ MS5 nursing S while here, and may d/c home when stable w/ no post acute care therapy needs.   Goals   Patient Goals to go home   PT Treatment Day 0   Plan   PT Frequency   (d/c PT)   Discharge Recommendation   Rehab Resource Intensity Level, PT No post-acute rehabilitation needs   AM-PAC Basic Mobility Inpatient   Turning in Flat Bed Without Bedrails 4   Lying on Back to Sitting on Edge of Flat Bed Without Bedrails 4   Moving Bed to Chair 3   Standing Up From Chair Using Arms 4   Walk in Room 3   Climb 3-5 Stairs With Railing 3   Basic Mobility Inpatient Raw Score 21   Basic Mobility Standardized Score 45.55   [place] Highest Level Of Mobility   JH-HLM Goal 6: Walk 10 steps or more   JH-HLM Achieved 8: Walk 250 feet ot more           Donny Harp PT, DPT, CSRS

## 2024-11-13 NOTE — PROGRESS NOTES
Progress Note - Internal Medicine   Name: Nadeem Ashton 79 y.o. female I MRN: [id number]  Unit/Bed#: -01 I Date of Admission: 11/11/2024   Date of Service: 11/13/2024 I Hospital Day: 2     Assessment & Plan  Hypertensive urgency  Patient with history of hypertension on multiple antihypertensive medications in the past including amlodipine, HCTZ, losartan, atenolol, verapamil.  Home regimen includes losartan 100 mg, verapamil 180 mg twice daily, atenolol 25 mg, which was ultimately decided upon given a new diagnosis of HCM.  Review of medical documentation shows the patient has had difficulty controlling blood pressure following shift away from HCTZ and amlodipine especially in the evening. Presents without symptoms of hypertensive emergency, but with systolic over 200. Status post 1 dose of Lasix 10 mg and self-medication with verapamil, patient blood pressure has dropped to 140s over 60s.    Plan:  -Continue home antihypertensive medications  -Continue monitoring vitals    Hypertrophic cardiomyopathy (HCC)  For diagnosis of HCM in August of this year following stress TTE that showed evidence of severe asymmetric hypertrophy of the septal wall, EF 70%, grade 1 diastolic dysfunction, LVOT obstruction, and moderate AV stenosis.  The patient underwent further cardiac workup with ENRICO on 10/24 in attempt to better delineate the extent of her valvular aortic stenosis versus HCM and for further assessment of the structure of her AV.  This showed evidence of severely increased wall thickness of the left ventricle, severe stenosis of the AV.  Additionally she also underwent left heart catheterization that showed no evidence of significant obstructive CAD.  Cardiology plans for the patient to receive ablation in December for HCM.    She presented with worsening shortness of breath, orthopnea, dyspnea on exertion, bilateral lower extremity swelling in the setting of pre-existing HCM and severe AS, likely secondary to  mild congestive heart failure in the setting of a preserved ejection fraction.  The patient reports improvement in swelling of bilateral lower extremities following Lasix.  Physical exam was significant for 1+ pitting edema of the bilateral extremities however was negative for rales suggestive of mild exacerbation.  Additionally chest x-ray similar to prior which showed evidence of mild pulmonary vascular congestion with trace pleural effusions, also suggesting a mild heart failure exacerbation.    Plan:  -Continue antihypertensive medication  -Continue telemetry  -Fluid restriction diet  -Daily standing weights, down 2 lbs since yesterday  -Cont monitor I's and O's  -Per cardiology/heart failure, Lasix 20 mg IV was increased to TID due to continued volume overload on exam. Inpatient vs outpatient alcohol septal ablation is being considered at this time.  Congestive heart failure (CHF) ([facility])  Wt Readings from Last 3 Encounters:   11/13/24 88.1 kg (194 lb 3.6 oz)   10/08/24 89.8 kg (198 lb)   10/03/24 92.1 kg (203 lb)     Acute congestive heart failure with preserved fraction secondary to HCM.  Plan as noted above.    Stage 3a chronic kidney disease (HCC)  Lab Results   Component Value Date    EGFR 46 11/13/2024    EGFR 59 11/12/2024    EGFR 63 11/11/2024    CREATININE 1.13 11/13/2024    CREATININE 0.92 11/12/2024    CREATININE 0.87 11/11/2024     Patient has baseline creatinine around 0.8-0.9, with GFR suggests CKD2-3A.  Continue to monitor creatinine function and patient with BMPs.    Restrictive lung disease  Patient is previously followed with pulmonology due to restrictive lung disease, possibly due to remote history of sarcoidosis.  Upon review of medical documentation it appears that she has not seen a pulmonologist in quite some time, and was looking to establish new pulmonologist with regards to her asthma.  Reportedly in 2020 the patient had very mild restriction based off her PFTs.  Continue home albuterol  as needed.    Mild persistent asthma without complication  Patient has history of mild persistent asthma, with home albuterol as needed.  Continue albuterol inpatient.    Dyslipidemia  Continue home atorvastatin 20 mg.    Essential hypertension  Plan for HTN as noted above under hypertensive urgency.    Disposition: Med/surg     Team: SOD TEAM C    Subjective   Patient seen and examined. No acute events overnight. Patient states her shortness or breath has slightly improved but reports she is still unable to lay flat without discomfort. She denies feeling lightheaded, chest pain, changes in vision, nausea, or vomiting at this time. She has had one bowel movement since yesterday and is urinating without difficulty.     Objective :  Temp:  [97.7 °F (36.5 °C)-98 °F (36.7 °C)] 97.7 °F (36.5 °C)  HR:  [49-57] 53  BP: (122-176)/(50-85) 122/50  Resp:  [15-20] 18  SpO2:  [91 %-96 %] 92 %    I/O         11/11 0701  11/12 0700 11/12 0701 11/13 0700 11/13 0701  11/14 0700    P.O.  1438 120    Total Intake(mL/kg)  1438 (16.3) 120 (1.4)    Urine (mL/kg/hr) 1300 1320 (0.6)     Total Output 1300 1320     Net -1300 +118 +120                 Weights:   IBW (Ideal Body Weight): 50.1 kg    Body mass index is 35.52 kg/m².  Weight (last 2 days)       Date/Time Weight    11/13/24 0500 88.1 (194.23)    11/12/24 15:55:17 88.9 (196)    11/12/24 1540 89.1 (196.43)    11/12/24 08:52:17 89.1 (196.43)            Physical Exam  Vitals and nursing note reviewed.   Constitutional:       General: She is not in acute distress.     Appearance: She is well-developed.   HENT:      Head: Normocephalic and atraumatic.   Eyes:      Conjunctiva/sclera: Conjunctivae normal.   Cardiovascular:      Rate and Rhythm: Normal rate and regular rhythm.      Heart sounds: Murmur heard.      Comments: Aortic stenosis murmur observed.  Pulmonary:      Effort: Pulmonary effort is normal. No respiratory distress.      Breath sounds: Normal breath sounds.   Abdominal:       Palpations: Abdomen is soft.      Tenderness: There is no abdominal tenderness.   Musculoskeletal:         General: No swelling.      Cervical back: Neck supple.      Right lower leg: Edema present.      Left lower leg: Edema present.      Comments: +1 pitting edema of the LE bilaterally.   Skin:     General: Skin is warm and dry.      Capillary Refill: Capillary refill takes less than 2 seconds.   Neurological:      Mental Status: She is alert.   Psychiatric:         Mood and Affect: Mood normal.           Lab Results: I have reviewed the following results:  Recent Labs     11/11/24  1807 11/11/24  2047 11/12/24  0440 11/13/24  0531   WBC 3.51*  --    < > 3.34*   HGB 10.9*  --    < > 11.5   HCT 34.1*  --    < > 35.2   *  --    < > 108*   SODIUM 141  --    < > 141   K 3.6  --    < > 3.5     --    < > 103   CO2 26  --    < > 29   BUN 23  --    < > 25   CREATININE 0.87  --    < > 1.13   GLUC 90  --    < > 119   AST 13  --   --   --    ALT 9  --   --   --    ALB 4.1  --   --   --    TBILI 0.63  --   --   --    ALKPHOS 61  --   --   --    HSTNI0 6  --   --   --    HSTNI2  --  7  --   --    *  --   --   --     < > = values in this interval not displayed.             Currently Ordered Meds:   Current Facility-Administered Medications:     albuterol (PROVENTIL HFA,VENTOLIN HFA) inhaler 2 puff, Q4H PRN    aspirin (ECOTRIN LOW STRENGTH) EC tablet 81 mg, Daily    atenolol (TENORMIN) tablet 25 mg, Daily    atorvastatin (LIPITOR) tablet 20 mg, Daily With Dinner    enoxaparin (LOVENOX) subcutaneous injection 40 mg, Daily    furosemide (LASIX) injection 20 mg, TID    insulin lispro (HumALOG/ADMELOG) 100 units/mL subcutaneous injection 2-12 Units, TID AC **AND** Fingerstick Glucose (POCT), TID AC    levothyroxine tablet 150 mcg, Early Morning    losartan (COZAAR) tablet 100 mg, Daily    potassium chloride (Klor-Con M20) CR tablet 40 mEq, BID    verapamil (CALAN-SR) CR tablet 180 mg, BID  VTE Pharmacologic  Prophylaxis: Enoxaparin (Lovenox)  VTE Mechanical Prophylaxis: sequential compression device    Administrative Statements     Portions of the record may have been created with voice recognition software.

## 2024-11-13 NOTE — PROGRESS NOTES
Heart Failure/ Pulmonary Hypertension Progress Note - Nadeem Ashton 79 y.o. female MRN: [id number]    Unit/Bed#: -01 Encounter: [id number]      Assessment/Plan:    79-year-old female with hypertrophic obstructive cardiomyopathy, hypertension, CAD status post stent to the LAD, obstructive sleep apnea, hyperlipidemia, diabetes who presents with leg swelling, shortness of breath and orthopnea for 2 weeks due to decompensated heart failure.     # Acute heart failure with preserved EF  Likely due to HOCM and uncontrolled blood pressure     # Hypertrophic obstructive cardiomyopathy  Concern for concomitant severe aortic valve stenosis but no significant peak to peak gradient on LV-AO pullback on [facility]     Studies- personally reviewed by [facility] 10/8/24  Diffuse moderate disease in all coronaries  3 septal branches seen  Apical ventricular mean gradient 25-28 mmHg  Mid ventricular mean gradient as high as 45 mmHg with post PVC increase in gradient, consistent with HOCM  Negligble LVOT gradient; no signifcant Peak to Peak Gradient on LV-AO pullback     ENRICO 10/3/24  LVEF 70%  Dynamic outflow obstruction at rest  Severe aortic valve stenosis  CEASAR of the chordal apparatus with late peaking gradient   Mild MR     Stress echo 8/12/24  Severe asymmetric hypertrophy of the septal wall  LVEF 70%  LVOT dynamic obstruction at rest with peak gradient of 53mmHg and 67 mmHg with Valsalva  Moderate aortic valve stenosis, mean gradient 26mmHg, DVI 0.24. JETHRO 1.39cm2  There is CEASAR of the chordal apparatus with lake peaking gradient  Normal wall motion at peak stress  Normal RV size and systolic function     Volume management:  --Home Diuretic: none  --Inpatient diuretic: given furosemide 20mg IV x 2     Evidence-based therapy:  --SGLT2 Inhibitor:  --Aldosterone Receptor Blocker:  --ARNi:     HOCM therapy:  Verapamil 180mg BID  Atenolol 25mg daily     # Aortic stenosis  Difficult to ascertain severity due to HOCM  No significant  "LV-Ao pull back on cardiac cath     # Hypertension  Severely elevated blood pressure on presentation with SBP in the 200s  SBP 140sthis AM, will monitor for now with diuresis given HOCM     # CAD s/p stent to LAD  PCI to LAD in 2008. No recurrent angina.   Continue aspirin, statin     # Hyperlipidemia  Continue home atorvastatin 20mg.      # FOUZIA on CPAP     # DM 2  HbA1c 6.5 9/25/24, per primary    # Hypothyroidism  TSH normal 9/2024, per PCP    # CKD 3  Baseline creastinine 0.8-.0.9  1.13 today, may have to accept higher creatinine for euvolemia     # Hx of sarcoidosis  Diagnosed with sarcoidosis >30 years ago.   #mild restrictive lung disease: on PFTS, scheduled to see pulmonary as outpatient       Today's Plan:  Still volume up on exam. Inaccurate I/Os, weight down 2 lbs  Will give lasix 20mg IV TID today  Keep K> 4 and Mg > 2  Strict I/O and daily standing weights  Inpatient vs outpatient alcohol septal ablation.       Subjective:   Patient seen and examined.  No significant events overnight.    Still with orthopnea and leg swelling    Review of Systems   Constitutional:  Negative for chills and fever.   Respiratory:  Positive for shortness of breath. Negative for chest tightness.    Cardiovascular:  Positive for leg swelling. Negative for chest pain and palpitations.   Gastrointestinal:  Negative for abdominal distention, nausea and vomiting.   Neurological:  Negative for dizziness and light-headedness.        Objective:   Intake/ Output: 1438/1320  Weight: 194 from 196 lbs  Tele: sinus bradycardia mostly in the 50s    Vitals: Blood pressure 147/63, pulse (!) 50, temperature 97.9 °F (36.6 °C), resp. rate 18, height 5' 2\" (1.575 m), weight 88.1 kg (194 lb 3.6 oz), SpO2 96%., Body mass index is 35.52 kg/m²., I/O last 3 completed shifts:  In: 1438 [P.O.:1438]  Out: 2620 [Urine:2620]  No intake/output data recorded.  Wt Readings from Last 3 Encounters:   11/13/24 88.1 kg (194 lb 3.6 oz)   10/08/24 89.8 kg (198 lb) "   10/03/24 92.1 kg (203 lb)       Intake/Output Summary (Last 24 hours) at 11/13/2024 0912  Last data filed at 11/13/2024 0201  Gross per 24 hour   Intake 1438 ml   Output 1320 ml   Net 118 ml     I/O last 3 completed shifts:  In: 1438 [P.O.:1438]  Out: 2620 [Urine:2620]      Physical Exam:  Vitals:    11/13/24 0100 11/13/24 0326 11/13/24 0500 11/13/24 0750   BP:  148/65  147/63   BP Location:       Pulse:  (!) 51  (!) 50   Resp:  19  18   Temp:    97.9 °F (36.6 °C)   TempSrc:       SpO2: 93% 91%  96%   Weight:   88.1 kg (194 lb 3.6 oz)    Height:           GEN: Patsyann Long alert, awake, not in acute distress,  pleasant and cooperative   HEENT: NC/AT, moist mucosa, anicteric sclerae; extraocular muscles intact  NECK: supple, no carotid bruits   HEART: regular rhythm, normal S1 and S2, no murmurs, clicks, gallops or rubs, JVP is  elevated  LUNGS: clear to auscultation bilaterally; no wheezes, rales, or rhonchi   ABDOMEN: normal bowel sounds, soft, no tenderness, no distention  EXTREMITIES: peripheral pulses normal; no clubbing, cyanosis; 1+ bilateral lower extremity edema  NEURO: no focal findings   SKIN: normal without suspicious lesions on exposed skin      Current Facility-Administered Medications:     albuterol (PROVENTIL HFA,VENTOLIN HFA) inhaler 2 puff, 2 puff, Inhalation, Q4H PRN, Vadim Carolina MD, 2 puff at 11/13/24 0145    aspirin (ECOTRIN LOW STRENGTH) EC tablet 81 mg, 81 mg, Oral, Daily, Vadim Carolina MD, 81 mg at 11/12/24 0823    atenolol (TENORMIN) tablet 25 mg, 25 mg, Oral, Daily, Vadim Carolina MD, 25 mg at 11/12/24 0823    atorvastatin (LIPITOR) tablet 20 mg, 20 mg, Oral, Daily With Dinner, Vadim Carolina MD, 20 mg at 11/12/24 1720    enoxaparin (LOVENOX) subcutaneous injection 40 mg, 40 mg, Subcutaneous, Daily, Vadim Carolina MD    insulin lispro (HumALOG/ADMELOG) 100 units/mL subcutaneous injection 2-12 Units, 2-12 Units, Subcutaneous, TID AC **AND**  Fingerstick Glucose (POCT), , , TID AC, Vadim Carolina MD    levothyroxine tablet 150 mcg, 150 mcg, Oral, Early Morning, Vadim Carolina MD, 150 mcg at 11/13/24 0522    losartan (COZAAR) tablet 100 mg, 100 mg, Oral, Daily, Vadim Carolina MD, 100 mg at 11/12/24 0823    verapamil (CALAN-SR) CR tablet 180 mg, 180 mg, Oral, BID, Brad Maria MD, 180 mg at 11/12/24 2031      Labs & Results:        Results from last 7 days   Lab Units 11/13/24  0531 11/12/24  0440 11/11/24  1807   WBC Thousand/uL 3.34* 3.46* 3.51*   HEMOGLOBIN g/dL 11.5 10.8* 10.9*   HEMATOCRIT % 35.2 34.3* 34.1*   PLATELETS Thousands/uL 108* 98* 102*         Results from last 7 days   Lab Units 11/13/24  0531 11/12/24  0440 11/11/24  1807   POTASSIUM mmol/L 3.5 3.5 3.6   CHLORIDE mmol/L 103 103 108   CO2 mmol/L 29 27 26   BUN mg/dL 25 22 23   CREATININE mg/dL 1.13 0.92 0.87   CALCIUM mg/dL 9.3 8.9 9.1   ALK PHOS U/L  --   --  61   ALT U/L  --   --  9   AST U/L  --   --  13           Thank you for the opportunity to participate in the care of this patient.    Susan Panchal MD  Advanced Heart Failure and Mechanical Circulatory Support  [facility]

## 2024-11-13 NOTE — OCCUPATIONAL THERAPY NOTE
Occupational Therapy Evaluation      Nadeem Poli    11/13/2024    Principal Problem:    Hypertensive urgency  Active Problems:    Essential hypertension    Dyslipidemia    Mild persistent asthma without complication    Restrictive lung disease    Stage 3a chronic kidney disease (HCC)    Hypertrophic cardiomyopathy (HCC)    Congestive heart failure (CHF) (HCC)      Past Medical History:   Diagnosis Date    Abnormal ultrasound of breast     CAD (coronary artery disease)     Cancer (HCC)     thyroid    Cataract     H/O heart artery stent     History of sarcoidosis     Hyperlipidemia     Hypertension     Sleep apnea        Past Surgical History:   Procedure Laterality Date    APPENDECTOMY      CARDIAC CATHETERIZATION Left 10/8/2024    Procedure: Cardiac Left Heart Cath;  Surgeon: Chasity Gonzalez DO;  Location: [facility];  Service: Cardiology    CARDIAC CATHETERIZATION  10/8/2024    Procedure: Cardiac catheterization;  Surgeon: Chasity Gonzalez DO;  Location: [facility];  Service: Cardiology    CHOLECYSTECTOMY      CORONARY ANGIOPLASTY WITH STENT PLACEMENT      HYSTERECTOMY      age 43    KNEE SURGERY Bilateral     OOPHORECTOMY Bilateral     age 43        11/13/24 0850   OT Last Visit   OT Visit Date 11/13/24   Note Type   Note type Evaluation   Pain Assessment   Pain Assessment Tool 0-10   Pain Score 2   Pain Location/Orientation Location: Generalized   Restrictions/Precautions   Weight Bearing Precautions Per Order No   Other Precautions Multiple lines;Telemetry;Pain;Fall Risk   Home Living   Type of Home House   Home Layout One level   Bathroom Shower/Tub Walk-in shower   Bathroom Toilet Raised   Bathroom Equipment Built-in shower seat;Grab bars in shower;Grab bars around toilet   Bathroom Accessibility Accessible   Home Equipment Cane   Additional Comments uses cane sometimes   Prior Function   Level of [place] Independent with ADLs;Independent with functional mobility   Lives With  "Spouse   IADLs Independent with meal prep;Independent with medication management;Family/Friend/Other provides transportation   Falls in the last 6 months 0   Vocational Retired   Lifestyle   Autonomy pt reports being independent w self care, home management, cooking etc. spouse does the driving   Reciprocal Relationships supportive family   Service to Others cooks soup for the homeless monthly   Intrinsic Gratification take care of her 2 year old great grand son. pt and spouse babysits 2x/week   General   Additional Pertinent History pt proudly talks about her family, 2 children, 6 grand children and one great grand son   Subjective   Subjective \"I really like to help others, it keeps me young'   ADL   Eating Assistance 7  Independent   Grooming Assistance 7  Independent   Grooming Deficit Setup   UB Bathing Assistance 7  Independent   UB Bathing Deficit Setup   LB Bathing Assistance 5  Supervision/Setup   LB Bathing Deficit Setup;Increased time to complete   UB Dressing Assistance 5  Supervision/Setup   UB Dressing Deficit Increased time to complete   LB Dressing Assistance 5  Supervision/Setup   LB Dressing Deficit Increased time to complete;Don/doff R sock;Don/doff L sock   Toileting Assistance  5  Supervision/Setup   Toileting Deficit Increased time to complete   Bed Mobility   Additional Comments Pt OOB in chair   Transfers   Sit to Stand 7  Independent   Stand to Sit 7  Independent   Stand pivot 7  Independent   Toilet transfer 7  Independent   Additional items Commode   Functional Mobility   Functional Mobility 5  Supervision   Balance   Static Sitting Normal   Dynamic Sitting Good   Static Standing Fair +   Dynamic Standing Fair +   Activity Tolerance   Activity Tolerance Patient limited by pain;Patient limited by fatigue;Patient tolerated treatment well   RUE Assessment   RUE Assessment WFL   LUE Assessment   LUE Assessment WFL   Hand Function   Gross Motor Coordination Functional   Fine Motor Coordination " Functional   Sensation   Light Touch No apparent deficits   Psychosocial   Psychosocial (WDL) WDL   Perception   Inattention/Neglect Appears intact   Cognition   Overall Cognitive Status WFL   Arousal/Participation Alert;Cooperative   Attention Within functional limits   Orientation Level Oriented X4   Memory Within functional limits   Following Commands Follows all commands and directions without difficulty   Assessment   Assessment Pt is a 79 y.o. female seen for OT evaluation s/p admit to [facility] on 11/11/2024 w/ Hypertensive urgency. Pt with LE swelling and FOX. She has a past medical history of Abnormal ultrasound of breast, CAD (coronary artery disease), Cancer (HCC), Cataract, H/O heart artery stent, History of sarcoidosis, Hyperlipidemia, Hypertension, and Sleep apnea.  Personal factors affecting pt at time of IE include:steps to enter environment. Prior to admission, pt was living w her spouse in a one story home, 2 [address]MB[address]. She is typically fully independent w self care, mobility. Home management. Spouse drives. Pt is active, likes to cook for the homeless. She also watches her 2 year old great grand son 2 x/week. Upon evaluation: Pt requires no assist w self care. She does need increased time due to SOB w activity. Sats on RA mid 90s at rest, dropped to 87% w mobiliyt, quickly improved once seated. Pt was educated on simple ECT/self pacing skills, lifting restrictions, importance of mobility and fall prevention. She expressed good understanding.  Based on findings from OT evaluation and functional performance deficits, pt has been identified as a  moderate complexity evaluation. The patient's raw score on the AM-PAC Daily Activity Inpatient Short Form is 22. A raw score of greater than or equal to 19 suggests the patient may benefit from discharge to home. Please refer to the recommendation of the Occupational Therapist for safe discharge planning From OT standpoint, recommendation at time of d/c  would be home w no ongoing skilled OT needs. DC OT at this time.   Plan   OT Frequency Eval only   Discharge Recommendation   Rehab Resource Intensity Level, OT No post-acute rehabilitation needs   AM-PAC Daily Activity Inpatient   Lower Body Dressing 3   Bathing 3   Toileting 4   Upper Body Dressing 4   Grooming 4   Eating 4   Daily Activity Raw Score 22   Daily Activity Standardized Score (Calc for Raw Score >=11) 47.1

## 2024-11-13 NOTE — ASSESSMENT & PLAN NOTE
Wt Readings from Last 3 Encounters:   11/13/24 88.1 kg (194 lb 3.6 oz)   10/08/24 89.8 kg (198 lb)   10/03/24 92.1 kg (203 lb)     Acute congestive heart failure with preserved fraction secondary to HCM.  Plan as noted above.

## 2024-11-13 NOTE — ASSESSMENT & PLAN NOTE
For diagnosis of HCM in August of this year following stress TTE that showed evidence of severe asymmetric hypertrophy of the septal wall, EF 70%, grade 1 diastolic dysfunction, LVOT obstruction, and moderate AV stenosis.  The patient underwent further cardiac workup with ENRICO on 10/24 in attempt to better delineate the extent of her valvular aortic stenosis versus HCM and for further assessment of the structure of her AV.  This showed evidence of severely increased wall thickness of the left ventricle, severe stenosis of the AV.  Additionally she also underwent left heart catheterization that showed no evidence of significant obstructive CAD.  Cardiology plans for the patient to receive ablation in December for HCM.    She presented with worsening shortness of breath, orthopnea, dyspnea on exertion, bilateral lower extremity swelling in the setting of pre-existing HCM and severe AS, likely secondary to mild congestive heart failure in the setting of a preserved ejection fraction.  The patient reports improvement in swelling of bilateral lower extremities following Lasix.  Physical exam was significant for 1+ pitting edema of the bilateral extremities however was negative for rales suggestive of mild exacerbation.  Additionally chest x-ray similar to prior which showed evidence of mild pulmonary vascular congestion with trace pleural effusions, also suggesting a mild heart failure exacerbation.    Plan:  -Continue antihypertensive medication  -Continue telemetry  -Fluid restriction diet  -Daily standing weights, down 2 lbs since yesterday  -Cont monitor I's and O's  -Per cardiology/heart failure, Lasix 20 mg IV was increased to TID due to continued volume overload on exam. Inpatient vs outpatient alcohol septal ablation is being considered at this time.

## 2024-11-13 NOTE — ASSESSMENT & PLAN NOTE
Lab Results   Component Value Date    EGFR 46 11/13/2024    EGFR 59 11/12/2024    EGFR 63 11/11/2024    CREATININE 1.13 11/13/2024    CREATININE 0.92 11/12/2024    CREATININE 0.87 11/11/2024     Patient has baseline creatinine around 0.8-0.9, with GFR suggests CKD2-3A.  Continue to monitor creatinine function and patient with BMPs.

## 2024-11-13 NOTE — QUICK NOTE
I/O were not reliably recorded today after lasix 20mg IV.  Patient states she did pee more than normal.     She is still volume overloaded on exam (elevated JVP, peripheral edema).     Will re-dose 20mg IV lasix x1 now.  Patient ok with night time dose. strict I/O measurements moving forward.    Osvaldo Brown MD

## 2024-11-14 ENCOUNTER — PREP FOR PROCEDURE (OUTPATIENT)
Dept: CARDIOLOGY CLINIC | Facility: CLINIC | Age: 79
End: 2024-11-14

## 2024-11-14 DIAGNOSIS — I42.1 HOCM (HYPERTROPHIC OBSTRUCTIVE CARDIOMYOPATHY) (HCC): Primary | ICD-10-CM

## 2024-11-14 DIAGNOSIS — I42.2 HYPERTROPHIC CARDIOMYOPATHY (HCC): Primary | ICD-10-CM

## 2024-11-14 PROBLEM — N17.9 AKI (ACUTE KIDNEY INJURY) (HCC): Status: ACTIVE | Noted: 2024-11-14

## 2024-11-14 LAB
ANION GAP SERPL CALCULATED.3IONS-SCNC: 10 MMOL/L (ref 4–13)
BUN SERPL-MCNC: 30 MG/DL (ref 5–25)
CALCIUM SERPL-MCNC: 9.3 MG/DL (ref 8.4–10.2)
CHLORIDE SERPL-SCNC: 101 MMOL/L (ref 96–108)
CO2 SERPL-SCNC: 28 MMOL/L (ref 21–32)
CREAT SERPL-MCNC: 1.01 MG/DL (ref 0.6–1.3)
GFR SERPL CREATININE-BSD FRML MDRD: 53 ML/MIN/1.73SQ M
GLUCOSE SERPL-MCNC: 113 MG/DL (ref 65–140)
GLUCOSE SERPL-MCNC: 114 MG/DL (ref 65–140)
GLUCOSE SERPL-MCNC: 97 MG/DL (ref 65–140)
GLUCOSE SERPL-MCNC: 99 MG/DL (ref 65–140)
POTASSIUM SERPL-SCNC: 3.8 MMOL/L (ref 3.5–5.3)
SODIUM SERPL-SCNC: 139 MMOL/L (ref 135–147)

## 2024-11-14 PROCEDURE — 99232 SBSQ HOSP IP/OBS MODERATE 35: CPT | Performed by: INTERNAL MEDICINE

## 2024-11-14 PROCEDURE — 80048 BASIC METABOLIC PNL TOTAL CA: CPT

## 2024-11-14 PROCEDURE — 82948 REAGENT STRIP/BLOOD GLUCOSE: CPT

## 2024-11-14 RX ORDER — SODIUM CHLORIDE 9 MG/ML
50 INJECTION, SOLUTION INTRAVENOUS CONTINUOUS
Status: DISCONTINUED | OUTPATIENT
Start: 2024-11-14 | End: 2024-11-14

## 2024-11-14 RX ORDER — ASPIRIN 81 MG/1
324 TABLET, CHEWABLE ORAL ONCE
Status: COMPLETED | OUTPATIENT
Start: 2024-11-15 | End: 2024-11-15

## 2024-11-14 RX ORDER — FUROSEMIDE 10 MG/ML
20 INJECTION INTRAMUSCULAR; INTRAVENOUS ONCE
Status: COMPLETED | OUTPATIENT
Start: 2024-11-14 | End: 2024-11-14

## 2024-11-14 RX ORDER — CLOPIDOGREL BISULFATE 75 MG/1
600 TABLET ORAL ONCE
Status: DISCONTINUED | OUTPATIENT
Start: 2024-11-14 | End: 2024-11-14

## 2024-11-14 RX ORDER — ASPIRIN 81 MG/1
324 TABLET, CHEWABLE ORAL ONCE
Status: DISCONTINUED | OUTPATIENT
Start: 2024-11-14 | End: 2024-11-14

## 2024-11-14 RX ORDER — CLOPIDOGREL BISULFATE 75 MG/1
600 TABLET ORAL ONCE
Status: COMPLETED | OUTPATIENT
Start: 2024-11-15 | End: 2024-11-15

## 2024-11-14 RX ORDER — SODIUM CHLORIDE 9 MG/ML
50 INJECTION, SOLUTION INTRAVENOUS CONTINUOUS
Status: DISCONTINUED | OUTPATIENT
Start: 2024-11-15 | End: 2024-11-15

## 2024-11-14 RX ADMIN — ALBUTEROL SULFATE 2 PUFF: 90 AEROSOL, METERED RESPIRATORY (INHALATION) at 23:38

## 2024-11-14 RX ADMIN — LOSARTAN POTASSIUM 100 MG: 50 TABLET, FILM COATED ORAL at 09:31

## 2024-11-14 RX ADMIN — POTASSIUM CHLORIDE 40 MEQ: 1500 TABLET, EXTENDED RELEASE ORAL at 17:01

## 2024-11-14 RX ADMIN — VERAPAMIL HYDROCHLORIDE 180 MG: 180 TABLET ORAL at 09:40

## 2024-11-14 RX ADMIN — FUROSEMIDE 20 MG: 10 INJECTION, SOLUTION INTRAMUSCULAR; INTRAVENOUS at 11:01

## 2024-11-14 RX ADMIN — LEVOTHYROXINE SODIUM 150 MCG: 75 TABLET ORAL at 05:14

## 2024-11-14 RX ADMIN — ATENOLOL 25 MG: 25 TABLET ORAL at 09:35

## 2024-11-14 RX ADMIN — ENOXAPARIN SODIUM 40 MG: 40 INJECTION SUBCUTANEOUS at 09:31

## 2024-11-14 RX ADMIN — POTASSIUM CHLORIDE 40 MEQ: 1500 TABLET, EXTENDED RELEASE ORAL at 09:31

## 2024-11-14 RX ADMIN — FUROSEMIDE 20 MG: 10 INJECTION, SOLUTION INTRAMUSCULAR; INTRAVENOUS at 17:03

## 2024-11-14 RX ADMIN — ASPIRIN 81 MG: 81 TABLET, COATED ORAL at 09:31

## 2024-11-14 RX ADMIN — ATORVASTATIN CALCIUM 20 MG: 20 TABLET, FILM COATED ORAL at 17:01

## 2024-11-14 RX ADMIN — VERAPAMIL HYDROCHLORIDE 180 MG: 180 TABLET ORAL at 20:43

## 2024-11-14 NOTE — ASSESSMENT & PLAN NOTE
Patient with history of hypertension on multiple antihypertensive medications in the past including amlodipine, HCTZ, losartan, atenolol, verapamil.  Home regimen includes losartan 100 mg, verapamil 180 mg twice daily, atenolol 25 mg, which was ultimately decided upon given a new diagnosis of HCM.  Review of medical documentation shows the patient has had difficulty controlling blood pressure following shift away from HCTZ and amlodipine especially in the evening. Presents without symptoms of hypertensive emergency, but with systolic over 200.     Plan:  -Continue home antihypertensive medications  -Continue monitoring vitals

## 2024-11-14 NOTE — CASE MANAGEMENT
Case Management Discharge Planning Note    Patient name Nadeem Claudio /-01 MRN [id number]  : 1945 Date 2024       Current Admission Date: 2024  Current Admission Diagnosis:Hypertensive urgency   Patient Active Problem List    Diagnosis Date Noted Date Diagnosed    RAFI (acute kidney injury) (HCC) 2024     Congestive heart failure (CHF) (HCC) 2024     Hypertensive urgency 2024     Hypertrophic cardiomyopathy (HCC) 10/03/2024     Postoperative hypothyroidism 10/03/2024     Fatty liver 10/03/2024     BMI 36.0-36.9,adult 10/03/2024     Sleep apnea 10/03/2024     Stage 3a chronic kidney disease (HCC) 2024     Restrictive lung disease 2020     Shortness of breath 2020     CAD (coronary artery disease) 2020     Nonrheumatic aortic valve stenosis 2020     Essential hypertension 2020     Dyslipidemia 2020     Mild persistent asthma without complication 2020       LOS (days): 3  Geometric Mean LOS (GMLOS) (days): 4.9  Days to GMLOS:2.2     OBJECTIVE:  Risk of Unplanned Readmission Score: 10.94         Current admission status: Inpatient   Preferred Pharmacy:   [id number] - [place] - [address]  [address] [place] [address]  Phone: [phone] Fax: [phone]    Primary Care Provider: Celine England MD    Primary Insurance: [employer] [facility]  Secondary Insurance:     DISCHARGE DETAILS:                                          Other Referral/Resources/Interventions Provided:  Referral Comments: S/w SLIM for care coordination. Pt not medically cleared. DCP home. No CM needs anticitpated.

## 2024-11-14 NOTE — PROGRESS NOTES
Heart Failure/ Pulmonary Hypertension Progress Note - Nadeem Ashton 79 y.o. female MRN: [id number]    Unit/Bed#: -01 Encounter: [id number]      Assessment/Plan:    79-year-old female with hypertrophic obstructive cardiomyopathy, hypertension, CAD status post stent to the LAD, obstructive sleep apnea, hyperlipidemia, diabetes who presents with leg swelling, shortness of breath and orthopnea for 2 weeks due to decompensated heart failure.     # Acute heart failure with preserved EF  Likely due to HOCM and uncontrolled blood pressure     # Hypertrophic obstructive cardiomyopathy  Concern for concomitant severe aortic valve stenosis but no significant peak to peak gradient on LV-AO pullback on [facility]     Studies- personally reviewed by [facility] 10/8/24  Diffuse moderate disease in all coronaries  3 septal branches seen  Apical ventricular mean gradient 25-28 mmHg  Mid ventricular mean gradient as high as 45 mmHg with post PVC increase in gradient, consistent with HOCM  Negligble LVOT gradient; no signifcant Peak to Peak Gradient on LV-AO pullback     ENRICO 10/3/24  LVEF 70%  Dynamic outflow obstruction at rest  Severe aortic valve stenosis  CEASAR of the chordal apparatus with late peaking gradient   Mild MR     Stress echo 8/12/24  Severe asymmetric hypertrophy of the septal wall  LVEF 70%  LVOT dynamic obstruction at rest with peak gradient of 53mmHg and 67 mmHg with Valsalva  Moderate aortic valve stenosis, mean gradient 26mmHg, DVI 0.24. JETHRO 1.39cm2  There is CEASAR of the chordal apparatus with lake peaking gradient  Normal wall motion at peak stress  Normal RV size and systolic function     Volume management:  --Home Diuretic: none  --Inpatient diuretic: given furosemide 20mg IV x 3 11/14/24     Evidence-based therapy:  --SGLT2 Inhibitor:  --Aldosterone Receptor Blocker:  --ARNi:     HOCM therapy:  Verapamil 180mg BID  Atenolol 25mg daily     # Aortic stenosis  Difficult to ascertain severity due to HOCM  No  "significant LV-Ao pull back on cardiac cath     # Hypertension  Severely elevated blood pressure on presentation with SBP in the 200s  Overall improved with diuresis     # CAD s/p stent to LAD  PCI to LAD in 2008. No recurrent angina.   Continue aspirin, statin     # Hyperlipidemia  Continue home atorvastatin 20mg.      # FOUZIA on CPAP     # DM 2  HbA1c 6.5 9/25/24, per primary    # Hypothyroidism  TSH normal 9/2024, per PCP    # CKD 3  Baseline creastinine 0.8-.0.9  Up to 1.26 overnight, 1.01 today     # Hx of sarcoidosis  Diagnosed with sarcoidosis >30 years ago.   #mild restrictive lung disease: on PFTS, scheduled to see pulmonary as outpatient       Today's Plan:  Volume status overall improved  Will give another dose of IV lasix 20mg today  Discussed with Dr. Jerry and Dr. Pearson. Plan to proceed with alcohol septal ablation tomorrow      Subjective:   Patient seen and examined.  No significant events overnight.    Orthopnea and leg swelling improved    Review of Systems   Constitutional:  Negative for chills and fever.   Respiratory:  Positive for shortness of breath. Negative for chest tightness.    Cardiovascular:  Positive for leg swelling. Negative for chest pain and palpitations.   Gastrointestinal:  Negative for abdominal distention, nausea and vomiting.   Neurological:  Negative for dizziness and light-headedness.        Objective:   Intake/ Output: 1670/3000, -1330  Weight: 192 from 194 lbs  Tele: sinus bradycardia mostly in the 50s    Vitals: Blood pressure 139/63, pulse (!) 51, temperature 97.5 °F (36.4 °C), resp. rate 18, height 5' 2\" (1.575 m), weight 87.4 kg (192 lb 10.9 oz), SpO2 93%., Body mass index is 35.24 kg/m²., I/O last 3 completed shifts:  In: 2630 [P.O.:2630]  Out: 4000 [Urine:4000]  No intake/output data recorded.  Wt Readings from Last 3 Encounters:   11/14/24 87.4 kg (192 lb 10.9 oz)   10/08/24 89.8 kg (198 lb)   10/03/24 92.1 kg (203 lb)       Intake/Output Summary (Last 24 hours) at " 11/14/2024 0915  Last data filed at 11/14/2024 0520  Gross per 24 hour   Intake 1310 ml   Output 3000 ml   Net -1690 ml     I/O last 3 completed shifts:  In: 2630 [P.O.:2630]  Out: 4000 [Urine:4000]      Physical Exam:  Vitals:    11/14/24 0312 11/14/24 0520 11/14/24 0556 11/14/24 0810   BP: 142/62   139/63   Pulse: (!) 50   (!) 51   Resp: 18   18   Temp:    97.5 °F (36.4 °C)   TempSrc:       SpO2: (!) 84%   93%   Weight:  87.4 kg (192 lb 10.9 oz) 87.4 kg (192 lb 10.9 oz)    Height:           GEN: Patsyann Long alert, awake, not in acute distress,  pleasant and cooperative   HEENT: NC/AT, moist mucosa, anicteric sclerae; extraocular muscles intact  NECK: supple, no carotid bruits   HEART: regular rhythm, normal S1 and S2, no murmurs, clicks, gallops or rubs, JVP is mildly elevated  LUNGS: clear to auscultation bilaterally; no wheezes, rales, or rhonchi   ABDOMEN: normal bowel sounds, soft, no tenderness, no distention  EXTREMITIES: peripheral pulses normal; no clubbing, cyanosis; improved lower extremity edema  NEURO: no focal findings   SKIN: normal without suspicious lesions on exposed skin      Current Facility-Administered Medications:     albuterol (PROVENTIL HFA,VENTOLIN HFA) inhaler 2 puff, 2 puff, Inhalation, Q4H PRN, Vadim Carolina MD, 2 puff at 11/13/24 0145    aspirin (ECOTRIN LOW STRENGTH) EC tablet 81 mg, 81 mg, Oral, Daily, Vadim Carolina MD, 81 mg at 11/13/24 0916    atenolol (TENORMIN) tablet 25 mg, 25 mg, Oral, Daily, Vadim Carolina MD, 25 mg at 11/13/24 0916    atorvastatin (LIPITOR) tablet 20 mg, 20 mg, Oral, Daily With Dinner, Vadim Carolina MD, 20 mg at 11/13/24 1736    enoxaparin (LOVENOX) subcutaneous injection 40 mg, 40 mg, Subcutaneous, Daily, Vadim Carolina MD, 40 mg at 11/13/24 0916    [Held by provider] furosemide (LASIX) injection 20 mg, 20 mg, Intravenous, TID, Susan Panchal MD, 20 mg at 11/13/24 1946    insulin lispro (HumALOG/ADMELOG) 100  units/mL subcutaneous injection 2-12 Units, 2-12 Units, Subcutaneous, TID AC **AND** Fingerstick Glucose (POCT), , , TID AC, Vadim Carolina MD    levothyroxine tablet 150 mcg, 150 mcg, Oral, Early Morning, Vadim Carolina MD, 150 mcg at 11/14/24 0514    losartan (COZAAR) tablet 100 mg, 100 mg, Oral, Daily, Vadim Carolina MD, 100 mg at 11/13/24 0916    potassium chloride (Klor-Con M20) CR tablet 40 mEq, 40 mEq, Oral, BID, Susan Panchal MD, 40 mEq at 11/13/24 1736    verapamil (CALAN-SR) CR tablet 180 mg, 180 mg, Oral, BID, Brad Maria MD, 180 mg at 11/13/24 2206      Labs & Results:        Results from last 7 days   Lab Units 11/13/24  0531 11/12/24  0440 11/11/24  1807   WBC Thousand/uL 3.34* 3.46* 3.51*   HEMOGLOBIN g/dL 11.5 10.8* 10.9*   HEMATOCRIT % 35.2 34.3* 34.1*   PLATELETS Thousands/uL 108* 98* 102*         Results from last 7 days   Lab Units 11/14/24  0519 11/13/24 2124 11/13/24  0531 11/12/24  0440 11/11/24  1807   POTASSIUM mmol/L 3.8 3.8 3.5   < > 3.6   CHLORIDE mmol/L 101 102 103   < > 108   CO2 mmol/L 28 29 29   < > 26   BUN mg/dL 30* 34* 25   < > 23   CREATININE mg/dL 1.01 1.26 1.13   < > 0.87   CALCIUM mg/dL 9.3 9.1 9.3   < > 9.1   ALK PHOS U/L  --   --   --   --  61   ALT U/L  --   --   --   --  9   AST U/L  --   --   --   --  13    < > = values in this interval not displayed.           Thank you for the opportunity to participate in the care of this patient.    Susan Panchal MD  Advanced Heart Failure and Mechanical Circulatory Support  [facility]

## 2024-11-14 NOTE — ASSESSMENT & PLAN NOTE
For diagnosis of HCM in August of this year following stress TTE that showed evidence of severe asymmetric hypertrophy of the septal wall, EF 70%, grade 1 diastolic dysfunction, LVOT obstruction, and moderate AV stenosis.  The patient underwent further cardiac workup with ENRICO on 10/24 in attempt to better delineate the extent of her valvular aortic stenosis versus HCM and for further assessment of the structure of her AV.  This showed evidence of severely increased wall thickness of the left ventricle, severe stenosis of the AV.  Additionally she also underwent left heart catheterization that showed no evidence of significant obstructive CAD.  Cardiology plans for the patient to receive ablation in December for HCM.    She presented with worsening shortness of breath, orthopnea, dyspnea on exertion, bilateral lower extremity swelling in the setting of pre-existing HCM and severe AS, likely secondary to mild congestive heart failure in the setting of a preserved ejection fraction. Chest x-ray similar to prior which showed evidence of mild pulmonary vascular congestion with trace pleural effusions, suggesting a mild heart failure exacerbation. Additionally +1 pitting edema was noted on physical exam initially.    Plan:  -Continue antihypertensive medication  -Continue telemetry  -Fluid and salt restriction diet  -Daily standing weights, down 3 lbs 5.1 oz since admission  -Cont monitor I's and O's  -Per cardiology/heart failure, one dose of Lasix 20 mg IV was given today. Cardiac septal ablation is scheduled for tomorrow morning.

## 2024-11-14 NOTE — PROGRESS NOTES
Progress Note - Internal Medicine   Name: Nadeem Ashton 79 y.o. female I MRN: [id number]  Unit/Bed#: -01 I Date of Admission: 11/11/2024   Date of Service: 11/14/2024 I Hospital Day: 3     Assessment & Plan  Hypertensive urgency  Patient with history of hypertension on multiple antihypertensive medications in the past including amlodipine, HCTZ, losartan, atenolol, verapamil.  Home regimen includes losartan 100 mg, verapamil 180 mg twice daily, atenolol 25 mg, which was ultimately decided upon given a new diagnosis of HCM.  Review of medical documentation shows the patient has had difficulty controlling blood pressure following shift away from HCTZ and amlodipine especially in the evening. Presents without symptoms of hypertensive emergency, but with systolic over 200.     Plan:  -Continue home antihypertensive medications  -Continue monitoring vitals    Hypertrophic cardiomyopathy (HCC)  For diagnosis of HCM in August of this year following stress TTE that showed evidence of severe asymmetric hypertrophy of the septal wall, EF 70%, grade 1 diastolic dysfunction, LVOT obstruction, and moderate AV stenosis.  The patient underwent further cardiac workup with ENRICO on 10/24 in attempt to better delineate the extent of her valvular aortic stenosis versus HCM and for further assessment of the structure of her AV.  This showed evidence of severely increased wall thickness of the left ventricle, severe stenosis of the AV.  Additionally she also underwent left heart catheterization that showed no evidence of significant obstructive CAD.  Cardiology plans for the patient to receive ablation in December for HCM.    She presented with worsening shortness of breath, orthopnea, dyspnea on exertion, bilateral lower extremity swelling in the setting of pre-existing HCM and severe AS, likely secondary to mild congestive heart failure in the setting of a preserved ejection fraction. Chest x-ray similar to prior which showed  evidence of mild pulmonary vascular congestion with trace pleural effusions, suggesting a mild heart failure exacerbation. Additionally +1 pitting edema was noted on physical exam initially.    Plan:  -Continue antihypertensive medication  -Continue telemetry  -Fluid and salt restriction diet  -Daily standing weights, down 3 lbs 5.1 oz since admission  -Cont monitor I's and O's  -Per cardiology/heart failure, one dose of Lasix 20 mg IV was given today. Cardiac septal ablation is scheduled for tomorrow morning.  Congestive heart failure (CHF) ([facility])  Wt Readings from Last 3 Encounters:   11/14/24 87.4 kg (192 lb 10.9 oz)   10/08/24 89.8 kg (198 lb)   10/03/24 92.1 kg (203 lb)     Acute congestive heart failure with preserved fraction secondary to HCM.  Plan as noted above.    Stage 3a chronic kidney disease ([facility])  Lab Results   Component Value Date    EGFR 53 11/14/2024    EGFR 40 11/13/2024    EGFR 46 11/13/2024    CREATININE 1.01 11/14/2024    CREATININE 1.26 11/13/2024    CREATININE 1.13 11/13/2024     Patient has baseline creatinine around 0.8-0.9, with GFR suggests CKD2-3A.  Continue to monitor creatinine function and patient with BMPs.    Restrictive lung disease  Patient is previously followed with pulmonology due to restrictive lung disease, possibly due to remote history of sarcoidosis.  Upon review of medical documentation it appears that she has not seen a pulmonologist in quite some time, and was looking to establish new pulmonologist with regards to her asthma.  Reportedly in 2020 the patient had very mild restriction based off her PFTs.  Continue home albuterol as needed.    Mild persistent asthma without complication  Patient has history of mild persistent asthma, with home albuterol as needed.  Continue albuterol PRN inpatient.    Dyslipidemia  Continue home atorvastatin 20 mg.    Essential hypertension  Plan for HTN as noted above under hypertensive urgency.  RAFI (acute kidney injury) ([facility])  RAFI with  evening creatinine 1.26 yesterday from 0.92 11/12.  Improved this morning to 1.01.  Likely due to diuresis.  Will continue to monitor.    Disposition: Pending septal ablation    Team: SOD TEAM C    Subjective   Patient seen and examined. No acute events overnight. Patient reports no longer having orthopnea when sleeping. Dyspnea on exertion has improved greatly as she can now ambulate more without restriction. She denies new onset chest pain or headache at this time.     Objective :  Temp:  [97.5 °F (36.4 °C)-98.1 °F (36.7 °C)] 97.5 °F (36.4 °C)  HR:  [50-60] 51  BP: (122-154)/(50-67) 139/63  Resp:  [16-18] 18  SpO2:  [84 %-93 %] 93 %  O2 Device: None (Room air)    I/O         11/12 0701 11/13 0700 11/13 0701  11/14 0700 11/14 0701  11/15 0700    P.O. 1438 1670     Total Intake(mL/kg) 1438 (16.3) 1670 (19.1)     Urine (mL/kg/hr) 1320 (0.6) 3000 (1.4)     Total Output 1320 3000     Net +118 -1330                  Weights:   IBW (Ideal Body Weight): 50.1 kg    Body mass index is 35.24 kg/m².  Weight (last 2 days)       Date/Time Weight    11/14/24 0556 87.4 (192.68)    11/14/24 0520 87.4 (192.68)    11/13/24 0500 88.1 (194.23)    11/12/24 15:55:17 88.9 (196)    11/12/24 1540 89.1 (196.43)    11/12/24 08:52:17 89.1 (196.43)            Physical Exam  Vitals and nursing note reviewed.   Constitutional:       General: She is not in acute distress.     Appearance: She is well-developed.   HENT:      Head: Normocephalic and atraumatic.   Eyes:      Conjunctiva/sclera: Conjunctivae normal.   Cardiovascular:      Rate and Rhythm: Normal rate and regular rhythm.      Heart sounds: Murmur heard.      Systolic murmur is present.      Comments: Aortic Stenosis murmur appreciated on exam. JVP elevated on exam.    Pulmonary:      Effort: Pulmonary effort is normal. No respiratory distress.      Breath sounds: Normal breath sounds.   Musculoskeletal:      Cervical back: Neck supple.      Right lower leg: Edema present.      Left  lower leg: Edema present.      Comments: Mild LE edema B/L.   Skin:     General: Skin is warm and dry.      Capillary Refill: Capillary refill takes less than 2 seconds.   Neurological:      Mental Status: She is alert.   Psychiatric:         Mood and Affect: Mood normal.           Lab Results: I have reviewed the following results:  Recent Labs     11/11/24  1807 11/11/24 2047 11/12/24  0440 11/13/24  0531 11/13/24  2124 11/14/24  0519   WBC 3.51*  --    < > 3.34*  --   --    HGB 10.9*  --    < > 11.5  --   --    HCT 34.1*  --    < > 35.2  --   --    *  --    < > 108*  --   --    SODIUM 141  --    < > 141   < > 139   K 3.6  --    < > 3.5   < > 3.8     --    < > 103   < > 101   CO2 26  --    < > 29   < > 28   BUN 23  --    < > 25   < > 30*   CREATININE 0.87  --    < > 1.13   < > 1.01   GLUC 90  --    < > 119   < > 113   AST 13  --   --   --   --   --    ALT 9  --   --   --   --   --    ALB 4.1  --   --   --   --   --    TBILI 0.63  --   --   --   --   --    ALKPHOS 61  --   --   --   --   --    HSTNI0 6  --   --   --   --   --    HSTNI2  --  7  --   --   --   --    *  --   --   --   --   --     < > = values in this interval not displayed.             Currently Ordered Meds:   Current Facility-Administered Medications:     albuterol (PROVENTIL HFA,VENTOLIN HFA) inhaler 2 puff, Q4H PRN    aspirin (ECOTRIN LOW STRENGTH) EC tablet 81 mg, Daily    atenolol (TENORMIN) tablet 25 mg, Daily    atorvastatin (LIPITOR) tablet 20 mg, Daily With Dinner    enoxaparin (LOVENOX) subcutaneous injection 40 mg, Daily    [Held by provider] furosemide (LASIX) injection 20 mg, TID    insulin lispro (HumALOG/ADMELOG) 100 units/mL subcutaneous injection 2-12 Units, TID AC **AND** Fingerstick Glucose (POCT), TID AC    levothyroxine tablet 150 mcg, Early Morning    losartan (COZAAR) tablet 100 mg, Daily    potassium chloride (Klor-Con M20) CR tablet 40 mEq, BID    verapamil (CALAN-SR) CR tablet 180 mg, BID  VTE  Pharmacologic Prophylaxis: Reason for no pharmacologic prophylaxis prior to procedure  VTE Mechanical Prophylaxis: sequential compression device    Administrative Statements     Portions of the record may have been created with voice recognition software.

## 2024-11-14 NOTE — ASSESSMENT & PLAN NOTE
RAFI with evening creatinine 1.26 yesterday from 0.92 11/12.  Improved this morning to 1.01.  Likely due to diuresis.  Will continue to monitor.

## 2024-11-14 NOTE — PLAN OF CARE
Problem: CARDIOVASCULAR - ADULT  Goal: Maintains optimal cardiac output and hemodynamic stability  Description: INTERVENTIONS:  - Monitor I/O, vital signs and rhythm  - Monitor for S/S and trends of decreased cardiac output  - Administer and titrate ordered vasoactive medications to optimize hemodynamic stability  - Assess quality of pulses, skin color and temperature  - Assess for signs of decreased coronary artery perfusion  - Instruct patient to report change in severity of symptoms  Outcome: Progressing  Goal: Absence of cardiac dysrhythmias or at baseline rhythm  Description: INTERVENTIONS:  - Continuous cardiac monitoring, vital signs, obtain 12 lead EKG if ordered  - Administer antiarrhythmic and heart rate control medications as ordered  - Monitor electrolytes and administer replacement therapy as ordered  Outcome: Progressing     Problem: METABOLIC, FLUID AND ELECTROLYTES - ADULT  Goal: Electrolytes maintained within normal limits  Description: INTERVENTIONS:  - Monitor labs and assess patient for signs and symptoms of electrolyte imbalances  - Administer electrolyte replacement as ordered  - Monitor response to electrolyte replacements, including repeat lab results as appropriate  - Instruct patient on fluid and nutrition as appropriate  Outcome: Progressing  Goal: Fluid balance maintained  Description: INTERVENTIONS:  - Monitor labs   - Monitor I/O and WT  - Instruct patient on fluid and nutrition as appropriate  - Assess for signs & symptoms of volume excess or deficit  Outcome: Progressing  Goal: Glucose maintained within target range  Description: INTERVENTIONS:  - Monitor Blood Glucose as ordered  - Assess for signs and symptoms of hyperglycemia and hypoglycemia  - Administer ordered medications to maintain glucose within target range  - Assess nutritional intake and initiate nutrition service referral as needed  Outcome: Progressing

## 2024-11-14 NOTE — ASSESSMENT & PLAN NOTE
Patient has history of mild persistent asthma, with home albuterol as needed.  Continue albuterol PRN inpatient.

## 2024-11-14 NOTE — ASSESSMENT & PLAN NOTE
Lab Results   Component Value Date    EGFR 53 11/14/2024    EGFR 40 11/13/2024    EGFR 46 11/13/2024    CREATININE 1.01 11/14/2024    CREATININE 1.26 11/13/2024    CREATININE 1.13 11/13/2024     Patient has baseline creatinine around 0.8-0.9, with GFR suggests CKD2-3A.  Continue to monitor creatinine function and patient with BMPs.

## 2024-11-14 NOTE — ASSESSMENT & PLAN NOTE
Wt Readings from Last 3 Encounters:   11/14/24 87.4 kg (192 lb 10.9 oz)   10/08/24 89.8 kg (198 lb)   10/03/24 92.1 kg (203 lb)     Acute congestive heart failure with preserved fraction secondary to HCM.  Plan as noted above.

## 2024-11-15 ENCOUNTER — APPOINTMENT (INPATIENT)
Dept: NON INVASIVE DIAGNOSTICS | Facility: HOSPITAL | Age: 79
DRG: 286 | End: 2024-11-15
Payer: COMMERCIAL

## 2024-11-15 ENCOUNTER — APPOINTMENT (INPATIENT)
Dept: RADIOLOGY | Facility: HOSPITAL | Age: 79
DRG: 286 | End: 2024-11-15
Payer: COMMERCIAL

## 2024-11-15 PROBLEM — D69.6 THROMBOCYTOPENIA (HCC): Chronic | Status: ACTIVE | Noted: 2024-11-15

## 2024-11-15 PROBLEM — I50.33 ACUTE ON CHRONIC DIASTOLIC CONGESTIVE HEART FAILURE (HCC): Status: ACTIVE | Noted: 2024-11-12

## 2024-11-15 LAB
ANION GAP SERPL CALCULATED.3IONS-SCNC: 7 MMOL/L (ref 4–13)
BSA FOR ECHO PROCEDURE: 1.87 M2
BUN SERPL-MCNC: 33 MG/DL (ref 5–25)
CALCIUM SERPL-MCNC: 9.1 MG/DL (ref 8.4–10.2)
CHLORIDE SERPL-SCNC: 103 MMOL/L (ref 96–108)
CO2 SERPL-SCNC: 29 MMOL/L (ref 21–32)
CREAT SERPL-MCNC: 0.98 MG/DL (ref 0.6–1.3)
ERYTHROCYTE [DISTWIDTH] IN BLOOD BY AUTOMATED COUNT: 15.7 % (ref 11.6–15.1)
GFR SERPL CREATININE-BSD FRML MDRD: 55 ML/MIN/1.73SQ M
GLUCOSE SERPL-MCNC: 113 MG/DL (ref 65–140)
GLUCOSE SERPL-MCNC: 117 MG/DL (ref 65–140)
GLUCOSE SERPL-MCNC: 120 MG/DL (ref 65–140)
GLUCOSE SERPL-MCNC: 89 MG/DL (ref 65–140)
HCT VFR BLD AUTO: 35.1 % (ref 34.8–46.1)
HGB BLD-MCNC: 11.3 G/DL (ref 11.5–15.4)
MCH RBC QN AUTO: 29.9 PG (ref 26.8–34.3)
MCHC RBC AUTO-ENTMCNC: 32.2 G/DL (ref 31.4–37.4)
MCV RBC AUTO: 93 FL (ref 82–98)
PLATELET # BLD AUTO: 115 THOUSANDS/UL (ref 149–390)
PMV BLD AUTO: 10.1 FL (ref 8.9–12.7)
POTASSIUM SERPL-SCNC: 4 MMOL/L (ref 3.5–5.3)
RBC # BLD AUTO: 3.78 MILLION/UL (ref 3.81–5.12)
SODIUM SERPL-SCNC: 139 MMOL/L (ref 135–147)
WBC # BLD AUTO: 3.26 THOUSAND/UL (ref 4.31–10.16)

## 2024-11-15 PROCEDURE — C1892 INTRO/SHEATH,FIXED,PEEL-AWAY: HCPCS | Performed by: INTERNAL MEDICINE

## 2024-11-15 PROCEDURE — C1894 INTRO/SHEATH, NON-LASER: HCPCS | Performed by: INTERNAL MEDICINE

## 2024-11-15 PROCEDURE — 99152 MOD SED SAME PHYS/QHP 5/>YRS: CPT | Performed by: INTERNAL MEDICINE

## 2024-11-15 PROCEDURE — 99153 MOD SED SAME PHYS/QHP EA: CPT | Performed by: INTERNAL MEDICINE

## 2024-11-15 PROCEDURE — 93005 ELECTROCARDIOGRAM TRACING: CPT

## 2024-11-15 PROCEDURE — C1769 GUIDE WIRE: HCPCS | Performed by: INTERNAL MEDICINE

## 2024-11-15 PROCEDURE — 82948 REAGENT STRIP/BLOOD GLUCOSE: CPT

## 2024-11-15 PROCEDURE — 4A023N7 MEASUREMENT OF CARDIAC SAMPLING AND PRESSURE, LEFT HEART, PERCUTANEOUS APPROACH: ICD-10-PCS | Performed by: INTERNAL MEDICINE

## 2024-11-15 PROCEDURE — 85027 COMPLETE CBC AUTOMATED: CPT

## 2024-11-15 PROCEDURE — C1898 LEAD, PMKR, OTHER THAN TRANS: HCPCS | Performed by: INTERNAL MEDICINE

## 2024-11-15 PROCEDURE — 93583 PERQ TRANSCATH SEPTAL REDUXN: CPT | Performed by: INTERNAL MEDICINE

## 2024-11-15 PROCEDURE — 5A1223Z PERFORMANCE OF CARDIAC PACING, CONTINUOUS: ICD-10-PCS | Performed by: INTERNAL MEDICINE

## 2024-11-15 PROCEDURE — 99232 SBSQ HOSP IP/OBS MODERATE 35: CPT | Performed by: INTERNAL MEDICINE

## 2024-11-15 PROCEDURE — 93452 LEFT HRT CATH W/VENTRCLGRPHY: CPT | Performed by: INTERNAL MEDICINE

## 2024-11-15 PROCEDURE — 93325 DOPPLER ECHO COLOR FLOW MAPG: CPT

## 2024-11-15 PROCEDURE — 93308 TTE F-UP OR LMTD: CPT

## 2024-11-15 PROCEDURE — 99223 1ST HOSP IP/OBS HIGH 75: CPT | Performed by: THORACIC SURGERY (CARDIOTHORACIC VASCULAR SURGERY)

## 2024-11-15 PROCEDURE — 80048 BASIC METABOLIC PNL TOTAL CA: CPT

## 2024-11-15 PROCEDURE — 71250 CT THORAX DX C-: CPT

## 2024-11-15 PROCEDURE — 93321 DOPPLER ECHO F-UP/LMTD STD: CPT

## 2024-11-15 DEVICE — LEAD 5076-65 MRI US RCMCRD
Type: IMPLANTABLE DEVICE | Site: HEART | Status: FUNCTIONAL
Brand: CAPSUREFIX NOVUS MRI™ SURESCAN®

## 2024-11-15 RX ORDER — ASPIRIN 81 MG/1
81 TABLET ORAL DAILY
Status: DISCONTINUED | OUTPATIENT
Start: 2024-11-16 | End: 2024-11-15

## 2024-11-15 RX ORDER — MIDAZOLAM HYDROCHLORIDE 2 MG/2ML
INJECTION, SOLUTION INTRAMUSCULAR; INTRAVENOUS CODE/TRAUMA/SEDATION MEDICATION
Status: DISCONTINUED | OUTPATIENT
Start: 2024-11-15 | End: 2024-11-15 | Stop reason: HOSPADM

## 2024-11-15 RX ORDER — FUROSEMIDE 20 MG/1
20 TABLET ORAL DAILY
Status: DISCONTINUED | OUTPATIENT
Start: 2024-11-15 | End: 2024-11-18 | Stop reason: HOSPADM

## 2024-11-15 RX ORDER — LIDOCAINE HYDROCHLORIDE 10 MG/ML
INJECTION, SOLUTION EPIDURAL; INFILTRATION; INTRACAUDAL; PERINEURAL CODE/TRAUMA/SEDATION MEDICATION
Status: DISCONTINUED | OUTPATIENT
Start: 2024-11-15 | End: 2024-11-15 | Stop reason: HOSPADM

## 2024-11-15 RX ORDER — LOSARTAN POTASSIUM 50 MG/1
100 TABLET ORAL DAILY
Status: DISCONTINUED | OUTPATIENT
Start: 2024-11-16 | End: 2024-11-16

## 2024-11-15 RX ORDER — SODIUM CHLORIDE 9 MG/ML
INJECTION, SOLUTION INTRAVENOUS
Status: DISCONTINUED | OUTPATIENT
Start: 2024-11-15 | End: 2024-11-15

## 2024-11-15 RX ORDER — LOSARTAN POTASSIUM 50 MG/1
100 TABLET ORAL DAILY
Status: DISCONTINUED | OUTPATIENT
Start: 2024-11-16 | End: 2024-11-15

## 2024-11-15 RX ORDER — ENOXAPARIN SODIUM 100 MG/ML
40 INJECTION SUBCUTANEOUS DAILY
Status: DISCONTINUED | OUTPATIENT
Start: 2024-11-16 | End: 2024-11-18 | Stop reason: HOSPADM

## 2024-11-15 RX ORDER — ASPIRIN 81 MG/1
81 TABLET ORAL DAILY
Status: DISCONTINUED | OUTPATIENT
Start: 2024-11-16 | End: 2024-11-18 | Stop reason: HOSPADM

## 2024-11-15 RX ORDER — SODIUM CHLORIDE 9 MG/ML
50 INJECTION, SOLUTION INTRAVENOUS CONTINUOUS
Status: DISPENSED | OUTPATIENT
Start: 2024-11-15 | End: 2024-11-15

## 2024-11-15 RX ORDER — NITROGLYCERIN 20 MG/100ML
INJECTION INTRAVENOUS CODE/TRAUMA/SEDATION MEDICATION
Status: DISCONTINUED | OUTPATIENT
Start: 2024-11-15 | End: 2024-11-15 | Stop reason: HOSPADM

## 2024-11-15 RX ORDER — HEPARIN SODIUM 1000 [USP'U]/ML
INJECTION, SOLUTION INTRAVENOUS; SUBCUTANEOUS CODE/TRAUMA/SEDATION MEDICATION
Status: DISCONTINUED | OUTPATIENT
Start: 2024-11-15 | End: 2024-11-15 | Stop reason: HOSPADM

## 2024-11-15 RX ORDER — FENTANYL CITRATE 50 UG/ML
INJECTION, SOLUTION INTRAMUSCULAR; INTRAVENOUS CODE/TRAUMA/SEDATION MEDICATION
Status: DISCONTINUED | OUTPATIENT
Start: 2024-11-15 | End: 2024-11-15 | Stop reason: HOSPADM

## 2024-11-15 RX ORDER — VERAPAMIL HYDROCHLORIDE 2.5 MG/ML
INJECTION, SOLUTION INTRAVENOUS CODE/TRAUMA/SEDATION MEDICATION
Status: DISCONTINUED | OUTPATIENT
Start: 2024-11-15 | End: 2024-11-15 | Stop reason: HOSPADM

## 2024-11-15 RX ADMIN — VERAPAMIL HYDROCHLORIDE 180 MG: 180 TABLET ORAL at 21:01

## 2024-11-15 RX ADMIN — ALBUTEROL SULFATE 2 PUFF: 90 AEROSOL, METERED RESPIRATORY (INHALATION) at 17:00

## 2024-11-15 RX ADMIN — CLOPIDOGREL BISULFATE 600 MG: 75 TABLET ORAL at 06:49

## 2024-11-15 RX ADMIN — LEVOTHYROXINE SODIUM 150 MCG: 75 TABLET ORAL at 06:49

## 2024-11-15 RX ADMIN — ASPIRIN 81 MG CHEWABLE TABLET 324 MG: 81 TABLET CHEWABLE at 06:48

## 2024-11-15 RX ADMIN — ATORVASTATIN CALCIUM 20 MG: 20 TABLET, FILM COATED ORAL at 17:00

## 2024-11-15 RX ADMIN — SODIUM CHLORIDE 50 ML/HR: 0.9 INJECTION, SOLUTION INTRAVENOUS at 06:52

## 2024-11-15 RX ADMIN — POTASSIUM CHLORIDE 40 MEQ: 1500 TABLET, EXTENDED RELEASE ORAL at 17:00

## 2024-11-15 RX ADMIN — SODIUM CHLORIDE 50 ML/HR: 0.9 INJECTION, SOLUTION INTRAVENOUS at 11:31

## 2024-11-15 RX ADMIN — FUROSEMIDE 20 MG: 20 TABLET ORAL at 13:00

## 2024-11-15 NOTE — PROGRESS NOTES
Progress Note - Internal Medicine   Name: Nadeem Ashton 79 y.o. female I MRN: [id number]  Unit/Bed#: -01 I Date of Admission: 11/11/2024   Date of Service: 11/15/2024 I Hospital Day: 4     Assessment & Plan  Hypertensive urgency  Patient with history of hypertension on multiple antihypertensive medications in the past including amlodipine, HCTZ, losartan, atenolol, verapamil.  Home regimen includes losartan 100 mg, verapamil 180 mg twice daily, atenolol 25 mg, which was ultimately decided upon given a new diagnosis of HCM.  Review of medical documentation shows the patient has had difficulty controlling blood pressure following shift away from HCTZ and amlodipine especially in the evening. Presents without symptoms of hypertensive emergency, but with systolic over 200.     Plan:  -Continue home antihypertensive medications  -Continue monitoring vitals    Hypertrophic cardiomyopathy (HCC)  For diagnosis of HCM in August of this year following stress TTE that showed evidence of severe asymmetric hypertrophy of the septal wall, EF 70%, grade 1 diastolic dysfunction, LVOT obstruction, and moderate AV stenosis.  The patient underwent further cardiac workup with ENRICO on 10/24 in attempt to better delineate the extent of her valvular aortic stenosis versus HCM and for further assessment of the structure of her AV.  This showed evidence of severely increased wall thickness of the left ventricle, severe stenosis of the AV.  Additionally she also underwent left heart catheterization that showed no evidence of significant obstructive CAD.      She presented with worsening shortness of breath, orthopnea, dyspnea on exertion, bilateral lower extremity swelling in the setting of pre-existing HCM and severe AS, likely secondary to mild congestive heart failure in the setting of a preserved ejection fraction. Chest x-ray similar to prior which showed evidence of mild pulmonary vascular congestion with trace pleural effusions,  suggesting a mild heart failure exacerbation. Additionally +1 pitting edema was noted on physical exam initially.    Plan:  -Septal ablation today  Congestive heart failure (CHF) ([facility])  Wt Readings from Last 3 Encounters:   11/15/24 86 kg (189 lb 9.5 oz)   10/08/24 89.8 kg (198 lb)   10/03/24 92.1 kg (203 lb)     Acute congestive heart failure with preserved fraction secondary to HCM.  Plan as noted above.    Stage 3a chronic kidney disease (HCC)  Lab Results   Component Value Date    EGFR 55 11/15/2024    EGFR 53 11/14/2024    EGFR 40 11/13/2024    CREATININE 0.98 11/15/2024    CREATININE 1.01 11/14/2024    CREATININE 1.26 11/13/2024     Patient has baseline creatinine around 0.8-0.9, with GFR suggests CKD2-3A.  Continue to monitor creatinine function and patient with BMPs.    Restrictive lung disease  Patient is previously followed with pulmonology due to restrictive lung disease, possibly due to remote history of sarcoidosis.  Upon review of medical documentation it appears that she has not seen a pulmonologist in quite some time, and was looking to establish new pulmonologist with regards to her asthma.  Reportedly in 2020 the patient had very mild restriction based off her PFTs.  Continue home albuterol as needed.    Mild persistent asthma without complication  Patient has history of mild persistent asthma, with home albuterol as needed.  Continue albuterol PRN inpatient.    Dyslipidemia  Continue home atorvastatin 20 mg.    Essential hypertension  Plan for HTN as noted above under hypertensive urgency.  RAFI (acute kidney injury) (HCC)  Resolved    Disposition: Pending septal ablation    Team: SOD TEAM C    Subjective   Patient seen and examined. No acute events overnight. No complaints at this time. Says the swelling in her legs is better.    Objective :  Temp:  [97.6 °F (36.4 °C)-97.9 °F (36.6 °C)] 97.6 °F (36.4 °C)  HR:  [54-71] 58  BP: (149-170)/(60-89) 164/72  Resp:  [18] 18  SpO2:  [90 %-99 %] 93 %  O2  Device: Nasal cannula  Nasal Cannula O2 Flow Rate (L/min):  [2 L/min] 2 L/min    I/O         11/12 0701 11/13 0700 11/13 0701  11/14 0700 11/14 0701  11/15 0700    P.O. 1438 1670     Total Intake(mL/kg) 1438 (16.3) 1670 (19.1)     Urine (mL/kg/hr) 1320 (0.6) 3000 (1.4)     Total Output 1320 3000     Net +118 -1330                  Weights:   IBW (Ideal Body Weight): 50.1 kg    Body mass index is 34.68 kg/m².  Weight (last 2 days)       Date/Time Weight    11/15/24 1101 86 (189.6)    11/15/24 1010 86 (189.6)    11/15/24 0600 86 (189.6)    11/14/24 0556 87.4 (192.68)    11/14/24 0520 87.4 (192.68)    11/13/24 0500 88.1 (194.23)            Physical Exam  Vitals and nursing note reviewed.   Constitutional:       General: She is not in acute distress.     Appearance: She is well-developed.   HENT:      Head: Normocephalic and atraumatic.   Eyes:      Conjunctiva/sclera: Conjunctivae normal.   Cardiovascular:      Rate and Rhythm: Normal rate and regular rhythm.      Heart sounds: Murmur heard.      Systolic murmur is present.   Pulmonary:      Effort: Pulmonary effort is normal. No respiratory distress.      Breath sounds: Normal breath sounds.   Musculoskeletal:      Cervical back: Neck supple.   Skin:     General: Skin is warm and dry.      Capillary Refill: Capillary refill takes less than 2 seconds.   Neurological:      Mental Status: She is alert.   Psychiatric:         Mood and Affect: Mood normal.           Lab Results: I have reviewed the following results:  Recent Labs     11/15/24  0455   WBC 3.26*   HGB 11.3*   HCT 35.1   *   SODIUM 139   K 4.0      CO2 29   BUN 33*   CREATININE 0.98   GLUC 113             Currently Ordered Meds:   Current Facility-Administered Medications:     albuterol (PROVENTIL HFA,VENTOLIN HFA) inhaler 2 puff, Q4H PRN    [START ON 11/16/2024] aspirin (ECOTRIN LOW STRENGTH) EC tablet 81 mg, Daily    atenolol (TENORMIN) tablet 25 mg, Daily    atorvastatin (LIPITOR) tablet 20  mg, Daily With Dinner    [START ON 11/16/2024] enoxaparin (LOVENOX) subcutaneous injection 40 mg, Daily    furosemide (LASIX) tablet 20 mg, Daily    insulin lispro (HumALOG/ADMELOG) 100 units/mL subcutaneous injection 2-12 Units, TID AC **AND** Fingerstick Glucose (POCT), TID AC    levothyroxine tablet 150 mcg, Early Morning    [START ON 11/16/2024] losartan (COZAAR) tablet 100 mg, Daily    potassium chloride (Klor-Con M20) CR tablet 40 mEq, BID    sodium chloride 0.9 % infusion, Continuous    verapamil (CALAN-SR) CR tablet 180 mg, BID  VTE Pharmacologic Prophylaxis: Reason for no pharmacologic prophylaxis prior to procedure  VTE Mechanical Prophylaxis: sequential compression device    Administrative Statements     Portions of the record may have been created with voice recognition software.

## 2024-11-15 NOTE — CONSULTS
Consultation - Cardiac Surgery   Nadeem Ashton 79 y.o. female MRN: [id number]  Unit/Bed#: -01 Encounter: [id number]    Physician Requesting Consult: Tyrell Orellana MD    Reason for Consult / Principal Problem: AS, HOCM, eval for AVR/septal myectomy    Inpatient consult to Cardiac Surgery  Consult performed by: Rigoberto Sharpe PA-C  Consult ordered by: Osvaldo Brown MD      History of Present Illness: Nadeem Ashton is a 79 y.o. female with a PMH for moderate-severe AS, HCOM (son has HOCM), chronic CHFpEF, class 2 obesity (BMI 35), hx of sarcoidosis (dx > 30 years ago), mild restrictive lung disease and mildly reduced DLCO (last PFTs 2020), HTN, HLD, asthma, CAD (s/p prox LAD TAMRAA ‘08), pancreatic body cyst (multiple), hepatic lesion (1cm, cyst vs hemangioma), enlarged mesenteric lymph nodes, DM2 on oral meds, controlled A1c (12 in 3/2024, 6.5 9/2024), chronic mild thrombocytopenia (plts 98k-119k)- never officially evaluated, LAFB, CKD 2-3a (baseline Cr 0.8-1.1, GFR 46-63), thyroid cancer s/p partial thyroidectomy, hx of lap lul, hx of hysterectomy/appy, OA/DJD s/p bilateral total knees.     She has been evaluated by cardiologist Dr. Pearson for HOCM and moderate to severe AS. The patient has been followed by cardiology since at least 2021 for moderate AS and had diagnosis of HOCM made many years ago when her son was diagnosed with HOCM and family screening. She has had worsening exertional fatigue/dyspnea for past several weeks/months, stress echo 8/12 showed moderate AS w/o ischemia but CEASAR w/ late peaking gradient and severe IVORY with dynamic LVOT outflow obstruction PG of 53 that increased to 67 with valsalva, therefore the patient was referred to Dr. Pearson. He scheduled ENRICO and cath to further eval AS/HOCM and to help decide treatment with surgical AVR/septal myectomy, alochol septal ablation then possible TAVR in future.     Follow up ENRICO 10/3 showed LVEF 70%, severe LV wall thickness, severe IVORY  with LVOT obstruction with PG 36, and severe AS with MG 38, JETHRO 0.67, imld AI, mild MR with CEASAR of chordal apparatus with late peaking gradient, and thickened shelf present at the distal chordae and the tip of the papillary muscle. Cardiac cath 10/8 showed patent LAD stent and no significant CAD (LAD stent also may limit alcohol septal ablation options).     Patient presented to [facility] ED  due to acute worsening of FOX, orthopnea, and LE edema. CXR showed CHF/pulm vasc congestion, EKG w/ SB in the 50s, elevated BNP of 268, negative troponin, blood pressure 217/81. Admitted with hypertensive urgency & acute on chronic diastolic CHF from AS/HOCM, admitted for further management and cardiology HF consultation. HF suggested septal ablation given worsening HCOM symptoms. Went for septal ablation today, however no significant LVOT gradient was identified therefore aborted.  Cardiac surgery consulted for consideration of surgical AVR and septal myectomy.     Patient currently sitting in bed, able to relay her above history well, and presenting symptoms. Admits to worsening SOB/FOX and LE edema over the preceding weeks which was new for her. Denies CP. Denies abdominal pain. Denies fevers/chills.     Lives in home with , retired. Watches grandson 2 days a week. Just started using cane 2 weeks ago, but otherwise ambulates and performs ADLs, does not drive. Never smoker, no alcohol use, never drug use.     Son was first diagnosed with HOCM as an adult, then prompting family for screening, which originally diagnosed her HOCM many years ago. Mother lived until 80s. Sister lived until 80s, had cardiac surgery x 2 (unclear what for). Father lived until 80s ( of thyroid cancer)    Past Medical History:  Past Medical History:   Diagnosis Date    Abnormal ultrasound of breast     CAD (coronary artery disease)     Cancer (HCC)     thyroid    Cataract     H/O heart artery stent     History of sarcoidosis      Hyperlipidemia     Hypertension     Sleep apnea        Past Surgical History:   Past Surgical History:   Procedure Laterality Date    APPENDECTOMY      CARDIAC CATHETERIZATION Left 10/8/2024    Procedure: Cardiac Left Heart Cath;  Surgeon: Chasity Gonzalez DO;  Location: [facility];  Service: Cardiology    CARDIAC CATHETERIZATION  10/8/2024    Procedure: Cardiac catheterization;  Surgeon: Chasity Gonzalez DO;  Location: [facility];  Service: Cardiology    CHOLECYSTECTOMY      CORONARY ANGIOPLASTY WITH STENT PLACEMENT      HYSTERECTOMY      age 43    KNEE SURGERY Bilateral     OOPHORECTOMY Bilateral     age 43       Family History:  Family History   Problem Relation Age of Onset    No Known Problems Mother     Thyroid cancer Father     No Known Problems Sister     No Known Problems Daughter     Cancer Maternal Grandmother         ear    No Known Problems Maternal Grandfather     No Known Problems Paternal Grandmother     No Known Problems Paternal Grandfather     No Known Problems Maternal Aunt     No Known Problems Maternal Aunt     Leukemia Paternal Aunt        Social History:  Social History     Substance and Sexual Activity   Alcohol Use Not Currently     Social History     Substance and Sexual Activity   Drug Use Never     Social History     Tobacco Use   Smoking Status Never    Passive exposure: Never   Smokeless Tobacco Never     Marital Status: /Civil Union    Home Medications:   Prior to Admission medications    Medication Sig Start Date End Date Taking? Authorizing Provider   albuterol (PROVENTIL HFA,VENTOLIN HFA) 90 mcg/act inhaler Inhale 2 puffs every 4 (four) hours as needed for wheezing or shortness of breath   Yes Historical Provider, MD   aspirin (ECOTRIN LOW STRENGTH) 81 mg EC tablet Take 81 mg by mouth daily   Yes Historical Provider, MD   atenolol (TENORMIN) 25 mg tablet Take 25 mg by mouth daily 9/2/24  Yes Historical Provider, MD   atorvastatin (LIPITOR) 20 mg tablet  Take 20 mg by mouth daily   Yes Historical Provider, MD   fluticasone (FLONASE) 50 mcg/act nasal spray 1 spray into each nostril daily   Yes Historical Provider, MD   losartan (COZAAR) 100 MG tablet Take 1 tablet (100 mg total) by mouth daily  Patient taking differently: Take 100 mg by mouth daily Take one half tablet twice daily 9/9/24 12/8/24 Yes Jacob Pearson MD   metFORMIN (GLUCOPHAGE) 500 mg tablet Take 1 tablet (500 mg total) by mouth 2 (two) times a day with meals  Patient taking differently: Take 500 mg by mouth 2 (two) times a day with meals 3/9/24 10/8/24 Yes Brie Unger PA-C   Synthroid 150 MCG tablet Take 150 mcg by mouth daily 5/28/22  Yes Historical Provider, MD   verapamil (CALAN-SR) 180 mg CR tablet Take 2 tablets (360 mg total) by mouth daily at bedtime 10/3/24 12/2/24 Yes Jacob Pearson MD       Inpatient Medications:  Scheduled Meds:   Current Facility-Administered Medications   Medication Dose Route Frequency Provider Last Rate    albuterol  2 puff Inhalation Q4H PRN DANDRE Morgan      [START ON 11/16/2024] aspirin  81 mg Oral Daily DANDRE Morgan      atenolol  25 mg Oral Daily DANDRE Morgan      atorvastatin  20 mg Oral Daily With Dinner DANDRE Morgan      [START ON 11/16/2024] enoxaparin  40 mg Subcutaneous Daily DANDRE Morgan      furosemide  20 mg Oral Daily Osvaldo Brown MD      insulin lispro  2-12 Units Subcutaneous TID AC DANDRE Morgan      levothyroxine  150 mcg Oral Early Morning DANDRE Morgan      [START ON 11/16/2024] losartan  100 mg Oral Daily DOMINIC MorganNP      potassium chloride  40 mEq Oral BID DANDRE Morgan      sodium chloride  50 mL/hr Intravenous Continuous Latosha Kathryn CRNP 50 mL/hr (11/15/24 1131)    verapamil  180 mg Oral BID DANDRE Morgan       Continuous Infusions: sodium chloride, 50 mL/hr, Last Rate: 50 mL/hr (11/15/24 1131)      PRN Meds:  albuterol, 2 puff, Q4H  PRN        Allergies:  No Known Allergies    Review of Systems:  Review of Systems   Constitutional:  Negative for chills and fever.   HENT:  Negative for trouble swallowing.    Eyes:  Negative for visual disturbance.   Respiratory:  Positive for shortness of breath and wheezing. Negative for chest tightness.    Cardiovascular:  Positive for leg swelling. Negative for chest pain and palpitations.   Gastrointestinal:  Negative for abdominal distention, abdominal pain, anal bleeding, blood in stool, nausea and vomiting.   Genitourinary:  Negative for dysuria and hematuria.   Musculoskeletal:  Negative for arthralgias.   Skin:  Negative for rash.   Neurological:  Negative for dizziness, seizures, syncope, light-headedness and headaches.   Psychiatric/Behavioral:  Negative for agitation and behavioral problems.        Vital Signs:     Vitals:    11/15/24 1158 11/15/24 1212 11/15/24 1245 11/15/24 1315   BP: 162/73 164/72 125/69 165/78   Pulse: 56 58 72 58   Resp:       Temp:       TempSrc:       SpO2:  93%     Weight:       Height:         Invasive Devices       Peripheral Intravenous Line  Duration             Peripheral IV 11/15/24 Distal;Right;Ventral (anterior) Forearm <1 day    Peripheral IV 11/15/24 Left;Ventral (anterior) Forearm <1 day                    Physical Exam:  Physical Exam  Constitutional:       Appearance: She is obese.   HENT:      Head: Normocephalic and atraumatic.      Nose: Nose normal.      Mouth/Throat:      Mouth: Mucous membranes are moist.      Pharynx: Oropharynx is clear.   Eyes:      Extraocular Movements: Extraocular movements intact.   Cardiovascular:      Rate and Rhythm: Normal rate.      Pulses:           Radial pulses are 2+ on the right side and 2+ on the left side.        Dorsalis pedis pulses are 2+ on the right side and 2+ on the left side.      Heart sounds: Murmur heard.      Systolic murmur is present with a grade of 4/6.   Pulmonary:      Effort: Pulmonary effort is normal.  No respiratory distress.      Breath sounds: Normal breath sounds. No wheezing or rales.   Abdominal:      General: Bowel sounds are normal. There is no distension.      Palpations: Abdomen is soft.      Tenderness: There is no abdominal tenderness. There is no guarding.   Musculoskeletal:      Cervical back: Normal range of motion and neck supple.      Right lower le+ Edema present.      Left lower le+ Edema present.   Skin:     General: Skin is warm and dry.      Coloration: Skin is not jaundiced.   Neurological:      General: No focal deficit present.      Mental Status: She is alert and oriented to person, place, and time.   Psychiatric:         Mood and Affect: Mood normal.         Behavior: Behavior normal.         Lab Results:     Results from last 7 days   Lab Units 11/15/24  0455 24  0531 24  0440   WBC Thousand/uL 3.26* 3.34* 3.46*   HEMOGLOBIN g/dL 11.3* 11.5 10.8*   HEMATOCRIT % 35.1 35.2 34.3*   PLATELETS Thousands/uL 115* 108* 98*     Results from last 7 days   Lab Units 11/15/24  0455 24  0519 244   POTASSIUM mmol/L 4.0 3.8 3.8   CHLORIDE mmol/L 103 101 102   CO2 mmol/L 29 28 29   BUN mg/dL 33* 30* 34*   CREATININE mg/dL 0.98 1.01 1.26   CALCIUM mg/dL 9.1 9.3 9.1         Lab Results   Component Value Date    HGBA1C 6.5 (H) 2024     Lab Results   Component Value Date    TROPONINI <0.02 2020       Imaging Studies:     Left heart cath 10/8/24: no significant CAD, LAD with three small septals that may not be suitable for alcohol septa ablation,  Apical Ventricular Mean Gradient 25-28 mmHg, Mid Ventricular Mean Gradient as high as 45 mmHg with Post PVC increase in Gradient, consistent with HOCM, Negligible LVOT gradient in would significant Peak to Peak Gradient on LV-AO pullback.      Cardiac Cath : Screw-in temporary pacing wire placed and fixed to generator. Removed at conclusion of the procedure. Normal LVEDP (15 mmHg). No significant LVOT gradient  identified. 50 mmHg peak-peak aortic valve gradient with CHF. No indication for alcohol septal ablation     Echo 11/12: LVEF 70%, severe LV wall thickness, severe asymmetric hypertrophy of the basal septal wall, dynamic LV outflow tract obstruction with a resting gradient of 34.0 mmHg, which increases to a peak gradient of 47.0 mmHg with Valsalva. RV size/fnx normal. Moderate to severe AS, MG 32, JETHRO 1.02, mild AI, mild MR no CEASAR, trace TR, PAP 26.      ENRICO 10/3/24: LVEF 70%, severe LV wall thickness, severe asymmetric basal septal wall, LV outflow tract dynamic obstruction at rest, peak resting gradient 36. Severe AS, MG 38, JETHRO 0.6, DVI 0.26. Mild MR. Systolic anterior motion of the chordal apparatus with late peaking gradient. No PORFIRIO thrombus.      CXR: mild PVC w/ trace pleural effusion     MRI abdomen 10/14/24: Several multilobulated/multiseptated pancreatic cysts measuring up to 4.2 cm/ in/ the uncinate. No solid enhancing components or main duct dilatation. For cyst(s) 2 cm or/ greater recommend gastroenterology and/or surgical oncology consult. EUS may now be warranted. T2 hyperintense 1 cm hepatic dome lesion, not well seen on postcontrast sequences, possibly due to motion artifact, presumably a cyst or hemangioma. Scattered additional foci of arterial hyperenhancement in the liver, which may also represent small hemangiomas or arterioportal shunts. Consider follow-up MRI with and without contrast in 3 months to ensure stability. Hepatosplenomegaly. Mildly enlarged yash hepatic/periportal lymph nodes of uncertain etiology. Haziness of the root of mesentery with several mildly enlarged mesenteric lymph nodes. Although most commonly this represents mesenteric panniculitis, given mild periportal lymphadenopathy and splenomegaly, consider the remote possibility of lymphoma.     EKG: Sinus bradycardia, Left axis deviation, Minimal voltage criteria for LVH, may be normal    Results Review Statement: I personally  reviewed the following image studies in PACS and associated radiology reports: cardiac caths, EKG, chest xray, MRI abdomen/MRCP, Ultrasound(s), and Echocardiogram. My interpretation of the radiology images/reports is: same as above.    Assessment:  Principal Problem:    Hypertensive urgency  Active Problems:    CAD (coronary artery disease)    Nonrheumatic aortic valve stenosis    Essential hypertension    Dyslipidemia    Mild persistent asthma without complication    Shortness of breath    Restrictive lung disease    Stage 3a chronic kidney disease (HCC)    Hypertrophic cardiomyopathy (HCC)    Postoperative hypothyroidism    Fatty liver    BMI 36.0-36.9,adult    Sleep apnea    Acute on chronic diastolic congestive heart failure (HCC)    RAFI (acute kidney injury) (HCC)    Thrombocytopenia (HCC)    Impression:  - severe AS, symptomatic. JETHRO was 0.67 on ENRICO  - HOCM with severe asymmetric septal hypertrophy with dynamic LVOT gradients on multiple echos  - acute on chronic diastolic CHF  - hx of sarcoidosis  - mild restrictive lung disease  - class 2 obesity BMI 35  - CKD 2-3a  - chronic mild thrombocytopenia, never officially evaluated  - hx of thyroid cancer s/p partial thyroidectomy  - DM2 on oral meds, controlled, A1c 6.5    Plan:  - surgical AVR and septal myectomy workup underway  - check CT chest, check carotid US, check PFTs  - surgery, surgical recovery and risks and benefits of aortic valve replacement and septal myectomy were discussed in detail today with the patient.  They understand and wish to proceed with further workup  - will recommend medical optimization from CHF standpoint and outpatient follow up for further evaluation and discussion for surgical AVR and septal myectomy   - reviewed case with Dr. Marii Ashton was comfortable with our recommendations, and their questions were answered to their satisfaction.  We will continue to evaluate the patient daily with further recommendations as  work up is completed.  Thank you for allowing us to participate in the care of this patient.     SIGNATURE: Rigoberto Sharpe PA-C  DATE: November 15, 2024  TIME: 2:48 PM    * This note was completed in part utilizing Dojo direct voice recognition software.   Grammatical errors, random word insertion, spelling mistakes, and incomplete sentences may be an occasional consequence of the system secondary to software limitations, ambient noise and hardware issues. At the time of dictation, efforts were made to edit, clarify and /or correct errors. Please read the chart carefully and recognize, using context, where substitutions have occurred.  If you have any questions or concerns about the context, text or information contained within the body of this dictation, please contact myself, the provider, for further clarification.

## 2024-11-15 NOTE — ASSESSMENT & PLAN NOTE
Lab Results   Component Value Date    EGFR 55 11/15/2024    EGFR 53 11/14/2024    EGFR 40 11/13/2024    CREATININE 0.98 11/15/2024    CREATININE 1.01 11/14/2024    CREATININE 1.26 11/13/2024     Patient has baseline creatinine around 0.8-0.9, with GFR suggests CKD2-3A.  Continue to monitor creatinine function and patient with BMPs.

## 2024-11-15 NOTE — PROGRESS NOTES
Progress Note - Heart Failure   Name: Nadeem Ashton 79 y.o. female I MRN: [id number]  Unit/Bed#: -01 I Date of Admission: 11/11/2024   Date of Service: 11/15/2024 I Hospital Day: 4     Nadeem Ashton is a 79 y.o. year old female with PMH of hypertrophic cardiomyopathy, hypertension, CAD status post stent to LAD, FOUZIA on CPAP, hyperlipidemia, diabetes, history of sarcoidosis who presents with 2 weeks of leg swelling, shortness of breath, orthopnea consistent with acute CHF.     #acute heart failure  Patient presenting with 2 weeks of symptoms and found to have clinical heart failure (volume overload, orthopnea, elevated BNP, elevated JVP). She has known HOCM, very difficult to control hypertension, and AS which are likely worsening her CHF.       Volume: volume overloaded on arrival.  Diuresed with IV lasix and appears euvolemic now.   Start lasix 20mg PO daily.      #HOCM  #Severe AS    Patient has had known HOCM with LVOT gradient up to 70 mmHG in the past on Valsalva.  She also has aortic stenosis.  Grading of AS and determining whether flow acceleration was from the LVOT gradient vs. AS has been difficult.  Previous ENRICO and LHC suggested the LVOT gradient may be more significant.  However, LHC done today showed no significant LVOT gradient by cath at rest however there was a 50 mmHg peak to peak gradient across the aortic valve consistent with severe AS.  TAVR would be risky given it could acutely worsen the LVOT obstructive gradient.  Discussed with Dr. Pearson and Dr. Jerry. CT surgery eval for SAVR and myemectomy was recommended.     #Hypertension  Very hypertensive on arrival SBP 200s. BP difficult to control given HOCM.   Outpatient regimen: atenolol 25mg daily, losartan 100mg, verapamil 180mg BID     Continue with outpatient regimen. Avoid further afterload reduction or volume depletion at this time.      #CAD s/p stent to LAD: PCI to LAD in 2008. No recurrent angina.   #HLD: continue home atorvastatin  20mg.   #FOUZIA on CPAP  #hx of sarcoidosis: diagnosed with sarcoidosis >30 years ago.   #DM2: A1c was 12 in march 2024, repeat 6.5%.  on metformin.   #mild restrictive lung disease: on PFTS, follows with pulm.        Objective :  Temp:  [97.6 °F (36.4 °C)-97.9 °F (36.6 °C)] 97.6 °F (36.4 °C)  HR:  [54-72] 58  BP: (125-170)/(60-89) 165/78  Resp:  [18] 18  SpO2:  [90 %-99 %] 93 %  O2 Device: Nasal cannula  Nasal Cannula O2 Flow Rate (L/min):  [2 L/min] 2 L/min    Physical Exam  Constitutional:       Appearance: Normal appearance.   HENT:      Head: Normocephalic and atraumatic.   Neck:      Vascular: no JVD  Cardiovascular:      Rate and Rhythm: Bradycardic, 4/6 systolic murmur heard best at right upper sternal border and increase with Valsalva.  Pulmonary:      Effort: CTA-b  Abdominal:      General: Abdomen is flat. Bowel sounds are normal.      Palpations: Abdomen is soft.   Musculoskeletal:   No lower extremity edema   Skin:     General: Skin is warm.   Neurological:      Mental Status: She is alert and oriented to person, place, and time.   Psychiatric:         Behavior: Behavior normal.

## 2024-11-15 NOTE — ASSESSMENT & PLAN NOTE
For diagnosis of HCM in August of this year following stress TTE that showed evidence of severe asymmetric hypertrophy of the septal wall, EF 70%, grade 1 diastolic dysfunction, LVOT obstruction, and moderate AV stenosis.  The patient underwent further cardiac workup with ENRICO on 10/24 in attempt to better delineate the extent of her valvular aortic stenosis versus HCM and for further assessment of the structure of her AV.  This showed evidence of severely increased wall thickness of the left ventricle, severe stenosis of the AV.  Additionally she also underwent left heart catheterization that showed no evidence of significant obstructive CAD.      She presented with worsening shortness of breath, orthopnea, dyspnea on exertion, bilateral lower extremity swelling in the setting of pre-existing HCM and severe AS, likely secondary to mild congestive heart failure in the setting of a preserved ejection fraction. Chest x-ray similar to prior which showed evidence of mild pulmonary vascular congestion with trace pleural effusions, suggesting a mild heart failure exacerbation. Additionally +1 pitting edema was noted on physical exam initially.    Plan:  -Septal ablation today

## 2024-11-15 NOTE — ASSESSMENT & PLAN NOTE
Wt Readings from Last 3 Encounters:   11/15/24 86 kg (189 lb 9.5 oz)   10/08/24 89.8 kg (198 lb)   10/03/24 92.1 kg (203 lb)     Acute congestive heart failure with preserved fraction secondary to HCM.  Plan as noted above.

## 2024-11-15 NOTE — PLAN OF CARE
Problem: SAFETY ADULT  Goal: Patient will remain free of falls  Description: INTERVENTIONS:  - Educate patient/family on patient safety including physical limitations  - Instruct patient to call for assistance with activity   - Consult OT/PT to assist with strengthening/mobility   - Keep Call bell within reach  - Keep bed low and locked with side rails adjusted as appropriate  - Keep care items and personal belongings within reach  - Initiate and maintain comfort rounds  - Make Fall Risk Sign visible to staff  - Offer Toileting every 4 Hours, in advance of need  - Initiate/Maintain 4alarm  - Obtain necessary fall risk management equipment: 4  - Apply yellow socks and bracelet for high fall risk patients  - Consider moving patient to room near nurses station  Outcome: Progressing  Goal: Maintain or return to baseline ADL function  Description: INTERVENTIONS:  -  Assess patient's ability to carry out ADLs; assess patient's baseline for ADL function and identify physical deficits which impact ability to perform ADLs (bathing, care of mouth/teeth, toileting, grooming, dressing, etc.)  - Assess/evaluate cause of self-care deficits   - Assess range of motion  - Assess patient's mobility; develop plan if impaired  - Assess patient's need for assistive devices and provide as appropriate  - Encourage maximum independence but intervene and supervise when necessary  - Involve family in performance of ADLs  - Assess for home care needs following discharge   - Consider OT consult to assist with ADL evaluation and planning for discharge  - Provide patient education as appropriate  Outcome: Progressing  Goal: Maintains/Returns to pre admission functional level  Description: INTERVENTIONS:  - Perform AM-PAC 6 Click Basic Mobility/ Daily Activity assessment daily.  - Set and communicate daily mobility goal to care team and patient/family/caregiver.   - Collaborate with rehabilitation services on mobility goals if consulted  -  Perform Range of Motion 4 times a day.  - Reposition patient every 4 hours.  - Dangle patient 4 times a day  - Stand patient 4 times a day  - Ambulate patient 4 times a day  - Out of bed to chair 4 times a day   - Out of bed for meals 4 times a day  - Out of bed for toileting  - Record patient progress and toleration of activity level   Outcome: Progressing     Problem: DISCHARGE PLANNING  Goal: Discharge to home or other facility with appropriate resources  Description: INTERVENTIONS:  - Identify barriers to discharge w/patient and caregiver  - Arrange for needed discharge resources and transportation as appropriate  - Identify discharge learning needs (meds, wound care, etc.)  - Arrange for interpretive services to assist at discharge as needed  - Refer to Case Management Department for coordinating discharge planning if the patient needs post-hospital services based on physician/advanced practitioner order or complex needs related to functional status, cognitive ability, or social support system  Outcome: Progressing     Problem: Knowledge Deficit  Goal: Patient/family/caregiver demonstrates understanding of disease process, treatment plan, medications, and discharge instructions  Description: Complete learning assessment and assess knowledge base.  Interventions:  - Provide teaching at level of understanding  - Provide teaching via preferred learning methods  Outcome: Progressing     Problem: CARDIOVASCULAR - ADULT  Goal: Maintains optimal cardiac output and hemodynamic stability  Description: INTERVENTIONS:  - Monitor I/O, vital signs and rhythm  - Monitor for S/S and trends of decreased cardiac output  - Administer and titrate ordered vasoactive medications to optimize hemodynamic stability  - Assess quality of pulses, skin color and temperature  - Assess for signs of decreased coronary artery perfusion  - Instruct patient to report change in severity of symptoms  Outcome: Progressing  Goal: Absence of cardiac  dysrhythmias or at baseline rhythm  Description: INTERVENTIONS:  - Continuous cardiac monitoring, vital signs, obtain 12 lead EKG if ordered  - Administer antiarrhythmic and heart rate control medications as ordered  - Monitor electrolytes and administer replacement therapy as ordered  Outcome: Progressing     Problem: METABOLIC, FLUID AND ELECTROLYTES - ADULT  Goal: Electrolytes maintained within normal limits  Description: INTERVENTIONS:  - Monitor labs and assess patient for signs and symptoms of electrolyte imbalances  - Administer electrolyte replacement as ordered  - Monitor response to electrolyte replacements, including repeat lab results as appropriate  - Instruct patient on fluid and nutrition as appropriate  Outcome: Progressing  Goal: Fluid balance maintained  Description: INTERVENTIONS:  - Monitor labs   - Monitor I/O and WT  - Instruct patient on fluid and nutrition as appropriate  - Assess for signs & symptoms of volume excess or deficit  Outcome: Progressing  Goal: Glucose maintained within target range  Description: INTERVENTIONS:  - Monitor Blood Glucose as ordered  - Assess for signs and symptoms of hyperglycemia and hypoglycemia  - Administer ordered medications to maintain glucose within target range  - Assess nutritional intake and initiate nutrition service referral as needed  Outcome: Progressing

## 2024-11-16 ENCOUNTER — APPOINTMENT (INPATIENT)
Dept: NON INVASIVE DIAGNOSTICS | Facility: HOSPITAL | Age: 79
DRG: 286 | End: 2024-11-16
Payer: COMMERCIAL

## 2024-11-16 DIAGNOSIS — I42.2 HYPERTROPHIC CARDIOMYOPATHY (HCC): ICD-10-CM

## 2024-11-16 PROBLEM — C79.51 MALIGNANT NEOPLASM METASTATIC TO BONE (HCC): Status: ACTIVE | Noted: 2024-11-16

## 2024-11-16 LAB
ALBUMIN SERPL BCG-MCNC: 4.2 G/DL (ref 3.5–5)
ALP SERPL-CCNC: 65 U/L (ref 34–104)
ALT SERPL W P-5'-P-CCNC: 9 U/L (ref 7–52)
ANION GAP SERPL CALCULATED.3IONS-SCNC: 10 MMOL/L (ref 4–13)
ANION GAP SERPL CALCULATED.3IONS-SCNC: 9 MMOL/L (ref 4–13)
AST SERPL W P-5'-P-CCNC: 14 U/L (ref 13–39)
BASOPHILS # BLD AUTO: 0.01 THOUSANDS/ÂΜL (ref 0–0.1)
BASOPHILS NFR BLD AUTO: 0 % (ref 0–1)
BILIRUB SERPL-MCNC: 0.73 MG/DL (ref 0.2–1)
BUN SERPL-MCNC: 25 MG/DL (ref 5–25)
BUN SERPL-MCNC: 31 MG/DL (ref 5–25)
CALCIUM SERPL-MCNC: 9.3 MG/DL (ref 8.4–10.2)
CALCIUM SERPL-MCNC: 9.5 MG/DL (ref 8.4–10.2)
CANCER AG125 SERPL-ACNC: 9.8 U/ML (ref 0–35)
CHLORIDE SERPL-SCNC: 104 MMOL/L (ref 96–108)
CHLORIDE SERPL-SCNC: 105 MMOL/L (ref 96–108)
CO2 SERPL-SCNC: 26 MMOL/L (ref 21–32)
CO2 SERPL-SCNC: 26 MMOL/L (ref 21–32)
CREAT SERPL-MCNC: 0.82 MG/DL (ref 0.6–1.3)
CREAT SERPL-MCNC: 0.94 MG/DL (ref 0.6–1.3)
EOSINOPHIL # BLD AUTO: 0.04 THOUSAND/ÂΜL (ref 0–0.61)
EOSINOPHIL NFR BLD AUTO: 1 % (ref 0–6)
ERYTHROCYTE [DISTWIDTH] IN BLOOD BY AUTOMATED COUNT: 15.6 % (ref 11.6–15.1)
ERYTHROCYTE [DISTWIDTH] IN BLOOD BY AUTOMATED COUNT: 15.7 % (ref 11.6–15.1)
GFR SERPL CREATININE-BSD FRML MDRD: 57 ML/MIN/1.73SQ M
GFR SERPL CREATININE-BSD FRML MDRD: 68 ML/MIN/1.73SQ M
GLUCOSE SERPL-MCNC: 100 MG/DL (ref 65–140)
GLUCOSE SERPL-MCNC: 108 MG/DL (ref 65–140)
GLUCOSE SERPL-MCNC: 108 MG/DL (ref 65–140)
GLUCOSE SERPL-MCNC: 114 MG/DL (ref 65–140)
GLUCOSE SERPL-MCNC: 120 MG/DL (ref 65–140)
GLUCOSE SERPL-MCNC: 121 MG/DL (ref 65–140)
HCT VFR BLD AUTO: 36.5 % (ref 34.8–46.1)
HCT VFR BLD AUTO: 37.3 % (ref 34.8–46.1)
HGB BLD-MCNC: 11.4 G/DL (ref 11.5–15.4)
HGB BLD-MCNC: 11.7 G/DL (ref 11.5–15.4)
IMM GRANULOCYTES # BLD AUTO: 0 THOUSAND/UL (ref 0–0.2)
IMM GRANULOCYTES NFR BLD AUTO: 0 % (ref 0–2)
LDH SERPL-CCNC: 117 U/L (ref 140–271)
LDH SERPL-CCNC: 149 U/L (ref 140–271)
LYMPHOCYTES # BLD AUTO: 1.27 THOUSANDS/ÂΜL (ref 0.6–4.47)
LYMPHOCYTES NFR BLD AUTO: 41 % (ref 14–44)
MCH RBC QN AUTO: 29.2 PG (ref 26.8–34.3)
MCH RBC QN AUTO: 29.2 PG (ref 26.8–34.3)
MCHC RBC AUTO-ENTMCNC: 31.2 G/DL (ref 31.4–37.4)
MCHC RBC AUTO-ENTMCNC: 31.4 G/DL (ref 31.4–37.4)
MCV RBC AUTO: 93 FL (ref 82–98)
MCV RBC AUTO: 93 FL (ref 82–98)
MONOCYTES # BLD AUTO: 0.22 THOUSAND/ÂΜL (ref 0.17–1.22)
MONOCYTES NFR BLD AUTO: 7 % (ref 4–12)
NEUTROPHILS # BLD AUTO: 1.54 THOUSANDS/ÂΜL (ref 1.85–7.62)
NEUTS SEG NFR BLD AUTO: 51 % (ref 43–75)
NRBC BLD AUTO-RTO: 0 /100 WBCS
PLATELET # BLD AUTO: 108 THOUSANDS/UL (ref 149–390)
PLATELET # BLD AUTO: 110 THOUSANDS/UL (ref 149–390)
PMV BLD AUTO: 10.3 FL (ref 8.9–12.7)
PMV BLD AUTO: 9.7 FL (ref 8.9–12.7)
POTASSIUM SERPL-SCNC: 4.1 MMOL/L (ref 3.5–5.3)
POTASSIUM SERPL-SCNC: 4.4 MMOL/L (ref 3.5–5.3)
PROT SERPL-MCNC: 7.1 G/DL (ref 6.4–8.4)
RBC # BLD AUTO: 3.91 MILLION/UL (ref 3.81–5.12)
RBC # BLD AUTO: 4.01 MILLION/UL (ref 3.81–5.12)
SODIUM SERPL-SCNC: 140 MMOL/L (ref 135–147)
SODIUM SERPL-SCNC: 140 MMOL/L (ref 135–147)
WBC # BLD AUTO: 2.94 THOUSAND/UL (ref 4.31–10.16)
WBC # BLD AUTO: 3.08 THOUSAND/UL (ref 4.31–10.16)

## 2024-11-16 PROCEDURE — 99232 SBSQ HOSP IP/OBS MODERATE 35: CPT | Performed by: INTERNAL MEDICINE

## 2024-11-16 PROCEDURE — 99223 1ST HOSP IP/OBS HIGH 75: CPT | Performed by: INTERNAL MEDICINE

## 2024-11-16 PROCEDURE — 80048 BASIC METABOLIC PNL TOTAL CA: CPT

## 2024-11-16 PROCEDURE — 80053 COMPREHEN METABOLIC PANEL: CPT | Performed by: INTERNAL MEDICINE

## 2024-11-16 PROCEDURE — 84166 PROTEIN E-PHORESIS/URINE/CSF: CPT | Performed by: INTERNAL MEDICINE

## 2024-11-16 PROCEDURE — 82948 REAGENT STRIP/BLOOD GLUCOSE: CPT

## 2024-11-16 PROCEDURE — 86335 IMMUNFIX E-PHORSIS/URINE/CSF: CPT | Performed by: INTERNAL MEDICINE

## 2024-11-16 PROCEDURE — 84165 PROTEIN E-PHORESIS SERUM: CPT | Performed by: INTERNAL MEDICINE

## 2024-11-16 PROCEDURE — 82232 ASSAY OF BETA-2 PROTEIN: CPT | Performed by: INTERNAL MEDICINE

## 2024-11-16 PROCEDURE — 83615 LACTATE (LD) (LDH) ENZYME: CPT | Performed by: INTERNAL MEDICINE

## 2024-11-16 PROCEDURE — 83521 IG LIGHT CHAINS FREE EACH: CPT | Performed by: INTERNAL MEDICINE

## 2024-11-16 PROCEDURE — 86334 IMMUNOFIX E-PHORESIS SERUM: CPT | Performed by: INTERNAL MEDICINE

## 2024-11-16 PROCEDURE — 86304 IMMUNOASSAY TUMOR CA 125: CPT | Performed by: INTERNAL MEDICINE

## 2024-11-16 PROCEDURE — 86301 IMMUNOASSAY TUMOR CA 19-9: CPT | Performed by: INTERNAL MEDICINE

## 2024-11-16 PROCEDURE — 93880 EXTRACRANIAL BILAT STUDY: CPT

## 2024-11-16 PROCEDURE — 85025 COMPLETE CBC W/AUTO DIFF WBC: CPT | Performed by: INTERNAL MEDICINE

## 2024-11-16 PROCEDURE — 85027 COMPLETE CBC AUTOMATED: CPT

## 2024-11-16 RX ORDER — POTASSIUM CHLORIDE 1500 MG/1
40 TABLET, EXTENDED RELEASE ORAL DAILY
Status: DISCONTINUED | OUTPATIENT
Start: 2024-11-16 | End: 2024-11-18 | Stop reason: HOSPADM

## 2024-11-16 RX ORDER — LOSARTAN POTASSIUM 50 MG/1
50 TABLET ORAL DAILY
Status: DISCONTINUED | OUTPATIENT
Start: 2024-11-16 | End: 2024-11-18 | Stop reason: HOSPADM

## 2024-11-16 RX ADMIN — ENOXAPARIN SODIUM 40 MG: 40 INJECTION SUBCUTANEOUS at 09:37

## 2024-11-16 RX ADMIN — VERAPAMIL HYDROCHLORIDE 180 MG: 180 TABLET ORAL at 12:08

## 2024-11-16 RX ADMIN — VERAPAMIL HYDROCHLORIDE 180 MG: 180 TABLET ORAL at 20:00

## 2024-11-16 RX ADMIN — ATORVASTATIN CALCIUM 20 MG: 20 TABLET, FILM COATED ORAL at 16:45

## 2024-11-16 RX ADMIN — ALBUTEROL SULFATE 2 PUFF: 90 AEROSOL, METERED RESPIRATORY (INHALATION) at 16:45

## 2024-11-16 RX ADMIN — ATENOLOL 25 MG: 25 TABLET ORAL at 09:38

## 2024-11-16 RX ADMIN — LEVOTHYROXINE SODIUM 150 MCG: 75 TABLET ORAL at 05:39

## 2024-11-16 RX ADMIN — LOSARTAN POTASSIUM 50 MG: 50 TABLET, FILM COATED ORAL at 09:38

## 2024-11-16 RX ADMIN — FUROSEMIDE 20 MG: 20 TABLET ORAL at 09:38

## 2024-11-16 RX ADMIN — POTASSIUM CHLORIDE 40 MEQ: 1500 TABLET, EXTENDED RELEASE ORAL at 09:38

## 2024-11-16 RX ADMIN — ALBUTEROL SULFATE 2 PUFF: 90 AEROSOL, METERED RESPIRATORY (INHALATION) at 05:34

## 2024-11-16 RX ADMIN — ASPIRIN 81 MG: 81 TABLET, COATED ORAL at 09:38

## 2024-11-16 NOTE — ASSESSMENT & PLAN NOTE
On preop imaging for CT surgery evaluation for SAVR/septal myectomy CT chest without contrast revealed findings compatible with malignancy with multiple lytic metastases in the bone, 7 mm right upper lobe nodule, 2.9 cm left lower lobe nodule, slightly thickened nodular pericardium with pericardial effusion. New right cardiophrenic node, and gastrohepatic   ligament nodes. Note that a T9 vertebral metastasis has disrupted the posterior cortex.    Previous MRI of the abdomen from 10/14 with multilobulated/multiseptated pancreatic cysts, hepatosplenomegaly with portal hepatic/periportal lymph nodes and mildly enlarged mesenteric lymph nodes with some concern for lymphoma given splenomegaly and overall findings.    Patient has a remote history of thyroid cancer    Plan:  LDH, SPEP, UPEP, , and CA 19-9 ordered.  Will keep patient today to obtain initial evaluation by hematology/oncology.  Appreciate their recommendations to determine further workup.

## 2024-11-16 NOTE — PROGRESS NOTES
Progress Note - Internal Medicine   Name: Nadeem Ashton 79 y.o. female I MRN: [id number]  Unit/Bed#: -01 I Date of Admission: 11/11/2024   Date of Service: 11/16/2024 I Hospital Day: 5     Assessment & Plan  Hypertensive urgency  Patient with history of hypertension on multiple antihypertensive medications in the past including amlodipine, HCTZ, losartan, atenolol, verapamil.  Home regimen includes losartan 100 mg, verapamil 180 mg twice daily, atenolol 25 mg, which was ultimately decided upon given a new diagnosis of HCM.  Review of medical documentation shows the patient has had difficulty controlling blood pressure following shift away from HCTZ and amlodipine especially in the evening. Presents without symptoms of hypertensive emergency, but with systolic over 200.     Plan:  -Continue home antihypertensive medications with losartan reduced to 50 mg  -Continue monitoring vitals    Hypertrophic cardiomyopathy (HCC)  For diagnosis of HCM in August of this year following stress TTE that showed evidence of severe asymmetric hypertrophy of the septal wall, EF 70%, grade 1 diastolic dysfunction, LVOT obstruction, and moderate AV stenosis.  The patient underwent further cardiac workup with ENRICO on 10/24 in attempt to better delineate the extent of her valvular aortic stenosis versus HCM and for further assessment of the structure of her AV.  This showed evidence of severely increased wall thickness of the left ventricle, severe stenosis of the AV.  Additionally she also underwent left heart catheterization that showed no evidence of significant obstructive CAD.      She presented with worsening shortness of breath, orthopnea, dyspnea on exertion, bilateral lower extremity swelling in the setting of pre-existing HCM and severe AS, likely secondary to mild congestive heart failure in the setting of a preserved ejection fraction. Chest x-ray similar to prior which showed evidence of mild pulmonary vascular congestion  with trace pleural effusions, suggesting a mild heart failure exacerbation. Additionally +1 pitting edema was noted on physical exam initially.    Plan:  -Recommended outpatient follow-up for CT surgery evaluation for SAVR/septal myectomy  Acute on chronic diastolic congestive heart failure (HCC)  Wt Readings from Last 3 Encounters:   11/16/24 86.4 kg (190 lb 7.6 oz)   10/08/24 89.8 kg (198 lb)   10/03/24 92.1 kg (203 lb)     Acute congestive heart failure with preserved fraction secondary to HCM.  Plan as noted above.    Stage 3a chronic kidney disease (HCC)  Lab Results   Component Value Date    EGFR 68 11/16/2024    EGFR 55 11/15/2024    EGFR 53 11/14/2024    CREATININE 0.82 11/16/2024    CREATININE 0.98 11/15/2024    CREATININE 1.01 11/14/2024     Patient has baseline creatinine around 0.8-0.9, with GFR suggests CKD2-3A.  Continue to monitor creatinine function and patient with BMPs.    Restrictive lung disease  Patient is previously followed with pulmonology due to restrictive lung disease, possibly due to remote history of sarcoidosis.  Upon review of medical documentation it appears that she has not seen a pulmonologist in quite some time, and was looking to establish new pulmonologist with regards to her asthma.  Reportedly in 2020 the patient had very mild restriction based off her PFTs.  Continue home albuterol as needed.    Mild persistent asthma without complication  Patient has history of mild persistent asthma, with home albuterol as needed.  Continue albuterol PRN inpatient.    Dyslipidemia  Continue home atorvastatin 20 mg.    Essential hypertension  Plan for HTN as noted above under hypertensive urgency.  RAFI (acute kidney injury) (HCC)  Resolved  CAD (coronary artery disease)    Nonrheumatic aortic valve stenosis    Shortness of breath    Postoperative hypothyroidism    Fatty liver    BMI 36.0-36.9,adult    Sleep apnea    Thrombocytopenia (HCC)    Malignant neoplasm metastatic to bone (HCC)  On  preop imaging for CT surgery evaluation for SAVR/septal myectomy CT chest without contrast revealed findings compatible with malignancy with multiple lytic metastases in the bone, 7 mm right upper lobe nodule, 2.9 cm left lower lobe nodule, slightly thickened nodular pericardium with pericardial effusion. New right cardiophrenic node, and gastrohepatic   ligament nodes. Note that a T9 vertebral metastasis has disrupted the posterior cortex.    Previous MRI of the abdomen from 10/14 with multilobulated/multiseptated pancreatic cysts, hepatosplenomegaly with portal hepatic/periportal lymph nodes and mildly enlarged mesenteric lymph nodes with some concern for lymphoma given splenomegaly and overall findings.    Patient has a remote history of thyroid cancer    Plan:  LDH, SPEP, UPEP, , and CA 19-9 ordered.  Will keep patient today to obtain initial evaluation by hematology/oncology.  Appreciate their recommendations to determine further workup.      Disposition: Pending oncologic evaluation    Team: SOD TEAM ZORAN    Subjective   Patient seen and examined. No acute events overnight. No complaints at this time.  A little disappointed that she was unable to have a septal ablation.    Objective :  Temp:  [97.6 °F (36.4 °C)-98.2 °F (36.8 °C)] 98.2 °F (36.8 °C)  HR:  [58-84] 69  BP: (122-187)/() 132/53  Resp:  [17-18] 17  SpO2:  [90 %-95 %] 94 %  O2 Device: None (Room air)    I/O         11/12 0701  11/13 0700 11/13 0701  11/14 0700 11/14 0701  11/15 0700    P.O. 1438 1670     Total Intake(mL/kg) 1438 (16.3) 1670 (19.1)     Urine (mL/kg/hr) 1320 (0.6) 3000 (1.4)     Total Output 1320 3000     Net +118 -1330                  Weights:   IBW (Ideal Body Weight): 50.1 kg    Body mass index is 34.84 kg/m².  Weight (last 2 days)       Date/Time Weight    11/16/24 0535 86.4 (190.48)    11/15/24 1101 86 (189.6)    11/15/24 1010 86 (189.6)    11/15/24 0600 86 (189.6)    11/14/24 0556 87.4 (192.68)    11/14/24 0520 87.4  (192.68)            Physical Exam  Vitals and nursing note reviewed.   Constitutional:       General: She is not in acute distress.     Appearance: She is well-developed.   HENT:      Head: Normocephalic and atraumatic.   Eyes:      Conjunctiva/sclera: Conjunctivae normal.   Cardiovascular:      Rate and Rhythm: Normal rate and regular rhythm.      Heart sounds: Murmur heard.      Systolic murmur is present.   Pulmonary:      Effort: Pulmonary effort is normal. No respiratory distress.      Breath sounds: Normal breath sounds.   Musculoskeletal:      Cervical back: Neck supple.      Right lower leg: No edema.      Left lower leg: No edema.   Skin:     General: Skin is warm and dry.      Capillary Refill: Capillary refill takes less than 2 seconds.   Neurological:      Mental Status: She is alert.   Psychiatric:         Mood and Affect: Mood normal.           Lab Results: I have reviewed the following results:  Recent Labs     11/16/24  0533   WBC 2.94*   HGB 11.7   HCT 37.3   *   SODIUM 140   K 4.1      CO2 26   BUN 25   CREATININE 0.82   GLUC 120             Currently Ordered Meds:   Current Facility-Administered Medications:     albuterol (PROVENTIL HFA,VENTOLIN HFA) inhaler 2 puff, Q4H PRN    aspirin (ECOTRIN LOW STRENGTH) EC tablet 81 mg, Daily    atenolol (TENORMIN) tablet 25 mg, Daily    atorvastatin (LIPITOR) tablet 20 mg, Daily With Dinner    enoxaparin (LOVENOX) subcutaneous injection 40 mg, Daily    furosemide (LASIX) tablet 20 mg, Daily    insulin lispro (HumALOG/ADMELOG) 100 units/mL subcutaneous injection 2-12 Units, TID AC **AND** Fingerstick Glucose (POCT), TID AC    levothyroxine tablet 150 mcg, Early Morning    losartan (COZAAR) tablet 50 mg, Daily    potassium chloride (Klor-Con M20) CR tablet 40 mEq, Daily    verapamil (CALAN-SR) CR tablet 180 mg, BID  VTE Pharmacologic Prophylaxis: Reason for no pharmacologic prophylaxis prior to procedure  VTE Mechanical Prophylaxis: sequential  compression device    Administrative Statements     Portions of the record may have been created with voice recognition software.

## 2024-11-16 NOTE — CONSULTS
Oncology Consult Note  Nadeem Ashton 79 y.o. female MRN: [id number]  Unit/Bed#: -01 Encounter: [id number]      Reason for Consult    2.9x2.2 cm LLL paraspinal nodule    Multiple lytic lesions    No biopsy to date; needs to be ordered             Assessment and Plan:    Nadeem Ashton is a 79F with history of newly diagnosed HCM, hypertension, dyslipidemia, mild persistent asthma, CAD status post stenting, restrictive lung disease, CKD 2 who presents due to worsening shortness of breath in the setting of elevated blood pressures.        2.9 x 2.2 cm smoothly marginated low-attenuation left lower lobe paraspinal nodule, new since 2018 (3/177). 7 mm smoothly marginated anterior right upper lobe nodule (3/66    Lytic lesions in the CT 3, 4, and T9 vertebral bodies with disruption of the posterior cortex of T9, additional lucencies in the skeleton may be metastases versus hemangiomas.    Labs with WBC 2.9 MCV 93 Hgb 11.7 plts 110 Na 140 Cr 0.82, K 4.1    Consistently leukopenic since March with WBC 3.34 now 2.94       Last diff with normal complement segs, lymphs but 11/13/2024 WBC also 3.34 , plts 108    Thrombocytopenia may be related to splenomegaly ; plts since March 2024 low at 116, 108, 98 11/1/22024; 110 11/16/2024    No LDH, no LFTs, no differential       Plan/Recommendations;    Will need biopsy left paraspinal mass or one of lytic lesions seen on noncontrast CT     Would get MRI C/T spine as well as this should elucidate the LLL paraspinal nodule better    Check SPEP, FLC, immunofixation, B2M, LDH  to start    Depending on results, may need marrow but will hold on that for now as need reason to do one              History of Presenting Illness:    On admission 11/11/2024    Nadeem Ashton is a 79F with history of newly diagnosed HCM, hypertension, dyslipidemia, mild persistent asthma, CAD status post stenting, restrictive lung disease, CKD 2 who presents due to worsening shortness of breath in the setting  of elevated blood pressures.  The patient has reportedly been having  orthopnea, dyspnea on exertion, and leg swelling for 1 to 2 weeks.  She went to see Dr. England, her PCP, as a result of the symptoms and in the office had a blood pressure of over 200 systolic and was subsequently sent to the emergency department.  She otherwise denies chest pain, fever/chills, abdominal pain, and/or nausea/vomiting,     In the ED she presented with BP of 217/81, with vitals that were otherwise within normal limits, physical exam was significant for bilateral lower extremity edema.  Labs were significant for hemoglobin of 10.9, white blood cell count of 3.51, BMP that was unremarkable, tropes that were unremarkable, and a BNP of 268 without prior to compare it to.  Chest x-ray was taken, without official read, however it does not look grossly dissimilar from prior.  The patient took her home verapamil 360 mg while waiting for care in the ED and was also treated with 10 mg of IV Lasix after being evaluated by ED staff.  Her blood pressure has since dropped to the 130s to 140s systolic over 60s diastolic.     The patient was recently diagnosed with HCM, for which she has been symptomatic as a result and has had reduced exercise tolerance and fatigue/dyspnea at baseline.  Recent workup with ENRICO and left heart cath in early 10/2024, with left heart cath showing no significant obstructive CAD and with ENRICO showing evidence of severely thickened left ventricle with severe asymmetric hypertrophy of the basal septal wall, EF 70%, diastolic dysfunction grade 1, left atrial dilation, and severe AV stenosis.  Recently the patient has had difficulty controlling her blood pressure, with BP spiking in the evening in the 170s over 70s, following transition from previous antihypertensive regimen which included amlodipine, triamterene-HCTZ to verapamil, atenolol, losartan due to contraindications with HCM.       States life long nonsmoker, no  chemical exposures.  Most issue related to cardiac.  Brother with lymphoma, paternal aunt with AML, no other familial cancers.  She has personal history of papillary thyroid cancer s/p lobectomy.  Had US scheduled of remaining thyroid as OP.      Had MRI abd/MRCP 1014/2024  1. Several multilobulated/multiseptated pancreatic cysts measuring up to 4.2 cm in the uncinate. No solid enhancing components or main duct dilatation. For cyst(s) 2 cm or greater recommend gastroenterology and/or surgical oncology consult. EUS may now   be warranted.     2. T2 hyperintense 1 cm hepatic dome lesion, not well seen on postcontrast sequences, possibly due to motion artifact, presumably a cyst or hemangioma. Scattered additional foci of arterial hyperenhancement in the liver, which may also represent small   hemangiomas or arterioportal shunts. Consider follow-up MRI with and without contrast in 3 months to ensure stability.     3. Hepatosplenomegaly. Mildly enlarged yash hepatic/periportal lymph nodes of uncertain etiology.     4. Haziness of the root of mesentery with several mildly enlarged mesenteric lymph nodes. Although most commonly this represents mesenteric panniculitis, given mild periportal lymphadenopathy and splenomegaly, consider the remote possibility of lymphoma.       However CT chest only without contrast 11/15/2024      LUNGS: 2.9 x 2.2 cm smoothly marginated low-attenuation left lower lobe paraspinal nodule, new since 2018 (3/177). 7 mm smoothly marginated anterior right upper lobe nodule (3/66).     AIRWAYS: No significant filling defects.     PLEURA:  Unremarkable.     HEART/GREAT VESSELS: Mild cardiomegaly. Moderate coronary artery calcification indicating atherosclerotic heart disease. Mild ectasia of the ascending aorta at 4.2 cm with dense calcification of the aortic valve leaflets with history of aortic stenosis.     Mild calcification in the wall of the ascending aorta. New trace pericardial effusion with  mild thickening and slight nodularity of the pericardium. New right cardiophrenic node with a short axis of 8 mm (2/76).     MEDIASTINUM AND JENNY: Calcified anterior mediastinal node.     CHEST WALL AND LOWER NECK: Unremarkable.     UPPER ABDOMEN: Mildly enlarged gastrohepatic ligament nodes, new from 2018. Hepatic cyst. 1.3 cm right adrenal adenoma.     Splenomegaly at 16 cm. Fatty atrophy of the pancreas. 1.8 cm cystic lesion in the body of the pancreas, grossly stable since 2018   (2/116). Partially imaged 1.9 cm low-attenuation lesion adjacent to the head of the pancreas, stable (2/128).     OSSEOUS STRUCTURES: Lytic lesions in the CT 3, 4, and T9 vertebral bodies with disruption of the posterior cortex of T9, additional lucencies in the skeleton may be metastases versus hemangiomas.     IMPRESSION:     Mild ectasia of the ascending aorta at 4.2 cm with mild calcification of the wall of the ascending aorta.     Findings compatible with malignancy with multiple lytic metastases in the bone, 7 mm right upper lobe nodule, 2.9 cm left lower lobe nodule, slightly thickened nodular pericardium with pericardial effusion. New right cardiophrenic node, and gastrohepatic   ligament nodes. Note that a T9 vertebral metastasis has disrupted the posterior cortex.     Splenomegaly.     Cystic lesions in the pancreas, see MRI 10/14/2024.         Review of Systems - As stated in the HPI otherwise the fourteen point review of systems was negative.    ECOG PS:3    Past Medical History:   Diagnosis Date    Abnormal ultrasound of breast     CAD (coronary artery disease)     Cancer (HCC)     thyroid    Cataract     H/O heart artery stent     History of sarcoidosis     Hyperlipidemia     Hypertension     Sleep apnea        Social History     Socioeconomic History    Marital status: /Civil Union     Spouse name: None    Number of children: None    Years of education: None    Highest education level: None   Occupational History     None   Tobacco Use    Smoking status: Never     Passive exposure: Never    Smokeless tobacco: Never   Vaping Use    Vaping status: Never Used   Substance and Sexual Activity    Alcohol use: Not Currently    Drug use: Never    Sexual activity: None   Other Topics Concern    None   Social History Narrative    None     Social Drivers of Health     Financial Resource Strain: Not on file   Food Insecurity: No Food Insecurity (11/12/2024)    Hunger Vital Sign     Worried About Running Out of Food in the Last Year: Never true     Ran Out of Food in the Last Year: Never true   Transportation Needs: No Transportation Needs (11/12/2024)    PRAPARE - Transportation     Lack of Transportation (Medical): No     Lack of Transportation (Non-Medical): No   Physical Activity: Not on file   Stress: Not on file   Social Connections: Not on file   Intimate Partner Violence: Unknown (11/12/2024)    Nursing IPS     Feels Physically and Emotionally Safe: Not on file     Physically Hurt by Someone: Not on file     Humiliated or Emotionally Abused by Someone: Not on file     Physically Hurt by Someone: 2     Hurt or Threatened by Someone: 2   Housing Stability: Low Risk  (11/12/2024)    Housing Stability Vital Sign     Unable to Pay for Housing in the Last Year: No     Number of Times Moved in the Last Year: 0     Homeless in the Last Year: No       Family History   Problem Relation Age of Onset    No Known Problems Mother     Thyroid cancer Father     No Known Problems Sister     No Known Problems Daughter     Cancer Maternal Grandmother         ear    No Known Problems Maternal Grandfather     No Known Problems Paternal Grandmother     No Known Problems Paternal Grandfather     No Known Problems Maternal Aunt     No Known Problems Maternal Aunt     Leukemia Paternal Aunt        No Known Allergies      Current Facility-Administered Medications:     albuterol (PROVENTIL HFA,VENTOLIN HFA) inhaler 2 puff, 2 puff, Inhalation, Q4H PRN,  "Latosha Sanchezek, CRNP, 2 puff at 11/16/24 0534    aspirin (ECOTRIN LOW STRENGTH) EC tablet 81 mg, 81 mg, Oral, Daily, Latosha Grecsek, CRNP, 81 mg at 11/16/24 0938    atenolol (TENORMIN) tablet 25 mg, 25 mg, Oral, Daily, Latosha Juancsek, CRNP, 25 mg at 11/16/24 0938    atorvastatin (LIPITOR) tablet 20 mg, 20 mg, Oral, Daily With Dinner, Latosha Juancsek, CRNP, 20 mg at 11/15/24 1700    enoxaparin (LOVENOX) subcutaneous injection 40 mg, 40 mg, Subcutaneous, Daily, Latosha Juancsek, CRNP, 40 mg at 11/16/24 0937    furosemide (LASIX) tablet 20 mg, 20 mg, Oral, Daily, Osvaldo Brown MD, 20 mg at 11/16/24 0938    insulin lispro (HumALOG/ADMELOG) 100 units/mL subcutaneous injection 2-12 Units, 2-12 Units, Subcutaneous, TID AC **AND** Fingerstick Glucose (POCT), , , TID AC, Latosha Juancsek, CRNP    levothyroxine tablet 150 mcg, 150 mcg, Oral, Early Morning, Latosha Grecsek, CRNP, 150 mcg at 11/16/24 0539    losartan (COZAAR) tablet 50 mg, 50 mg, Oral, Daily, Susan Panchal MD, 50 mg at 11/16/24 0938    potassium chloride (Klor-Con M20) CR tablet 40 mEq, 40 mEq, Oral, Daily, Susan Panchal MD, 40 mEq at 11/16/24 0938    verapamil (CALAN-SR) CR tablet 180 mg, 180 mg, Oral, BID, Latosha Juancsek, CRNP, 180 mg at 11/16/24 1208      /53   Pulse 69   Temp 98.2 °F (36.8 °C)   Resp 17   Ht 5' 2\" (1.575 m)   Wt 86.4 kg (190 lb 7.6 oz)   SpO2 94%   BMI 34.84 kg/m²     General Appearance:    Alert, oriented        Eyes:    PERRL   Ears:    Normal external ear canals, both ears   Nose:   Nares normal, septum midline   Throat:   Mucosa moist. Pharynx without injection.    Neck:   Supple       Lungs:     Clear to auscultation bilaterally   Chest Wall:    No tenderness or deformity    Heart:    Regular rate and rhythm       Abdomen:     Soft, non-tender, bowel sounds +, no organomegaly           Extremities:   Extremities no cyanosis or edema       Skin:   no rash or icterus.    Lymph nodes:   Cervical, " supraclavicular, and axillary nodes normal   Neurologic:   CNII-XII intact, normal strength, sensation and reflexes     Throughout               Recent Results (from the past 48 hours)   Fingerstick Glucose (POCT)    Collection Time: 11/14/24  5:08 PM   Result Value Ref Range    POC Glucose 99 65 - 140 mg/dl   Basic metabolic panel    Collection Time: 11/15/24  4:55 AM   Result Value Ref Range    Sodium 139 135 - 147 mmol/L    Potassium 4.0 3.5 - 5.3 mmol/L    Chloride 103 96 - 108 mmol/L    CO2 29 21 - 32 mmol/L    ANION GAP 7 4 - 13 mmol/L    BUN 33 (H) 5 - 25 mg/dL    Creatinine 0.98 0.60 - 1.30 mg/dL    Glucose 113 65 - 140 mg/dL    Calcium 9.1 8.4 - 10.2 mg/dL    eGFR 55 ml/min/1.73sq m   CBC and Platelet    Collection Time: 11/15/24  4:55 AM   Result Value Ref Range    WBC 3.26 (L) 4.31 - 10.16 Thousand/uL    RBC 3.78 (L) 3.81 - 5.12 Million/uL    Hemoglobin 11.3 (L) 11.5 - 15.4 g/dL    Hematocrit 35.1 34.8 - 46.1 %    MCV 93 82 - 98 fL    MCH 29.9 26.8 - 34.3 pg    MCHC 32.2 31.4 - 37.4 g/dL    RDW 15.7 (H) 11.6 - 15.1 %    Platelets 115 (L) 149 - 390 Thousands/uL    MPV 10.1 8.9 - 12.7 fL   Fingerstick Glucose (POCT)    Collection Time: 11/15/24  7:26 AM   Result Value Ref Range    POC Glucose 120 65 - 140 mg/dl   Echo follow up/limited w/ contrast if indicated    Collection Time: 11/15/24 10:36 AM   Result Value Ref Range    BSA 1.87 m2   Fingerstick Glucose (POCT)    Collection Time: 11/15/24 12:13 PM   Result Value Ref Range    POC Glucose 89 65 - 140 mg/dl   Fingerstick Glucose (POCT)    Collection Time: 11/15/24  4:14 PM   Result Value Ref Range    POC Glucose 117 65 - 140 mg/dl   Basic metabolic panel    Collection Time: 11/16/24  5:33 AM   Result Value Ref Range    Sodium 140 135 - 147 mmol/L    Potassium 4.1 3.5 - 5.3 mmol/L    Chloride 105 96 - 108 mmol/L    CO2 26 21 - 32 mmol/L    ANION GAP 9 4 - 13 mmol/L    BUN 25 5 - 25 mg/dL    Creatinine 0.82 0.60 - 1.30 mg/dL    Glucose 120 65 - 140 mg/dL     Calcium 9.3 8.4 - 10.2 mg/dL    eGFR 68 ml/min/1.73sq m   CBC and Platelet    Collection Time: 11/16/24  5:33 AM   Result Value Ref Range    WBC 2.94 (L) 4.31 - 10.16 Thousand/uL    RBC 4.01 3.81 - 5.12 Million/uL    Hemoglobin 11.7 11.5 - 15.4 g/dL    Hematocrit 37.3 34.8 - 46.1 %    MCV 93 82 - 98 fL    MCH 29.2 26.8 - 34.3 pg    MCHC 31.4 31.4 - 37.4 g/dL    RDW 15.7 (H) 11.6 - 15.1 %    Platelets 110 (L) 149 - 390 Thousands/uL    MPV 9.7 8.9 - 12.7 fL   Fingerstick Glucose (POCT)    Collection Time: 11/16/24  7:46 AM   Result Value Ref Range    POC Glucose 121 65 - 140 mg/dl   Fingerstick Glucose (POCT)    Collection Time: 11/16/24 12:04 PM   Result Value Ref Range    POC Glucose 100 65 - 140 mg/dl         CT chest wo contrast  Result Date: 11/16/2024  Narrative: CT CHEST WITHOUT IV CONTRAST INDICATION:   FOX/SOB, AS,HOCM, preop cardiac surgery. History of thyroid cancer. COMPARISON: CXR 11/11/2024, abdomen CT 9/11/2018. TECHNIQUE: Chest CT without intravenous contrast.  Axial, sagittal, coronal 2D reformats and coronal MIPS from source data. Radiation dose length product (DLP):  659 mGy-cm . Radiation dose exposure minimized using iterative reconstruction and automated exposure control. FINDINGS: LUNGS: 2.9 x 2.2 cm smoothly marginated low-attenuation left lower lobe paraspinal nodule, new since 2018 (3/177). 7 mm smoothly marginated anterior right upper lobe nodule (3/66). AIRWAYS: No significant filling defects. PLEURA:  Unremarkable. HEART/GREAT VESSELS: Mild cardiomegaly. Moderate coronary artery calcification indicating atherosclerotic heart disease. Mild ectasia of the ascending aorta at 4.2 cm with dense calcification of the aortic valve leaflets with history of aortic stenosis. Mild calcification in the wall of the ascending aorta. New trace pericardial effusion with mild thickening and slight nodularity of the pericardium. New right cardiophrenic node with a short axis of 8 mm (2/76). MEDIASTINUM AND  JENNY: Calcified anterior mediastinal node. CHEST WALL AND LOWER NECK: Unremarkable. UPPER ABDOMEN: Mildly enlarged gastrohepatic ligament nodes, new from 2018. Hepatic cyst. 1.3 cm right adrenal adenoma. Splenomegaly at 16 cm. Fatty atrophy of the pancreas. 1.8 cm cystic lesion in the body of the pancreas, grossly stable since 2018 (2/116). Partially imaged 1.9 cm low-attenuation lesion adjacent to the head of the pancreas, stable (2/128). OSSEOUS STRUCTURES: Lytic lesions in the CT 3, 4, and T9 vertebral bodies with disruption of the posterior cortex of T9, additional lucencies in the skeleton may be metastases versus hemangiomas.     Impression: Mild ectasia of the ascending aorta at 4.2 cm with mild calcification of the wall of the ascending aorta. Findings compatible with malignancy with multiple lytic metastases in the bone, 7 mm right upper lobe nodule, 2.9 cm left lower lobe nodule, slightly thickened nodular pericardium with pericardial effusion. New right cardiophrenic node, and gastrohepatic  ligament nodes. Note that a T9 vertebral metastasis has disrupted the posterior cortex. Splenomegaly. Cystic lesions in the pancreas, see MRI 10/14/2024. I personally discussed this study with Dr. Tyrell Orellana on 11/16/2024 10:29 AM. Workstation performed: [id number]     Cardiac catheterization  Result Date: 11/15/2024  Narrative: Screw-in temporary pacing wire placed and fixed to generator. Removed at conclusion of the procedure. Normal LVEDP (15 mmHg). No significant LVOT gradient identified. 50 mmHg peak-peak aortic valve gradient with CHF. No indication for alcohol septal ablation.     Echo follow up/limited w/ contrast if indicated  Result Date: 11/12/2024  Narrative:   Left Ventricle: Left ventricular cavity size is normal. Wall thickness is severely increased. There is severe asymmetric hypertrophy of the basal septal wall. The left ventricular ejection fraction is 70% by visual estimation. Systolic function  is hyperdynamic. Wall motion is normal. Diastolic function is mildly abnormal, consistent with grade I (abnormal) relaxation. There is dynamic outflow tract obstruction with a resting gradient of 34.0 mmHg, which increases to a peak gradient of 47.0 mmHg with valsalva.   Right Ventricle: Right ventricular cavity size is normal. Systolic function is normal.   Left Atrium: The atrium is mildly dilated.   Aortic Valve: The aortic valve is trileaflet. The leaflets are severely thickened. The leaflets are severely calcified. There is severely reduced mobility. There is mild regurgitation. There is moderate to severe stenosis. The aortic valve peak velocity is 3.8 m/s. The aortic valve mean gradient is 32.0 mmHg. The aortic valve area is 1.02 cm2.   Mitral Valve: There is mild annular calcification. There is mild regurgitation. There is no systolic anterior motion.   Pericardium: There is a small pericardial effusion posterior to the heart. The fluid exhibits no internal echoes. There is no echocardiographic evidence of tamponade. The evidence against tamponade includes: no right ventricular diastolic collapse, no right atrial inversion and no respiratory variation.     XR chest 1 view portable  Result Date: 11/12/2024  Narrative: XR CHEST PORTABLE INDICATION: chf. COMPARISON: CXR 11/05/2024, chest CT 9/11/2018. FINDINGS: Mild pulmonary venous congestion. Trace pleural effusions. Mild cardiomegaly. Bones are unremarkable for age. Normal upper abdomen.     Impression: Mild pulmonary venous congestion with trace pleural effusions. Workstation performed: [id number]     XR chest pa and lateral  Result Date: 11/6/2024  Narrative: XR CHEST PA AND LATERAL INDICATION: R06.02: Shortness of breath. COMPARISON: 5/11/2020 FINDINGS: Clear lungs. No pneumothorax or pleural effusion. Cardiac silhouette top normal in size. Mild pulmonary vascular congestion. Age-related degenerative changes in the spine. Normal upper abdomen.      Impression: Mild pulmonary vascular congestion. Otherwise, no acute abnormality Workstation performed: [id number]         I spent 40 minutes on chart review, direct face to face counseling, coordination of care and documentation.     Marion Fraire MD PhD

## 2024-11-16 NOTE — PROGRESS NOTES
Heart Failure/ Pulmonary Hypertension Progress Note - Nadeem Ashton 79 y.o. female MRN: [id number]    Unit/Bed#: -01 Encounter: [id number]      Assessment/Plan:    79-year-old female with hypertrophic obstructive cardiomyopathy, hypertension, CAD status post stent to the LAD, obstructive sleep apnea, hyperlipidemia, diabetes who presents with leg swelling, shortness of breath and orthopnea for 2 weeks due to decompensated heart failure.     # Acute heart failure with preserved EF  Likely due to HOCM, uncontrolled blood pressure and severe AS     # Hypertrophic obstructive cardiomyopathy  With concomitant severe AS     Studies- personally reviewed by [facility] 11/15/24:  Normal LVEDP (15 mmHg).  No significant LVOT gradient identified. 50 mmHg peak-peak aortic valve gradient with CHF.  No indication for alcohol septal ablation.      [facility] 10/8/24  Diffuse moderate disease in all coronaries  3 septal branches seen  Apical ventricular mean gradient 25-28 mmHg  Mid ventricular mean gradient as high as 45 mmHg with post PVC increase in gradient, consistent with HOCM  Negligble LVOT gradient; no signifcant Peak to Peak Gradient on LV-AO pullback     [facility] 10/3/24  LVEF 70%  Dynamic outflow obstruction at rest  Severe aortic valve stenosis  CEASAR of the chordal apparatus with late peaking gradient   Mild MR     Stress echo 8/12/24  Severe asymmetric hypertrophy of the septal wall  LVEF 70%  LVOT dynamic obstruction at rest with peak gradient of 53mmHg and 67 mmHg with Valsalva  Moderate aortic valve stenosis, mean gradient 26mmHg, DVI 0.24. JETHRO 1.39cm2  There is CEASAR of the chordal apparatus with lake peaking gradient  Normal wall motion at peak stress  Normal RV size and systolic function     Volume management:  --Home Diuretic: none  --Inpatient diuretic: given furosemide 20mg IV x 3 11/14/24; transitioned to oral lasix 20mg daily 11/16/24     Evidence-based therapy:  --SGLT2 Inhibitor:  --Aldosterone Receptor  "Blocker:  --ARNi:     HOCM therapy:  Verapamil 180mg BID  Atenolol 25mg daily     # Aortic stenosis  Severe on [facility] 11/15/24     # Hypertension  Severely elevated blood pressure on presentation with SBP in the 200s  Overall improved with diuresis     # CAD s/p stent to LAD  PCI to LAD in 2008. No recurrent angina.   Continue aspirin, statin     # Hyperlipidemia  Continue home atorvastatin 20mg.      # FOUZIA on CPAP     # DM 2  HbA1c 6.5 9/25/24, per primary     # Hypothyroidism  TSH normal 9/2024, per PCP     # CKD 3  Baseline creastinine 0.8-.0.9, 0.82 today     # Hx of sarcoidosis  Diagnosed with sarcoidosis >30 years ago.   #mild restrictive lung disease: on PFTS, scheduled to see pulmonary as outpatient     Today's Plan:  Euvolemic on exam  Continue oral lasix 20mg daily  CTS consultation for surgical AVR/myectomy - recommended medical optimization and follow up  Will reduced losartan to 50mg daily, did not receive dose yesterday and normotensive before meds today. BP overall improved with diuresis  Continue verapamil and atenolol  Ok for discharge from cardiology standpoint. She will need close outpatient follow up, will schedule follow up early next week      Subjective:   Patient seen and examined.  No significant events overnight.    Had some shortness of breath this AM improved with inhaler    Review of Systems   Constitutional:  Negative for chills and fever.   Respiratory:  Negative for shortness of breath.    Gastrointestinal:  Negative for abdominal distention, nausea and vomiting.   Neurological:  Negative for dizziness and light-headedness.        Objective:   Intake/ Output: 120?/1850  Weight: 190 from 189 lbs  Tele: sinus rhythm    Vitals: Blood pressure 132/53, pulse 69, temperature 98.2 °F (36.8 °C), resp. rate 17, height 5' 2\" (1.575 m), weight 86.4 kg (190 lb 7.6 oz), SpO2 94%., Body mass index is 34.84 kg/m²., I/O last 3 completed shifts:  In: 360 [P.O.:360]  Out: 2850 [Urine:2850]  No " intake/output data recorded.  Wt Readings from Last 3 Encounters:   11/16/24 86.4 kg (190 lb 7.6 oz)   10/08/24 89.8 kg (198 lb)   10/03/24 92.1 kg (203 lb)       Intake/Output Summary (Last 24 hours) at 11/16/2024 0849  Last data filed at 11/16/2024 0536  Gross per 24 hour   Intake 120 ml   Output 1850 ml   Net -1730 ml     I/O last 3 completed shifts:  In: 360 [P.O.:360]  Out: 2850 [Urine:2850]      Physical Exam:  Vitals:    11/15/24 2105 11/16/24 0006 11/16/24 0535 11/16/24 0746   BP: 149/61 133/50  132/53   BP Location:       Pulse:  70  69   Resp:  17  17   Temp:  98.2 °F (36.8 °C)     TempSrc:       SpO2:  90%  94%   Weight:   86.4 kg (190 lb 7.6 oz)    Height:           GEN: Patsyann Long alert, awake, not in acute distress, pleasant and cooperative   HEENT: NC/AT, moist mucosa, anicteric sclerae; extraocular muscles intact  NECK: supple, no carotid bruits   HEART: regular rhythm, normal S1 and S2, grade 3/6 systolic murmur; no clicks, gallops or rubs, JVP is nonelevated  LUNGS: clear to auscultation bilaterally; no wheezes, rales, or rhonchi   ABDOMEN: normal bowel sounds, soft, no tenderness, no distention  EXTREMITIES: peripheral pulses normal; no clubbing, cyanosis; trace edema  NEURO: no focal findings   SKIN: normal without suspicious lesions on exposed skin      Current Facility-Administered Medications:     albuterol (PROVENTIL HFA,VENTOLIN HFA) inhaler 2 puff, 2 puff, Inhalation, Q4H PRN, Latosha Grecsek, CRNP, 2 puff at 11/16/24 0534    aspirin (ECOTRIN LOW STRENGTH) EC tablet 81 mg, 81 mg, Oral, Daily, Latosha Grecsek, CRNP    atenolol (TENORMIN) tablet 25 mg, 25 mg, Oral, Daily, Latosha Grecsek, CRNP, 25 mg at 11/14/24 0935    atorvastatin (LIPITOR) tablet 20 mg, 20 mg, Oral, Daily With Dinner, DANDRE Morgan, 20 mg at 11/15/24 1700    enoxaparin (LOVENOX) subcutaneous injection 40 mg, 40 mg, Subcutaneous, Daily, DANDRE Morgan    furosemide (LASIX) tablet 20 mg, 20 mg, Oral, Daily,  Osvaldo Brown MD, 20 mg at 11/15/24 1300    insulin lispro (HumALOG/ADMELOG) 100 units/mL subcutaneous injection 2-12 Units, 2-12 Units, Subcutaneous, TID AC **AND** Fingerstick Glucose (POCT), , , TID AC, Latosha Grecsek, CRNP    levothyroxine tablet 150 mcg, 150 mcg, Oral, Early Morning, Latosha Grecsek, CRNP, 150 mcg at 11/16/24 0539    losartan (COZAAR) tablet 100 mg, 100 mg, Oral, Daily, Latosha Grecsek, CRNP    potassium chloride (Klor-Con M20) CR tablet 40 mEq, 40 mEq, Oral, BID, Latosha Grecsek, CRNP, 40 mEq at 11/15/24 1700    verapamil (CALAN-SR) CR tablet 180 mg, 180 mg, Oral, BID, Latosha Grecsek, CRNP, 180 mg at 11/15/24 2101      Labs & Results:        Results from last 7 days   Lab Units 11/16/24  0533 11/15/24  0455 11/13/24  0531   WBC Thousand/uL 2.94* 3.26* 3.34*   HEMOGLOBIN g/dL 11.7 11.3* 11.5   HEMATOCRIT % 37.3 35.1 35.2   PLATELETS Thousands/uL 110* 115* 108*         Results from last 7 days   Lab Units 11/16/24  0533 11/15/24  0455 11/14/24  0519 11/12/24  0440 11/11/24  1807   POTASSIUM mmol/L 4.1 4.0 3.8   < > 3.6   CHLORIDE mmol/L 105 103 101   < > 108   CO2 mmol/L 26 29 28   < > 26   BUN mg/dL 25 33* 30*   < > 23   CREATININE mg/dL 0.82 0.98 1.01   < > 0.87   CALCIUM mg/dL 9.3 9.1 9.3   < > 9.1   ALK PHOS U/L  --   --   --   --  61   ALT U/L  --   --   --   --  9   AST U/L  --   --   --   --  13    < > = values in this interval not displayed.           Thank you for the opportunity to participate in the care of this patient.    Susan Panchal MD  Advanced Heart Failure and Mechanical Circulatory Support  [facility]

## 2024-11-16 NOTE — ASSESSMENT & PLAN NOTE
Patient with history of hypertension on multiple antihypertensive medications in the past including amlodipine, HCTZ, losartan, atenolol, verapamil.  Home regimen includes losartan 100 mg, verapamil 180 mg twice daily, atenolol 25 mg, which was ultimately decided upon given a new diagnosis of HCM.  Review of medical documentation shows the patient has had difficulty controlling blood pressure following shift away from HCTZ and amlodipine especially in the evening. Presents without symptoms of hypertensive emergency, but with systolic over 200.     Plan:  -Continue home antihypertensive medications with losartan reduced to 50 mg  -Continue monitoring vitals

## 2024-11-16 NOTE — PLAN OF CARE
The patient's cath lab access site dressings on the neck and right wrist are intact. Good pulses, and denies pain/SOB on room air.              Problem: CARDIOVASCULAR - ADULT  Goal: Maintains optimal cardiac output and hemodynamic stability  Description: INTERVENTIONS:  - Monitor I/O, vital signs and rhythm  - Monitor for S/S and trends of decreased cardiac output  - Administer and titrate ordered vasoactive medications to optimize hemodynamic stability  - Assess quality of pulses, skin color and temperature  - Assess for signs of decreased coronary artery perfusion  - Instruct patient to report change in severity of symptoms  Outcome: Progressing     Problem: METABOLIC, FLUID AND ELECTROLYTES - ADULT  Goal: Electrolytes maintained within normal limits  Description: INTERVENTIONS:  - Monitor labs and assess patient for signs and symptoms of electrolyte imbalances  - Administer electrolyte replacement as ordered  - Monitor response to electrolyte replacements, including repeat lab results as appropriate  - Instruct patient on fluid and nutrition as appropriate  Outcome: Progressing     Problem: PAIN - ADULT  Goal: Verbalizes/displays adequate comfort level or baseline comfort level  Description: Interventions:  - Encourage patient to monitor pain and request assistance  - Assess pain using appropriate pain scale  - Administer analgesics based on type and severity of pain and evaluate response  - Implement non-pharmacological measures as appropriate and evaluate response  - Consider cultural and social influences on pain and pain management  - Notify physician/advanced practitioner if interventions unsuccessful or patient reports new pain  Outcome: Progressing

## 2024-11-16 NOTE — ASSESSMENT & PLAN NOTE
Wt Readings from Last 3 Encounters:   11/16/24 86.4 kg (190 lb 7.6 oz)   10/08/24 89.8 kg (198 lb)   10/03/24 92.1 kg (203 lb)     Acute congestive heart failure with preserved fraction secondary to HCM.  Plan as noted above.     No

## 2024-11-16 NOTE — ASSESSMENT & PLAN NOTE
For diagnosis of HCM in August of this year following stress TTE that showed evidence of severe asymmetric hypertrophy of the septal wall, EF 70%, grade 1 diastolic dysfunction, LVOT obstruction, and moderate AV stenosis.  The patient underwent further cardiac workup with [facility] on 10/24 in attempt to better delineate the extent of her valvular aortic stenosis versus HCM and for further assessment of the structure of her AV.  This showed evidence of severely increased wall thickness of the left ventricle, severe stenosis of the AV.  Additionally she also underwent left heart catheterization that showed no evidence of significant obstructive CAD.      She presented with worsening shortness of breath, orthopnea, dyspnea on exertion, bilateral lower extremity swelling in the setting of pre-existing HCM and severe AS, likely secondary to mild congestive heart failure in the setting of a preserved ejection fraction. Chest x-ray similar to prior which showed evidence of mild pulmonary vascular congestion with trace pleural effusions, suggesting a mild heart failure exacerbation. Additionally +1 pitting edema was noted on physical exam initially.    Plan:  -Recommended outpatient follow-up for CT surgery evaluation for SAVR/septal myectomy

## 2024-11-16 NOTE — ASSESSMENT & PLAN NOTE
Lab Results   Component Value Date    EGFR 68 11/16/2024    EGFR 55 11/15/2024    EGFR 53 11/14/2024    CREATININE 0.82 11/16/2024    CREATININE 0.98 11/15/2024    CREATININE 1.01 11/14/2024     Patient has baseline creatinine around 0.8-0.9, with GFR suggests CKD2-3A.  Continue to monitor creatinine function and patient with BMPs.

## 2024-11-16 NOTE — PLAN OF CARE
Problem: SAFETY ADULT  Goal: Patient will remain free of falls  Description: INTERVENTIONS:  - Educate patient/family on patient safety including physical limitations  - Instruct patient to call for assistance with activity   - Consult OT/PT to assist with strengthening/mobility   - Keep Call bell within reach  - Keep bed low and locked with side rails adjusted as appropriate  - Keep care items and personal belongings within reach  - Initiate and maintain comfort rounds  - Make Fall Risk Sign visible to staff  - Offer Toileting every 4 Hours, in advance of need  - Initiate/Maintain 4alarm  - Obtain necessary fall risk management equipment: 4  - Apply yellow socks and bracelet for high fall risk patients  - Consider moving patient to room near nurses station  Outcome: Progressing  Goal: Maintain or return to baseline ADL function  Description: INTERVENTIONS:  -  Assess patient's ability to carry out ADLs; assess patient's baseline for ADL function and identify physical deficits which impact ability to perform ADLs (bathing, care of mouth/teeth, toileting, grooming, dressing, etc.)  - Assess/evaluate cause of self-care deficits   - Assess range of motion  - Assess patient's mobility; develop plan if impaired  - Assess patient's need for assistive devices and provide as appropriate  - Encourage maximum independence but intervene and supervise when necessary  - Involve family in performance of ADLs  - Assess for home care needs following discharge   - Consider OT consult to assist with ADL evaluation and planning for discharge  - Provide patient education as appropriate  Outcome: Progressing  Goal: Maintains/Returns to pre admission functional level  Description: INTERVENTIONS:  - Perform AM-PAC 6 Click Basic Mobility/ Daily Activity assessment daily.  - Set and communicate daily mobility goal to care team and patient/family/caregiver.   - Collaborate with rehabilitation services on mobility goals if consulted  -  Perform Range of Motion 4 times a day.  - Reposition patient every 4 hours.  - Dangle patient 4 times a day  - Stand patient 4 times a day  - Ambulate patient 4 times a day  - Out of bed to chair 4 times a day   - Out of bed for meals 4 times a day  - Out of bed for toileting  - Record patient progress and toleration of activity level   Outcome: Progressing     Problem: DISCHARGE PLANNING  Goal: Discharge to home or other facility with appropriate resources  Description: INTERVENTIONS:  - Identify barriers to discharge w/patient and caregiver  - Arrange for needed discharge resources and transportation as appropriate  - Identify discharge learning needs (meds, wound care, etc.)  - Arrange for interpretive services to assist at discharge as needed  - Refer to Case Management Department for coordinating discharge planning if the patient needs post-hospital services based on physician/advanced practitioner order or complex needs related to functional status, cognitive ability, or social support system  Outcome: Progressing     Problem: Knowledge Deficit  Goal: Patient/family/caregiver demonstrates understanding of disease process, treatment plan, medications, and discharge instructions  Description: Complete learning assessment and assess knowledge base.  Interventions:  - Provide teaching at level of understanding  - Provide teaching via preferred learning methods  Outcome: Progressing     Problem: CARDIOVASCULAR - ADULT  Goal: Maintains optimal cardiac output and hemodynamic stability  Description: INTERVENTIONS:  - Monitor I/O, vital signs and rhythm  - Monitor for S/S and trends of decreased cardiac output  - Administer and titrate ordered vasoactive medications to optimize hemodynamic stability  - Assess quality of pulses, skin color and temperature  - Assess for signs of decreased coronary artery perfusion  - Instruct patient to report change in severity of symptoms  Outcome: Progressing  Goal: Absence of cardiac  dysrhythmias or at baseline rhythm  Description: INTERVENTIONS:  - Continuous cardiac monitoring, vital signs, obtain 12 lead EKG if ordered  - Administer antiarrhythmic and heart rate control medications as ordered  - Monitor electrolytes and administer replacement therapy as ordered  Outcome: Progressing     Problem: METABOLIC, FLUID AND ELECTROLYTES - ADULT  Goal: Electrolytes maintained within normal limits  Description: INTERVENTIONS:  - Monitor labs and assess patient for signs and symptoms of electrolyte imbalances  - Administer electrolyte replacement as ordered  - Monitor response to electrolyte replacements, including repeat lab results as appropriate  - Instruct patient on fluid and nutrition as appropriate  Outcome: Progressing  Goal: Fluid balance maintained  Description: INTERVENTIONS:  - Monitor labs   - Monitor I/O and WT  - Instruct patient on fluid and nutrition as appropriate  - Assess for signs & symptoms of volume excess or deficit  Outcome: Progressing  Goal: Glucose maintained within target range  Description: INTERVENTIONS:  - Monitor Blood Glucose as ordered  - Assess for signs and symptoms of hyperglycemia and hypoglycemia  - Administer ordered medications to maintain glucose within target range  - Assess nutritional intake and initiate nutrition service referral as needed  Outcome: Progressing     Problem: PAIN - ADULT  Goal: Verbalizes/displays adequate comfort level or baseline comfort level  Description: Interventions:  - Encourage patient to monitor pain and request assistance  - Assess pain using appropriate pain scale  - Administer analgesics based on type and severity of pain and evaluate response  - Implement non-pharmacological measures as appropriate and evaluate response  - Consider cultural and social influences on pain and pain management  - Notify physician/advanced practitioner if interventions unsuccessful or patient reports new pain  Outcome: Progressing

## 2024-11-17 LAB
ANION GAP SERPL CALCULATED.3IONS-SCNC: 8 MMOL/L (ref 4–13)
BASOPHILS # BLD AUTO: 0.01 THOUSANDS/ÂΜL (ref 0–0.1)
BASOPHILS NFR BLD AUTO: 0 % (ref 0–1)
BUN SERPL-MCNC: 30 MG/DL (ref 5–25)
CALCIUM SERPL-MCNC: 9.2 MG/DL (ref 8.4–10.2)
CANCER AG19-9 SERPL-ACNC: 4 U/ML (ref 0–35)
CHLORIDE SERPL-SCNC: 106 MMOL/L (ref 96–108)
CO2 SERPL-SCNC: 24 MMOL/L (ref 21–32)
CREAT SERPL-MCNC: 1.01 MG/DL (ref 0.6–1.3)
EOSINOPHIL # BLD AUTO: 0.02 THOUSAND/ÂΜL (ref 0–0.61)
EOSINOPHIL NFR BLD AUTO: 1 % (ref 0–6)
ERYTHROCYTE [DISTWIDTH] IN BLOOD BY AUTOMATED COUNT: 15.6 % (ref 11.6–15.1)
GFR SERPL CREATININE-BSD FRML MDRD: 53 ML/MIN/1.73SQ M
GLUCOSE SERPL-MCNC: 103 MG/DL (ref 65–140)
GLUCOSE SERPL-MCNC: 119 MG/DL (ref 65–140)
GLUCOSE SERPL-MCNC: 119 MG/DL (ref 65–140)
GLUCOSE SERPL-MCNC: 124 MG/DL (ref 65–140)
GLUCOSE SERPL-MCNC: 147 MG/DL (ref 65–140)
HCT VFR BLD AUTO: 37.2 % (ref 34.8–46.1)
HGB BLD-MCNC: 12.1 G/DL (ref 11.5–15.4)
IMM GRANULOCYTES # BLD AUTO: 0 THOUSAND/UL (ref 0–0.2)
IMM GRANULOCYTES NFR BLD AUTO: 0 % (ref 0–2)
LYMPHOCYTES # BLD AUTO: 1.37 THOUSANDS/ÂΜL (ref 0.6–4.47)
LYMPHOCYTES NFR BLD AUTO: 43 % (ref 14–44)
MCH RBC QN AUTO: 30.2 PG (ref 26.8–34.3)
MCHC RBC AUTO-ENTMCNC: 32.5 G/DL (ref 31.4–37.4)
MCV RBC AUTO: 93 FL (ref 82–98)
MONOCYTES # BLD AUTO: 0.26 THOUSAND/ÂΜL (ref 0.17–1.22)
MONOCYTES NFR BLD AUTO: 8 % (ref 4–12)
NEUTROPHILS # BLD AUTO: 1.56 THOUSANDS/ÂΜL (ref 1.85–7.62)
NEUTS SEG NFR BLD AUTO: 48 % (ref 43–75)
NRBC BLD AUTO-RTO: 0 /100 WBCS
PLATELET # BLD AUTO: 112 THOUSANDS/UL (ref 149–390)
PMV BLD AUTO: 10.5 FL (ref 8.9–12.7)
POTASSIUM SERPL-SCNC: 4.3 MMOL/L (ref 3.5–5.3)
RBC # BLD AUTO: 4.01 MILLION/UL (ref 3.81–5.12)
SODIUM SERPL-SCNC: 138 MMOL/L (ref 135–147)
WBC # BLD AUTO: 3.22 THOUSAND/UL (ref 4.31–10.16)

## 2024-11-17 PROCEDURE — 99232 SBSQ HOSP IP/OBS MODERATE 35: CPT | Performed by: INTERNAL MEDICINE

## 2024-11-17 PROCEDURE — 82948 REAGENT STRIP/BLOOD GLUCOSE: CPT

## 2024-11-17 PROCEDURE — 85025 COMPLETE CBC W/AUTO DIFF WBC: CPT

## 2024-11-17 PROCEDURE — 93880 EXTRACRANIAL BILAT STUDY: CPT | Performed by: SURGERY

## 2024-11-17 PROCEDURE — 80048 BASIC METABOLIC PNL TOTAL CA: CPT

## 2024-11-17 RX ADMIN — LOSARTAN POTASSIUM 50 MG: 50 TABLET, FILM COATED ORAL at 09:17

## 2024-11-17 RX ADMIN — LEVOTHYROXINE SODIUM 150 MCG: 75 TABLET ORAL at 05:00

## 2024-11-17 RX ADMIN — VERAPAMIL HYDROCHLORIDE 180 MG: 180 TABLET ORAL at 20:32

## 2024-11-17 RX ADMIN — ENOXAPARIN SODIUM 40 MG: 40 INJECTION SUBCUTANEOUS at 09:17

## 2024-11-17 RX ADMIN — POTASSIUM CHLORIDE 40 MEQ: 1500 TABLET, EXTENDED RELEASE ORAL at 09:17

## 2024-11-17 RX ADMIN — ALBUTEROL SULFATE 2 PUFF: 90 AEROSOL, METERED RESPIRATORY (INHALATION) at 16:23

## 2024-11-17 RX ADMIN — ASPIRIN 81 MG: 81 TABLET, COATED ORAL at 09:17

## 2024-11-17 RX ADMIN — ALBUTEROL SULFATE 2 PUFF: 90 AEROSOL, METERED RESPIRATORY (INHALATION) at 04:56

## 2024-11-17 RX ADMIN — ATENOLOL 25 MG: 25 TABLET ORAL at 09:17

## 2024-11-17 RX ADMIN — ATORVASTATIN CALCIUM 20 MG: 20 TABLET, FILM COATED ORAL at 16:25

## 2024-11-17 RX ADMIN — VERAPAMIL HYDROCHLORIDE 180 MG: 180 TABLET ORAL at 09:18

## 2024-11-17 RX ADMIN — FUROSEMIDE 20 MG: 20 TABLET ORAL at 09:17

## 2024-11-17 NOTE — PLAN OF CARE
Patient has no change in assessment.          Problem: SAFETY ADULT  Goal: Patient will remain free of falls  Description: INTERVENTIONS:  - Educate patient/family on patient safety including physical limitations  - Instruct patient to call for assistance with activity   - Consult OT/PT to assist with strengthening/mobility   - Keep Call bell within reach  - Keep bed low and locked with side rails adjusted as appropriate  - Keep care items and personal belongings within reach  - Initiate and maintain comfort rounds  - Make Fall Risk Sign visible to staff  - Offer Toileting every 2 Hours, in advance of need  - Initiate/Maintain bed alarm  - Apply yellow socks and bracelet for high fall risk patients  - Consider moving patient to room near nurses station  Outcome: Progressing     Problem: CARDIOVASCULAR - ADULT  Goal: Maintains optimal cardiac output and hemodynamic stability  Description: INTERVENTIONS:  - Monitor I/O, vital signs and rhythm  - Monitor for S/S and trends of decreased cardiac output  - Administer and titrate ordered vasoactive medications to optimize hemodynamic stability  - Assess quality of pulses, skin color and temperature  - Assess for signs of decreased coronary artery perfusion  - Instruct patient to report change in severity of symptoms  Outcome: Progressing     Problem: METABOLIC, FLUID AND ELECTROLYTES - ADULT  Goal: Electrolytes maintained within normal limits  Description: INTERVENTIONS:  - Monitor labs and assess patient for signs and symptoms of electrolyte imbalances  - Administer electrolyte replacement as ordered  - Monitor response to electrolyte replacements, including repeat lab results as appropriate  - Instruct patient on fluid and nutrition as appropriate  Outcome: Progressing     Problem: PAIN - ADULT  Goal: Verbalizes/displays adequate comfort level or baseline comfort level  Description: Interventions:  - Encourage patient to monitor pain and request assistance  - Assess pain  using appropriate pain scale  - Administer analgesics based on type and severity of pain and evaluate response  - Implement non-pharmacological measures as appropriate and evaluate response  - Consider cultural and social influences on pain and pain management  - Notify physician/advanced practitioner if interventions unsuccessful or patient reports new pain  Outcome: Progressing

## 2024-11-17 NOTE — ASSESSMENT & PLAN NOTE
Patient is previously followed with pulmonology due to restrictive lung disease, possibly due to remote history of sarcoidosis.  Upon review of medical documentation it appears that she has not seen a pulmonologist in quite some time, and was looking to establish new pulmonologist with regards to her asthma.  Reportedly in 2020 the patient had very mild restriction based off her PFTs.      Plan  Continue home albuterol as needed.

## 2024-11-17 NOTE — ASSESSMENT & PLAN NOTE
Presents with systolic over 200, asymptomatic  History of hypertension on multiple antihypertensive medications in the past including amlodipine, HCTZ, losartan, atenolol, verapamil.    Home regimen: losartan 100 mg, verapamil 180 mg twice daily, atenolol 25 mg- Ultimately decided upon given a new diagnosis of HCM.  Review of medical documentation shows the patient has had difficulty controlling blood pressure following shift away from HCTZ and amlodipine especially in the evening.     Plan:  -Continue home antihypertensive medications with losartan reduced to 50 mg  -Continue monitoring vitals

## 2024-11-17 NOTE — ASSESSMENT & PLAN NOTE
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 New right cardiophrenic node, and gastrohepatic ligament nodes. Note that a T9 vertebral metastasis has disrupted the posterior cortex.    Previous MRI of the abdomen from 10/14 with multilobulated/multiseptated pancreatic cysts, hepatosplenomegaly with portal hepatic/periportal lymph nodes and mildly enlarged mesenteric lymph nodes with some concern for lymphoma given splenomegaly and overall findings.    Patient has a remote history of thyroid cancer    Plan:  -Oncology consulted, recommendations appreciated  -IR consulted, recommendations appreciated  -Follow-up LDH, SPEP, UPEP, , and CA 19-9 .  -Will need IR biopsy of left paraspinal mass or one of the lytic bone lesions seen on CT  -MRI spine to further characterize daily left lower lobe paraspinal nodule

## 2024-11-17 NOTE — ASSESSMENT & PLAN NOTE
Diagnosed with HCM in August of this year following stress TTE that showed evidence of severe asymmetric hypertrophy of the septal wall, EF 70%, grade 1 diastolic dysfunction, LVOT obstruction, and moderate AV stenosis.  Underwent further cardiac workup with [facility] on 10/24 in attempt to better delineate the extent of her valvular aortic stenosis versus HCM and for further assessment of the structure of her AV.  This showed evidence of severely increased wall thickness of the left ventricle, severe stenosis of the AV.  Additionally she also underwent left heart catheterization that showed no evidence of significant obstructive CAD.      Presented with worsening shortness of breath, orthopnea, dyspnea on exertion, bilateral lower extremity swelling in the setting of pre-existing HCM and severe AS, likely secondary to mild heart failure exacerbation.     Plan:  -Recommended outpatient follow-up for CT surgery evaluation for SAVR/septal myectomy

## 2024-11-17 NOTE — ASSESSMENT & PLAN NOTE
Wt Readings from Last 3 Encounters:   11/17/24 85.9 kg (189 lb 6 oz)   10/08/24 89.8 kg (198 lb)   10/03/24 92.1 kg (203 lb)     Acute congestive heart failure with preserved fraction secondary to HCM.  Plan as noted above.

## 2024-11-17 NOTE — ASSESSMENT & PLAN NOTE
Lab Results   Component Value Date    EGFR 53 11/17/2024    EGFR 57 11/16/2024    EGFR 68 11/16/2024    CREATININE 1.01 11/17/2024    CREATININE 0.94 11/16/2024    CREATININE 0.82 11/16/2024     Patient has baseline creatinine around 0.8-0.9, with GFR suggests CKD2-3A.  Continue to monitor creatinine function.

## 2024-11-17 NOTE — PROGRESS NOTES
Progress Note - Internal Medicine   Name: Nadeem Ashton 79 y.o. female I MRN: [id number]  Unit/Bed#: -01 I Date of Admission: 11/11/2024   Date of Service: 11/17/2024 I Hospital Day: 6    Assessment & Plan  Hypertensive urgency  Presents with systolic over 200, asymptomatic  History of hypertension on multiple antihypertensive medications in the past including amlodipine, HCTZ, losartan, atenolol, verapamil.    Home regimen: losartan 100 mg, verapamil 180 mg twice daily, atenolol 25 mg- Ultimately decided upon given a new diagnosis of HCM.  Review of medical documentation shows the patient has had difficulty controlling blood pressure following shift away from HCTZ and amlodipine especially in the evening.     Plan:  -Continue home antihypertensive medications with losartan reduced to 50 mg  -Continue monitoring vitals    Hypertrophic cardiomyopathy (HCC)  Diagnosed with HCM in August of this year following stress TTE that showed evidence of severe asymmetric hypertrophy of the septal wall, EF 70%, grade 1 diastolic dysfunction, LVOT obstruction, and moderate AV stenosis.  Underwent further cardiac workup with E[facility]ICO on 10/24 in attempt to better delineate the extent of her valvular aortic stenosis versus HCM and for further assessment of the structure of her AV.  This showed evidence of severely increased wall thickness of the left ventricle, severe stenosis of the AV.  Additionally she also underwent left heart catheterization that showed no evidence of significant obstructive CAD.      Presented with worsening shortness of breath, orthopnea, dyspnea on exertion, bilateral lower extremity swelling in the setting of pre-existing HCM and severe AS, likely secondary to mild heart failure exacerbation.     Plan:  -Recommended outpatient follow-up for CT surgery evaluation for SAVR/septal myectomy  Acute on chronic diastolic congestive heart failure (HCC)  Wt Readings from Last 3 Encounters:   11/17/24 85.9 kg (189  lb 6 oz)   10/08/24 89.8 kg (198 lb)   10/03/24 92.1 kg (203 lb)     Acute congestive heart failure with preserved fraction secondary to HCM.  Plan as noted above.    Stage 3a chronic kidney disease (HCC)  Lab Results   Component Value Date    EGFR 53 11/17/2024    EGFR 57 11/16/2024    EGFR 68 11/16/2024    CREATININE 1.01 11/17/2024    CREATININE 0.94 11/16/2024    CREATININE 0.82 11/16/2024     Patient has baseline creatinine around 0.8-0.9, with GFR suggests CKD2-3A.  Continue to monitor creatinine function.    Restrictive lung disease  Patient is previously followed with pulmonology due to restrictive lung disease, possibly due to remote history of sarcoidosis.  Upon review of medical documentation it appears that she has not seen a pulmonologist in quite some time, and was looking to establish new pulmonologist with regards to her asthma.  Reportedly in 2020 the patient had very mild restriction based off her PFTs.      Plan  Continue home albuterol as needed.    Mild persistent asthma without complication  Patient has history of mild persistent asthma, with home albuterol as needed.    Continue albuterol PRN inpatient.    Dyslipidemia  Continue home atorvastatin 20 mg.    Essential hypertension  Plan for HTN as noted above under hypertensive urgency.  RAFI (acute kidney injury) (HCC)  Resolved  Thrombocytopenia (HCC)  Stable  Malignant neoplasm metastatic to bone (HCC)  Diagnosed on preop imaging for CT surgery evaluation for SAVR/septal myectomy   CT chest without contrast revealed findings compatible with malignancy with multiple lytic metastases in the bone, 7 mm right upper lobe nodule, 2.9 cm left lower lobe nodule, slightly thickened nodular pericardium with pericardial effusion. New right cardiophrenic node, and gastrohepatic ligament nodes. Note that a T9 vertebral metastasis has disrupted the posterior cortex.    Previous MRI of the abdomen from 10/14 with multilobulated/multiseptated pancreatic  cysts, hepatosplenomegaly with portal hepatic/periportal lymph nodes and mildly enlarged mesenteric lymph nodes with some concern for lymphoma given splenomegaly and overall findings.    Patient has a remote history of thyroid cancer    Plan:  -Oncology consulted, recommendations appreciated  -IR consulted, recommendations appreciated  -Follow-up LDH, SPEP, UPEP, , and CA 19-9 .  -Will need IR biopsy of left paraspinal mass or one of the lytic bone lesions seen on CT  -MRI spine to further characterize daily left lower lobe paraspinal nodule        Team: SOD TEAM C    Subjective   Patient seen and examined. No acute events overnight.  Reports no new concerns or complaints.  States she is a little anxious about cancer diagnosis.    Objective :  Temp:  [98.1 °F (36.7 °C)-98.9 °F (37.2 °C)] 98.1 °F (36.7 °C)  HR:  [50-60] 60  BP: (128-140)/(52-64) 140/63  Resp:  [16-18] 18  SpO2:  [92 %-94 %] 93 %  O2 Device: None (Room air)    I/O         11/15 0701 11/16 0700 11/16 0701  11/17 0700 11/17 0701  11/18 0700    P.O. 120 1500     Total Intake(mL/kg) 120 (1.4) 1500 (17.5)     Urine (mL/kg/hr) 1850 (0.9) 1150 (0.6)     Total Output 1850 1150     Net -1730 +350                  Weights:   IBW (Ideal Body Weight): 50.1 kg    Body mass index is 34.64 kg/m².  Weight (last 2 days)       Date/Time Weight    11/17/24 0600 85.9 (189.38)    11/16/24 0535 86.4 (190.48)    11/15/24 1101 86 (189.6)    11/15/24 1010 86 (189.6)    11/15/24 0600 86 (189.6)            Physical Exam  Vitals and nursing note reviewed.   Constitutional:       General: She is not in acute distress.     Appearance: She is well-developed.   HENT:      Head: Normocephalic and atraumatic.   Eyes:      Conjunctiva/sclera: Conjunctivae normal.   Cardiovascular:      Rate and Rhythm: Normal rate and regular rhythm.      Heart sounds: Murmur heard.      Systolic murmur is present.   Pulmonary:      Effort: Pulmonary effort is normal. No respiratory distress.       Breath sounds: Normal breath sounds.   Musculoskeletal:      Cervical back: Neck supple.      Right lower leg: No edema.      Left lower leg: No edema.   Skin:     General: Skin is warm and dry.      Capillary Refill: Capillary refill takes less than 2 seconds.   Neurological:      Mental Status: She is alert.   Psychiatric:         Mood and Affect: Mood normal.           Lab Results: I have reviewed the following results:  Recent Labs     11/16/24  1741 11/17/24  0648   WBC 3.08*  --    HGB 11.4*  --    HCT 36.5  --    *  --    SODIUM 140 138   K 4.4 4.3    106   CO2 26 24   BUN 31* 30*   CREATININE 0.94 1.01   GLUC 108 124   AST 14  --    ALT 9  --    ALB 4.2  --    TBILI 0.73  --    ALKPHOS 65  --        Imaging Results Review: I reviewed radiology reports from this admission including: CT chest.  Other Study Results Review: No additional pertinent studies reviewed.    Currently Ordered Meds:   Current Facility-Administered Medications:     albuterol (PROVENTIL HFA,VENTOLIN HFA) inhaler 2 puff, Q4H PRN    aspirin (ECOTRIN LOW STRENGTH) EC tablet 81 mg, Daily    atenolol (TENORMIN) tablet 25 mg, Daily    atorvastatin (LIPITOR) tablet 20 mg, Daily With Dinner    enoxaparin (LOVENOX) subcutaneous injection 40 mg, Daily    furosemide (LASIX) tablet 20 mg, Daily    insulin lispro (HumALOG/ADMELOG) 100 units/mL subcutaneous injection 2-12 Units, TID AC **AND** Fingerstick Glucose (POCT), TID AC    levothyroxine tablet 150 mcg, Early Morning    losartan (COZAAR) tablet 50 mg, Daily    potassium chloride (Klor-Con M20) CR tablet 40 mEq, Daily    verapamil (CALAN-SR) CR tablet 180 mg, BID  VTE Pharmacologic Prophylaxis: VTE covered by:  enoxaparin, Subcutaneous, 40 mg at 11/16/24 0937     VTE Mechanical Prophylaxis: sequential compression device    Administrative Statements   I have spent a total time of 30 minutes in caring for this patient on the day of the visit/encounter including Diagnostic results,  Prognosis, Impressions, Counseling / Coordination of care, Documenting in the medical record, Reviewing / ordering tests, medicine, procedures  , Obtaining or reviewing history  , and Communicating with other healthcare professionals .  Portions of the record may have been created with voice recognition software.

## 2024-11-18 VITALS
HEART RATE: 57 BPM | WEIGHT: 190.48 LBS | OXYGEN SATURATION: 91 % | TEMPERATURE: 97.8 F | RESPIRATION RATE: 18 BRPM | BODY MASS INDEX: 35.05 KG/M2 | HEIGHT: 62 IN | SYSTOLIC BLOOD PRESSURE: 135 MMHG | DIASTOLIC BLOOD PRESSURE: 54 MMHG

## 2024-11-18 PROBLEM — Z51.5 PALLIATIVE CARE ENCOUNTER: Status: ACTIVE | Noted: 2024-11-18

## 2024-11-18 PROBLEM — R91.8 LUNG MASS: Status: ACTIVE | Noted: 2024-11-16

## 2024-11-18 PROBLEM — M89.9 LESION OF BONE OF THORACIC SPINE: Status: ACTIVE | Noted: 2024-11-18

## 2024-11-18 PROBLEM — Z71.89 GOALS OF CARE, COUNSELING/DISCUSSION: Status: ACTIVE | Noted: 2024-11-18

## 2024-11-18 LAB
ANION GAP SERPL CALCULATED.3IONS-SCNC: 11 MMOL/L (ref 4–13)
BASOPHILS # BLD AUTO: 0.01 THOUSANDS/ÂΜL (ref 0–0.1)
BASOPHILS NFR BLD AUTO: 0 % (ref 0–1)
BUN SERPL-MCNC: 32 MG/DL (ref 5–25)
CALCIUM SERPL-MCNC: 9.2 MG/DL (ref 8.4–10.2)
CHLORIDE SERPL-SCNC: 103 MMOL/L (ref 96–108)
CO2 SERPL-SCNC: 24 MMOL/L (ref 21–32)
CREAT SERPL-MCNC: 1.01 MG/DL (ref 0.6–1.3)
EOSINOPHIL # BLD AUTO: 0.04 THOUSAND/ÂΜL (ref 0–0.61)
EOSINOPHIL NFR BLD AUTO: 1 % (ref 0–6)
ERYTHROCYTE [DISTWIDTH] IN BLOOD BY AUTOMATED COUNT: 15.5 % (ref 11.6–15.1)
GFR SERPL CREATININE-BSD FRML MDRD: 53 ML/MIN/1.73SQ M
GLUCOSE SERPL-MCNC: 112 MG/DL (ref 65–140)
GLUCOSE SERPL-MCNC: 114 MG/DL (ref 65–140)
GLUCOSE SERPL-MCNC: 115 MG/DL (ref 65–140)
HCT VFR BLD AUTO: 35.9 % (ref 34.8–46.1)
HGB BLD-MCNC: 11.3 G/DL (ref 11.5–15.4)
IMM GRANULOCYTES # BLD AUTO: 0 THOUSAND/UL (ref 0–0.2)
IMM GRANULOCYTES NFR BLD AUTO: 0 % (ref 0–2)
LYMPHOCYTES # BLD AUTO: 1.41 THOUSANDS/ÂΜL (ref 0.6–4.47)
LYMPHOCYTES NFR BLD AUTO: 41 % (ref 14–44)
MCH RBC QN AUTO: 29.6 PG (ref 26.8–34.3)
MCHC RBC AUTO-ENTMCNC: 31.5 G/DL (ref 31.4–37.4)
MCV RBC AUTO: 94 FL (ref 82–98)
MONOCYTES # BLD AUTO: 0.33 THOUSAND/ÂΜL (ref 0.17–1.22)
MONOCYTES NFR BLD AUTO: 10 % (ref 4–12)
NEUTROPHILS # BLD AUTO: 1.66 THOUSANDS/ÂΜL (ref 1.85–7.62)
NEUTS SEG NFR BLD AUTO: 48 % (ref 43–75)
NRBC BLD AUTO-RTO: 0 /100 WBCS
PLATELET # BLD AUTO: 104 THOUSANDS/UL (ref 149–390)
PMV BLD AUTO: 10.4 FL (ref 8.9–12.7)
POTASSIUM SERPL-SCNC: 4.2 MMOL/L (ref 3.5–5.3)
RBC # BLD AUTO: 3.82 MILLION/UL (ref 3.81–5.12)
SODIUM SERPL-SCNC: 138 MMOL/L (ref 135–147)
WBC # BLD AUTO: 3.45 THOUSAND/UL (ref 4.31–10.16)

## 2024-11-18 PROCEDURE — 99223 1ST HOSP IP/OBS HIGH 75: CPT

## 2024-11-18 PROCEDURE — 99232 SBSQ HOSP IP/OBS MODERATE 35: CPT | Performed by: INTERNAL MEDICINE

## 2024-11-18 PROCEDURE — 85025 COMPLETE CBC W/AUTO DIFF WBC: CPT

## 2024-11-18 PROCEDURE — 99232 SBSQ HOSP IP/OBS MODERATE 35: CPT | Performed by: PHYSICIAN ASSISTANT

## 2024-11-18 PROCEDURE — 82948 REAGENT STRIP/BLOOD GLUCOSE: CPT

## 2024-11-18 PROCEDURE — 99232 SBSQ HOSP IP/OBS MODERATE 35: CPT | Performed by: STUDENT IN AN ORGANIZED HEALTH CARE EDUCATION/TRAINING PROGRAM

## 2024-11-18 PROCEDURE — 99238 HOSP IP/OBS DSCHRG MGMT 30/<: CPT | Performed by: INTERNAL MEDICINE

## 2024-11-18 PROCEDURE — 80048 BASIC METABOLIC PNL TOTAL CA: CPT

## 2024-11-18 RX ORDER — POTASSIUM CHLORIDE 1500 MG/1
20 TABLET, EXTENDED RELEASE ORAL DAILY
Qty: 30 TABLET | Refills: 0 | Status: SHIPPED | OUTPATIENT
Start: 2024-11-19

## 2024-11-18 RX ORDER — VERAPAMIL HYDROCHLORIDE 180 MG/1
360 TABLET, EXTENDED RELEASE ORAL
Qty: 180 TABLET | Refills: 1 | Status: SHIPPED | OUTPATIENT
Start: 2024-11-18 | End: 2024-11-18

## 2024-11-18 RX ORDER — LOSARTAN POTASSIUM 50 MG/1
50 TABLET ORAL DAILY
Qty: 30 TABLET | Refills: 0 | Status: SHIPPED | OUTPATIENT
Start: 2024-11-19

## 2024-11-18 RX ORDER — FUROSEMIDE 20 MG/1
20 TABLET ORAL DAILY
Qty: 30 TABLET | Refills: 0 | Status: SHIPPED | OUTPATIENT
Start: 2024-11-19

## 2024-11-18 RX ORDER — VERAPAMIL HYDROCHLORIDE 180 MG/1
180 TABLET, EXTENDED RELEASE ORAL 2 TIMES DAILY
Qty: 60 TABLET | Refills: 0 | Status: SHIPPED | OUTPATIENT
Start: 2024-11-18

## 2024-11-18 RX ADMIN — LOSARTAN POTASSIUM 50 MG: 50 TABLET, FILM COATED ORAL at 09:38

## 2024-11-18 RX ADMIN — ATENOLOL 25 MG: 25 TABLET ORAL at 09:38

## 2024-11-18 RX ADMIN — FUROSEMIDE 20 MG: 20 TABLET ORAL at 09:38

## 2024-11-18 RX ADMIN — VERAPAMIL HYDROCHLORIDE 180 MG: 180 TABLET ORAL at 09:38

## 2024-11-18 RX ADMIN — LEVOTHYROXINE SODIUM 150 MCG: 75 TABLET ORAL at 05:01

## 2024-11-18 RX ADMIN — ENOXAPARIN SODIUM 40 MG: 40 INJECTION SUBCUTANEOUS at 09:38

## 2024-11-18 RX ADMIN — POTASSIUM CHLORIDE 40 MEQ: 1500 TABLET, EXTENDED RELEASE ORAL at 09:38

## 2024-11-18 RX ADMIN — ASPIRIN 81 MG: 81 TABLET, COATED ORAL at 09:38

## 2024-11-18 NOTE — ASSESSMENT & PLAN NOTE
Diagnosed August 2024  10/3/24 E[facility]: LVEF 70%, severe asymmetric hypertrophy of the basal septal wall, outflow tract dynamic obstruction, severe AS valve area 0.67 cm2  10/8/24 Left heart cath: No significant obstructive CAD, LAD with three small septals that may not be suitable for ASA, Apical Vengricual Mean Gradient 25-28 mmHg, Mid Ventricular Mean Gradient as high as 45 mmHg with Post PVC increase in Gradient, consistent with HOCM, Negligble LVOT gradient in would signifcant Peak to Peak Gradient on LV-AO pullback  11/12/24 ECHO: LVEF 70%, severe asymmetric hypertrophy of basal septal wall, moderate-severe AS, small pericardial effusion  11/15/24: cardiac cath Normal LVEDP (15 mmHg).No significant LVOT gradient identified. 50 mmHg peak-peak aortic valve gradient with CHF.No indication for alcohol septal ablation.     Heart failure and cardiac surgery consulted  Consideration of AVR and septal myectomy pending further oncology workup  Surgical intervention deferred at this time in setting of incidental findings of likely metastatic disease on pre-operative chest CT  Would benefit from close outpatient follow up

## 2024-11-18 NOTE — ASSESSMENT & PLAN NOTE
11/15/24 CT chest with concern of metastatic disease - multiple lytic lesions in the bone, 7 mm RUL and 2.9 mm LLL nodule, thickened nodular pericardium with pericardial effusion, and enlarged gastrohepatic ligament nodes  Oncology consulted, recommend biopsy and MRI C/T spine for further evaluation    She is agreeable to MRI  Considering biopsy, would like time to think about risks/benefits further

## 2024-11-18 NOTE — PROGRESS NOTES
Heart Failure/ Pulmonary Hypertension Progress Note - Nadeem Ashton 79 y.o. female MRN: [id number]    Unit/Bed#: -01 Encounter: [id number]      Assessment:    Principal Problem:    Hypertensive urgency  Active Problems:    CAD (coronary artery disease)    Nonrheumatic aortic valve stenosis    Essential hypertension    Dyslipidemia    Mild persistent asthma without complication    Shortness of breath    Restrictive lung disease    Stage 3a chronic kidney disease (HCC)    Hypertrophic cardiomyopathy (HCC)    Postoperative hypothyroidism    Fatty liver    BMI 36.0-36.9,adult    Sleep apnea    Acute on chronic diastolic congestive heart failure (HCC)    RAFI (acute kidney injury) (HCC)    Thrombocytopenia (HCC)    Lung mass    Goals of care, counseling/discussion    Palliative care encounter    Lesion of bone of thoracic spine    Acute heart failure with preserved ejection fraction  Patient presented with uncontrolled blood pressure, likely in the setting of HOCM and severe AS  Hypertrophic obstructive cardiomyopathy  Echocardiogram 6/24 showed mild to moderate left ventricular concentric hypertrophy EF 70% peak gradient 62 at rest and sitting to with Valsalva  Echocardiogram with stress 8/24 showed severe asymmetrical hypertrophy of septal wall, EF 70%, LVOT dynamic obstruction at rest with a peak gradient of 53 and 67 with Valsalva, moderate aortic valve stenosis  Echo 10/24 showed left ventricle ejection fraction 70% with dynamic outflow obstruction at rest severe aortic stenosis  Left heart cath 10/8 showed diffuse moderate disease in all coronaries with the legible LVOT gradient, no significant peak to peak gradient, mid ventricular mean gradient as high as 45 with post PVC increase in gradient consistent with HOCM  Left heart cath 11/24 showed normal LVEDP of 15 no significant LVOT gradient.  50 mmHg peak to peak aortic valve gradient with CHF and no indication for alcohol septal ablation  Patient was  planned for alcohol septal ablation however due to severe aortic stenosis CT surgery recommended surgical aortic valve replacement with myomectomy  Plan  Patient follow-up with Dr. Wilson at [facility]  Recommended follow-up outpatient with cardiology  Continue home regimen verapamil 180 twice daily, atenolol 25 daily  Continue Lasix 20 mg daily  Patient is stable for discharge from the heart failure standpoint    Severe aortic stenosis  Patient has severe arctic stenosis on left heart cath  Recommended follow-up outpatient with cardiothoracic surgery for possible aortic valve replacement surgery    Hypertension  Patient was severe hypertension on presentation with systolic in the 200s  Patient improved after Lasix 20 mg daily  Continue verapamil, atenolol, losartan as home meds    Coronary artery status post stenting to LAD  Patient with past medical history of coronary artery disease status post stenting in 2008  Continue aspirin and statin    Hyperlipidemia  Continue home atorvastatin    CKD to stage III  Baseline creatinine is 0.8-0.9    History of sarcoidosis  Patient was diagnosed 30 years ago    Newly diagnosed lung nodule and lytic bone lesions  Patient with left lower lobe paraspinal nodule and multiple lytic lesions to the bone  Oncology is following  CT surgery recommended discussion with oncology regarding biopsy and plan before pursuing any surgery        Subjective:   Patient seen and examined.  No significant events overnight.  Patient was seen sitting in the chair comfortably and pleasant.  Patient denied shortness of breath, chest pain, nausea, vomiting, diarrhea, constipation.      Review of Systems   Constitutional:  Negative for appetite change, fever and unexpected weight change.   HENT:  Negative for ear discharge, facial swelling, nosebleeds, sinus pressure and trouble swallowing.    Eyes:  Negative for photophobia, redness and itching.   Respiratory:  Negative for choking, chest tightness,  "shortness of breath, wheezing and stridor.    Cardiovascular:  Negative for chest pain, palpitations and leg swelling.   Gastrointestinal:  Negative for abdominal distention, abdominal pain, constipation and diarrhea.   Genitourinary:  Negative for decreased urine volume, dyspareunia, frequency, genital sores, pelvic pain, urgency, vaginal discharge and vaginal pain.   Musculoskeletal:  Negative for arthralgias, gait problem and joint swelling.   Neurological:  Negative for tremors, facial asymmetry, speech difficulty and headaches.   Hematological:  Negative for adenopathy.   Psychiatric/Behavioral:  Negative for behavioral problems, decreased concentration, hallucinations and self-injury.         Vitals: Blood pressure 135/54, pulse 57, temperature 97.8 °F (36.6 °C), resp. rate 18, height 5' 2\" (1.575 m), weight 86.4 kg (190 lb 7.6 oz), SpO2 91%., Body mass index is 34.84 kg/m²., I/O last 3 completed shifts:  In: 920 [P.O.:920]  Out: 2150 [Urine:2150]  I/O this shift:  In: 300 [P.O.:300]  Out: -   Wt Readings from Last 3 Encounters:   11/18/24 86.4 kg (190 lb 7.6 oz)   10/08/24 89.8 kg (198 lb)   10/03/24 92.1 kg (203 lb)       Intake/Output Summary (Last 24 hours) at 11/18/2024 1438  Last data filed at 11/18/2024 1300  Gross per 24 hour   Intake 540 ml   Output 1550 ml   Net -1010 ml     I/O last 3 completed shifts:  In: 920 [P.O.:920]  Out: 2150 [Urine:2150]    No significant arrhythmias seen on telemetry review.       Physical Exam:  Vitals:    11/17/24 2233 11/18/24 0459 11/18/24 0545 11/18/24 0737   BP: 143/59 147/61  135/54   BP Location: Right arm      Pulse: 65 (!) 48  57   Resp: 18   18   Temp: 97.8 °F (36.6 °C)   97.8 °F (36.6 °C)   TempSrc: Oral      SpO2: 93% 92%  91%   Weight:   86.4 kg (190 lb 7.6 oz)    Height:           GEN: Luzsyann Long appears well, alert and oriented x 3, pleasant and cooperative   HEENT: pupils equal, round, and reactive to light; extraocular muscles intact  NECK: supple, no " carotid bruits   HEART: Loud systolic ejection murmur at right second intercostal space. regular rhythm, normal S1 and S2, clicks, gallops or rubs, JVP is    LUNGS: clear to auscultation bilaterally; no wheezes, rales, or rhonchi   ABDOMEN: normal bowel sounds, soft, no tenderness, no distention  EXTREMITIES: Bilateral lower extremity edema +1, peripheral pulses normal; no clubbing, cyanosis  NEURO: no focal findings   SKIN: normal without suspicious lesions on exposed skin      Current Facility-Administered Medications:     albuterol (PROVENTIL HFA,VENTOLIN HFA) inhaler 2 puff, 2 puff, Inhalation, Q4H PRN, Latosha Grecsek, CRNP, 2 puff at 11/17/24 1623    aspirin (ECOTRIN LOW STRENGTH) EC tablet 81 mg, 81 mg, Oral, Daily, Latosha Grecsek, CRNP, 81 mg at 11/18/24 0938    atenolol (TENORMIN) tablet 25 mg, 25 mg, Oral, Daily, Latosha Grecsek, CRNP, 25 mg at 11/18/24 0938    atorvastatin (LIPITOR) tablet 20 mg, 20 mg, Oral, Daily With Dinner, Latosha Grecsek, CRNP, 20 mg at 11/17/24 1625    enoxaparin (LOVENOX) subcutaneous injection 40 mg, 40 mg, Subcutaneous, Daily, Latosha Grecsek, CRNP, 40 mg at 11/18/24 0938    furosemide (LASIX) tablet 20 mg, 20 mg, Oral, Daily, Osvaldo Brown MD, 20 mg at 11/18/24 0938    insulin lispro (HumALOG/ADMELOG) 100 units/mL subcutaneous injection 2-12 Units, 2-12 Units, Subcutaneous, TID AC **AND** Fingerstick Glucose (POCT), , , TID AC, Latosha Grecsek, CRNP    levothyroxine tablet 150 mcg, 150 mcg, Oral, Early Morning, Latosha Grecsek, CRNP, 150 mcg at 11/18/24 0501    losartan (COZAAR) tablet 50 mg, 50 mg, Oral, Daily, Susan Panchal MD, 50 mg at 11/18/24 0938    potassium chloride (Klor-Con M20) CR tablet 40 mEq, 40 mEq, Oral, Daily, Susan Panchal MD, 40 mEq at 11/18/24 0938    verapamil (CALAN-SR) CR tablet 180 mg, 180 mg, Oral, BID, DANDRE Morgan, 180 mg at 11/18/24 0938      Labs & Results:        Results from last 7 days   Lab Units 11/18/24  6453  11/17/24  0919 11/16/24  1741   WBC Thousand/uL 3.45* 3.22* 3.08*   HEMOGLOBIN g/dL 11.3* 12.1 11.4*   HEMATOCRIT % 35.9 37.2 36.5   PLATELETS Thousands/uL 104* 112* 108*         Results from last 7 days   Lab Units 11/18/24  0513 11/17/24  0648 11/16/24  1741 11/12/24  0440 11/11/24  1807   POTASSIUM mmol/L 4.2 4.3 4.4   < > 3.6   CHLORIDE mmol/L 103 106 104   < > 108   CO2 mmol/L 24 24 26   < > 26   BUN mg/dL 32* 30* 31*   < > 23   CREATININE mg/dL 1.01 1.01 0.94   < > 0.87   CALCIUM mg/dL 9.2 9.2 9.5   < > 9.1   ALK PHOS U/L  --   --  65  --  61   ALT U/L  --   --  9  --  9   AST U/L  --   --  14  --  13    < > = values in this interval not displayed.           Counseling / Coordination of Care  Total floor / unit time spent today 30 minutes.  Greater than 50% of total time was spent with the patient and / or family counseling and / or coordination of care.      Thank you for the opportunity to participate in the care of this patient.    Will Taylor DO PGY3  Internal Medicine Resident   [facility]

## 2024-11-18 NOTE — CASE MANAGEMENT
Case Management Discharge Planning Note    Patient name Nadeem Claudio /-01 MRN [id number]  : 1945 Date 2024       Current Admission Date: 2024  Current Admission Diagnosis:Hypertensive urgency   Patient Active Problem List    Diagnosis Date Noted Date Diagnosed    Goals of care, counseling/discussion 2024     Palliative care encounter 2024     Lesion of bone of thoracic spine 2024     Lung mass 2024     Thrombocytopenia (HCC) 11/15/2024     RAFI (acute kidney injury) (HCC) 2024     Acute on chronic diastolic congestive heart failure (HCC) 2024     Hypertensive urgency 2024     Hypertrophic cardiomyopathy (HCC) 10/03/2024     Postoperative hypothyroidism 10/03/2024     Fatty liver 10/03/2024     BMI 36.0-36.9,adult 10/03/2024     Sleep apnea 10/03/2024     Stage 3a chronic kidney disease (HCC) 2024     Restrictive lung disease 2020     Shortness of breath 2020     CAD (coronary artery disease) 2020     Nonrheumatic aortic valve stenosis 2020     Essential hypertension 2020     Dyslipidemia 2020     Mild persistent asthma without complication 2020       LOS (days): 7  Geometric Mean LOS (GMLOS) (days): 4.9  Days to GMLOS:-1.8     OBJECTIVE:  Risk of Unplanned Readmission Score: 14.28         Current admission status: Inpatient   Preferred Pharmacy:   [facility] #[id number] - [place] - [address] [facility].  [address] [place] [address]  [place] [address]  Phone: [phone] Fax: [phone]    [id number] - [place] - [id number] [facility] [place] [address]  Phone: [phone] Fax: [phone]    Primary Care Provider: Celine England MD    Primary Insurance: [employer] [facility]  Secondary Insurance:     DISCHARGE DETAILS:                                                                                        IMM Given  (Date):: 11/18/24  IMM Given to:: Patient     IMM reviewed with patient and caregiver, patient and caregiver agrees with discharge determination.

## 2024-11-18 NOTE — CONSULTS
Consultation - Palliative and Supportive Care   Nadeem Ashton 79 y.o. female [id number]    Palliative care encounter  Assessment & Plan  Palliative diagnosis: HOCM, severe AS, concern for metastatic disease on imaging  Last outpatient visit: not established    Social support:  Spouse and 2 adult children (daughter and son)  Supportive listening provided  Normalized experience of patient/family  Provided anxiety containment  Provided anticipatory guidance  Encouraged self care  Advocated for patient/family with interdisciplinary care team    Coordination of care:  Reviewed case with RN, jude    Follow up  Palliative Care will continue to follow and goals of care discussions will be ongoing.   Please reach out via [employer] secure chat if questions or concerns arise.    I have reviewed the patient's controlled substance dispensing history in the Prescription Drug Monitoring Program in compliance with the [facility] regulations before prescribing any controlled substances.  Last refills  No opioid or benzo refills in [place] over past year.    We appreciate the invitation to be involved in this patient's care. Please do not hesitate to reach our on call provider through our clinic answering service at [phone] should you have acute symptom control concerns.      Goals of care, counseling/discussion  Assessment & Plan  Goals:  Level 1 code status  Disease focused care without limits placed  Patient would like time to consider options, including lung biopsy. She is agreeable to undergo recommended MRI and to outpatient follow up with oncology, heart failure, and cardiac surgery outpatient to further discuss her options.   Plan for discharge home today  Encouraged follow up with Palliative Medicine on an outpatient basis after discharge for continued symptom management.  Our office will contact patient to schedule a hospital follow up.    Decisional apparatus:  Patient does have capacity on exam today.  If capacity is lost, patient's  substitute decision maker would default to spouse by [place] 169.  ER contacts:  Jaxson Ashton  Emergency Contact  [phone]  Merlyn Telles  Daughter  [phone]  Samuel Ashton  Child  [phone]  Advance Directive/Living Will/POLST:  none on file      Lung mass  Assessment & Plan  11/15/24 CT chest with concern of metastatic disease - multiple lytic lesions in the bone, 7 mm RUL and 2.9 mm LLL nodule, thickened nodular pericardium with pericardial effusion, and enlarged gastrohepatic ligament nodes  Oncology consulted, recommend biopsy and MRI C/T spine for further evaluation    She is agreeable to MRI  Considering biopsy, would like time to think about risks/benefits further    Hypertrophic cardiomyopathy (HCC)  Assessment & Plan  Diagnosed August 2024  10/3/24 ENRICO: LVEF 70%, severe asymmetric hypertrophy of the basal septal wall, outflow tract dynamic obstruction, severe AS valve area 0.67 cm2  10/8/24 Left heart cath: No significant obstructive CAD, LAD with three small septals that may not be suitable for ASA, Apical Vengricual Mean Gradient 25-28 mmHg, Mid Ventricular Mean Gradient as high as 45 mmHg with Post PVC increase in Gradient, consistent with HOCM, Negligble LVOT gradient in would signifcant Peak to Peak Gradient on LV-AO pullback  11/12/24 ECHO: LVEF 70%, severe asymmetric hypertrophy of basal septal wall, moderate-severe AS, small pericardial effusion  11/15/24: cardiac cath Normal LVEDP (15 mmHg).No significant LVOT gradient identified. 50 mmHg peak-peak aortic valve gradient with CHF.No indication for alcohol septal ablation.     Heart failure and cardiac surgery consulted  Consideration of AVR and septal myectomy pending further oncology workup  Surgical intervention deferred at this time in setting of incidental findings of likely metastatic disease on pre-operative chest CT  Would benefit from close outpatient follow up    Nonrheumatic aortic valve stenosis  Assessment & Plan  Severe AS  Heart  failure and cardiac surgery following  Consideration of AVR and septal myectomy pending further oncology workup  Surgical intervention deferred at this time in setting of incidental findings of likely metastatic disease on pre-operative chest CT  Would benefit from close outpatient follow up        IDENTIFICATION:  Inpatient consult to Palliative Care  Consult performed by: DANDRE Parks  Consult ordered by: Jud Dickinson PA-C        History of Present Illness:  Nadeem Ashton is a 79 y.o. female who presented 11/11/24 with dyspnea on exertion and orthopnea. Pertinent history includes aortic stenosis, HOCM, CAD s/p stent placement to LAD, hypertension, hyperlipidemia, ckd stage III. On admission with hypertensive urgency and evidence of acute heart failure likely in setting of HOCM and uncontrolled BP. ECHO completed 11/12 demonstrating lvef 70%, severe asymmetric hypertrophy of basal septal wall, moderate-severe AS, small pericardial effusion. Heart failure consulted, recommend septal ablation. 11/15 patient underwent left heart catheterization, no indication for septal ablation at that time. Cardiac surgery consulted for consideration of AVR and septal myectomy. During pre-operative workup, CT chest revealed concern for metastatic disease - multiple lytic lesions in the bone, 7 mm RUL and 2.9 mm VICKI nodule, thickened nodular pericardium with pericardial effusion, and enlarged gastrohepatic ligament nodes. Oncology consulted, recommending further workup including biopsy, MRI C/T spine. Palliative care consulted for goals of care.    Patient seen with spouse and daughter present at bedside. Patient is out of bed in chair, in NAD. Introduced palliative care services. Patient expressed feeling overwhelmed/shocked at the information received over the past several days. She is struggling to reconcile that she came in with progressive dyspnea and hypertension and is now needing to consider open heart surgery, as  well as process a possible cancer diagnosis. She notes that due to her dyspnea she has not been sleeping well for several weeks, though feels throughout admission dyspnea has improved. She also notes persistent hypertension at home (SBP 200s) though denies being otherwise symptomatic. Nadeem and family had several questions about oncologic workup including necessity of biopsy, biopsy method, need for further imaging. Nadeem is unsure if she would like to pursue lung biopsy at this time. She also expressed concerns for risks associated with open heart surgery. She would like time to further consider the risks/benefits of offered diagnostic workup and interventions. She is agreeable to further imaging and outpatient oncology and cardiac surgery follow up at this time. No further questions or concerns. Planning for discharge today.      Past Medical History:   Diagnosis Date    Abnormal ultrasound of breast     CAD (coronary artery disease)     Cancer (HCC)     thyroid    Cataract     H/O heart artery stent     History of sarcoidosis     Hyperlipidemia     Hypertension     Sleep apnea      Past Surgical History:   Procedure Laterality Date    APPENDECTOMY      CARDIAC CATHETERIZATION Left 10/8/2024    Procedure: Cardiac Left Heart Cath;  Surgeon: Chasity Gonzalez DO;  Location: [facility];  Service: Cardiology    CARDIAC CATHETERIZATION  10/8/2024    Procedure: Cardiac catheterization;  Surgeon: Chasity Gonzalez DO;  Location: [facility];  Service: Cardiology    CHOLECYSTECTOMY      CORONARY ANGIOPLASTY WITH STENT PLACEMENT      HYSTERECTOMY      age 43    KNEE SURGERY Bilateral     OOPHORECTOMY Bilateral     age 43     Social History     Socioeconomic History    Marital status: /Civil Union     Spouse name: Not on file    Number of children: Not on file    Years of education: Not on file    Highest education level: Not on file   Occupational History    Not on file   Tobacco Use     Smoking status: Never     Passive exposure: Never    Smokeless tobacco: Never   Vaping Use    Vaping status: Never Used   Substance and Sexual Activity    Alcohol use: Not Currently    Drug use: Never    Sexual activity: Not on file   Other Topics Concern    Not on file   Social History Narrative    Not on file     Social Drivers of Health     Financial Resource Strain: Not on file   Food Insecurity: No Food Insecurity (11/12/2024)    Hunger Vital Sign     Worried About Running Out of Food in the Last Year: Never true     Ran Out of Food in the Last Year: Never true   Transportation Needs: No Transportation Needs (11/12/2024)    PRAPARE - Transportation     Lack of Transportation (Medical): No     Lack of Transportation (Non-Medical): No   Physical Activity: Not on file   Stress: Not on file   Social Connections: Not on file   Intimate Partner Violence: Unknown (11/12/2024)    Nursing IPS     Feels Physically and Emotionally Safe: Not on file     Physically Hurt by Someone: Not on file     Humiliated or Emotionally Abused by Someone: Not on file     Physically Hurt by Someone: 2     Hurt or Threatened by Someone: 2   Housing Stability: Low Risk  (11/12/2024)    Housing Stability Vital Sign     Unable to Pay for Housing in the Last Year: No     Number of Times Moved in the Last Year: 0     Homeless in the Last Year: No     Family History   Problem Relation Age of Onset    No Known Problems Mother     Thyroid cancer Father     No Known Problems Sister     No Known Problems Daughter     Cancer Maternal Grandmother         ear    No Known Problems Maternal Grandfather     No Known Problems Paternal Grandmother     No Known Problems Paternal Grandfather     No Known Problems Maternal Aunt     No Known Problems Maternal Aunt     Leukemia Paternal Aunt        Medications:  all current active meds have been reviewed, current meds:   Current Facility-Administered Medications:     albuterol (PROVENTIL HFA,VENTOLIN HFA)  inhaler 2 puff, Q4H PRN    aspirin (ECOTRIN LOW STRENGTH) EC tablet 81 mg, Daily    atenolol (TENORMIN) tablet 25 mg, Daily    atorvastatin (LIPITOR) tablet 20 mg, Daily With Dinner    enoxaparin (LOVENOX) subcutaneous injection 40 mg, Daily    furosemide (LASIX) tablet 20 mg, Daily    insulin lispro (HumALOG/ADMELOG) 100 units/mL subcutaneous injection 2-12 Units, TID AC **AND** Fingerstick Glucose (POCT), TID AC    levothyroxine tablet 150 mcg, Early Morning    losartan (COZAAR) tablet 50 mg, Daily    potassium chloride (Klor-Con M20) CR tablet 40 mEq, Daily    verapamil (CALAN-SR) CR tablet 180 mg, BID, and PTA meds:   Prior to Admission Medications   Prescriptions Last Dose Informant Patient Reported? Taking?   Synthroid 150 MCG tablet 2024 Self Yes Yes   Sig: Take 150 mcg by mouth daily   albuterol (PROVENTIL HFA,VENTOLIN HFA) 90 mcg/act inhaler 2024 Self Yes Yes   Sig: Inhale 2 puffs every 4 (four) hours as needed for wheezing or shortness of breath   aspirin (ECOTRIN LOW STRENGTH) 81 mg EC tablet 2024 Self Yes Yes   Sig: Take 81 mg by mouth daily   atenolol (TENORMIN) 25 mg tablet 2024 Self Yes Yes   Sig: Take 25 mg by mouth daily   atorvastatin (LIPITOR) 20 mg tablet 2024 Self Yes Yes   Sig: Take 20 mg by mouth daily   fluticasone (FLONASE) 50 mcg/act nasal spray   Yes Yes   Si spray into each nostril daily   losartan (COZAAR) 100 MG tablet 2024 Self No Yes   Sig: Take 1 tablet (100 mg total) by mouth daily   Patient taking differently: Take 100 mg by mouth daily Take one half tablet twice daily   metFORMIN (GLUCOPHAGE) 500 mg tablet  Self No Yes   Sig: Take 1 tablet (500 mg total) by mouth 2 (two) times a day with meals   Patient taking differently: Take 500 mg by mouth 2 (two) times a day with meals   verapamil (CALAN-SR) 180 mg CR tablet   No No   Sig: TAKE 2 TABLETS BY MOUTH AT BEDTIME      Facility-Administered Medications: None       No Known  "Allergies    Objective:  /54   Pulse 57   Temp 97.8 °F (36.6 °C)   Resp 18   Ht 5' 2\" (1.575 m)   Wt 86.4 kg (190 lb 7.6 oz)   SpO2 91%   BMI 34.84 kg/m²     Physical Exam  Vitals and nursing note reviewed.   Constitutional:       General: She is awake. She is not in acute distress.  HENT:      Head: Atraumatic.      Mouth/Throat:      Mouth: Mucous membranes are moist.   Eyes:      Conjunctiva/sclera: Conjunctivae normal.   Cardiovascular:      Rate and Rhythm: Bradycardia present.   Pulmonary:      Effort: Pulmonary effort is normal. No respiratory distress.   Abdominal:      General: There is no distension.   Skin:     General: Skin is warm and dry.   Neurological:      General: No focal deficit present.      Mental Status: She is alert and oriented to person, place, and time.   Psychiatric:         Mood and Affect: Mood normal.         Thought Content: Thought content normal.       Lab Results: I have personally reviewed pertinent labs., CBC:   Lab Results   Component Value Date    WBC 3.45 (L) 11/18/2024    HGB 11.3 (L) 11/18/2024    HCT 35.9 11/18/2024    MCV 94 11/18/2024     (L) 11/18/2024    RBC 3.82 11/18/2024    MCH 29.6 11/18/2024    MCHC 31.5 11/18/2024    RDW 15.5 (H) 11/18/2024    MPV 10.4 11/18/2024    NRBC 0 11/18/2024   , CMP:   Lab Results   Component Value Date    SODIUM 138 11/18/2024    K 4.2 11/18/2024     11/18/2024    CO2 24 11/18/2024    BUN 32 (H) 11/18/2024    CREATININE 1.01 11/18/2024    CALCIUM 9.2 11/18/2024    EGFR 53 11/18/2024     Imaging Studies: I have personally reviewed pertinent reports.  EKG, Pathology, and Other Studies: I have personally reviewed pertinent reports.    Counseling / Coordination of Care  Total floor / unit time spent today 55+ minutes. Greater than 50% of total time was spent with the patient and / or family counseling and / or coordination of care. A description of the counseling / coordination of care: Reviewed chart, provided " "medical updates, determined goals of care, discussed palliative care and symptom management, provided anticipatory guidance, determined competency and POA/HCA, determined social/family support, provided psychosocial support.     Portions of this document may have been created using dictation software and as such some \"sound alike\" terms may have been generated by the system. Do not hesitate to contact me with any questions or clarifications.    "

## 2024-11-18 NOTE — PROGRESS NOTES
Progress Note - Cardiac Surgery   Nadeem Ashton 79 y.o. female MRN: [id number]  Unit/Bed#: -01 Encounter: [id number]      24 Hour Events: CT chest complete w/ multiple lytic lesions concerning for malignancy including a paraspinal mass, oncology consulted & requesting a biopsy & they may need a bone marrow biopsy. Otherwise carotid US complete. No other events. No cardiac complaints. Is not sure what her goals of care are at this time or if she'd want an invasive biopsy or open heart surgery in general.    Medications:   Scheduled Meds:  Current Facility-Administered Medications   Medication Dose Route Frequency Provider Last Rate    albuterol  2 puff Inhalation Q4H PRN Latosha Grecsek, CRNP      aspirin  81 mg Oral Daily Latosha Grecsek, CRNP      atenolol  25 mg Oral Daily Latosha Grecsek, CRNP      atorvastatin  20 mg Oral Daily With Dinner Latosha Grecsek, CRNP      enoxaparin  40 mg Subcutaneous Daily Latosha Grecsek, CRNP      furosemide  20 mg Oral Daily Osvaldo Brown MD      insulin lispro  2-12 Units Subcutaneous TID AC Latosha Grecsek, CRNP      levothyroxine  150 mcg Oral Early Morning Latosha Grecsek, CRNP      losartan  50 mg Oral Daily Susan Panchal MD      potassium chloride  40 mEq Oral Daily Susan Panchal MD      verapamil  180 mg Oral BID Latosha LauraekDANDRE       Continuous Infusions:     Results:   Results from last 7 days   Lab Units 11/18/24  0513 11/17/24  0919 11/16/24  1741   WBC Thousand/uL 3.45* 3.22* 3.08*   HEMOGLOBIN g/dL 11.3* 12.1 11.4*   HEMATOCRIT % 35.9 37.2 36.5   PLATELETS Thousands/uL 104* 112* 108*     Results from last 7 days   Lab Units 11/18/24  0513 11/17/24  0648 11/16/24  1741   POTASSIUM mmol/L 4.2 4.3 4.4   CHLORIDE mmol/L 103 106 104   CO2 mmol/L 24 24 26   BUN mg/dL 32* 30* 31*   CREATININE mg/dL 1.01 1.01 0.94   CALCIUM mg/dL 9.2 9.2 9.5           Studies:     Left heart cath 10/8/24: no significant CAD, LAD with three small septals that may not  be suitable for alcohol septa ablation,  Apical Ventricular Mean Gradient 25-28 mmHg, Mid Ventricular Mean Gradient as high as 45 mmHg with Post PVC increase in Gradient, consistent with HOCM, Negligible LVOT gradient in would significant Peak to Peak Gradient on LV-AO pullback.      Cardiac Cath 11/5: Screw-in temporary pacing wire placed and fixed to generator. Removed at conclusion of the procedure. Normal LVEDP (15 mmHg). No significant LVOT gradient identified. 50 mmHg peak-peak aortic valve gradient with CHF. No indication for alcohol septal ablation     Echo: LVEF 70%, severe LV wall thickness, severe asymmetric hypertrophy of the basal septal wall, dynamic LV outflow tract obstruction with a resting gradient of 34.0 mmHg, which increases to a peak gradient of 47.0 mmHg with Valsalva. RV size/fnx normal. Moderate to severe AS, MG 32, JETHRO 1.02, mild AI, mild MR no CEASAR, trace TR, PAP 26.      ENRICO 10/3/24: LVEF 70%, severe LV wall thickness, severe asymmetric basal septal wall, LV outflow tract dynamic obstruction at rest, peak resting gradient 36. Severe AS, MG 38, JETHRO 0.6, DVI 0.26. Mild MR. Systolic anterior motion of the chordal apparatus with late peaking gradient. No PORFIRIO thrombus.      Carotid US: <50% ICA stenosis b/l, antegrade flow b/l, no SCD b/l     CXR: mild PVC w/ trace pleural effusion     CT Chest: ascending aorta 42mm w/ mild calcification of the walls, multiple lytic lesions in bone (7mm RUL, 2.9cm LLL, thickened pericardium w/ effusion, new right cardiophrenic node, gatrohepatic ligament node, T9 vertebral mets), splenomegaly , cystic lesions int eh pancreas     MRI abdomen 10/14/24: Several multilobulated/multiseptated pancreatic cysts measuring up to 4.2 cm/ in/ the uncinate. No solid enhancing components or main duct dilatation. For cyst(s) 2 cm or/ greater recommend gastroenterology and/or surgical oncology consult. EUS may now be warranted. T2 hyperintense 1 cm hepatic dome lesion, not  well seen on postcontrast sequences, possibly due to motion artifact, presumably a cyst or hemangioma. Scattered additional foci of arterial hyperenhancement in the liver, which may also represent small hemangiomas or arterioportal shunts. Consider follow-up MRI with and without contrast in 3 months to ensure stability. Hepatosplenomegaly. Mildly enlarged yash hepatic/periportal lymph nodes of uncertain etiology. Haziness of the root of mesentery with several mildly enlarged mesenteric lymph nodes. Although most commonly this represents mesenteric panniculitis, given mild periportal lymphadenopathy and splenomegaly, consider the remote possibility of lymphoma.     EKG: Sinus bradycardia, Left axis deviation, Minimal voltage criteria for LVH, may be normal variant     Results Review Statement: I personally reviewed the following image studies in PACS and associated radiology reports: CT chest and Ultrasound(s). My interpretation of the radiology images/reports is: as above.    Vitals:   Vitals:    11/17/24 2233 11/18/24 0459 11/18/24 0545 11/18/24 0737   BP: 143/59 147/61  135/54   BP Location: Right arm      Pulse: 65 (!) 48  57   Resp: 18   18   Temp: 97.8 °F (36.6 °C)   97.8 °F (36.6 °C)   TempSrc: Oral      SpO2: 93% 92%  91%   Weight:   86.4 kg (190 lb 7.6 oz)    Height:           Physical Exam:    General: No acute distress  HEENT/NECK:  Normocephalic. Atraumatic.  Cardiac: Regular rate and rhythm  Pulmonary:   on RA, no respiratory distress  Abdomen:  Non-distended  Extremities: Extremities warm/dry  Neuro: Alert and oriented X 3 and No focal deficits  Skin: Warm/Dry, without rashes or lesions.      Assessment:    Hypertrophic obstructive cardiomyopathy; Ongoing septal myectomy work up  Severe aortic stenosis; Ongoing AVR workup    Plan:  Patient agreeable to proceed with surgical work-up    Pre-op work-up underway  Oncology following for multiple lytic lesions & paraspinal mass   Needs biopsy & possible bone  marrow biopsy   Would need to know diagnosis & prognosis prior to open heart surgical consideration   Palliative care consult for GOC & reeval from oncology today to discuss options  Last dental evaluation >1 year ago, would need prior to surgical consideration, patient refusing inpatient eval at this time    Case discussed w/ Dr. Ronald Colvin D.O.    Cardiac sx will sign off but follow peripherally. Closer to discharge will help arrange f/u in the outpatient setting for further surgical discussion. Please call the service w/ questions or concern.    SIGNATURE: Jud Dickinson PA-C  DATE: November 18, 2024  TIME: 9:42 AM    * This note was completed in part utilizing [employer] direct voice recognition software.   Grammatical errors, random word insertion, spelling mistakes, and incomplete sentences may be an occasional consequence of the system secondary to software limitations, ambient noise and hardware issues. At the time of dictation, efforts were made to edit, clarify and /or correct errors. Please read the chart carefully and recognize, using context, where substitutions have occurred.  If you have any questions or concerns about the context, text or information contained within the body of this dictation, please contact myself, the provider, for further clarification.

## 2024-11-18 NOTE — ASSESSMENT & PLAN NOTE
Palliative diagnosis: HOCM, severe AS, concern for metastatic disease on imaging  Last outpatient visit: not established    Social support:  Spouse and 2 adult children (daughter and son)  Supportive listening provided  Normalized experience of patient/family  Provided anxiety containment  Provided anticipatory guidance  Encouraged self care  Advocated for patient/family with interdisciplinary care team    Coordination of care:  Reviewed case with jude DEL ROSARIO    Follow up  Palliative Care will continue to follow and goals of care discussions will be ongoing.   Please reach out via [employer] secure chat if questions or concerns arise.    I have reviewed the patient's controlled substance dispensing history in the Prescription Drug Monitoring Program in compliance with the [facility] regulations before prescribing any controlled substances.  Last refills  No opioid or benzo refills in [place] over past year.    We appreciate the invitation to be involved in this patient's care. Please do not hesitate to reach our on call provider through our clinic answering service at [phone] should you have acute symptom control concerns.

## 2024-11-18 NOTE — ASSESSMENT & PLAN NOTE
Goals:  Level 1 code status  Disease focused care without limits placed  Patient would like time to consider options, including lung biopsy. She is agreeable to undergo recommended MRI and to outpatient follow up with oncology, heart failure, and cardiac surgery outpatient to further discuss her options.   Plan for discharge home today  Encouraged follow up with Palliative Medicine on an outpatient basis after discharge for continued symptom management.  Our office will contact patient to schedule a hospital follow up.    Decisional apparatus:  Patient does have capacity on exam today.  If capacity is lost, patient's substitute decision maker would default to spouse by [place].  ER contacts:  Jaxson Poli  Emergency Contact  [phone]  Merlyn Telles  Daughter  [phone]  Samuel Ashton  Child  [phone]  Advance Directive/Living Will/POLST:  none on file

## 2024-11-18 NOTE — ASSESSMENT & PLAN NOTE
Wt Readings from Last 3 Encounters:   11/18/24 86.4 kg (190 lb 7.6 oz)   10/08/24 89.8 kg (198 lb)   10/03/24 92.1 kg (203 lb)     Acute congestive heart failure with preserved fraction secondary to HCM.  Plan as noted above.

## 2024-11-18 NOTE — ASSESSMENT & PLAN NOTE
Lab Results   Component Value Date    EGFR 53 11/18/2024    EGFR 53 11/17/2024    EGFR 57 11/16/2024    CREATININE 1.01 11/18/2024    CREATININE 1.01 11/17/2024    CREATININE 0.94 11/16/2024     Patient has baseline creatinine around 0.8-0.9, with GFR suggests CKD2-3A.  Continue to monitor creatinine function.

## 2024-11-18 NOTE — QUICK NOTE
Discussed with pt and family at length regarding need to follow-up with outpt hematology and obtain second opinion as soon as able. Recommended outpt IR biopsy which the patient is hesitant to get if it would require intubation / possible bronchoscopy. Primary team aware.     Cheli Carolina DO  2:56 PM

## 2024-11-18 NOTE — DISCHARGE SUMMARY
INTERNAL MEDICINE RESIDENCY DISCHARGE SUMMARY     Nadeem Ashton   79 y.o. female  MRN: [id number]  Room/Bed: /-01     [facility] AN CT SCAN   Encounter: [id number]    Principal Problem:    Hypertensive urgency  Active Problems:    CAD (coronary artery disease)    Nonrheumatic aortic valve stenosis    Essential hypertension    Dyslipidemia    Mild persistent asthma without complication    Shortness of breath    Restrictive lung disease    Stage 3a chronic kidney disease (HCC)    Hypertrophic cardiomyopathy (HCC)    Postoperative hypothyroidism    Fatty liver    BMI 36.0-36.9,adult    Sleep apnea    Acute on chronic diastolic congestive heart failure (HCC)    RAFI (acute kidney injury) (HCC)    Thrombocytopenia (HCC)    Lung mass    Goals of care, counseling/discussion    Palliative care encounter    Lesion of bone of thoracic spine      No new Assessment & Plan notes have been filed under this hospital service since the last note was generated.  Service: Internal Medicine      DETAILS OF HOSPITAL COURSE     Nadeem Ashton is a 78 YO Female with PMH of HOCM, HTN, HLD, mild persistent asthma, CAD status post stenting, restrictive lung disease, CKD 2, papillary thryoid CA s/p lobectomy who presented to the hospital with worsening shortness of breath in the setting of hypertension. While in the office with her PCP Dr. England, she was noted to have a SBP >200. This prompted her to visit the ED for management of hypertensive urgency. BP improved to 140s/60s with self medication with verapamil and 1 dose of Lasix 10 mg.      She continued to receive IV lasix for shortness of breath and volume overload associated with congestive heart failure. Her volume status gradually improved. Planned alcohol septal ablation for HOCM was unsuccessful and cardiothoracic surgery was ultimately consulted for surgical AVR and septal myomectomy. Preoperative chest CT revealed findings  compatible with malignancy, including multiple lytic metastases in the bone, pulmonary nodules, and lymph nodes. Surgical intervention was delayed in light of imaging findings and oncologic consultation was obtained inpatient.    Further imaging was recommended to better characterize the lesions identified on CT scan. Discussed the patient's condition with family at bedside, including details of possible malignancy. At this time, patient is not interested in additional imaging, biopsy, or other interventions that might delay her discharge. Need for outpatient follow up was discussed.         Vitals:    11/18/24 0737   BP: 135/54   Pulse: 57   Resp: 18   Temp: 97.8 °F (36.6 °C)   SpO2: 91%      Physical Exam  Vitals reviewed.   Constitutional:       General: She is not in acute distress.     Appearance: Normal appearance. She is obese. She is not ill-appearing or diaphoretic.   HENT:      Head: Normocephalic.      Right Ear: External ear normal.      Left Ear: External ear normal.      Nose: No congestion or rhinorrhea.      Mouth/Throat:      Mouth: Mucous membranes are moist.      Pharynx: No posterior oropharyngeal erythema.   Eyes:      General: No scleral icterus.     Extraocular Movements: Extraocular movements intact.   Cardiovascular:      Rate and Rhythm: Normal rate.      Pulses: Normal pulses.      Heart sounds: No murmur heard.     No friction rub. No gallop.   Pulmonary:      Effort: Pulmonary effort is normal. No respiratory distress.      Breath sounds: Normal breath sounds. No stridor. No wheezing or rales.   Musculoskeletal:      Right lower leg: No edema.      Left lower leg: No edema.   Skin:     General: Skin is warm and dry.      Coloration: Skin is not jaundiced or pale.   Neurological:      General: No focal deficit present.      Mental Status: She is alert.   Psychiatric:         Mood and Affect: Mood normal.         Behavior: Behavior normal.          DISCHARGE INFORMATION     PCP at  Discharge:  Dr. England    Admitting Provider: Tyrell Orellana MD  Admission Date: 11/11/2024    Discharge Provider: No att. providers found  Discharge Date: 11/18/2024    Discharge Disposition: Home/Self Care  Discharge Condition: fair  Discharge with Lines: no    Discharge Diet: regular diet  Activity Restrictions: none  Test Results Pending at Discharge: immunofixation, UPEP, Ig Free LT chain, beta 2 microglobulin, SPEP    Discharge Diagnoses:  Principal Problem:    Hypertensive urgency  Active Problems:    CAD (coronary artery disease)    Nonrheumatic aortic valve stenosis    Essential hypertension    Dyslipidemia    Mild persistent asthma without complication    Shortness of breath    Restrictive lung disease    Stage 3a chronic kidney disease (HCC)    Hypertrophic cardiomyopathy (HCC)    Postoperative hypothyroidism    Fatty liver    BMI 36.0-36.9,adult    Sleep apnea    Acute on chronic diastolic congestive heart failure (HCC)    RAFI (acute kidney injury) (HCC)    Thrombocytopenia (HCC)    Lung mass    Goals of care, counseling/discussion    Palliative care encounter    Lesion of bone of thoracic spine  Resolved Problems:    * No resolved hospital problems. *      Consulting Providers:      Diagnostic & Therapeutic Procedures Performed:  CT chest wo contrast  Result Date: 11/16/2024  Impression: Mild ectasia of the ascending aorta at 4.2 cm with mild calcification of the wall of the ascending aorta. Findings compatible with malignancy with multiple lytic metastases in the bone, 7 mm right upper lobe nodule, 2.9 cm left lower lobe nodule, slightly thickened nodular pericardium with pericardial effusion. New right cardiophrenic node, and gastrohepatic  ligament nodes. Note that a T9 vertebral metastasis has disrupted the posterior cortex. Splenomegaly. Cystic lesions in the pancreas, see MRI 10/14/2024. I personally discussed this study with Dr. Tyrell Orellana on 11/16/2024 10:29 AM. Workstation performed:  [id number]     XR chest 1 view portable  Result Date: 11/12/2024  Impression: Mild pulmonary venous congestion with trace pleural effusions. Workstation performed: [id number]       Code Status: Prior  Advance Directive & Living Will: <no information>  Power of :    POLST:      Medications:  Current Discharge Medication List        Current Discharge Medication List        START taking these medications    Details   furosemide (LASIX) 20 mg tablet Take 1 tablet (20 mg total) by mouth daily  Qty: 30 tablet, Refills: 0    Associated Diagnoses: Hypertension; Heart failure (HCC); Acute on chronic diastolic congestive heart failure (HCC)      potassium chloride (Klor-Con M20) 20 mEq tablet Take 1 tablet (20 mEq total) by mouth daily  Qty: 30 tablet, Refills: 0    Associated Diagnoses: Hypertension; Heart failure (HCC); Acute on chronic diastolic congestive heart failure (HCC)           Current Discharge Medication List        CONTINUE these medications which have NOT CHANGED    Details   albuterol (PROVENTIL HFA,VENTOLIN HFA) 90 mcg/act inhaler Inhale 2 puffs every 4 (four) hours as needed for wheezing or shortness of breath    Comments: <!--EPICS-->Substitution to a formulary equivalent within the same pharmaceutical class is authorized.<!--EPICE-->      aspirin (ECOTRIN LOW STRENGTH) 81 mg EC tablet Take 81 mg by mouth daily      atenolol (TENORMIN) 25 mg tablet Take 25 mg by mouth daily      atorvastatin (LIPITOR) 20 mg tablet Take 20 mg by mouth daily      fluticasone (FLONASE) 50 mcg/act nasal spray 1 spray into each nostril daily      metFORMIN (GLUCOPHAGE) 500 mg tablet Take 1 tablet (500 mg total) by mouth 2 (two) times a day with meals  Qty: 60 tablet, Refills: 0    Associated Diagnoses: Type 2 diabetes mellitus with hyperglycemia, without long-term current use of insulin ([facility])      Synthroid 150 MCG tablet Take 150 mcg by mouth daily             Allergies:  No Known Allergies    FOLLOW-UP     PCP  "Outpatient Follow-up:  yes  follow up with PCP in one week    Consulting Providers Follow-up:  yes  follow up with oncology, heart failure, cardiology, cardiothoracic surgery      Active Issues Requiring Follow-up:   Malignancy workup, HOCM, HTN    Discharge Statement:   I spent 45 minutes minutes discharging the patient. This time was spent on the day of discharge. I had direct contact with the patient on the day of discharge. Additional documentation is required if more than 30 minutes were spent on discharge.    Portions of the record may have been created with voice recognition software.  Occasional wrong word or \"sound a like\" substitutions may have occurred due to the inherent limitations of voice recognition software.  Read the chart carefully and recognize, using context, where substitutions have occurred.    ==  Phil Mera DO  [facility]  Internal Medicine Resident PGY-1   "

## 2024-11-18 NOTE — ASSESSMENT & PLAN NOTE
Severe AS  Heart failure and cardiac surgery following  Consideration of AVR and septal myectomy pending further oncology workup  Surgical intervention deferred at this time in setting of incidental findings of likely metastatic disease on pre-operative chest CT  Would benefit from close outpatient follow up

## 2024-11-18 NOTE — PROGRESS NOTES
"  [facility]  Progress Note  Encounter: [id number]     PATIENT INFO     Name: Nadeem Ashton  YOB: 1945   Age: 79 y.o.   Sex: female   MRN: [id number]  Unit/Bed#: -01     ASSESSMENT & PLAN     2.9x2.2 cm LLL paraspinal nodule  Multiple lytic lesions  Chronic Leukopenia  Thrombocytopenia in setting of splenomegaly    Will need biopsy of left paraspinal mass or one of lytic lesions seen on noncontrast CT, IR consult in place  MRI Cervical / thoracic spine pending, per nursing to occur overnight  F/u SPEP, FLC, immunofixation, B2M  Ca 19-9 4, Ca 125 9/8,   Will need outpt follow-up with heme-onc  F/u with primary team next steps if pt is discharged prior to MRI / Biopsy      Depending on results, may need marrow but will hold on that for now as need reason to do one       SUBJECTIVE     Patient reports feeling well. States she is still very concerned that the abnormalities on her recent CT were never found prior given she is under multiple physicians care. States she will not wait until tonight for MRI and plans to seek a second oncology opinion at [facility] is >1 mo as she feels she needs a break from all her medical appointments.     OBJECTIVE     Objective   Blood pressure 135/54, pulse 57, temperature 97.8 °F (36.6 °C), resp. rate 18, height 5' 2\" (1.575 m), weight 86.4 kg (190 lb 7.6 oz), SpO2 91%. Body mass index is 34.84 kg/m².    Intake/Output Summary (Last 24 hours) at 11/18/2024 1018  Last data filed at 11/18/2024 0900  Gross per 24 hour   Intake 800 ml   Output 1550 ml   Net -750 ml     Medication Administration - last 24 hours from 11/17/2024 1018 to 11/18/2024 1018         Date/Time Order Dose Route Action Action by     11/18/2024 0938 EST atenolol (TENORMIN) tablet 25 mg 25 mg Oral Given Geneva Villarreal RN     11/18/2024 0501 EST levothyroxine tablet 150 mcg 150 mcg Oral Given Kvng Lopez RN     11/17/2024 1623 EST albuterol (PROVENTIL " HFA,VENTOLIN HFA) inhaler 2 puff 2 puff Inhalation Given Kapil Sethi RN     11/17/2024 1625 EST atorvastatin (LIPITOR) tablet 20 mg 20 mg Oral Given Kapil Sethi RN     11/18/2024 0742 EST insulin lispro (HumALOG/ADMELOG) 100 units/mL subcutaneous injection 2-12 Units -- Subcutaneous Not Given Geneva Villarreal RN     11/17/2024 1636 EST insulin lispro (HumALOG/ADMELOG) 100 units/mL subcutaneous injection 2-12 Units -- Subcutaneous Not Given Kapil Sethi RN     11/17/2024 1201 EST insulin lispro (HumALOG/ADMELOG) 100 units/mL subcutaneous injection 2-12 Units -- Subcutaneous Not Given Kamaljit Hernandez RN     11/18/2024 0938 EST verapamil (CALAN-SR) CR tablet 180 mg 180 mg Oral Given Geneva Villarreal RN     11/17/2024 2032 EST verapamil (CALAN-SR) CR tablet 180 mg 180 mg Oral Given Kvng Lopez RN     11/18/2024 0938 EST aspirin (ECOTRIN LOW STRENGTH) EC tablet 81 mg 81 mg Oral Given Geneva Villarreal RN     11/18/2024 0938 EST enoxaparin (LOVENOX) subcutaneous injection 40 mg 40 mg Subcutaneous Given Geneva Villarreal RN     11/18/2024 0938 EST furosemide (LASIX) tablet 20 mg 20 mg Oral Given Geneva Villarreal RN     11/18/2024 0938 EST losartan (COZAAR) tablet 50 mg 50 mg Oral Given Geneva Villarreal RN     11/18/2024 0938 EST potassium chloride (Klor-Con M20) CR tablet 40 mEq 40 mEq Oral Given Geneva Villarreal RN            General Appearance:   Alert, cooperative, no distress   HEENT:   Normocephalic, atraumatic, anicteric     Lungs:   Equal chest rise, respirations unlabored    Heart:   Regular rate and rhythm   Abdomen:   Soft, non-tender, non-distended; normal bowel sounds; no masses, no organomegaly    Rectal:   Deferred    Extremities:   No cyanosis, clubbing or edema    Neuro:   Moves all 4 extremities    Skin:   No jaundice, rashes, or lesions      Invasive Devices       Peripheral Intravenous Line  Duration             Peripheral IV 11/17/24 Dorsal (posterior);Right Forearm <1 day                       ONC HISTORY     Oncology History    No history exists.        LABORATORY RESULTS     No results displayed because visit has over 200 results.           IMAGING RESULTS     VAS carotid complete study  Result Date: 11/17/2024  Narrative:  [facility] REPORT CLINICAL: Indications: Patient presents for surgical clearance and general health evaluation secondary to future open heart surgery. Patient is asymptomatic at this time. Operative History: 2024-10-08 Cardiac catheterization Coronary angioplasty with stents Risk Factors The patient has history of HTN, Hyperlipidemia, Cancer, CHF, CKD, Aortic valve stenosis, Cardiomyopathy and CAD. Clinical Right Pressure:  139/65 mm Hg, Left Pressure:  142/54 mm Hg.  FINDINGS:  Right        Impression  PSV  EDV (cm/s)  Direction of Flow  Ratio  Dist. ICA                 42          11                      0.70  Mid. ICA                  84          14                      1.42  Prox. ICA    1 - 49%      62          13                      1.05  Dist CCA                  56          10                            Mid CCA                   59          11                      0.90  Prox CCA                  66          14                      1.04  Ext Carotid               39           0                      0.66  Prox Vert                 36           7  Antegrade                 Subclavian               124           0                            Innominate                63           0                             Left         Impression  PSV  EDV (cm/s)  Direction of Flow  Ratio  Dist. ICA                 44          12                      0.55  Mid. ICA                 108          24                      1.33  Prox. ICA    1 - 49%      81          20                      1.00  Dist CCA                  64          13                            Mid CCA                   81           7                      0.80  Prox CCA                 101          13                             Ext Carotid              109           0                      1.35  Prox Vert                 34           6  Antegrade                 Subclavian               153           5                               CONCLUSION:  Impression  RIGHT: There is <50% stenosis noted in the internal carotid artery. Plaque is heterogenous and irregular. Vertebral artery flow is antegrade. There is no significant subclavian artery disease.  LEFT: There is <50% stenosis noted in the internal carotid artery. Plaque is heterogenous and irregular. Vertebral artery flow is antegrade. There is no significant subclavian artery disease.  No prior study for comparison.  SIGNATURE: Electronically Signed by: KAITLIN PAEZ MD on 2024-11-17 02:50:16 PM    CT chest wo contrast  Result Date: 11/16/2024  Narrative: CT CHEST WITHOUT IV CONTRAST INDICATION:   FOX/SOB, AS,HOCM, preop cardiac surgery. History of thyroid cancer. COMPARISON: CXR 11/11/2024, abdomen CT 9/11/2018. TECHNIQUE: Chest CT without intravenous contrast.  Axial, sagittal, coronal 2D reformats and coronal MIPS from source data. Radiation dose length product (DLP):  659 mGy-cm . Radiation dose exposure minimized using iterative reconstruction and automated exposure control. FINDINGS: LUNGS: 2.9 x 2.2 cm smoothly marginated low-attenuation left lower lobe paraspinal nodule, new since 2018 (3/177). 7 mm smoothly marginated anterior right upper lobe nodule (3/66). AIRWAYS: No significant filling defects. PLEURA:  Unremarkable. HEART/GREAT VESSELS: Mild cardiomegaly. Moderate coronary artery calcification indicating atherosclerotic heart disease. Mild ectasia of the ascending aorta at 4.2 cm with dense calcification of the aortic valve leaflets with history of aortic stenosis. Mild calcification in the wall of the ascending aorta. New trace pericardial effusion with mild thickening and slight nodularity of the pericardium. New right cardiophrenic node with a short axis of 8 mm  (2/76). MEDIASTINUM AND JENNY: Calcified anterior mediastinal node. CHEST WALL AND LOWER NECK: Unremarkable. UPPER ABDOMEN: Mildly enlarged gastrohepatic ligament nodes, new from 2018. Hepatic cyst. 1.3 cm right adrenal adenoma. Splenomegaly at 16 cm. Fatty atrophy of the pancreas. 1.8 cm cystic lesion in the body of the pancreas, grossly stable since 2018 (2/116). Partially imaged 1.9 cm low-attenuation lesion adjacent to the head of the pancreas, stable (2/128). OSSEOUS STRUCTURES: Lytic lesions in the CT 3, 4, and T9 vertebral bodies with disruption of the posterior cortex of T9, additional lucencies in the skeleton may be metastases versus hemangiomas.     Impression: Mild ectasia of the ascending aorta at 4.2 cm with mild calcification of the wall of the ascending aorta. Findings compatible with malignancy with multiple lytic metastases in the bone, 7 mm right upper lobe nodule, 2.9 cm left lower lobe nodule, slightly thickened nodular pericardium with pericardial effusion. New right cardiophrenic node, and gastrohepatic  ligament nodes. Note that a T9 vertebral metastasis has disrupted the posterior cortex. Splenomegaly. Cystic lesions in the pancreas, see MRI 10/14/2024. I personally discussed this study with Dr. Tyrell Orellana on 11/16/2024 10:29 AM. Workstation performed: [id number]     Cardiac catheterization  Result Date: 11/15/2024  Narrative: Screw-in temporary pacing wire placed and fixed to generator. Removed at conclusion of the procedure. Normal LVEDP (15 mmHg). No significant LVOT gradient identified. 50 mmHg peak-peak aortic valve gradient with CHF. No indication for alcohol septal ablation.     Echo follow up/limited w/ contrast if indicated  Result Date: 11/12/2024  Narrative:   Left Ventricle: Left ventricular cavity size is normal. Wall thickness is severely increased. There is severe asymmetric hypertrophy of the basal septal wall. The left ventricular ejection fraction is 70% by visual  estimation. Systolic function is hyperdynamic. Wall motion is normal. Diastolic function is mildly abnormal, consistent with grade I (abnormal) relaxation. There is dynamic outflow tract obstruction with a resting gradient of 34.0 mmHg, which increases to a peak gradient of 47.0 mmHg with valsalva.   Right Ventricle: Right ventricular cavity size is normal. Systolic function is normal.   Left Atrium: The atrium is mildly dilated.   Aortic Valve: The aortic valve is trileaflet. The leaflets are severely thickened. The leaflets are severely calcified. There is severely reduced mobility. There is mild regurgitation. There is moderate to severe stenosis. The aortic valve peak velocity is 3.8 m/s. The aortic valve mean gradient is 32.0 mmHg. The aortic valve area is 1.02 cm2.   Mitral Valve: There is mild annular calcification. There is mild regurgitation. There is no systolic anterior motion.   Pericardium: There is a small pericardial effusion posterior to the heart. The fluid exhibits no internal echoes. There is no echocardiographic evidence of tamponade. The evidence against tamponade includes: no right ventricular diastolic collapse, no right atrial inversion and no respiratory variation.     XR chest 1 view portable  Result Date: 11/12/2024  Narrative: XR CHEST PORTABLE INDICATION: chf. COMPARISON: CXR 11/05/2024, chest CT 9/11/2018. FINDINGS: Mild pulmonary venous congestion. Trace pleural effusions. Mild cardiomegaly. Bones are unremarkable for age. Normal upper abdomen.     Impression: Mild pulmonary venous congestion with trace pleural effusions. Workstation performed: [id number]     XR chest pa and lateral  Result Date: 11/6/2024  Narrative: XR CHEST PA AND LATERAL INDICATION: R06.02: Shortness of breath. COMPARISON: 5/11/2020 FINDINGS: Clear lungs. No pneumothorax or pleural effusion. Cardiac silhouette top normal in size. Mild pulmonary vascular congestion. Age-related degenerative changes in the spine.  Normal upper abdomen.     Impression: Mild pulmonary vascular congestion. Otherwise, no acute abnormality Workstation performed: [id number]     I have personally reviewed pertinent imaging study reports.      Cheli Carolina DO PGY-2  [facility]  Division of hematology & oncology    ** Please Note: This note is constructed using a voice recognition dictation system. **

## 2024-11-19 ENCOUNTER — HOSPITAL ENCOUNTER (EMERGENCY)
Facility: HOSPITAL | Age: 79
Discharge: HOME | End: 2024-11-19
Attending: EMERGENCY MEDICINE
Payer: COMMERCIAL

## 2024-11-19 ENCOUNTER — APPOINTMENT (EMERGENCY)
Dept: CT IMAGING | Facility: HOSPITAL | Age: 79
End: 2024-11-19
Payer: COMMERCIAL

## 2024-11-19 VITALS
OXYGEN SATURATION: 94 % | RESPIRATION RATE: 20 BRPM | HEART RATE: 52 BPM | TEMPERATURE: 98 F | BODY MASS INDEX: 35.48 KG/M2 | SYSTOLIC BLOOD PRESSURE: 151 MMHG | WEIGHT: 194 LBS | DIASTOLIC BLOOD PRESSURE: 72 MMHG

## 2024-11-19 DIAGNOSIS — M54.16 LUMBAR RADICULOPATHY, ACUTE: Primary | ICD-10-CM

## 2024-11-19 DIAGNOSIS — R82.71 BACTERIURIA: ICD-10-CM

## 2024-11-19 DIAGNOSIS — M62.830 LUMBAR PARASPINAL MUSCLE SPASM: ICD-10-CM

## 2024-11-19 LAB
BACTERIA UR QL AUTO: ABNORMAL /HPF
BILIRUB UR QL STRIP: NEGATIVE
CLARITY UR: CLEAR
COLOR UR: ABNORMAL
GLUCOSE UR STRIP-MCNC: NEGATIVE MG/DL
HGB UR QL STRIP.AUTO: NEGATIVE
KAPPA LC FREE SER-MCNC: 87.7 MG/L (ref 3.3–19.4)
KAPPA LC FREE/LAMBDA FREE SER: 2.61 {RATIO} (ref 0.26–1.65)
KETONES UR STRIP-MCNC: NEGATIVE MG/DL
LAMBDA LC FREE SERPL-MCNC: 33.6 MG/L (ref 5.7–26.3)
LEUKOCYTE ESTERASE UR QL STRIP: ABNORMAL
NITRITE UR QL STRIP: NEGATIVE
NON-SQ EPI CELLS URNS QL MICRO: ABNORMAL /HPF
PH UR STRIP.AUTO: 5.5 [PH]
PROT UR STRIP-MCNC: NEGATIVE MG/DL
RBC #/AREA URNS AUTO: ABNORMAL /HPF
SP GR UR STRIP.AUTO: 1.04 (ref 1–1.03)
UROBILINOGEN UR STRIP-ACNC: <2 MG/DL
WBC #/AREA URNS AUTO: ABNORMAL /HPF

## 2024-11-19 PROCEDURE — 99285 EMERGENCY DEPT VISIT HI MDM: CPT

## 2024-11-19 PROCEDURE — 74177 CT ABD & PELVIS W/CONTRAST: CPT

## 2024-11-19 PROCEDURE — 96375 TX/PRO/DX INJ NEW DRUG ADDON: CPT

## 2024-11-19 PROCEDURE — 81001 URINALYSIS AUTO W/SCOPE: CPT | Performed by: EMERGENCY MEDICINE

## 2024-11-19 PROCEDURE — 96376 TX/PRO/DX INJ SAME DRUG ADON: CPT

## 2024-11-19 PROCEDURE — 96374 THER/PROPH/DIAG INJ IV PUSH: CPT

## 2024-11-19 PROCEDURE — 99284 EMERGENCY DEPT VISIT MOD MDM: CPT | Performed by: EMERGENCY MEDICINE

## 2024-11-19 PROCEDURE — 96365 THER/PROPH/DIAG IV INF INIT: CPT

## 2024-11-19 RX ORDER — FENTANYL CITRATE 50 UG/ML
50 INJECTION, SOLUTION INTRAMUSCULAR; INTRAVENOUS ONCE
Refills: 0 | Status: COMPLETED | OUTPATIENT
Start: 2024-11-19 | End: 2024-11-19

## 2024-11-19 RX ORDER — LIDOCAINE 50 MG/G
1 PATCH TOPICAL ONCE
Status: DISCONTINUED | OUTPATIENT
Start: 2024-11-19 | End: 2024-11-19 | Stop reason: HOSPADM

## 2024-11-19 RX ORDER — HYDROMORPHONE HCL/PF 1 MG/ML
0.5 SYRINGE (ML) INJECTION ONCE
Refills: 0 | Status: COMPLETED | OUTPATIENT
Start: 2024-11-19 | End: 2024-11-19

## 2024-11-19 RX ORDER — METHYLPREDNISOLONE 4 MG/1
TABLET ORAL
Qty: 21 TABLET | Refills: 0 | Status: SHIPPED | OUTPATIENT
Start: 2024-11-19

## 2024-11-19 RX ORDER — ACETAMINOPHEN 10 MG/ML
1000 INJECTION, SOLUTION INTRAVENOUS ONCE
Status: COMPLETED | OUTPATIENT
Start: 2024-11-19 | End: 2024-11-19

## 2024-11-19 RX ORDER — METHOCARBAMOL 500 MG/1
500 TABLET, FILM COATED ORAL ONCE
Status: COMPLETED | OUTPATIENT
Start: 2024-11-19 | End: 2024-11-19

## 2024-11-19 RX ORDER — METHOCARBAMOL 500 MG/1
500 TABLET, FILM COATED ORAL 3 TIMES DAILY PRN
Qty: 15 TABLET | Refills: 0 | Status: SHIPPED | OUTPATIENT
Start: 2024-11-19 | End: 2024-11-24

## 2024-11-19 RX ORDER — OXYCODONE HYDROCHLORIDE 5 MG/1
2.5 TABLET ORAL EVERY 6 HOURS PRN
Qty: 7 TABLET | Refills: 0 | Status: SHIPPED | OUTPATIENT
Start: 2024-11-19 | End: 2024-11-26

## 2024-11-19 RX ADMIN — HYDROMORPHONE HYDROCHLORIDE 0.5 MG: 1 INJECTION, SOLUTION INTRAMUSCULAR; INTRAVENOUS; SUBCUTANEOUS at 07:30

## 2024-11-19 RX ADMIN — ACETAMINOPHEN 1000 MG: 10 INJECTION INTRAVENOUS at 05:17

## 2024-11-19 RX ADMIN — FENTANYL CITRATE 50 MCG: 50 INJECTION INTRAMUSCULAR; INTRAVENOUS at 05:48

## 2024-11-19 RX ADMIN — METHOCARBAMOL 500 MG: 500 TABLET ORAL at 07:30

## 2024-11-19 RX ADMIN — FENTANYL CITRATE 50 MCG: 50 INJECTION INTRAMUSCULAR; INTRAVENOUS at 04:34

## 2024-11-19 RX ADMIN — IOHEXOL 100 ML: 350 INJECTION, SOLUTION INTRAVENOUS at 05:35

## 2024-11-19 RX ADMIN — LIDOCAINE 1 PATCH: 50 PATCH CUTANEOUS at 07:30

## 2024-11-19 NOTE — ED NOTES
Pt is aware urine sample is needed, pt will notify RN when pt needs to urinate.     Jasmin Connell RN  11/19/24 1364

## 2024-11-19 NOTE — DISCHARGE INSTRUCTIONS
Take Medrol Dosepak as directed to help with inflammation of nerves.  You may take Robaxin 500 mg orally up to 3 times daily as needed for muscle spasm in addition to using lidocaine patch, ice and heat.    If needed, for severe pain, you may take oxycodone 2.5 mg orally every 6 hours.  This may cause you to be drowsy.  Take extra care with position changes especially standing.    A small amount of bacteria were seen in today's urine.  A urine culture has been sent.  You will be notified if infection is identified and treatment with antibiotics is necessary.

## 2024-11-19 NOTE — ED PROVIDER NOTES
"Time reflects when diagnosis was documented in both MDM as applicable and the Disposition within this note       Time User Action Codes Description Comment    11/19/2024  7:50 AM Saray Judge Add [M54.16] Lumbar radiculopathy, acute     11/19/2024  7:51 AM Saray Judge Add [M62.830] Lumbar paraspinal muscle spasm     11/19/2024  8:27 AM Saray Judge Add [R82.71] Bacteriuria           ED Disposition       ED Disposition   Discharge    Condition   Stable    Date/Time   Tue Nov 19, 2024  7:50 AM    Comment   Nadeem Ashton discharge to home/self care.                   Assessment & Plan       Medical Decision Making  Amount and/or Complexity of Data Reviewed  Labs: ordered.  Radiology: ordered.    Risk  Prescription drug management.        ED Course as of 11/19/24 2330   Tue Nov 19, 2024   0434 Patient just completed hospitalization (November 11 through the 18th) after experiencing hypertensive urgency and undergoing further evaluation of hypertrophic cardiomyopathy   0539 Currently at CT scan.  She has not yet had significant relief in discomfort.   0701 Patient notes discomfort has improved some.  She does still intermittently get \"zingers.\"  Reviewed study results which have returned-CT scan.  No acute abnormality on this.  As discomfort started immediately after return/change in position to strongly suspect radicular component with muscle spasm.  Will medicate further.  Patient does feel able to provide urine specimen and will ambulate to restroom shortly.     0741 Patient able to provide urine specimen.   0749 Patient relates that she feels well when upright walking to the bathroom.  Await urine results.  Anticipate discharge with supportive care.     Urine studies reviewed with patient and family.  Although occasional bacteria are noted she does not have large presence of white blood cells accompanying this and has not had urinary specific symptoms.  Discomfort is most " consistent with radicular etiology; suspicion for UTI/infectious etiology is much lower.  Urine has been sent for culture.  She is aware that she will be notified if this is abnormal and antibiotic should be initiated.  She is quite receptive to this plan and eager to return home and get some rest.      Discussed treatment plan with steroid taper and muscle relaxer.  Patient notes that her glucoses are usually extremely well-controlled.  She indicates that while hospitalized she did not receive metformin nor required coverage with insulin.  She does check her glucose daily.  Upon my mention of concern that steroids could elevate this family indicated that she would very easily be able to contact her PCP who could arrange for insulin coverage if necessary if hyperglycemia develops.  Patient aware to use caution with use of muscle relaxer.  She and family are aware that OTC lidocaine patches may be used along with ice and/or heat at alternate times she declined referral to comprehensive spine center noting that she already has a list of new specialty groups with whom she needs to make appointments.  She will follow-up with her PCP regarding current discomfort.    She indicates that after taking a short amount of time to think things over she will decide if she wishes to proceed with cardiac surgery among other interventions/treatments.  She notes that biopsy of the concerning area in her lung is one of the next steps in her care.  Family has already been in contact with PCP Dr. England with whom she will follow-up.    She was able to steadily ambulate out of the department.      Medications   fentaNYL injection 50 mcg (50 mcg Intravenous Given 11/19/24 0815)   acetaminophen (Ofirmev) injection 1,000 mg (0 mg Intravenous Stopped 11/19/24 0629)   iohexol (OMNIPAQUE) 350 MG/ML injection (MULTI-DOSE) 100 mL (100 mL Intravenous Given 11/19/24 7739)   fentaNYL injection 50 mcg (50 mcg Intravenous Given 11/19/24 2436)    methocarbamol (ROBAXIN) tablet 500 mg (500 mg Oral Given 11/19/24 0730)   HYDROmorphone (DILAUDID) injection 0.5 mg (0.5 mg Intravenous Given 11/19/24 0730)       ED Risk Strat Scores                                               History of Present Illness       Chief Complaint   Patient presents with    Back Pain     Pt arrives via EMS from home for right lower back pain after turning in bed. Pt was just discharged from [facility] after a failed ethanol ablation of heart due to a blocked artery. Recent diagnosis of possible lung Ca.        Past Medical History:   Diagnosis Date    Abnormal ultrasound of breast     CAD (coronary artery disease)     Cancer (HCC)     thyroid    Cataract     H/O heart artery stent     History of sarcoidosis     Hyperlipidemia     Hypertension     Sleep apnea       Past Surgical History:   Procedure Laterality Date    APPENDECTOMY      CARDIAC CATHETERIZATION Left 10/8/2024    Procedure: Cardiac Left Heart Cath;  Surgeon: Chasity Gonzalez DO;  Location: [facility];  Service: Cardiology    CARDIAC CATHETERIZATION  10/8/2024    Procedure: Cardiac catheterization;  Surgeon: Chasity Gonzalez DO;  [facility]: [facility];  Service: Cardiology    CARDIAC CATHETERIZATION Left 11/15/2024    Procedure: Cardiac Left Heart Cath;  Surgeon: Abilio Jerry MD;  Location: [facility];  Service: Cardiology    CARDIAC CATHETERIZATION N/A 11/15/2024    Procedure: Cardiac temporary pacemaker;  Surgeon: Abilio Jerry MD;  Location: [facility];  Service: Cardiology    CHOLECYSTECTOMY      CORONARY ANGIOPLASTY WITH STENT PLACEMENT      HYSTERECTOMY      age 43    KNEE SURGERY Bilateral     OOPHORECTOMY Bilateral     age 43      Family History   Problem Relation Age of Onset    No Known Problems Mother     Thyroid cancer Father     No Known Problems Sister     No Known Problems Daughter     Cancer Maternal Grandmother         ear    No Known Problems Maternal Grandfather     No  Known Problems Paternal Grandmother     No Known Problems Paternal Grandfather     No Known Problems Maternal Aunt     No Known Problems Maternal Aunt     Leukemia Paternal Aunt       Social History     Tobacco Use    Smoking status: Never     Passive exposure: Never    Smokeless tobacco: Never   Vaping Use    Vaping status: Never Used   Substance Use Topics    Alcohol use: Not Currently    Drug use: Never      E-Cigarette/Vaping    E-Cigarette Use Never User       E-Cigarette/Vaping Substances    Nicotine No     THC No     CBD No     Flavoring No     Other No     Unknown No       I have reviewed and agree with the history as documented.     Patient is a 79-year-old female who presents to the emergency department for evaluation with intense right flank pain.  Onset after turning in bed, pain radiates to the right lower abdomen.  This is sharp and unrelieved and worsened with movement/position change.  She finds greatest relief here upon sitting upright.  No associated concerning urinary or bowel symptoms.  Specifically she has not had any urinary frequency, urgency, dysuria, hematuria or malodor of urine.  No difficulties with bowel movements.    She was recently hospitalized with hypertensive urgency and CHF in setting of hypertrophic cardiomyopathy.  He underwent cardiac catheterization and consideration of possible ethanol ablation and was deemed an appropriate candidate given degree of aortic valve abnormality.  She indicated desire to think about options a bit more at home-whether to pursue cardiac surgery.  Additionally an evaluation abnormal lesions identified in thoracic spine concerning for possible metastasis.    She was home for less than 24 hours when pain (contraction-like) started        Review of Systems   All other systems reviewed and are negative.          Objective       ED Triage Vitals   Temperature Pulse Blood Pressure Respirations SpO2 Patient Position - Orthostatic VS   11/19/24 0405 11/19/24  0403 11/19/24 0403 11/19/24 0403 11/19/24 0403 11/19/24 0430   98 °F (36.7 °C) 57 153/69 20 96 % Sitting      Temp Source Heart Rate Source BP Location FiO2 (%) Pain Score    11/19/24 0405 11/19/24 0403 11/19/24 0430 -- 11/19/24 0434    Oral Monitor Left arm  10 - Worst Possible Pain      Vitals      Date and Time Temp Pulse SpO2 Resp BP Pain Score FACES Pain Rating User   11/19/24 0629 -- -- -- -- -- 5 --    11/19/24 0601 -- 52 94 % -- 151/72 -- --    11/19/24 0548 -- -- -- -- -- 5 --    11/19/24 0546 -- 60 97 % -- 164/69 -- --    11/19/24 0500 -- 52 93 % 20 151/66 4 --    11/19/24 0434 -- -- -- -- -- 10 - Worst Possible Pain --    11/19/24 0430 -- 57 94 % 20 161/91 -- --    11/19/24 0405 98 °F (36.7 °C) -- -- -- -- -- --    11/19/24 0403 -- 57 96 % 20 153/69 -- --             Physical Exam  Vitals reviewed.   Constitutional:       Appearance: Normal appearance.   HENT:      Mouth/Throat:      Mouth: Mucous membranes are dry.   Eyes:      Extraocular Movements: Extraocular movements intact.      Conjunctiva/sclera: Conjunctivae normal.   Cardiovascular:      Rate and Rhythm: Normal rate and regular rhythm.   Pulmonary:      Effort: Pulmonary effort is normal.      Breath sounds: Normal breath sounds.   Abdominal:      General: Bowel sounds are normal.      Palpations: Abdomen is soft.      Tenderness: There is abdominal tenderness (Right flank laterally.  No overlying skin change).   Skin:     General: Skin is warm and dry.   Neurological:      General: No focal deficit present.      Mental Status: She is alert and oriented to person, place, and time.   Psychiatric:         Mood and Affect: Mood normal.         Results Reviewed       Procedure Component Value Units Date/Time    Urine culture [[id number]]     Lab Status: No result Specimen: Urine     Urine Microscopic [[id number]]  (Abnormal) Collected: 11/19/24 7486    Lab Status: Final result Specimen: Urine, Clean Catch Updated: 11/19/24 4140      RBC, UA 2-4 /hpf      WBC, UA 1-2 /hpf      Epithelial Cells Occasional /hpf      Bacteria, UA Occasional /hpf     UA w Reflex to Microscopic w Reflex to Culture [[id number]]  (Abnormal) Collected: 24 0740    Lab Status: Final result Specimen: Urine, Clean Catch Updated: 24 0759     Color, UA Light Yellow     Clarity, UA Clear     Specific Gravity, UA 1.045     pH, UA 5.5     Leukocytes, UA Moderate     Nitrite, UA Negative     Protein, UA Negative mg/dl      Glucose, UA Negative mg/dl      Ketones, UA Negative mg/dl      Urobilinogen, UA <2.0 mg/dl      Bilirubin, UA Negative     Occult Blood, UA Negative            CT abdomen pelvis with contrast   Final Interpretation by Jesus Gayle MD ([id number])      No acute pathology in the abdomen or pelvis.      Redemonstrated findings of potential malignancy, including left lower nodule and upper abdominal adenopathy.      Unchanged hepatosplenomegaly.      Unchanged pancreatic cystic lesions, better characterized on recent MRI.         Workstation performed: [id number]             Procedures    ED Medication and Procedure Management   Prior to Admission Medications   Prescriptions Last Dose Informant Patient Reported? Taking?   Synthroid 150 MCG tablet  Self Yes No   Sig: Take 150 mcg by mouth daily   albuterol (PROVENTIL HFA,VENTOLIN HFA) 90 mcg/act inhaler  Self Yes No   Sig: Inhale 2 puffs every 4 (four) hours as needed for wheezing or shortness of breath   aspirin (ECOTRIN LOW STRENGTH) 81 mg EC tablet  Self Yes No   Sig: Take 81 mg by mouth daily   atenolol (TENORMIN) 25 mg tablet  Self Yes No   Sig: Take 25 mg by mouth daily   atorvastatin (LIPITOR) 20 mg tablet  Self Yes No   Sig: Take 20 mg by mouth daily   fluticasone (FLONASE) 50 mcg/act nasal spray   Yes No   Si spray into each nostril daily   furosemide (LASIX) 20 mg tablet   No No   Sig: Take 1 tablet (20 mg total) by mouth daily   losartan (COZAAR) 50 mg tablet   No No   Sig: Take 1  tablet (50 mg total) by mouth daily   metFORMIN (GLUCOPHAGE) 500 mg tablet  Self No No   Sig: Take 1 tablet (500 mg total) by mouth 2 (two) times a day with meals   Patient taking differently: Take 500 mg by mouth 2 (two) times a day with meals   potassium chloride (Klor-Con M20) 20 mEq tablet   No No   Sig: Take 1 tablet (20 mEq total) by mouth daily   verapamil (CALAN-SR) 180 mg CR tablet   No No   Sig: Take 1 tablet (180 mg total) by mouth 2 (two) times a day      Facility-Administered Medications: None     Discharge Medication List as of 11/19/2024  8:40 AM        START taking these medications    Details   methocarbamol (ROBAXIN) 500 mg tablet Take 1 tablet (500 mg total) by mouth 3 (three) times a day as needed for muscle spasms for up to 5 days Prn muscle spasms, Starting Tue 11/19/2024, Until Sun 11/24/2024 at 2359, Normal      methylPREDNISolone 4 MG tablet therapy pack Use as directed on package, Normal      oxyCODONE (ROXICODONE) 5 immediate release tablet Take 0.5 tablets (2.5 mg total) by mouth every 6 (six) hours as needed for moderate pain or severe pain for up to 7 days Max Daily Amount: 10 mg, Starting Tue 11/19/2024, Until Tue 11/26/2024 at 2359, Normal           CONTINUE these medications which have NOT CHANGED    Details   albuterol (PROVENTIL HFA,VENTOLIN HFA) 90 mcg/act inhaler Inhale 2 puffs every 4 (four) hours as needed for wheezing or shortness of breath, Historical Med      aspirin (ECOTRIN LOW STRENGTH) 81 mg EC tablet Take 81 mg by mouth daily, Historical Med      atenolol (TENORMIN) 25 mg tablet Take 25 mg by mouth daily, Starting Mon 9/2/2024, Historical Med      atorvastatin (LIPITOR) 20 mg tablet Take 20 mg by mouth daily, Historical Med      fluticasone (FLONASE) 50 mcg/act nasal spray 1 spray into each nostril daily, Historical Med      furosemide (LASIX) 20 mg tablet Take 1 tablet (20 mg total) by mouth daily, Starting Tue 11/19/2024, Normal      losartan (COZAAR) 50 mg tablet  Take 1 tablet (50 mg total) by mouth daily, Starting Tue 11/19/2024, Normal      metFORMIN (GLUCOPHAGE) 500 mg tablet Take 1 tablet (500 mg total) by mouth 2 (two) times a day with meals, Starting Sat 3/9/2024, Until Tue 10/8/2024, Normal      potassium chloride (Klor-Con M20) 20 mEq tablet Take 1 tablet (20 mEq total) by mouth daily, Starting Tue 11/19/2024, Normal      Synthroid 150 MCG tablet Take 150 mcg by mouth daily, Starting Sat 5/28/2022, Historical Med      verapamil (CALAN-SR) 180 mg CR tablet Take 1 tablet (180 mg total) by mouth 2 (two) times a day, Starting Mon 11/18/2024, Normal           No discharge procedures on file.  ED SEPSIS DOCUMENTATION   Time reflects when diagnosis was documented in both MDM as applicable and the Disposition within this note       Time User Action Codes Description Comment    11/19/2024  7:50 AM Saray Judge Add [M54.16] Lumbar radiculopathy, acute     11/19/2024  7:51 AM Saray Judge Add [M62.830] Lumbar paraspinal muscle spasm     11/19/2024  8:27 AM Saray Judge Add [R82.71] Bacteriuria                  Saray Judge MD  11/19/24 9814

## 2024-11-19 NOTE — UTILIZATION REVIEW
NOTIFICATION OF ADMISSION DISCHARGE   This is a Notification of Discharge from [facility]. Please be advised that this patient has been discharge from our facility. Below you will find the admission and discharge date and time including the patient’s disposition.   UTILIZATION REVIEW CONTACT:  Kerri Perez  Utilization   Network Utilization Review Department  Phone: [phone] x carefully listen to the prompts. All voicemails are confidential.  Email: [email or web address]     ADMISSION INFORMATION  PRESENTATION DATE: 11/11/2024  7:37 PM  OBERVATION ADMISSION DATE: N/A  INPATIENT ADMISSION DATE: 11/11/24  9:41 PM   DISCHARGE DATE: 11/18/2024  4:14 PM   DISPOSITION:Home/Self Care    Network Utilization Review Department  ATTENTION: Please call with any questions or concerns to [phone] and carefully listen to the prompts so that you are directed to the right person. All voicemails are confidential.   For Discharge needs, contact Care Management DC Support Team at [phone] opt. 2  Send all requests for admission clinical reviews, approved or denied determinations and any other requests to dedicated fax number below belonging to the campus where the patient is receiving treatment. List of dedicated fax numbers for the Facilities:  FACILITY NAME UR FAX NUMBER   ADMISSION DENIALS (Administrative/Medical Necessity) [phone]   DISCHARGE SUPPORT TEAM ([facility]) [phone]   PARENT CHILD HEALTH (Maternity/NICU/Pediatrics) [phone]   [facility] [phone]   [facility] [phone]   [facility] [phone]   [facility] [phone]   [facility] [phone]   [facility] [phone]   [facility] [phone]   [facility]  [phone]   [facility] [phone]   [facility] [phone]   [facility] [phone]   [facility] [phone]

## 2024-11-20 ENCOUNTER — TELEPHONE (OUTPATIENT)
Dept: RADIOLOGY | Facility: HOSPITAL | Age: 79
End: 2024-11-20

## 2024-11-20 ENCOUNTER — PREP FOR PROCEDURE (OUTPATIENT)
Dept: INTERVENTIONAL RADIOLOGY/VASCULAR | Facility: CLINIC | Age: 79
End: 2024-11-20

## 2024-11-20 ENCOUNTER — TELEPHONE (OUTPATIENT)
Dept: CARDIAC SURGERY | Facility: CLINIC | Age: 79
End: 2024-11-20

## 2024-11-20 DIAGNOSIS — R91.8 LUNG MASS: Primary | ICD-10-CM

## 2024-11-20 PROBLEM — I42.2 HYPERTROPHIC CARDIOMYOPATHY (HCC): Chronic | Status: ACTIVE | Noted: 2024-10-03

## 2024-11-20 PROBLEM — I25.10 CAD (CORONARY ARTERY DISEASE): Chronic | Status: ACTIVE | Noted: 2020-06-18

## 2024-11-20 PROBLEM — N17.9 AKI (ACUTE KIDNEY INJURY) (HCC): Status: RESOLVED | Noted: 2024-11-14 | Resolved: 2024-11-20

## 2024-11-20 PROBLEM — R06.02 SHORTNESS OF BREATH: Status: RESOLVED | Noted: 2020-07-21 | Resolved: 2024-11-20

## 2024-11-20 PROBLEM — I16.0 HYPERTENSIVE URGENCY: Status: RESOLVED | Noted: 2024-11-12 | Resolved: 2024-11-20

## 2024-11-20 PROBLEM — I50.32 CHRONIC DIASTOLIC CONGESTIVE HEART FAILURE (HCC): Chronic | Status: ACTIVE | Noted: 2024-11-12

## 2024-11-20 PROBLEM — I10 ESSENTIAL HYPERTENSION: Chronic | Status: ACTIVE | Noted: 2020-06-18

## 2024-11-20 LAB
ALBUMIN SERPL ELPH-MCNC: 4.1 G/DL (ref 3.2–5.1)
ALBUMIN SERPL ELPH-MCNC: 56.1 % (ref 48–70)
ALBUMIN UR ELPH-MCNC: 100 %
ALPHA1 GLOB MFR UR ELPH: 0 %
ALPHA1 GLOB SERPL ELPH-MCNC: 0.33 G/DL (ref 0.15–0.47)
ALPHA1 GLOB SERPL ELPH-MCNC: 4.5 % (ref 1.8–7)
ALPHA2 GLOB MFR UR ELPH: 0 %
ALPHA2 GLOB SERPL ELPH-MCNC: 0.74 G/DL (ref 0.42–1.04)
ALPHA2 GLOB SERPL ELPH-MCNC: 10.2 % (ref 5.9–14.9)
ATRIAL RATE: 71 BPM
B-GLOBULIN MFR UR ELPH: 0 %
B2 MICROGLOB SERPL-MCNC: 5 MG/L (ref 0.6–2.4)
BETA GLOB ABNORMAL SERPL ELPH-MCNC: 0.44 G/DL (ref 0.31–0.57)
BETA1 GLOB SERPL ELPH-MCNC: 6 % (ref 4.7–7.7)
BETA2 GLOB SERPL ELPH-MCNC: 6.1 % (ref 3.1–7.9)
BETA2+GAMMA GLOB SERPL ELPH-MCNC: 0.45 G/DL (ref 0.2–0.58)
GAMMA GLOB ABNORMAL SERPL ELPH-MCNC: 1.25 G/DL (ref 0.4–1.66)
GAMMA GLOB MFR UR ELPH: 0 %
GAMMA GLOB SERPL ELPH-MCNC: 17.1 % (ref 6.9–22.3)
IGG/ALB SER: 1.28 {RATIO} (ref 1.1–1.8)
INTERPRETATION UR IFE-IMP: NORMAL
INTERPRETATION UR IFE-IMP: NORMAL
P AXIS: 58 DEGREES
PR INTERVAL: 172 MS
PROT PATTERN SERPL ELPH-IMP: NORMAL
PROT PATTERN UR ELPH-IMP: NORMAL
PROT SERPL-MCNC: 7.3 G/DL (ref 6.4–8.2)
PROT UR-MCNC: 11 MG/DL
QRS AXIS: -48 DEGREES
QRSD INTERVAL: 94 MS
QT INTERVAL: 436 MS
QTC INTERVAL: 474 MS
T WAVE AXIS: 50 DEGREES
VENTRICULAR RATE: 71 BPM

## 2024-11-20 PROCEDURE — 93010 ELECTROCARDIOGRAM REPORT: CPT | Performed by: INTERNAL MEDICINE

## 2024-11-20 PROCEDURE — 84166 PROTEIN E-PHORESIS/URINE/CSF: CPT | Performed by: STUDENT IN AN ORGANIZED HEALTH CARE EDUCATION/TRAINING PROGRAM

## 2024-11-20 PROCEDURE — 86335 IMMUNFIX E-PHORSIS/URINE/CSF: CPT | Performed by: STUDENT IN AN ORGANIZED HEALTH CARE EDUCATION/TRAINING PROGRAM

## 2024-11-20 NOTE — TELEPHONE ENCOUNTER
Spoke with patient regarding CT surgery referral for AS/HCOM. Patient states at this time she is dealing with possible Thyroid and Lung cancer. She is addressing this with her PCP and does not want to address the cardiac issues at this time. I strongly encouraged patient to continue to follow with PCP and cardiologist. I agreed to call her in January 2025 to discuss potential CT surgery consult. Patient verbalized understanding of same.

## 2024-11-20 NOTE — TELEPHONE ENCOUNTER
Called to schedule lung Bx. She would not like to schedule at this time. Consulting her family dr about him recommending pet scan for now. Pt will call back if they would like to schedule Bx.

## 2024-11-20 NOTE — PROGRESS NOTES
General Cardiology Outpatient Visit    Nadeem Ashton 79 y.o. female   MRN: [id number]  Encounter: [id number]    Assessment:  Patient Active Problem List    Diagnosis Date Noted    Goals of care, counseling/discussion 11/18/2024    Palliative care encounter 11/18/2024    Lesion of bone of thoracic spine 11/18/2024    Lung mass 11/16/2024    Thrombocytopenia (HCC) 11/15/2024    Chronic diastolic congestive heart failure (HCC) 11/12/2024    Hypertrophic cardiomyopathy (HCC) 10/03/2024    Postoperative hypothyroidism 10/03/2024    Fatty liver 10/03/2024    BMI 36.0-36.9,adult 10/03/2024    Sleep apnea 10/03/2024    Stage 3a chronic kidney disease (HCC) 09/24/2024    Restrictive lung disease 09/21/2020    CAD (coronary artery disease) 06/18/2020    Nonrheumatic aortic valve stenosis 06/18/2020    Essential hypertension 06/18/2020    Dyslipidemia 06/18/2020    Mild persistent asthma without complication 06/18/2020     Telemedicine Consent:  Patient: Nadeem Ashton  Provider located at [facility] CARDIOLOGY [address] [address]    The patient was identified by name and date of birth. Nadeem Ashton was informed that this is a telemedicine visit and that the visit is being conducted through Telephone.  My office door was closed. No one else was in the room.  She acknowledged consent and understanding of privacy and security of the platform. The patient has agreed to participate and understands they can discontinue the visit at any time. Patient is aware this is a billable service. I have spent a total time of 50 minutes in caring for this patient on the day of the visit/encounter including Diagnostic results, Risks and benefits of tx options, Patient and family education, Importance of tx compliance, Documenting in the medical record, and Obtaining or reviewing history  .    Today's Plan:  Continue current medications.   She is not interested in open heart  "surgery or other invasive testing at this time.  Declined sooner follow-up visit with cardiology. Wishes to follow-up with Dr. Pearson as previously scheduled in 03/2025.    Plan:  Hypertrophic obstructive cardiomyopathy   With contaminant severe aortic stenosis.   [facility] 11/15/2024: \"Normal LVEDP (15 mmHg). No significant LVOT gradient identified. 50 mmHg peak-peak aortic valve gradient with CHF. No indication for alcohol septal ablation.\"    Seen by CT surgery during 11/2024 admission for surgical AVR/myectomy - recommended medical optimization and follow up at that time.   Continues on atenolol 25 mg daily and verapamil 180 mg BID.    Follows with Dr. Pearson.    Chronic HFpEF; LVEF 70%   Etiology: HOCM.   Weight of 190 lbs on 11/18 (day of discharge). Today, weighs 188 lbs at home.    Guideline-Directed Medical Therapies:  --Aldosterone Antagonist: No.   --SGLT2 Inhibitor: No.    Volume Management:  --Diuretic: Lasix 20 mg daily.  --K supplementation: potassium 20 mEq daily.     Hypertension / history of hypertensive urgency   BP of 159/58 mmHg at home today.   Continues on losartan 50 mg daily and medications as above.     Coronary artery disease   Without active chest pain.   S/p stenting of LAD in 2008.  [facility] 10/2024: moderate diffuse disease throughout.   Continue on aspirin and statin.    Chronic kidney disease, stage IIIa   Baseline creatinine of 0.8-1.0.   Most recent BMP from 11/18/2024: sodium 138; potassium 4.2; BUN 32; creatinine 1.01; eGFR 53.     Paraspinal mass on CT imaging: consulted during 11/2024 admission and recommending biopsy. However, patient prefers to proceed with PET scan.   Aortic stenosis, severe   Hyperlipidemia  Mild persistent asthma  Hypothyroidism  Diabetes mellitus, type II  Obstructive sleep apnea  Restrictive lung disease  History of sarcoidosis  History of papillary thyroid cancer    HPI:   Nadeem Ashton is a 79-year-old woman with a PMH as above who presents to the office for " "hospital follow-up. Follows with Dr. Pearson.     Admitted to [facility] from 11/11 to 11/18/2024 after presenting from PCP office with worsening SOB and hypertensive urgency. Started on IV diuretics (IV Lasix 20 mg BID), and cardiology consulted. Case discussed with interventional cardiology and Dr. Herring for inpatient alcohol septal ablation. [facility] completed on 11/15; \"no indication for alcohol septal ablation.\" CT surgery consulted for evaluation for surgical AVR with myectomy; recommended optimization followed by outpatient follow-up. Transitioned to PO Lasix on 11/15. CT chest with new paraspinal mass; oncology consulted and recommended biopsy +/- bone marrow biopsy. Lost 6 lbs this admission.     In [facility] ED in 11/19/2024 with severe pain. Received pain medication and steroids. Pain resolved and was discharged home with muscle relaxer.    11/21/2024: Patient contacted for hospital follow-up. She reviewed hospital course as well as ED course in detail. Patient expressed frustration regarding recent hospitalization and lack of resolution regarding HOCM and aortic stenosis (\"I've lost my trust in my cardiology doctors and your hospital\"). She is not interested in open heart surgery at this time. Tired of being tested, poked, and prodded and is more concerned about quality of life rather than quantity. Planning to follow-up with her PCP in near future and follow his recommendations.    Symptomatically, patient feeling great today. No current cardiac complaints.  Continues to routinely monitor blood pressure and weights at home.    Past Medical History:   Diagnosis Date    Abnormal ultrasound of breast     RAFI (acute kidney injury) (HCC) 11/14/2024    CAD (coronary artery disease)     Cancer (HCC)     thyroid    Cataract     H/O heart artery stent     History of sarcoidosis     Hyperlipidemia     Hypertension     Hypertensive urgency 11/12/2024    Sleep apnea        Review of Systems   Constitutional:  " Negative for activity change, appetite change, fatigue and unexpected weight change.   Respiratory:  Negative for cough, chest tightness and shortness of breath.    Cardiovascular:  Negative for chest pain, palpitations and leg swelling.   Gastrointestinal:  Negative for abdominal distention and abdominal pain.   Genitourinary:  Negative for decreased urine volume.   Neurological:  Negative for dizziness and syncope.   Psychiatric/Behavioral:  Negative for confusion and sleep disturbance. The patient is not nervous/anxious.        No Known Allergies      Current Outpatient Medications:     albuterol (PROVENTIL HFA,VENTOLIN HFA) 90 mcg/act inhaler, Inhale 2 puffs every 4 (four) hours as needed for wheezing or shortness of breath, Disp: , Rfl:     aspirin (ECOTRIN LOW STRENGTH) 81 mg EC tablet, Take 81 mg by mouth daily, Disp: , Rfl:     atenolol (TENORMIN) 25 mg tablet, Take 25 mg by mouth daily, Disp: , Rfl:     atorvastatin (LIPITOR) 20 mg tablet, Take 20 mg by mouth daily, Disp: , Rfl:     fluticasone (FLONASE) 50 mcg/act nasal spray, 1 spray into each nostril daily, Disp: , Rfl:     furosemide (LASIX) 20 mg tablet, Take 1 tablet (20 mg total) by mouth daily, Disp: 30 tablet, Rfl: 0    losartan (COZAAR) 50 mg tablet, Take 1 tablet (50 mg total) by mouth daily, Disp: 30 tablet, Rfl: 0    metFORMIN (GLUCOPHAGE) 500 mg tablet, Take 1 tablet (500 mg total) by mouth 2 (two) times a day with meals, Disp: 60 tablet, Rfl: 0    methocarbamol (ROBAXIN) 500 mg tablet, Take 1 tablet (500 mg total) by mouth 3 (three) times a day as needed for muscle spasms for up to 5 days Prn muscle spasms, Disp: 15 tablet, Rfl: 0    methylPREDNISolone 4 MG tablet therapy pack, Use as directed on package, Disp: 21 tablet, Rfl: 0    oxyCODONE (ROXICODONE) 5 immediate release tablet, Take 0.5 tablets (2.5 mg total) by mouth every 6 (six) hours as needed for moderate pain or severe pain for up to 7 days Max Daily Amount: 10 mg, Disp: 7 tablet,  Rfl: 0    potassium chloride (Klor-Con M20) 20 mEq tablet, Take 1 tablet (20 mEq total) by mouth daily, Disp: 30 tablet, Rfl: 0    Synthroid 150 MCG tablet, Take 150 mcg by mouth daily, Disp: , Rfl:     verapamil (CALAN-SR) 180 mg CR tablet, Take 1 tablet (180 mg total) by mouth 2 (two) times a day, Disp: 60 tablet, Rfl: 0    Social History     Socioeconomic History    Marital status: /Civil Union     Spouse name: Not on file    Number of children: Not on file    Years of education: Not on file    Highest education level: Not on file   Occupational History    Not on file   Tobacco Use    Smoking status: Never     Passive exposure: Never    Smokeless tobacco: Never   Vaping Use    Vaping status: Never Used   Substance and Sexual Activity    Alcohol use: Not Currently    Drug use: Never    Sexual activity: Not on file   Other Topics Concern    Not on file   Social History Narrative    Not on file     Social Drivers of Health     Financial Resource Strain: Not on file   Food Insecurity: No Food Insecurity (11/12/2024)    Hunger Vital Sign     Worried About Running Out of Food in the Last Year: Never true     Ran Out of Food in the Last Year: Never true   Transportation Needs: No Transportation Needs (11/12/2024)    PRAPARE - Transportation     Lack of Transportation (Medical): No     Lack of Transportation (Non-Medical): No   Physical Activity: Not on file   Stress: Not on file   Social Connections: Not on file   Intimate Partner Violence: Unknown (11/12/2024)    Nursing IPS     Feels Physically and Emotionally Safe: Not on file     Physically Hurt by Someone: Not on file     Humiliated or Emotionally Abused by Someone: Not on file     Physically Hurt by Someone: 2     Hurt or Threatened by Someone: 2   Housing Stability: Low Risk  (11/12/2024)    Housing Stability Vital Sign     Unable to Pay for Housing in the Last Year: No     Number of Times Moved in the Last Year: 0     Homeless in the Last Year: No  "    Family History   Problem Relation Age of Onset    No Known Problems Mother     Thyroid cancer Father     No Known Problems Sister     No Known Problems Daughter     Cancer Maternal Grandmother         ear    No Known Problems Maternal Grandfather     No Known Problems Paternal Grandmother     No Known Problems Paternal Grandfather     No Known Problems Maternal Aunt     No Known Problems Maternal Aunt     Leukemia Paternal Aunt        Vitals:   Blood pressure 159/58, pulse (!) 54, height 5' 2\" (1.575 m), weight 85.3 kg (188 lb).    Wt Readings from Last 10 Encounters:   11/21/24 85.3 kg (188 lb)   11/19/24 88 kg (194 lb 0.1 oz)   11/18/24 86.4 kg (190 lb 7.6 oz)   10/08/24 89.8 kg (198 lb)   10/03/24 92.1 kg (203 lb)   09/24/24 92.3 kg (203 lb 6.4 oz)   08/12/24 86.2 kg (190 lb)   06/26/24 86.2 kg (190 lb)   03/09/24 86.4 kg (190 lb 7.6 oz)   02/27/23 98 kg (216 lb)     Vitals:    11/21/24 0916   BP: 159/58   BP Location: Left arm   Patient Position: Sitting   Cuff Size: Standard   Pulse: (!) 54   Weight: 85.3 kg (188 lb)   Height: 5' 2\" (1.575 m)       Labs & Results:  Lab Results   Component Value Date    WBC 3.45 (L) 11/18/2024    HGB 11.3 (L) 11/18/2024    HCT 35.9 11/18/2024    MCV 94 11/18/2024     (L) 11/18/2024     Lab Results   Component Value Date    SODIUM 138 11/18/2024    K 4.2 11/18/2024     11/18/2024    CO2 24 11/18/2024    BUN 32 (H) 11/18/2024    CREATININE 1.01 11/18/2024    GLUC 115 11/18/2024    CALCIUM 9.2 11/18/2024     Lab Results   Component Value Date    INR 1.10 05/11/2020    PROTIME 13.6 05/11/2020     Lab Results   Component Value Date     (H) 11/11/2024      Marleny Rodriguez PA-C  "

## 2024-11-21 ENCOUNTER — TELEPHONE (OUTPATIENT)
Dept: PALLIATIVE MEDICINE | Facility: CLINIC | Age: 79
End: 2024-11-21

## 2024-11-21 ENCOUNTER — TELEMEDICINE (OUTPATIENT)
Dept: CARDIOLOGY CLINIC | Facility: CLINIC | Age: 79
End: 2024-11-21
Payer: COMMERCIAL

## 2024-11-21 VITALS
DIASTOLIC BLOOD PRESSURE: 58 MMHG | HEART RATE: 54 BPM | WEIGHT: 188 LBS | SYSTOLIC BLOOD PRESSURE: 159 MMHG | BODY MASS INDEX: 34.6 KG/M2 | HEIGHT: 62 IN

## 2024-11-21 DIAGNOSIS — I35.0 NONRHEUMATIC AORTIC VALVE STENOSIS: ICD-10-CM

## 2024-11-21 DIAGNOSIS — I10 ESSENTIAL HYPERTENSION: Chronic | ICD-10-CM

## 2024-11-21 DIAGNOSIS — Z09 HOSPITAL DISCHARGE FOLLOW-UP: Primary | ICD-10-CM

## 2024-11-21 DIAGNOSIS — I42.1 HOCM (HYPERTROPHIC OBSTRUCTIVE CARDIOMYOPATHY) (HCC): ICD-10-CM

## 2024-11-21 DIAGNOSIS — I35.0 SEVERE AORTIC STENOSIS: ICD-10-CM

## 2024-11-21 DIAGNOSIS — I42.2 HYPERTROPHIC CARDIOMYOPATHY (HCC): Chronic | ICD-10-CM

## 2024-11-21 DIAGNOSIS — I50.32 CHRONIC HEART FAILURE WITH PRESERVED EJECTION FRACTION (HCC): ICD-10-CM

## 2024-11-21 LAB — BSA FOR ECHO PROCEDURE: 1.87 M2

## 2024-11-21 PROCEDURE — 99443 PR PHYS/QHP TELEPHONE EVALUATION 21-30 MIN: CPT | Performed by: PHYSICIAN ASSISTANT

## 2024-11-21 PROCEDURE — 93321 DOPPLER ECHO F-UP/LMTD STD: CPT | Performed by: INTERNAL MEDICINE

## 2024-11-21 PROCEDURE — 93325 DOPPLER ECHO COLOR FLOW MAPG: CPT | Performed by: INTERNAL MEDICINE

## 2024-11-21 PROCEDURE — 93308 TTE F-UP OR LMTD: CPT | Performed by: INTERNAL MEDICINE

## 2024-12-10 ENCOUNTER — HOSPITAL ENCOUNTER (OUTPATIENT)
Dept: ULTRASOUND IMAGING | Facility: HOSPITAL | Age: 79
Discharge: HOME | End: 2024-12-10
Attending: INTERNAL MEDICINE
Payer: COMMERCIAL

## 2024-12-10 ENCOUNTER — APPOINTMENT (OUTPATIENT)
Dept: LAB | Facility: HOSPITAL | Age: 79
End: 2024-12-10
Payer: COMMERCIAL

## 2024-12-10 DIAGNOSIS — C73 MALIGNANT NEOPLASM OF THYROID GLAND (HCC): ICD-10-CM

## 2024-12-10 DIAGNOSIS — I10 HYPERTENSION: ICD-10-CM

## 2024-12-10 DIAGNOSIS — I50.9 HEART FAILURE (HCC): ICD-10-CM

## 2024-12-10 LAB
ANION GAP SERPL CALCULATED.3IONS-SCNC: 8 MMOL/L (ref 4–13)
BUN SERPL-MCNC: 22 MG/DL (ref 5–25)
CALCIUM SERPL-MCNC: 9.5 MG/DL (ref 8.4–10.2)
CHLORIDE SERPL-SCNC: 104 MMOL/L (ref 96–108)
CO2 SERPL-SCNC: 30 MMOL/L (ref 21–32)
CREAT SERPL-MCNC: 0.88 MG/DL (ref 0.6–1.3)
GFR SERPL CREATININE-BSD FRML MDRD: 62 ML/MIN/1.73SQ M
GLUCOSE P FAST SERPL-MCNC: 98 MG/DL (ref 65–99)
POTASSIUM SERPL-SCNC: 4.2 MMOL/L (ref 3.5–5.3)
SODIUM SERPL-SCNC: 142 MMOL/L (ref 135–147)

## 2024-12-10 PROCEDURE — 36415 COLL VENOUS BLD VENIPUNCTURE: CPT

## 2024-12-10 PROCEDURE — 80048 BASIC METABOLIC PNL TOTAL CA: CPT

## 2024-12-10 PROCEDURE — 76536 US EXAM OF HEAD AND NECK: CPT

## 2024-12-11 ENCOUNTER — HOSPITAL ENCOUNTER (OUTPATIENT)
Dept: RADIOLOGY | Age: 79
Discharge: HOME | End: 2024-12-11
Payer: COMMERCIAL

## 2024-12-11 DIAGNOSIS — K86.2 CYST OF PANCREAS: ICD-10-CM

## 2024-12-11 LAB — GLUCOSE SERPL-MCNC: 114 MG/DL (ref 65–140)

## 2024-12-11 PROCEDURE — 82948 REAGENT STRIP/BLOOD GLUCOSE: CPT

## 2024-12-11 PROCEDURE — 78815 PET IMAGE W/CT SKULL-THIGH: CPT

## 2024-12-11 PROCEDURE — A9552 F18 FDG: HCPCS

## 2024-12-11 NOTE — LETTER
[facility]  [address] [address]      December 16, 2024    MRN: [id number]     Phone: [phone]     Dear Ms. Ashton,    You recently had a(n) Nuclear Medicine performed on 12/11/2024 at  [facility] that was requested by DANDRE Altamirano. The study was reviewed by a radiologist, which is a physician who specializes in medical imaging. The radiologist issued a report describing his or her findings. In that report there was a finding that the radiologist felt warranted further discussion with your health care provider and that discussion would be beneficial to you.      The results were sent to DANDRE Altamirano on 12/11/2024  3:13 PM. We recommend that you contact DANDRE Altamirano at [phone] or set up an appointment to discuss the results of the imaging test. If you have already heard from DANDRE Altamirano regarding the results of your study, you can disregard this letter.     This letter is not meant to alarm you, but intended to encourage you to follow-up on your results with the provider that sent you for the imaging study. In addition, we have enclosed answers to frequently asked questions by other patients who have also received a letter to review results with their health care provider (see page two).      Thank you for choosing [facility] for your medical imaging needs.                                                                                                                                                        FREQUENTLY ASKED QUESTIONS    Why am I receiving this letter?  [place] State Law requires us to notify patients who have findings on imaging exams that may require more testing or follow-up with a health professional within the next 3 months.        How serious is the finding on the imaging test?  This letter is sent to all patients who may need follow-up or more testing within the  next 3 months.  Receiving this letter does not necessarily mean you have a life-threatening imaging finding or disease.  Recommendations in the radiologist’s imaging report are general in nature and it is up to your healthcare provider to say whether those recommendations make sense for your situation.  You are strongly encouraged to talk to your health care provider about the results and ask whether additional steps need to be taken.    Where can I get a copy of the final report for my recent radiology exam?  To get a full copy of the report you can access your records online at [email or web address] or please contact [facility]’s Medical Records Department at [phone] Monday through Friday between 8 am and 6 pm.         What do I need to do now?           Please contact your health care provider who requested the imaging study to discuss what further actions (if any) are needed.  You may have already heard from (your ordering provider) in regard to this test in which case you can disregard this letter.        NOTICE IN ACCORDANCE WITH THE [place] STATE “PATIENT TEST RESULT INFORMATION ACT OF 2018”    You are receiving this notice as a result of a determination by your diagnostic imaging service that further discussions of your test results are warranted and would be beneficial to you.    The complete results of your test or tests have been or will be sent to the health care practitioner that ordered the test or tests. It is recommended that you contact your health care practitioner to discuss your results as soon as possible.

## 2024-12-16 PROBLEM — G47.33 OSA (OBSTRUCTIVE SLEEP APNEA): Status: ACTIVE | Noted: 2024-12-16

## 2024-12-26 ENCOUNTER — APPOINTMENT (OUTPATIENT)
Dept: LAB | Facility: HOSPITAL | Age: 79
End: 2024-12-26
Attending: INTERNAL MEDICINE
Payer: COMMERCIAL

## 2024-12-26 ENCOUNTER — HOSPITAL ENCOUNTER (OUTPATIENT)
Dept: NON INVASIVE DIAGNOSTICS | Facility: HOSPITAL | Age: 79
Discharge: HOME | End: 2024-12-26
Payer: COMMERCIAL

## 2024-12-26 VITALS
DIASTOLIC BLOOD PRESSURE: 78 MMHG | WEIGHT: 194 LBS | HEIGHT: 62 IN | HEART RATE: 50 BPM | SYSTOLIC BLOOD PRESSURE: 130 MMHG | BODY MASS INDEX: 35.7 KG/M2

## 2024-12-26 DIAGNOSIS — D51.9 ANEMIA DUE TO VITAMIN B12 DEFICIENCY, UNSPECIFIED B12 DEFICIENCY TYPE: ICD-10-CM

## 2024-12-26 DIAGNOSIS — I42.2 HYPERTROPHIC CARDIOMYOPATHY (HCC): ICD-10-CM

## 2024-12-26 DIAGNOSIS — I35.0 NODULAR CALCIFIC AORTIC VALVE STENOSIS: ICD-10-CM

## 2024-12-26 DIAGNOSIS — I25.10 ATHEROSCLEROSIS OF NATIVE CORONARY ARTERY, UNSPECIFIED WHETHER ANGINA PRESENT, UNSPECIFIED WHETHER NATIVE OR TRANSPLANTED HEART: ICD-10-CM

## 2024-12-26 DIAGNOSIS — E78.5 HYPERLIPIDEMIA, UNSPECIFIED HYPERLIPIDEMIA TYPE: ICD-10-CM

## 2024-12-26 DIAGNOSIS — E03.9 ACQUIRED HYPOTHYROIDISM: ICD-10-CM

## 2024-12-26 DIAGNOSIS — I10 ESSENTIAL HYPERTENSION, MALIGNANT: ICD-10-CM

## 2024-12-26 LAB
ALBUMIN SERPL BCG-MCNC: 4.2 G/DL (ref 3.5–5)
ALP SERPL-CCNC: 66 U/L (ref 34–104)
ALT SERPL W P-5'-P-CCNC: 8 U/L (ref 7–52)
ANION GAP SERPL CALCULATED.3IONS-SCNC: 8 MMOL/L (ref 4–13)
AORTIC ROOT: 3.3 CM
AORTIC VALVE MEAN VELOCITY: 29.4 M/S
APICAL FOUR CHAMBER EJECTION FRACTION: 73 %
ASCENDING AORTA: 4 CM
AST SERPL W P-5'-P-CCNC: 12 U/L (ref 13–39)
AV AREA BY CONTINUOUS VTI: 1.3 CM2
AV AREA PEAK VELOCITY: 1.3 CM2
AV LVOT MEAN GRADIENT: 6 MMHG
AV LVOT PEAK GRADIENT: 10 MMHG
AV MEAN GRADIENT: 39 MMHG
AV PEAK GRADIENT: 64 MMHG
AV REGURGITATION PRESSURE HALF TIME: 661 MS
AV VALVE AREA: 1.34 CM2
AV VELOCITY RATIO: 0.4
BASOPHILS # BLD AUTO: 0.01 THOUSANDS/ÂΜL (ref 0–0.1)
BASOPHILS NFR BLD AUTO: 0 % (ref 0–1)
BILIRUB SERPL-MCNC: 0.67 MG/DL (ref 0.2–1)
BSA FOR ECHO PROCEDURE: 1.89 M2
BUN SERPL-MCNC: 27 MG/DL (ref 5–25)
CALCIUM SERPL-MCNC: 9.5 MG/DL (ref 8.4–10.2)
CHLORIDE SERPL-SCNC: 106 MMOL/L (ref 96–108)
CHOLEST SERPL-MCNC: 122 MG/DL (ref ?–200)
CO2 SERPL-SCNC: 28 MMOL/L (ref 21–32)
CREAT SERPL-MCNC: 0.94 MG/DL (ref 0.6–1.3)
CREAT UR-MCNC: 137.3 MG/DL
DOP CALC AO PEAK VEL: 3.99 M/S
DOP CALC AO VTI: 112.06 CM
DOP CALC LVOT AREA: 3.14 CM2
DOP CALC LVOT CARDIAC INDEX: 3.63 L/MIN/M2
DOP CALC LVOT CARDIAC OUTPUT: 6.86 L/MIN
DOP CALC LVOT DIAMETER: 2 CM
DOP CALC LVOT PEAK VEL VTI: 47.92 CM
DOP CALC LVOT PEAK VEL: 1.59 M/S
DOP CALC LVOT STROKE INDEX: 76.2 ML/M2
DOP CALC LVOT STROKE VOLUME: 144
E WAVE DECELERATION TIME: 354 MS
E/A RATIO: 0.71
EOSINOPHIL # BLD AUTO: 0.02 THOUSAND/ÂΜL (ref 0–0.61)
EOSINOPHIL NFR BLD AUTO: 1 % (ref 0–6)
ERYTHROCYTE [DISTWIDTH] IN BLOOD BY AUTOMATED COUNT: 15.1 % (ref 11.6–15.1)
FRACTIONAL SHORTENING: 35 (ref 28–44)
GFR SERPL CREATININE-BSD FRML MDRD: 57 ML/MIN/1.73SQ M
GLUCOSE P FAST SERPL-MCNC: 96 MG/DL (ref 65–99)
HCT VFR BLD AUTO: 35.2 % (ref 34.8–46.1)
HDLC SERPL-MCNC: 33 MG/DL
HGB BLD-MCNC: 11.1 G/DL (ref 11.5–15.4)
IMM GRANULOCYTES # BLD AUTO: 0 THOUSAND/UL (ref 0–0.2)
IMM GRANULOCYTES NFR BLD AUTO: 0 % (ref 0–2)
INTERVENTRICULAR SEPTUM IN DIASTOLE (PARASTERNAL SHORT AXIS VIEW): 1.9 CM
INTERVENTRICULAR SEPTUM: 1.9 CM (ref 0.6–1.1)
LAAS-AP2: 24 CM2
LAAS-AP4: 25 CM2
LDLC SERPL CALC-MCNC: 73 MG/DL (ref 0–100)
LEFT ATRIUM SIZE: 4.9 CM
LEFT ATRIUM VOLUME (MOD BIPLANE): 88 ML
LEFT ATRIUM VOLUME INDEX (MOD BIPLANE): 46.6 ML/M2
LEFT INTERNAL DIMENSION IN SYSTOLE: 3.1 CM (ref 2.1–4)
LEFT VENTRICULAR INTERNAL DIMENSION IN DIASTOLE: 4.8 CM (ref 3.5–6)
LEFT VENTRICULAR POSTERIOR WALL IN END DIASTOLE: 1.4 CM
LEFT VENTRICULAR STROKE VOLUME: 69 ML
LVSV (TEICH): 69 ML
LYMPHOCYTES # BLD AUTO: 1.51 THOUSANDS/ÂΜL (ref 0.6–4.47)
LYMPHOCYTES NFR BLD AUTO: 43 % (ref 14–44)
MCH RBC QN AUTO: 29.5 PG (ref 26.8–34.3)
MCHC RBC AUTO-ENTMCNC: 31.5 G/DL (ref 31.4–37.4)
MCV RBC AUTO: 94 FL (ref 82–98)
MICROALBUMIN UR-MCNC: 47.4 MG/L
MICROALBUMIN/CREAT 24H UR: 35 MG/G CREATININE (ref 0–30)
MONOCYTES # BLD AUTO: 0.19 THOUSAND/ÂΜL (ref 0.17–1.22)
MONOCYTES NFR BLD AUTO: 5 % (ref 4–12)
MV E'TISSUE VEL-LAT: 6 CM/S
MV E'TISSUE VEL-SEP: 5 CM/S
MV PEAK A VEL: 1.36 M/S
MV PEAK E VEL: 96 CM/S
MV STENOSIS PRESSURE HALF TIME: 103 MS
MV VALVE AREA P 1/2 METHOD: 2.1
NEUTROPHILS # BLD AUTO: 1.8 THOUSANDS/ÂΜL (ref 1.85–7.62)
NEUTS SEG NFR BLD AUTO: 51 % (ref 43–75)
NONHDLC SERPL-MCNC: 89 MG/DL
NRBC BLD AUTO-RTO: 0 /100 WBCS
PLATELET # BLD AUTO: 102 THOUSANDS/UL (ref 149–390)
PMV BLD AUTO: 9.6 FL (ref 8.9–12.7)
POTASSIUM SERPL-SCNC: 4 MMOL/L (ref 3.5–5.3)
PROT SERPL-MCNC: 7.1 G/DL (ref 6.4–8.4)
RBC # BLD AUTO: 3.76 MILLION/UL (ref 3.81–5.12)
RIGHT ATRIUM AREA SYSTOLE A4C: 12.8 CM2
RIGHT VENTRICLE ID DIMENSION: 3.5 CM
SL CV AV DECELERATION TIME RETROGRADE: 2280 MS
SL CV AV PEAK GRADIENT RETROGRADE: 31 MMHG
SL CV LEFT ATRIUM LENGTH A2C: 5.8 CM
SL CV PED ECHO LEFT VENTRICLE DIASTOLIC VOLUME (MOD BIPLANE) 2D: 107 ML
SL CV PED ECHO LEFT VENTRICLE SYSTOLIC VOLUME (MOD BIPLANE) 2D: 38 ML
SODIUM SERPL-SCNC: 142 MMOL/L (ref 135–147)
TR MAX PG: 41 MMHG
TR PEAK VELOCITY: 3.2 M/S
TRICUSPID ANNULAR PLANE SYSTOLIC EXCURSION: 2.8 CM
TRICUSPID VALVE PEAK REGURGITATION VELOCITY: 3.21 M/S
TRIGL SERPL-MCNC: 81 MG/DL (ref ?–150)
TSH SERPL DL<=0.05 MIU/L-ACNC: 1.44 UIU/ML (ref 0.45–4.5)
VIT B12 SERPL-MCNC: 364 PG/ML (ref 180–914)
WBC # BLD AUTO: 3.53 THOUSAND/UL (ref 4.31–10.16)

## 2024-12-26 PROCEDURE — 80061 LIPID PANEL: CPT

## 2024-12-26 PROCEDURE — 82570 ASSAY OF URINE CREATININE: CPT

## 2024-12-26 PROCEDURE — 82088 ASSAY OF ALDOSTERONE: CPT

## 2024-12-26 PROCEDURE — 82607 VITAMIN B-12: CPT

## 2024-12-26 PROCEDURE — 85025 COMPLETE CBC W/AUTO DIFF WBC: CPT

## 2024-12-26 PROCEDURE — 80053 COMPREHEN METABOLIC PANEL: CPT

## 2024-12-26 PROCEDURE — 93356 MYOCRD STRAIN IMG SPCKL TRCK: CPT

## 2024-12-26 PROCEDURE — 36415 COLL VENOUS BLD VENIPUNCTURE: CPT

## 2024-12-26 PROCEDURE — 84443 ASSAY THYROID STIM HORMONE: CPT

## 2024-12-26 PROCEDURE — 93306 TTE W/DOPPLER COMPLETE: CPT

## 2024-12-26 PROCEDURE — 93356 MYOCRD STRAIN IMG SPCKL TRCK: CPT | Performed by: INTERNAL MEDICINE

## 2024-12-26 PROCEDURE — 93306 TTE W/DOPPLER COMPLETE: CPT | Performed by: INTERNAL MEDICINE

## 2024-12-26 PROCEDURE — 84244 ASSAY OF RENIN: CPT

## 2024-12-26 PROCEDURE — 82043 UR ALBUMIN QUANTITATIVE: CPT

## 2024-12-31 LAB
AORTIC ROOT: 3.3 CM
AORTIC VALVE MEAN VELOCITY: 29.4 M/S
APICAL FOUR CHAMBER EJECTION FRACTION: 73 %
ASCENDING AORTA: 4 CM
AV AREA BY CONTINUOUS VTI: 1.3 CM2
AV AREA PEAK VELOCITY: 1.3 CM2
AV LVOT MEAN GRADIENT: 6 MMHG
AV LVOT PEAK GRADIENT: 10 MMHG
AV MEAN GRADIENT: 40 MMHG
AV PEAK GRADIENT: 64 MMHG
AV REGURGITATION PRESSURE HALF TIME: 661 MS
AV VALVE AREA: 1.34 CM2
AV VELOCITY RATIO: 0.4
BSA FOR ECHO PROCEDURE: 1.89 M2
DOP CALC AO PEAK VEL: 4 M/S
DOP CALC AO VTI: 112.06 CM
DOP CALC LVOT AREA: 3.14 CM2
DOP CALC LVOT CARDIAC INDEX: 3.63 L/MIN/M2
DOP CALC LVOT CARDIAC OUTPUT: 6.86 L/MIN
DOP CALC LVOT DIAMETER: 2 CM
DOP CALC LVOT PEAK VEL VTI: 47.92 CM
DOP CALC LVOT PEAK VEL: 1.59 M/S
DOP CALC LVOT STROKE INDEX: 76.2 ML/M2
DOP CALC LVOT STROKE VOLUME: 150.47
E WAVE DECELERATION TIME: 354 MS
E/A RATIO: 0.71
FRACTIONAL SHORTENING: 35 (ref 28–44)
GLOBAL LONGITUIDAL STRAIN: -19 %
INTERVENTRICULAR SEPTUM IN DIASTOLE (PARASTERNAL SHORT AXIS VIEW): 1.9 CM
INTERVENTRICULAR SEPTUM: 1.9 CM (ref 0.6–1.1)
LAAS-AP2: 24 CM2
LAAS-AP4: 25 CM2
LEFT ATRIUM SIZE: 4.9 CM
LEFT ATRIUM VOLUME (MOD BIPLANE): 88 ML
LEFT ATRIUM VOLUME INDEX (MOD BIPLANE): 46.6 ML/M2
LEFT INTERNAL DIMENSION IN SYSTOLE: 3.1 CM (ref 2.1–4)
LEFT VENTRICULAR INTERNAL DIMENSION IN DIASTOLE: 4.8 CM (ref 3.5–6)
LEFT VENTRICULAR POSTERIOR WALL IN END DIASTOLE: 1.4 CM
LEFT VENTRICULAR STROKE VOLUME: 69 ML
LVSV (TEICH): 69 ML
MV E'TISSUE VEL-LAT: 6 CM/S
MV E'TISSUE VEL-SEP: 5 CM/S
MV PEAK A VEL: 1.36 M/S
MV PEAK E VEL: 96 CM/S
MV STENOSIS PRESSURE HALF TIME: 103 MS
MV VALVE AREA P 1/2 METHOD: 2.14
RA PRESSURE ESTIMATED: 3 MMHG
RIGHT ATRIUM AREA SYSTOLE A4C: 12.8 CM2
RIGHT VENTRICLE ID DIMENSION: 3.5 CM
RV PSP: 44 MMHG
SL CV AV DECELERATION TIME RETROGRADE: 2280 MS
SL CV AV PEAK GRADIENT RETROGRADE: 31 MMHG
SL CV ECHO LV DYNAMIC OBSTRUCTION PEAK GRADIENT (STANDI: 33 MMHG
SL CV ECHO LV DYNAMIC OBSTRUCTION PEAK GRADIENT (VALSAL: 56 MMHG
SL CV LEFT ATRIUM LENGTH A2C: 5.8 CM
SL CV LV EF: 70
SL CV PED ECHO LEFT VENTRICLE DIASTOLIC VOLUME (MOD BIPLANE) 2D: 107 ML
SL CV PED ECHO LEFT VENTRICLE SYSTOLIC VOLUME (MOD BIPLANE) 2D: 38 ML
TR MAX PG: 41 MMHG
TR PEAK VELOCITY: 3.2 M/S
TRICUSPID ANNULAR PLANE SYSTOLIC EXCURSION: 2.8 CM
TRICUSPID VALVE PEAK REGURGITATION VELOCITY: 3.21 M/S

## 2025-01-07 ENCOUNTER — TELEPHONE (OUTPATIENT)
Dept: CARDIOLOGY CLINIC | Facility: CLINIC | Age: 80
End: 2025-01-07

## 2025-01-07 NOTE — TELEPHONE ENCOUNTER
Spoke w patient. Asked if she wanted to speak w Dr Pearson about other options. Pt wants to wait for MRI results, will call back to let us know decision

## 2025-01-08 ENCOUNTER — HOSPITAL ENCOUNTER (OUTPATIENT)
Dept: MRI IMAGING | Facility: HOSPITAL | Age: 80
Discharge: HOME | End: 2025-01-08
Attending: INTERNAL MEDICINE
Payer: COMMERCIAL

## 2025-01-08 DIAGNOSIS — C90.00 MULTIPLE MYELOMA, REMISSION STATUS UNSPECIFIED (HCC): ICD-10-CM

## 2025-01-08 LAB
ALDOST SERPL-MCNC: 3.1 NG/DL (ref 0–30)
ALDOST/RENIN PLAS-RTO: >18.6 {RATIO} (ref 0–30)
RENIN PLAS-CCNC: <0.167 NG/ML/HR (ref 0.17–5.38)

## 2025-01-08 PROCEDURE — 72158 MRI LUMBAR SPINE W/O & W/DYE: CPT

## 2025-01-08 PROCEDURE — A9585 GADOBUTROL INJECTION: HCPCS | Performed by: INTERNAL MEDICINE

## 2025-01-08 PROCEDURE — 72156 MRI NECK SPINE W/O & W/DYE: CPT

## 2025-01-08 RX ORDER — GADOBUTROL 604.72 MG/ML
8 INJECTION INTRAVENOUS
Status: COMPLETED | OUTPATIENT
Start: 2025-01-08 | End: 2025-01-08

## 2025-01-08 RX ADMIN — GADOBUTROL 8 ML: 604.72 INJECTION INTRAVENOUS at 08:43

## 2025-01-12 ENCOUNTER — HOSPITAL ENCOUNTER (OUTPATIENT)
Dept: MRI IMAGING | Facility: HOSPITAL | Age: 80
Discharge: HOME | End: 2025-01-12
Payer: COMMERCIAL

## 2025-01-12 DIAGNOSIS — C90.00 MULTIPLE MYELOMA, REMISSION STATUS UNSPECIFIED (HCC): ICD-10-CM

## 2025-01-12 PROCEDURE — 72157 MRI CHEST SPINE W/O & W/DYE: CPT

## 2025-01-12 PROCEDURE — A9585 GADOBUTROL INJECTION: HCPCS | Performed by: INTERNAL MEDICINE

## 2025-01-12 RX ORDER — GADOBUTROL 604.72 MG/ML
8.5 INJECTION INTRAVENOUS
Status: COMPLETED | OUTPATIENT
Start: 2025-01-12 | End: 2025-01-12

## 2025-01-12 RX ADMIN — GADOBUTROL 8.5 ML: 604.72 INJECTION INTRAVENOUS at 11:37

## 2025-01-22 ENCOUNTER — APPOINTMENT (OUTPATIENT)
Dept: LAB | Facility: HOSPITAL | Age: 80
End: 2025-01-22
Attending: INTERNAL MEDICINE
Payer: COMMERCIAL

## 2025-01-22 DIAGNOSIS — C90.00 MYELOMA ASSOCIATED AMYLOIDOSIS (HCC): ICD-10-CM

## 2025-01-22 DIAGNOSIS — E11.9 DIABETES MELLITUS WITHOUT COMPLICATION (HCC): ICD-10-CM

## 2025-01-22 DIAGNOSIS — E85.9 MYELOMA ASSOCIATED AMYLOIDOSIS (HCC): ICD-10-CM

## 2025-01-22 DIAGNOSIS — D69.6 THROMBOCYTOPENIA, UNSPECIFIED (HCC): ICD-10-CM

## 2025-01-22 LAB
ALBUMIN SERPL BCG-MCNC: 4.4 G/DL (ref 3.5–5)
ALP SERPL-CCNC: 68 U/L (ref 34–104)
ALT SERPL W P-5'-P-CCNC: 9 U/L (ref 7–52)
ANION GAP SERPL CALCULATED.3IONS-SCNC: 9 MMOL/L (ref 4–13)
ANISOCYTOSIS BLD QL SMEAR: PRESENT
AST SERPL W P-5'-P-CCNC: 14 U/L (ref 13–39)
BASOPHILS # BLD MANUAL: 0.03 THOUSAND/UL (ref 0–0.1)
BASOPHILS NFR MAR MANUAL: 1 % (ref 0–1)
BILIRUB SERPL-MCNC: 0.68 MG/DL (ref 0.2–1)
BUN SERPL-MCNC: 25 MG/DL (ref 5–25)
CALCIUM SERPL-MCNC: 9.7 MG/DL (ref 8.4–10.2)
CHLORIDE SERPL-SCNC: 103 MMOL/L (ref 96–108)
CO2 SERPL-SCNC: 28 MMOL/L (ref 21–32)
CREAT SERPL-MCNC: 0.9 MG/DL (ref 0.6–1.3)
EOSINOPHIL # BLD MANUAL: 0 THOUSAND/UL (ref 0–0.4)
EOSINOPHIL NFR BLD MANUAL: 0 % (ref 0–6)
ERYTHROCYTE [DISTWIDTH] IN BLOOD BY AUTOMATED COUNT: 15.2 % (ref 11.6–15.1)
ERYTHROCYTE [SEDIMENTATION RATE] IN BLOOD: 12 MM/HOUR (ref 0–29)
GFR SERPL CREATININE-BSD FRML MDRD: 61 ML/MIN/1.73SQ M
GLUCOSE P FAST SERPL-MCNC: 127 MG/DL (ref 65–99)
HCT VFR BLD AUTO: 39.4 % (ref 34.8–46.1)
HGB BLD-MCNC: 12.3 G/DL (ref 11.5–15.4)
IGA SERPL-MCNC: 237 MG/DL (ref 66–433)
IGG SERPL-MCNC: 1241 MG/DL (ref 635–1741)
IGM SERPL-MCNC: 27 MG/DL (ref 45–281)
LYMPHOCYTES # BLD AUTO: 0.96 THOUSAND/UL (ref 0.6–4.47)
LYMPHOCYTES # BLD AUTO: 25 % (ref 14–44)
MCH RBC QN AUTO: 29.2 PG (ref 26.8–34.3)
MCHC RBC AUTO-ENTMCNC: 31.2 G/DL (ref 31.4–37.4)
MCV RBC AUTO: 94 FL (ref 82–98)
MONOCYTES # BLD AUTO: 0.19 THOUSAND/UL (ref 0–1.22)
MONOCYTES NFR BLD: 6 % (ref 4–12)
NEUTROPHILS # BLD MANUAL: 2.02 THOUSAND/UL (ref 1.85–7.62)
NEUTS BAND NFR BLD MANUAL: 2 % (ref 0–8)
NEUTS SEG NFR BLD AUTO: 61 % (ref 43–75)
OVALOCYTES BLD QL SMEAR: PRESENT
PLATELET # BLD AUTO: 116 THOUSANDS/UL (ref 149–390)
PLATELET BLD QL SMEAR: ABNORMAL
PMV BLD AUTO: 10.2 FL (ref 8.9–12.7)
POIKILOCYTOSIS BLD QL SMEAR: PRESENT
POLYCHROMASIA BLD QL SMEAR: PRESENT
POTASSIUM SERPL-SCNC: 4 MMOL/L (ref 3.5–5.3)
PROT SERPL-MCNC: 7.4 G/DL (ref 6.4–8.4)
RBC # BLD AUTO: 4.21 MILLION/UL (ref 3.81–5.12)
RBC MORPH BLD: PRESENT
SODIUM SERPL-SCNC: 140 MMOL/L (ref 135–147)
URATE SERPL-MCNC: 8.3 MG/DL (ref 2–7.5)
VARIANT LYMPHS # BLD AUTO: 5 %
WBC # BLD AUTO: 3.2 THOUSAND/UL (ref 4.31–10.16)

## 2025-01-22 PROCEDURE — 36415 COLL VENOUS BLD VENIPUNCTURE: CPT

## 2025-01-22 PROCEDURE — 80053 COMPREHEN METABOLIC PANEL: CPT

## 2025-01-22 PROCEDURE — 82784 ASSAY IGA/IGD/IGG/IGM EACH: CPT

## 2025-01-22 PROCEDURE — 86334 IMMUNOFIX E-PHORESIS SERUM: CPT

## 2025-01-22 PROCEDURE — 82785 ASSAY OF IGE: CPT

## 2025-01-22 PROCEDURE — 85652 RBC SED RATE AUTOMATED: CPT

## 2025-01-22 PROCEDURE — 85007 BL SMEAR W/DIFF WBC COUNT: CPT

## 2025-01-22 PROCEDURE — 84165 PROTEIN E-PHORESIS SERUM: CPT

## 2025-01-22 PROCEDURE — 85027 COMPLETE CBC AUTOMATED: CPT

## 2025-01-22 PROCEDURE — 84550 ASSAY OF BLOOD/URIC ACID: CPT

## 2025-01-22 PROCEDURE — 83521 IG LIGHT CHAINS FREE EACH: CPT

## 2025-01-23 LAB
ALBUMIN SERPL ELPH-MCNC: 3.93 G/DL (ref 3.2–5.1)
ALBUMIN SERPL ELPH-MCNC: 57.8 % (ref 48–70)
ALPHA1 GLOB SERPL ELPH-MCNC: 0.3 G/DL (ref 0.15–0.47)
ALPHA1 GLOB SERPL ELPH-MCNC: 4.4 % (ref 1.8–7)
ALPHA2 GLOB SERPL ELPH-MCNC: 0.67 G/DL (ref 0.42–1.04)
ALPHA2 GLOB SERPL ELPH-MCNC: 9.9 % (ref 5.9–14.9)
BETA GLOB ABNORMAL SERPL ELPH-MCNC: 0.44 G/DL (ref 0.31–0.57)
BETA1 GLOB SERPL ELPH-MCNC: 6.4 % (ref 4.7–7.7)
BETA2 GLOB SERPL ELPH-MCNC: 6 % (ref 3.1–7.9)
BETA2+GAMMA GLOB SERPL ELPH-MCNC: 0.41 G/DL (ref 0.2–0.58)
GAMMA GLOB ABNORMAL SERPL ELPH-MCNC: 1.05 G/DL (ref 0.4–1.66)
GAMMA GLOB SERPL ELPH-MCNC: 15.5 % (ref 6.9–22.3)
IGG/ALB SER: 1.37 {RATIO} (ref 1.1–1.8)
INTERPRETATION UR IFE-IMP: NORMAL
KAPPA LC FREE SER-MCNC: 39.3 MG/L (ref 3.3–19.4)
KAPPA LC FREE/LAMBDA FREE SER: 1.35 {RATIO} (ref 0.26–1.65)
LAMBDA LC FREE SERPL-MCNC: 29.2 MG/L (ref 5.7–26.3)
PROT PATTERN SERPL ELPH-IMP: NORMAL
PROT SERPL-MCNC: 6.8 G/DL (ref 6.4–8.2)
TOTAL IGE SMQN RAST: 43.1 KU/L (ref 0–113)

## 2025-01-23 PROCEDURE — 86334 IMMUNOFIX E-PHORESIS SERUM: CPT | Performed by: STUDENT IN AN ORGANIZED HEALTH CARE EDUCATION/TRAINING PROGRAM

## 2025-01-23 PROCEDURE — 84165 PROTEIN E-PHORESIS SERUM: CPT | Performed by: STUDENT IN AN ORGANIZED HEALTH CARE EDUCATION/TRAINING PROGRAM

## 2025-01-24 ENCOUNTER — TELEPHONE (OUTPATIENT)
Age: 80
End: 2025-01-24

## 2025-01-24 LAB
KAPPA LC UR-MCNC: 63.34 MG/L (ref 1.17–86.46)
KAPPA LC/LAMBDA UR: 4.54 {RATIO} (ref 1.83–14.26)
LAMBDA LC UR-MCNC: 13.94 MG/L (ref 0.27–15.21)

## 2025-02-20 ENCOUNTER — CONSULT (OUTPATIENT)
Dept: HEMATOLOGY ONCOLOGY | Facility: CLINIC | Age: 80
End: 2025-02-20
Payer: COMMERCIAL

## 2025-02-20 VITALS
DIASTOLIC BLOOD PRESSURE: 70 MMHG | HEIGHT: 62 IN | SYSTOLIC BLOOD PRESSURE: 132 MMHG | TEMPERATURE: 97.1 F | RESPIRATION RATE: 18 BRPM | BODY MASS INDEX: 35.7 KG/M2 | OXYGEN SATURATION: 93 % | HEART RATE: 61 BPM | WEIGHT: 194 LBS

## 2025-02-20 DIAGNOSIS — E11.22 TYPE 2 DIABETES MELLITUS WITH STAGE 3A CHRONIC KIDNEY DISEASE, WITHOUT LONG-TERM CURRENT USE OF INSULIN (HCC): ICD-10-CM

## 2025-02-20 DIAGNOSIS — K86.89 PANCREATIC MASS: ICD-10-CM

## 2025-02-20 DIAGNOSIS — R91.8 LUNG MASS: ICD-10-CM

## 2025-02-20 DIAGNOSIS — R16.2 HEPATOSPLENOMEGALY: Primary | ICD-10-CM

## 2025-02-20 DIAGNOSIS — I50.33 ACUTE ON CHRONIC DIASTOLIC (CONGESTIVE) HEART FAILURE (HCC): ICD-10-CM

## 2025-02-20 DIAGNOSIS — D69.6 THROMBOCYTOPENIA (HCC): ICD-10-CM

## 2025-02-20 DIAGNOSIS — N18.31 CHRONIC KIDNEY DISEASE, STAGE 3A (HCC): ICD-10-CM

## 2025-02-20 DIAGNOSIS — M89.9 LESION OF BONE OF THORACIC SPINE: ICD-10-CM

## 2025-02-20 DIAGNOSIS — D72.819 LEUKOPENIA, UNSPECIFIED TYPE: ICD-10-CM

## 2025-02-20 DIAGNOSIS — N18.31 TYPE 2 DIABETES MELLITUS WITH STAGE 3A CHRONIC KIDNEY DISEASE, WITHOUT LONG-TERM CURRENT USE OF INSULIN (HCC): ICD-10-CM

## 2025-02-20 PROCEDURE — G2211 COMPLEX E/M VISIT ADD ON: HCPCS | Performed by: INTERNAL MEDICINE

## 2025-02-20 PROCEDURE — 99215 OFFICE O/P EST HI 40 MIN: CPT | Performed by: INTERNAL MEDICINE

## 2025-02-20 NOTE — H&P (VIEW-ONLY)
Name: Nadeem Ashton      : 1945      MRN: [id number]  Encounter Provider: Glen Estrada MD  Encounter Date: 2025   Encounter department: [facility] [place].  Patient was seen by our team member recently in the hospital.  :  Assessment & Plan  Lesion of bone of thoracic spine  On MRI scan of thoracic spine she has marrow replacing lesions at T3-T4 and T9.  On PET CT scan she has 2.7 x 1.8 cm left lower paraspinal nodule without significant FDG avidity, 3.2, 2.6 and 2.8 activity respectively.  Hemangiomas at T10 and T12 on MRI scan of the lumbar spine.Signal lesion within right C4  Orders:    Ambulatory Referral to Hematology / Oncology    CBC and differential; Future    Comprehensive metabolic panel; Future    LD,Blood; Future    Ambulatory referral to Interventional Radiology; Future    Biopsy bone marrow; Future    Ambulatory Referral to Interventional Radiology; Future    Lung mass  That is left lower lobe paraspinal nodule new since 2018, smoothly marginated.  2.9 x 2.2 cm.  Also 7 mm smoothly marginated anterior right upper lobe nodule.  MRI scan of the lumbar spine showed hemangiomas at T10 and T12.  Orders:    Ambulatory Referral to Hematology / Oncology    Hepatosplenomegaly  Cystic lesion in the liver.  Splenomegaly, 16 cm on CT chest but 22 cm on CT abdomen.  Right adrenal adenoma.  Cystic lesions in the pancreas.  Slightly enlarged paracaval lymph nodes.  T12 hemangioma.  Orders:    CBC and differential; Future    Comprehensive metabolic panel; Future    LD,Blood; Future    Ambulatory referral to Interventional Radiology; Future    Biopsy bone marrow; Future    Ambulatory Referral to Interventional Radiology; Future    Leukopenia, unspecified type  Chronic leukopenia at least since 2024.  Borderline thrombocytopenia.  Orders:    CBC and differential; Future    Comprehensive metabolic panel; Future    LD,Blood; Future    Ambulatory referral to  Interventional Radiology; Future    Biopsy bone marrow; Future    Ambulatory Referral to Interventional Radiology; Future    Thrombocytopenia (HCC)  Borderline thrombocytopenia  Orders:    CBC and differential; Future    Comprehensive metabolic panel; Future    LD,Blood; Future    Ambulatory referral to Interventional Radiology; Future    Biopsy bone marrow; Future    Ambulatory Referral to Interventional Radiology; Future    Pancreatic mass  Multiple pancreatic cysts       Type 2 diabetes mellitus with stage 3a chronic kidney disease, without long-term current use of insulin (HCC)    Lab Results   Component Value Date    HGBA1C 6.5 (H) 09/25/2024   Being managed by PCP         Chronic kidney disease, stage 3a (HCC)  Lab Results   Component Value Date    EGFR 61 01/22/2025    EGFR 57 12/26/2024    EGFR 62 12/10/2024    CREATININE 0.90 01/22/2025    CREATININE 0.94 12/26/2024    CREATININE 0.88 12/10/2024   Being managed by PCP         Acute on chronic diastolic (congestive) heart failure (HCC)  Wt Readings from Last 3 Encounters:   02/20/25 88 kg (194 lb)   12/26/24 88 kg (194 lb)   12/16/24 88 kg (194 lb)   Being managed by PCP               Ordered blood work, bone marrow biopsy and bone biopsy.  Follow-up in 4 weeks.  Patient has multiple abnormalities on the imaging studies and blood counts.  I explained those to her one by one.  She would not allow me to refer her to any invasive procedure other than bone marrow and bone biopsy and blood work that I ordered.  No other test and no other procedure.  No other referral.  She was very sure about that.  I ordered what she allowed.  She knows that I will look and for hematological malignancy or solid cancer with metastatic disease.  We had a long discussion and I spent more than an hour with her.  Patient will be reassessed after blood work, bone marrow and/or bone biopsy.  All discussed with patient in very much detail.  Questions answered.  She is capable of  "self-care at this time.  I suggested self breast examination, eating healthy foods, staying active but to avoid falls and trauma.  Suggested health screening tests. Patient to continue to follow-up with primary physician and other consultants.  Provided counseling and support.  I used a dictation device to dictate this note and there could be mistakes in my note and for that patient may contact my office.            History of Present Illness   Chief Complaint   Patient presents with    Consult   Patient is here with a gentleman and she has multiple above abnormalities on CT scans, PET scans and MRI scans.  Surprisingly she does not have that many symptoms.  She states she is recovering from cough cold and congestion and exertional dyspnea.  Has some tiredness.  She is anxious.  Has arthritic symptoms.  History of hypertension, diabetes mellitus, hyperlipidemia, hypothyroidism and remote past history of sarcoidosis.  History of 1 coronary stent, enlarged heart and CHF.  She gives history of thyroid cancer 30+ years ago and she states half of her thyroid was removed.  Non-smoker and no alcohol.  No history of drug allergy.  Son had thyroid cancer.  Brother had lymphoma.  She states she has heart condition in her family.  Pertinent Medical History      02/20/25:    See details above in HPI and assessment section  Review of Systems  Reviewed 12 systems.  Symptoms are as in HPI.  No other neurological, cardiac, pulmonary, GI and  symptoms other than listed in HPI and assessment section.  No fevers, chills, bleeding, bone pains, skin rash, weight loss and no swelling of the ankles and no swollen glands.      Objective   /70 (BP Location: Left arm, Patient Position: Sitting, Cuff Size: Large)   Pulse 61   Temp (!) 97.1 °F (36.2 °C) (Temporal)   Resp 18   Ht 5' 2\" (1.575 m)   Wt 88 kg (194 lb)   SpO2 93%   BMI 35.48 kg/m²   ECOG 1  Physical Exam  Vitals reviewed.   Constitutional:       General: She is not " in acute distress.     Appearance: Normal appearance. She is not ill-appearing.   HENT:      Head: Normocephalic and atraumatic.      Mouth/Throat:      Comments: No thrush.  Eyes:      General: No scleral icterus.     Conjunctiva/sclera: Conjunctivae normal.   Cardiovascular:      Rate and Rhythm: Normal rate and regular rhythm.      Heart sounds: Murmur (Systolic murmur) heard.   Pulmonary:      Effort: Pulmonary effort is normal. No respiratory distress.      Breath sounds: Normal breath sounds. No rhonchi or rales.   Abdominal:      General: Abdomen is flat. There is no distension.      Palpations: Abdomen is soft. There is mass (Spleen is palpably enlarged).      Tenderness: There is no abdominal tenderness.      Comments: No ascites.    Musculoskeletal:         General: No swelling. Normal range of motion.      Cervical back: Normal range of motion. No rigidity or tenderness.      Right lower leg: No edema.      Left lower leg: No edema.      Comments: No calf tenderness.   Lymphadenopathy:      Cervical: No cervical adenopathy.      Upper Body:      Right upper body: No supraclavicular or axillary adenopathy.      Left upper body: No supraclavicular or axillary adenopathy.   Skin:     Coloration: Skin is not jaundiced or pale.      Findings: No bruising or rash.      Nails: There is no clubbing.   Neurological:      General: No focal deficit present.      Mental Status: She is alert and oriented to person, place, and time.      Motor: No weakness.      Coordination: Coordination normal.      Gait: Gait normal.   Psychiatric:         Behavior: Behavior normal.      Comments: Anxious         Labs: I have reviewed the following labs:  Results for orders placed or performed in visit on 01/22/25   CBC and differential   Result Value Ref Range    WBC 3.20 (L) 4.31 - 10.16 Thousand/uL    RBC 4.21 3.81 - 5.12 Million/uL    Hemoglobin 12.3 11.5 - 15.4 g/dL    Hematocrit 39.4 34.8 - 46.1 %    MCV 94 82 - 98 fL    MCH  29.2 26.8 - 34.3 pg    MCHC 31.2 (L) 31.4 - 37.4 g/dL    RDW 15.2 (H) 11.6 - 15.1 %    MPV 10.2 8.9 - 12.7 fL    Platelets 116 (L) 149 - 390 Thousands/uL   Comprehensive metabolic panel   Result Value Ref Range    Sodium 140 135 - 147 mmol/L    Potassium 4.0 3.5 - 5.3 mmol/L    Chloride 103 96 - 108 mmol/L    CO2 28 21 - 32 mmol/L    ANION GAP 9 4 - 13 mmol/L    BUN 25 5 - 25 mg/dL    Creatinine 0.90 0.60 - 1.30 mg/dL    Glucose, Fasting 127 (H) 65 - 99 mg/dL    Calcium 9.7 8.4 - 10.2 mg/dL    AST 14 13 - 39 U/L    ALT 9 7 - 52 U/L    Alkaline Phosphatase 68 34 - 104 U/L    Total Protein 7.4 6.4 - 8.4 g/dL    Albumin 4.4 3.5 - 5.0 g/dL    Total Bilirubin 0.68 0.20 - 1.00 mg/dL    eGFR 61 ml/min/1.73sq m   Sedimentation rate, automated   Result Value Ref Range    Sed Rate 12 0 - 29 mm/hour   Result Value Ref Range    Uric Acid 8.3 (H) 2.0 - 7.5 mg/dL   IgG, IgA, IgM   Result Value Ref Range     66 - 433 mg/dL    IGG 1,241 635 - 1,741 mg/dL    IGM 27 (L) 45 - 281 mg/dL   Protein electrophoresis, serum   Result Value Ref Range    A/G Ratio 1.37 1.10 - 1.80    Albumin Electrophoresis 57.8 48.0 - 70.0 %    Albumin CONC 3.93 3.20 - 5.10 g/dl    Alpha 1 4.4 1.8 - 7.0 %    ALPHA 1 CONC 0.30 0.15 - 0.47 g/dL    Alpha 2 9.9 5.9 - 14.9 %    ALPHA 2 CONC 0.67 0.42 - 1.04 g/dL    Beta-1 6.4 4.7 - 7.7 %    BETA 1 CONC 0.44 0.31 - 0.57 g/dL    Beta-2 6.0 3.1 - 7.9 %    BETA 2 CONC 0.41 0.20 - 0.58 g/dL    Gamma Globulin 15.5 6.9 - 22.3 %    GAMMA CONC 1.05 0.40 - 1.66 g/dL    Total Protein 6.8 6.4 - 8.2 g/dL    SPEP Interpretation See Comment    IgE   Result Value Ref Range    IgE 43.1 0 - 113 kU/l   Immunoglobulin free LT chains blood   Result Value Ref Range    Ig Kappa Free Light Chain 39.3 (H) 3.3 - 19.4 mg/L    Ig Lambda Free Light Chain 29.2 (H) 5.7 - 26.3 mg/L    Kappa/Lambda FluidC Ratio 1.35 0.26 - 1.65   KAPPA/LAMBDA LIGHT CHAINS, URINE, RANDOM OR 24 HOUR   Result Value Ref Range    Free Lebo Lt Chains,Ur 63.34  1.17 - 86.46 mg/L    Free Lambda Lt Chains,Ur 13.94 0.27 - 15.21 mg/L    Kappa/Lambda Ratio,U 4.54 1.83 - 14.26   Immunofixation, Serum(Reflex Only-Do Not Order)   Result Value Ref Range    Immunofixation Interpretation See Comment    Manual Differential(PHLEBS Do Not Order)   Result Value Ref Range    Segmented % 61 43 - 75 %    Bands % 2 0 - 8 %    Lymphocytes % 25 14 - 44 %    Monocytes % 6 4 - 12 %    Eosinophils % 0 0 - 6 %    Basophils % 1 0 - 1 %    Atypical Lymphocytes % 5 (H) <=0 %    Absolute Neutrophils 2.02 1.85 - 7.62 Thousand/uL    Absolute Lymphocytes 0.96 0.60 - 4.47 Thousand/uL    Absolute Monocytes 0.19 0.00 - 1.22 Thousand/uL    Absolute Eosinophils 0.00 0.00 - 0.40 Thousand/uL    Absolute Basophils 0.03 0.00 - 0.10 Thousand/uL    Total Counted      RBC Morphology Present     Platelet Estimate Decreased (A) Adequate    Anisocytosis Present     Ovalocytes Present     Poikilocytes Present     Polychromasia Present        NM PET CT skull base to mid thigh  Status: Final result     PACS Images     Show images for NM PET CT skull base to mid thigh  Study Result    Result Text   PET/CT SCAN     INDICATION: K86.2: Cyst of pancreas, lung nodules, osseous lytic lesions, history of thyroid cancer     MODIFIER: PI     COMPARISON: CT 11/19/2024 and priors     CELL TYPE: None     TECHNIQUE:   10.8 mCi F-18-FDG administered IV. Multiplanar attenuation corrected and non attenuation corrected PET images are available for interpretation, and contiguous, low dose, axial CT sections were obtained from the skull base through the femurs.   Intravenous contrast material was not utilized. This examination, like all CT scans performed in the [facility], was performed utilizing techniques to minimize radiation dose exposure, including the use of iterative reconstruction and   automated exposure control.     Fasting serum glucose: 114 mg/dl     FINDINGS:     VISUALIZED BRAIN:  No acute abnormalities are  seen.     HEAD/NECK:  There is a small hypermetabolic cutaneous lesion posterior to the right ear, SUV 6. This measures approximately 1.2 cm based on the PET images. This is nonspecific and may be inflammatory versus neoplastic.     No FDG avid cervical adenopathy is seen.  CT images: Unremarkable.     CHEST:  Image 3/76, 7 mm right upper lung nodule does not demonstrate significant FDG uptake.     Image 3/118, 2.7 x 1.8 cm left lower paraspinal nodule also does not demonstrate significant FDG uptake compared to background activity, SUV 1.4. For comparison, adjacent lung parenchyma has an SUV of 1.2.     No hypermetabolic hilar or mediastinal adenopathy.     CT images: Coronary and aortic atherosclerosis. Ectatic ascending thoracic aorta measuring 4.2 cm.     ABDOMEN:  Mildly prominent gastrohepatic, periportal, portal caval, retroperitoneal and mesenteric adenopathy is again visualized. These nodes for the most part do not demonstrate significant FDG uptake. On image 3/138, there is a 1 cm periportal node with mild   FDG activity, SUV 2.5.     Central mesenteric stranding is again visualized, but without significant FDG activity compared to background activity. SUV is in the range of 1.5.     Splenomegaly is again demonstrated measuring 22 cm CC dimension. There is mildly increased but homogeneous FDG activity, SUV 3.4. For comparison, liver activity has an SUV of 3.1.     Known pancreatic cystic lesions do not demonstrate significant FDG uptake.     Bowel activity appears physiologic.     CT images: Colon diverticula. Atherosclerotic aorta. Right renal cyst. Cholecystectomy. Hepatosplenomegaly.     PELVIS:  No FDG avid soft tissue lesions are seen.  CT images: Hysterectomy.     OSSEOUS STRUCTURES:  Thoracic spine lytic lesions are again visualized, demonstrating mild FDG activity:  T3 lesion SUV 3.2.  T4 lesion SUV 2.6.  T9 lesion SUV 2.8.     No additional FDG avid lesions are seen.     CT images: Spine  degenerative change. T12 hemangioma.     IMPRESSION:  1. Right upper and left lower lung lesions do not demonstrate significant FDG activity, favoring a benign etiology versus low-grade neoplasm. Continued CT follow-up recommended. Consider 6-month CT follow-up. If these lesions continues to enlarge, tissue   sampling may still be warranted.  2. Mildly hypermetabolic lytic lesions in the thoracic spine as above most concerning for metastases/myeloma until proven otherwise. Correlate clinically.  3. Minimal FDG uptake in a periportal nodes as above, nonspecific. Additional prominent nodes do not demonstrate significant FDG activity.  4. Splenomegaly with nonspecific mild but homogeneous FDG activity.  5. Small hypermetabolic cutaneous lesion posterior to the right ear may be inflammatory versus neoplastic. Correlate clinically.     The study was marked in EPIC for significant notification.        Workstation performed: [id number]        Imaging    NM PET CT skull base to mid thigh (Order: [id number]) - 12/11/2024    IMPRESSION:     Marrow replacing lesions at T3, T4 and T9. Possible small lesion at T11. No karlos epidural extension of disease identified. Findings correspond to areas of uptake on recent PET/CT and again remains suspicious for metastasis or myeloma.     Degenerative changes of the thoracic spine, as described above.     Workstation performed: [id number]        Imaging    MRI thoracic spine w wo contrast (Order: [id number]) - 1/12/2025    IMPRESSION:     No discrete marrow replacing lesions identified within the lumbar spine. Hemangiomas are noted at T10 and T12.     Multilevel degenerative changes of the lumbar spine, as described above. Multifactorial disease results in severe mass effect on the thecal sac at L3-L4.        Workstation performed: [id number]        Imaging    MRI lumbar spine w wo contrast (Order: [id number]) - 1/8/2025  IMPRESSION:     Low T1 and high STIR signal lesion within the right  C4 facet. Additional marrow replacing lesions noted at T3 and T4 are incompletely evaluated however correspond to areas of uptake on recent PET/CT. No epidural extension of disease identified. As   previously suggested findings are suspicious for the presence of metastatic disease or myeloma until proven otherwise.        Workstation performed: [id number]        Imaging    MRI cervical spine w wo contrast (Order: [id number]) - 1/8/2025  MPRESSION:     1. Status post left hemithyroidectomy. No evidence of residual thyroid tissue or discrete nodules within the surgical bed.     2. Right lower pole thyroid nodule measuring 0.8 x 0.9 x 1.0 cm (nodule #1, image 16). This nodule does not meet current ACR size criteria for biopsy, however, reassessment on follow-up ultrasound is recommended in 12 months. A shorter interval follow-up   may be performed as clinically warranted.     Reference: ACR Thyroid Imaging, Reporting and Data System (TI-RADS): White Paper of the ACR TI-RADS Committee. J AM Angelita Radiol 2017;14:587-595. Additional recommendations based on American Thyroid Association 2015 guidelines.     The study was marked in EPIC for significant notification.        Workstation performed: [id number]        Imaging    US thyroid (Order: [id number]) - 12/10/2024

## 2025-02-20 NOTE — ASSESSMENT & PLAN NOTE
That is left lower lobe paraspinal nodule new since 2018, smoothly marginated.  2.9 x 2.2 cm.  Also 7 mm smoothly marginated anterior right upper lobe nodule.  MRI scan of the lumbar spine showed hemangiomas at T10 and T12.  Orders:    Ambulatory Referral to Hematology / Oncology

## 2025-02-20 NOTE — ASSESSMENT & PLAN NOTE
Wt Readings from Last 3 Encounters:   02/20/25 88 kg (194 lb)   12/26/24 88 kg (194 lb)   12/16/24 88 kg (194 lb)   Being managed by PCP               Ordered blood work, bone marrow biopsy and bone biopsy.  Follow-up in 4 weeks.  Patient has multiple abnormalities on the imaging studies and blood counts.  I explained those to her one by one.  She would not allow me to refer her to any invasive procedure other than bone marrow and bone biopsy and blood work that I ordered.  No other test and no other procedure.  No other referral.  She was very sure about that.  I ordered what she allowed.  She knows that I will look and for hematological malignancy or solid cancer with metastatic disease.  We had a long discussion and I spent more than an hour with her.  Patient will be reassessed after blood work, bone marrow and/or bone biopsy.  All discussed with patient in very much detail.  Questions answered.  She is capable of self-care at this time.  I suggested self breast examination, eating healthy foods, staying active but to avoid falls and trauma.  Suggested health screening tests. Patient to continue to follow-up with primary physician and other consultants.  Provided counseling and support.  I used a dictation device to dictate this note and there could be mistakes in my note and for that patient may contact my office.

## 2025-02-20 NOTE — PATIENT INSTRUCTIONS
Ordered blood work, bone marrow biopsy and bone biopsy.  Follow-up in 4 weeks.   
Patent
<-- Click to add NO significant Past Surgical History

## 2025-02-20 NOTE — ASSESSMENT & PLAN NOTE
Borderline thrombocytopenia  Orders:    CBC and differential; Future    Comprehensive metabolic panel; Future    LD,Blood; Future    Ambulatory referral to Interventional Radiology; Future    Biopsy bone marrow; Future    Ambulatory Referral to Interventional Radiology; Future

## 2025-02-20 NOTE — PROGRESS NOTES
Name: Nadeem Ashton      : 1945      MRN: [id number]  Encounter Provider: Glen Estrada MD  Encounter Date: 2025   Encounter department: [facility] [place].  Patient was seen by our team member recently in the hospital.  :  Assessment & Plan  Lesion of bone of thoracic spine  On MRI scan of thoracic spine she has marrow replacing lesions at T3-T4 and T9.  On PET CT scan she has 2.7 x 1.8 cm left lower paraspinal nodule without significant FDG avidity, 3.2, 2.6 and 2.8 activity respectively.  Hemangiomas at T10 and T12 on MRI scan of the lumbar spine.Signal lesion within right C4  Orders:    Ambulatory Referral to Hematology / Oncology    CBC and differential; Future    Comprehensive metabolic panel; Future    LD,Blood; Future    Ambulatory referral to Interventional Radiology; Future    Biopsy bone marrow; Future    Ambulatory Referral to Interventional Radiology; Future    Lung mass  That is left lower lobe paraspinal nodule new since 2018, smoothly marginated.  2.9 x 2.2 cm.  Also 7 mm smoothly marginated anterior right upper lobe nodule.  MRI scan of the lumbar spine showed hemangiomas at T10 and T12.  Orders:    Ambulatory Referral to Hematology / Oncology    Hepatosplenomegaly  Cystic lesion in the liver.  Splenomegaly, 16 cm on CT chest but 22 cm on CT abdomen.  Right adrenal adenoma.  Cystic lesions in the pancreas.  Slightly enlarged paracaval lymph nodes.  T12 hemangioma.  Orders:    CBC and differential; Future    Comprehensive metabolic panel; Future    LD,Blood; Future    Ambulatory referral to Interventional Radiology; Future    Biopsy bone marrow; Future    Ambulatory Referral to Interventional Radiology; Future    Leukopenia, unspecified type  Chronic leukopenia at least since 2024.  Borderline thrombocytopenia.  Orders:    CBC and differential; Future    Comprehensive metabolic panel; Future    LD,Blood; Future    Ambulatory referral to  Interventional Radiology; Future    Biopsy bone marrow; Future    Ambulatory Referral to Interventional Radiology; Future    Thrombocytopenia (HCC)  Borderline thrombocytopenia  Orders:    CBC and differential; Future    Comprehensive metabolic panel; Future    LD,Blood; Future    Ambulatory referral to Interventional Radiology; Future    Biopsy bone marrow; Future    Ambulatory Referral to Interventional Radiology; Future    Pancreatic mass  Multiple pancreatic cysts       Type 2 diabetes mellitus with stage 3a chronic kidney disease, without long-term current use of insulin (HCC)    Lab Results   Component Value Date    HGBA1C 6.5 (H) 09/25/2024   Being managed by PCP         Chronic kidney disease, stage 3a (HCC)  Lab Results   Component Value Date    EGFR 61 01/22/2025    EGFR 57 12/26/2024    EGFR 62 12/10/2024    CREATININE 0.90 01/22/2025    CREATININE 0.94 12/26/2024    CREATININE 0.88 12/10/2024   Being managed by PCP         Acute on chronic diastolic (congestive) heart failure (HCC)  Wt Readings from Last 3 Encounters:   02/20/25 88 kg (194 lb)   12/26/24 88 kg (194 lb)   12/16/24 88 kg (194 lb)   Being managed by PCP               Ordered blood work, bone marrow biopsy and bone biopsy.  Follow-up in 4 weeks.  Patient has multiple abnormalities on the imaging studies and blood counts.  I explained those to her one by one.  She would not allow me to refer her to any invasive procedure other than bone marrow and bone biopsy and blood work that I ordered.  No other test and no other procedure.  No other referral.  She was very sure about that.  I ordered what she allowed.  She knows that I will look and for hematological malignancy or solid cancer with metastatic disease.  We had a long discussion and I spent more than an hour with her.  Patient will be reassessed after blood work, bone marrow and/or bone biopsy.  All discussed with patient in very much detail.  Questions answered.  She is capable of  "self-care at this time.  I suggested self breast examination, eating healthy foods, staying active but to avoid falls and trauma.  Suggested health screening tests. Patient to continue to follow-up with primary physician and other consultants.  Provided counseling and support.  I used a dictation device to dictate this note and there could be mistakes in my note and for that patient may contact my office.            History of Present Illness   Chief Complaint   Patient presents with    Consult   Patient is here with a gentleman and she has multiple above abnormalities on CT scans, PET scans and MRI scans.  Surprisingly she does not have that many symptoms.  She states she is recovering from cough cold and congestion and exertional dyspnea.  Has some tiredness.  She is anxious.  Has arthritic symptoms.  History of hypertension, diabetes mellitus, hyperlipidemia, hypothyroidism and remote past history of sarcoidosis.  History of 1 coronary stent, enlarged heart and CHF.  She gives history of thyroid cancer 30+ years ago and she states half of her thyroid was removed.  Non-smoker and no alcohol.  No history of drug allergy.  Son had thyroid cancer.  Brother had lymphoma.  She states she has heart condition in her family.  Pertinent Medical History      02/20/25:    See details above in HPI and assessment section  Review of Systems  Reviewed 12 systems.  Symptoms are as in HPI.  No other neurological, cardiac, pulmonary, GI and  symptoms other than listed in HPI and assessment section.  No fevers, chills, bleeding, bone pains, skin rash, weight loss and no swelling of the ankles and no swollen glands.      Objective   /70 (BP Location: Left arm, Patient Position: Sitting, Cuff Size: Large)   Pulse 61   Temp (!) 97.1 °F (36.2 °C) (Temporal)   Resp 18   Ht 5' 2\" (1.575 m)   Wt 88 kg (194 lb)   SpO2 93%   BMI 35.48 kg/m²   ECOG 1  Physical Exam  Vitals reviewed.   Constitutional:       General: She is not " in acute distress.     Appearance: Normal appearance. She is not ill-appearing.   HENT:      Head: Normocephalic and atraumatic.      Mouth/Throat:      Comments: No thrush.  Eyes:      General: No scleral icterus.     Conjunctiva/sclera: Conjunctivae normal.   Cardiovascular:      Rate and Rhythm: Normal rate and regular rhythm.      Heart sounds: Murmur (Systolic murmur) heard.   Pulmonary:      Effort: Pulmonary effort is normal. No respiratory distress.      Breath sounds: Normal breath sounds. No rhonchi or rales.   Abdominal:      General: Abdomen is flat. There is no distension.      Palpations: Abdomen is soft. There is mass (Spleen is palpably enlarged).      Tenderness: There is no abdominal tenderness.      Comments: No ascites.    Musculoskeletal:         General: No swelling. Normal range of motion.      Cervical back: Normal range of motion. No rigidity or tenderness.      Right lower leg: No edema.      Left lower leg: No edema.      Comments: No calf tenderness.   Lymphadenopathy:      Cervical: No cervical adenopathy.      Upper Body:      Right upper body: No supraclavicular or axillary adenopathy.      Left upper body: No supraclavicular or axillary adenopathy.   Skin:     Coloration: Skin is not jaundiced or pale.      Findings: No bruising or rash.      Nails: There is no clubbing.   Neurological:      General: No focal deficit present.      Mental Status: She is alert and oriented to person, place, and time.      Motor: No weakness.      Coordination: Coordination normal.      Gait: Gait normal.   Psychiatric:         Behavior: Behavior normal.      Comments: Anxious         Labs: I have reviewed the following labs:  Results for orders placed or performed in visit on 01/22/25   CBC and differential   Result Value Ref Range    WBC 3.20 (L) 4.31 - 10.16 Thousand/uL    RBC 4.21 3.81 - 5.12 Million/uL    Hemoglobin 12.3 11.5 - 15.4 g/dL    Hematocrit 39.4 34.8 - 46.1 %    MCV 94 82 - 98 fL    MCH  29.2 26.8 - 34.3 pg    MCHC 31.2 (L) 31.4 - 37.4 g/dL    RDW 15.2 (H) 11.6 - 15.1 %    MPV 10.2 8.9 - 12.7 fL    Platelets 116 (L) 149 - 390 Thousands/uL   Comprehensive metabolic panel   Result Value Ref Range    Sodium 140 135 - 147 mmol/L    Potassium 4.0 3.5 - 5.3 mmol/L    Chloride 103 96 - 108 mmol/L    CO2 28 21 - 32 mmol/L    ANION GAP 9 4 - 13 mmol/L    BUN 25 5 - 25 mg/dL    Creatinine 0.90 0.60 - 1.30 mg/dL    Glucose, Fasting 127 (H) 65 - 99 mg/dL    Calcium 9.7 8.4 - 10.2 mg/dL    AST 14 13 - 39 U/L    ALT 9 7 - 52 U/L    Alkaline Phosphatase 68 34 - 104 U/L    Total Protein 7.4 6.4 - 8.4 g/dL    Albumin 4.4 3.5 - 5.0 g/dL    Total Bilirubin 0.68 0.20 - 1.00 mg/dL    eGFR 61 ml/min/1.73sq m   Sedimentation rate, automated   Result Value Ref Range    Sed Rate 12 0 - 29 mm/hour   Result Value Ref Range    Uric Acid 8.3 (H) 2.0 - 7.5 mg/dL   IgG, IgA, IgM   Result Value Ref Range     66 - 433 mg/dL    IGG 1,241 635 - 1,741 mg/dL    IGM 27 (L) 45 - 281 mg/dL   Protein electrophoresis, serum   Result Value Ref Range    A/G Ratio 1.37 1.10 - 1.80    Albumin Electrophoresis 57.8 48.0 - 70.0 %    Albumin CONC 3.93 3.20 - 5.10 g/dl    Alpha 1 4.4 1.8 - 7.0 %    ALPHA 1 CONC 0.30 0.15 - 0.47 g/dL    Alpha 2 9.9 5.9 - 14.9 %    ALPHA 2 CONC 0.67 0.42 - 1.04 g/dL    Beta-1 6.4 4.7 - 7.7 %    BETA 1 CONC 0.44 0.31 - 0.57 g/dL    Beta-2 6.0 3.1 - 7.9 %    BETA 2 CONC 0.41 0.20 - 0.58 g/dL    Gamma Globulin 15.5 6.9 - 22.3 %    GAMMA CONC 1.05 0.40 - 1.66 g/dL    Total Protein 6.8 6.4 - 8.2 g/dL    SPEP Interpretation See Comment    IgE   Result Value Ref Range    IgE 43.1 0 - 113 kU/l   Immunoglobulin free LT chains blood   Result Value Ref Range    Ig Kappa Free Light Chain 39.3 (H) 3.3 - 19.4 mg/L    Ig Lambda Free Light Chain 29.2 (H) 5.7 - 26.3 mg/L    Kappa/Lambda FluidC Ratio 1.35 0.26 - 1.65   KAPPA/LAMBDA LIGHT CHAINS, URINE, RANDOM OR 24 HOUR   Result Value Ref Range    Free [place] Lt Chains,Ur 63.34  1.17 - 86.46 mg/L    Free Lambda Lt Chains,Ur 13.94 0.27 - 15.21 mg/L    Kappa/Lambda Ratio,U 4.54 1.83 - 14.26   Immunofixation, Serum(Reflex Only-Do Not Order)   Result Value Ref Range    Immunofixation Interpretation See Comment    Manual Differential(PHLEBS Do Not Order)   Result Value Ref Range    Segmented % 61 43 - 75 %    Bands % 2 0 - 8 %    Lymphocytes % 25 14 - 44 %    Monocytes % 6 4 - 12 %    Eosinophils % 0 0 - 6 %    Basophils % 1 0 - 1 %    Atypical Lymphocytes % 5 (H) <=0 %    Absolute Neutrophils 2.02 1.85 - 7.62 Thousand/uL    Absolute Lymphocytes 0.96 0.60 - 4.47 Thousand/uL    Absolute Monocytes 0.19 0.00 - 1.22 Thousand/uL    Absolute Eosinophils 0.00 0.00 - 0.40 Thousand/uL    Absolute Basophils 0.03 0.00 - 0.10 Thousand/uL    Total Counted      RBC Morphology Present     Platelet Estimate Decreased (A) Adequate    Anisocytosis Present     Ovalocytes Present     Poikilocytes Present     Polychromasia Present        NM PET CT skull base to mid thigh  Status: Final result     PACS Images     Show images for NM PET CT skull base to mid thigh  Study Result    Result Text   PET/CT SCAN     INDICATION: K86.2: Cyst of pancreas, lung nodules, osseous lytic lesions, history of thyroid cancer     MODIFIER: PI     COMPARISON: CT 11/19/2024 and priors     CELL TYPE: None     TECHNIQUE:   10.8 mCi F-18-FDG administered IV. Multiplanar attenuation corrected and non attenuation corrected PET images are available for interpretation, and contiguous, low dose, axial CT sections were obtained from the skull base through the femurs.   Intravenous contrast material was not utilized. This examination, like all CT scans performed in the [facility], was performed utilizing techniques to minimize radiation dose exposure, including the use of iterative reconstruction and   automated exposure control.     Fasting serum glucose: 114 mg/dl     FINDINGS:     VISUALIZED BRAIN:  No acute abnormalities are  seen.     HEAD/NECK:  There is a small hypermetabolic cutaneous lesion posterior to the right ear, SUV 6. This measures approximately 1.2 cm based on the PET images. This is nonspecific and may be inflammatory versus neoplastic.     No FDG avid cervical adenopathy is seen.  CT images: Unremarkable.     CHEST:  Image 3/76, 7 mm right upper lung nodule does not demonstrate significant FDG uptake.     Image 3/118, 2.7 x 1.8 cm left lower paraspinal nodule also does not demonstrate significant FDG uptake compared to background activity, SUV 1.4. For comparison, adjacent lung parenchyma has an SUV of 1.2.     No hypermetabolic hilar or mediastinal adenopathy.     CT images: Coronary and aortic atherosclerosis. Ectatic ascending thoracic aorta measuring 4.2 cm.     ABDOMEN:  Mildly prominent gastrohepatic, periportal, portal caval, retroperitoneal and mesenteric adenopathy is again visualized. These nodes for the most part do not demonstrate significant FDG uptake. On image 3/138, there is a 1 cm periportal node with mild   FDG activity, SUV 2.5.     Central mesenteric stranding is again visualized, but without significant FDG activity compared to background activity. SUV is in the range of 1.5.     Splenomegaly is again demonstrated measuring 22 cm CC dimension. There is mildly increased but homogeneous FDG activity, SUV 3.4. For comparison, liver activity has an SUV of 3.1.     Known pancreatic cystic lesions do not demonstrate significant FDG uptake.     Bowel activity appears physiologic.     CT images: Colon diverticula. Atherosclerotic aorta. Right renal cyst. Cholecystectomy. Hepatosplenomegaly.     PELVIS:  No FDG avid soft tissue lesions are seen.  CT images: Hysterectomy.     OSSEOUS STRUCTURES:  Thoracic spine lytic lesions are again visualized, demonstrating mild FDG activity:  T3 lesion SUV 3.2.  T4 lesion SUV 2.6.  T9 lesion SUV 2.8.     No additional FDG avid lesions are seen.     CT images: Spine  degenerative change. T12 hemangioma.     IMPRESSION:  1. Right upper and left lower lung lesions do not demonstrate significant FDG activity, favoring a benign etiology versus low-grade neoplasm. Continued CT follow-up recommended. Consider 6-month CT follow-up. If these lesions continues to enlarge, tissue   sampling may still be warranted.  2. Mildly hypermetabolic lytic lesions in the thoracic spine as above most concerning for metastases/myeloma until proven otherwise. Correlate clinically.  3. Minimal FDG uptake in a periportal nodes as above, nonspecific. Additional prominent nodes do not demonstrate significant FDG activity.  4. Splenomegaly with nonspecific mild but homogeneous FDG activity.  5. Small hypermetabolic cutaneous lesion posterior to the right ear may be inflammatory versus neoplastic. Correlate clinically.     The study was marked in EPIC for significant notification.        Workstation performed: [id number]        Imaging    NM PET CT skull base to mid thigh (Order: [id number]) - 12/11/2024    IMPRESSION:     Marrow replacing lesions at T3, T4 and T9. Possible small lesion at T11. No karlos epidural extension of disease identified. Findings correspond to areas of uptake on recent PET/CT and again remains suspicious for metastasis or myeloma.     Degenerative changes of the thoracic spine, as described above.     Workstation performed: [id number]        Imaging    MRI thoracic spine w wo contrast (Order: [id number]) - 1/12/2025    IMPRESSION:     No discrete marrow replacing lesions identified within the lumbar spine. Hemangiomas are noted at T10 and T12.     Multilevel degenerative changes of the lumbar spine, as described above. Multifactorial disease results in severe mass effect on the thecal sac at L3-L4.        Workstation performed: [id number]        Imaging    MRI lumbar spine w wo contrast (Order: [id number]) - 1/8/2025  IMPRESSION:     Low T1 and high STIR signal lesion within the right  C4 facet. Additional marrow replacing lesions noted at T3 and T4 are incompletely evaluated however correspond to areas of uptake on recent PET/CT. No epidural extension of disease identified. As   previously suggested findings are suspicious for the presence of metastatic disease or myeloma until proven otherwise.        Workstation performed: [id number]        Imaging    MRI cervical spine w wo contrast (Order: [id number]) - 1/8/2025  MPRESSION:     1. Status post left hemithyroidectomy. No evidence of residual thyroid tissue or discrete nodules within the surgical bed.     2. Right lower pole thyroid nodule measuring 0.8 x 0.9 x 1.0 cm (nodule #1, image 16). This nodule does not meet current ACR size criteria for biopsy, however, reassessment on follow-up ultrasound is recommended in 12 months. A shorter interval follow-up   may be performed as clinically warranted.     Reference: ACR Thyroid Imaging, Reporting and Data System (TI-RADS): White Paper of the ACR TI-RADS Committee. J AM Angelita Radiol 2017;14:587-595. Additional recommendations based on American Thyroid Association 2015 guidelines.     The study was marked in EPIC for significant notification.        Workstation performed: [id number]        Imaging    US thyroid (Order: [id number]) - 12/10/2024

## 2025-02-20 NOTE — ASSESSMENT & PLAN NOTE
On MRI scan of thoracic spine she has marrow replacing lesions at T3-T4 and T9.  On PET CT scan she has 2.7 x 1.8 cm left lower paraspinal nodule without significant FDG avidity, 3.2, 2.6 and 2.8 activity respectively.  Hemangiomas at T10 and T12 on MRI scan of the lumbar spine.Signal lesion within right C4  Orders:    Ambulatory Referral to Hematology / Oncology    CBC and differential; Future    Comprehensive metabolic panel; Future    LD,Blood; Future    Ambulatory referral to Interventional Radiology; Future    Biopsy bone marrow; Future    Ambulatory Referral to Interventional Radiology; Future

## 2025-02-22 ENCOUNTER — APPOINTMENT (OUTPATIENT)
Dept: LAB | Facility: HOSPITAL | Age: 80
End: 2025-02-22
Attending: INTERNAL MEDICINE
Payer: COMMERCIAL

## 2025-02-22 DIAGNOSIS — D69.6 THROMBOCYTOPENIA (HCC): ICD-10-CM

## 2025-02-22 DIAGNOSIS — R16.2 HEPATOSPLENOMEGALY: ICD-10-CM

## 2025-02-22 DIAGNOSIS — D72.819 LEUKOPENIA, UNSPECIFIED TYPE: ICD-10-CM

## 2025-02-22 DIAGNOSIS — M89.9 LESION OF BONE OF THORACIC SPINE: ICD-10-CM

## 2025-02-22 LAB
ALBUMIN SERPL BCG-MCNC: 4.2 G/DL (ref 3.5–5)
ALP SERPL-CCNC: 54 U/L (ref 34–104)
ALT SERPL W P-5'-P-CCNC: 12 U/L (ref 7–52)
ANION GAP SERPL CALCULATED.3IONS-SCNC: 7 MMOL/L (ref 4–13)
ANISOCYTOSIS BLD QL SMEAR: PRESENT
AST SERPL W P-5'-P-CCNC: 14 U/L (ref 13–39)
BASOPHILS # BLD MANUAL: 0 THOUSAND/UL (ref 0–0.1)
BASOPHILS NFR MAR MANUAL: 0 % (ref 0–1)
BILIRUB SERPL-MCNC: 0.78 MG/DL (ref 0.2–1)
BUN SERPL-MCNC: 21 MG/DL (ref 5–25)
CALCIUM SERPL-MCNC: 9.8 MG/DL (ref 8.4–10.2)
CHLORIDE SERPL-SCNC: 105 MMOL/L (ref 96–108)
CO2 SERPL-SCNC: 30 MMOL/L (ref 21–32)
CREAT SERPL-MCNC: 0.9 MG/DL (ref 0.6–1.3)
EOSINOPHIL # BLD MANUAL: 0.05 THOUSAND/UL (ref 0–0.4)
EOSINOPHIL NFR BLD MANUAL: 1 % (ref 0–6)
ERYTHROCYTE [DISTWIDTH] IN BLOOD BY AUTOMATED COUNT: 14.8 % (ref 11.6–15.1)
GFR SERPL CREATININE-BSD FRML MDRD: 61 ML/MIN/1.73SQ M
GLUCOSE P FAST SERPL-MCNC: 126 MG/DL (ref 65–99)
HCT VFR BLD AUTO: 42.7 % (ref 34.8–46.1)
HGB BLD-MCNC: 13.4 G/DL (ref 11.5–15.4)
LDH SERPL-CCNC: 133 U/L (ref 140–271)
LYMPHOCYTES # BLD AUTO: 1.49 THOUSAND/UL (ref 0.6–4.47)
LYMPHOCYTES # BLD AUTO: 20 % (ref 14–44)
MCH RBC QN AUTO: 29.3 PG (ref 26.8–34.3)
MCHC RBC AUTO-ENTMCNC: 31.4 G/DL (ref 31.4–37.4)
MCV RBC AUTO: 93 FL (ref 82–98)
MONOCYTES # BLD AUTO: 0.45 THOUSAND/UL (ref 0–1.22)
MONOCYTES NFR BLD: 9 % (ref 4–12)
NEUTROPHILS # BLD MANUAL: 2.98 THOUSAND/UL (ref 1.85–7.62)
NEUTS SEG NFR BLD AUTO: 60 % (ref 43–75)
OVALOCYTES BLD QL SMEAR: PRESENT
PLATELET # BLD AUTO: 124 THOUSANDS/UL (ref 149–390)
PLATELET BLD QL SMEAR: ABNORMAL
PMV BLD AUTO: 9.3 FL (ref 8.9–12.7)
POIKILOCYTOSIS BLD QL SMEAR: PRESENT
POLYCHROMASIA BLD QL SMEAR: PRESENT
POTASSIUM SERPL-SCNC: 4.2 MMOL/L (ref 3.5–5.3)
PROT SERPL-MCNC: 7.2 G/DL (ref 6.4–8.4)
RBC # BLD AUTO: 4.58 MILLION/UL (ref 3.81–5.12)
RBC MORPH BLD: PRESENT
SODIUM SERPL-SCNC: 142 MMOL/L (ref 135–147)
VARIANT LYMPHS # BLD AUTO: 10 %
WBC # BLD AUTO: 4.96 THOUSAND/UL (ref 4.31–10.16)

## 2025-02-22 PROCEDURE — 83615 LACTATE (LD) (LDH) ENZYME: CPT

## 2025-02-22 PROCEDURE — 85027 COMPLETE CBC AUTOMATED: CPT

## 2025-02-22 PROCEDURE — 80053 COMPREHEN METABOLIC PANEL: CPT

## 2025-02-22 PROCEDURE — 85007 BL SMEAR W/DIFF WBC COUNT: CPT

## 2025-02-22 PROCEDURE — 36415 COLL VENOUS BLD VENIPUNCTURE: CPT

## 2025-02-23 ENCOUNTER — PREP FOR PROCEDURE (OUTPATIENT)
Dept: INTERVENTIONAL RADIOLOGY/VASCULAR | Facility: CLINIC | Age: 80
End: 2025-02-23

## 2025-02-23 DIAGNOSIS — M89.9 LESION OF BONE OF THORACIC SPINE: Primary | ICD-10-CM

## 2025-02-23 RX ORDER — SODIUM CHLORIDE 9 MG/ML
30 INJECTION, SOLUTION INTRAVENOUS CONTINUOUS
Status: CANCELLED | OUTPATIENT
Start: 2025-02-23

## 2025-03-05 NOTE — PRE-PROCEDURE INSTRUCTIONS
Pre-procedure Instructions for Interventional Radiology  [address] [address]  INTERVENTIONAL RADIOLOGY [phone]    You are scheduled for a/an Thoracic Spine Mass Biopsy and Bone Marrow Biopsy .    On 3/12/25.    Your tentative arrival time is 8:45 AM.  Short stay will notify you the day before your procedure with the exact arrival time and the location to arrive.    To prepare for your procedure:  Please arrange for someone to drive you home after the procedure and stay with you until the next morning if you are instructed to do so.  This is typically for patients receiving some type of sedative or anesthetic for the procedure.  DO NOT EAT OR DRINK ANYTHING after midnight on the evening before your procedure including candy & gum.  ONLY SIPS OF WATER with your medications are allowed on the morning of your procedure.  TAKE ALL OF YOUR REGULAR MEDICATIONS THE MORNING OF YOUR PROCEDURE with sips of water!  We may call you to stop some of your blood sugar, blood pressure and blood thinning medications depending on the procedure.  Please take all of these medications unless we instruct you to stop them.  If you have an allergy to x-ray dye, please contact Interventional Radiology for an x-ray dye preparation which usually consists of an oral steroid and Benadryl.  If you wear a Glucose Monitor, you may be asked to remove it for your procedure if we are using x-ray.  These devices need to be removed when we are imaging with x-ray near the device since the radiation can cause the unit to malfunction.  If possible and not too inconvenient, you may want to schedule your exam closer to day 14 of your 14 day device so your device is not wasted.    The day of your procedure:  Bring a list of the medications you take at home.  Bring medications you take for breathing problems (such as inhalers), medications for chest pain, or both.  Bring a case for your glasses or  contacts.  Bring your insurance card and a form of photo ID.  Please leave all valuables such as credit cards and jewelry at home.  Report to the admitting office to the left of the registration desk in the main lobby at the [facility], Entrance B.  You will then be directed to the Short Stay Center.  While your procedure is being performed, your family may wait in the Radiology Waiting Room on the 1st floor in Radiology.  if they need to leave, they may provide a number to be called following the procedure.   Be prepared to stay overnight just in case. Sometimes procedures will indicate the need for further observation or treatment.   If you are scheduled for a follow-up visit with the Interventional Radiologist after your procedure, you will be called with a date and time.    Special Instructions (Medications to stop taking before your procedure etc.):  ASA last dose 3/6/25 and restart 3/13/25. Lasix and metformin hold AM 3/12/25.

## 2025-03-10 NOTE — PROGRESS NOTES
[facility] Follow-up Visit- Cardiology   Nadeem Ashton 79 y.o. female MRN: [id number]  Unit/Bed#:  Encounter: [id number]    Patient Active Problem List    Diagnosis Date Noted    Type 2 diabetes mellitus with stage 3a chronic kidney disease, without long-term current use of insulin (HCC) 02/20/2025    FOUZIA (obstructive sleep apnea) 12/16/2024    Goals of care, counseling/discussion 11/18/2024    Palliative care encounter 11/18/2024    Lesion of bone of thoracic spine 11/18/2024    Lung mass 11/16/2024    Thrombocytopenia (HCC) 11/15/2024    Acute on chronic diastolic (congestive) heart failure (HCC) 11/12/2024    Hypertrophic cardiomyopathy (HCC) 10/03/2024    Postoperative hypothyroidism 10/03/2024    Fatty liver 10/03/2024    BMI 36.0-36.9,adult 10/03/2024    Sleep apnea 10/03/2024    Stage 3a chronic kidney disease (HCC) 09/24/2024    Restrictive lung disease 09/21/2020    CAD (coronary artery disease) 06/18/2020    Nonrheumatic aortic valve stenosis 06/18/2020    Essential hypertension 06/18/2020    Dyslipidemia 06/18/2020    Mild persistent asthma without complication 06/18/2020     Plan: Ms Nadeem Ashton was seen in the [facility] for consultation on 9-. 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 Her obstructive HCM is complicated by at lease moderate valvular aortic stenosis. Following oleg initial evaluation, she underwent ENRICO on 10-3-2024 that showed:      Left Ventricle: Wall thickness is severely increased. There is severe asymmetric hypertrophy of the basal septal wall. The left ventricular ejection fraction is 70%. Systolic function is hyperdynamic. Wall motion is normal. Diastolic function is mildly abnormal, consistent with grade I (abnormal) relaxation. There is  outflow tract dynamic obstruction at rest. The peak resting gradient was measured at  36 mmHg, however, patient's blood pressure was elevated at the time. Obstruction is caused by a thickened shelf present at the distal chordae and the tip of the papillary muscle roughly 28 mm caudal to the aortic valve.    Left Atrium: The atrium is dilated. There is no thrombus.    Atrial Septum: There is a small and patent foramen ovale confirmed at rest with predominant left to right shunting using color Doppler.    Left Atrial Appendage: There is reduced function. There is no thrombus.    Aortic Valve: The aortic valve is trileaflet. The leaflets are moderately thickened. The leaflets are moderately calcified. There is severely reduced mobility. There is mild regurgitation. There is severe stenosis. The aortic valve mean gradient is 38.0 mmHg. The dimensionless velocity index is 0.26. The aortic valve area is 0.67 cm2.    Mitral Valve: There is mild annular calcification. There is mild regurgitation. There is systolic anterior motion of the chordal apparatus with late peaking gradient.    Aorta: There is mild non-protruding atherosclerosis of the descending aorta.    She was then sent for coronary angiography and cardiac catheterization that showed moderate diffuse atherosclerosis of the coronary arteries and mild to moderate aortic stenosis. She was then admitted to hospital with heart failure and elevated blood pressure on 11-. She was treated with diuresis and antihypertensive medications and after hemodynamic optimization was considered for alcohol septal ablation (11-) but LVOT gradients were not indicative of severe dynamic obstruction. She was then seen by cardiothoracic surgery for consideration of simultaneous septal myectomy and aortic valve replacement. A thoracic CT scan then showed possible metastatic neoplasm of the left lower lobe (lung with bone mets). However, the lung lesions did not show significant FDG uptake on PET scan. Activity was, however, seen in lytic lesions of the spine  and metastatic malignancy or multiple myeloma were suspected. The findings were further corroborated by MRI of spine. Bone and bone marrow biopsy are scheduled for 3-. A transthoracic echo performed on 12- gas shown:      Left Ventricle: Left ventricular cavity size is normal. Wall thickness is increased. 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 The left ventricular ejection fraction is 70% by visual estimation. Systolic function is vigorous. Global longitudinal strain is normal at -19%.  Reduced strain noted in the basal anteroseptal and septal walls. Diastolic function is mildly abnormal, consistent with grade I (abnormal) relaxation.  Left atrial filling pressure is elevated. There is mild outflow tract dynamic obstruction at rest.    Right Ventricle: Right ventricular cavity size is normal. Systolic function is normal.    Left Atrium: The atrium is moderately dilated (42-48 mL/m2).    Aortic Valve: The aortic valve is trileaflet. The leaflets are moderately thickened. The leaflets are moderately calcified. There is severely reduced mobility. There is mild regurgitation. There is moderate to severe stenosis. The aortic valve peak velocity is 4.0 m/s. The aortic valve mean gradient is 40.0 mmHg. The dimensionless velocity index is 0.40. The aortic valve area is 1.34 cm2. The aortic valve velocity is increased in the setting of stenosis and increased flow.    Mitral Valve: There is mild annular calcification. There is mild regurgitation.    Tricuspid Valve: There is mild regurgitation. The right ventricular systolic pressure is mildly elevated. The estimated right ventricular systolic pressure is 44.00 mmHg.    Aorta: The aortic root is normal in size. The ascending aorta is mildly dilated. The ascending aorta is 4 cm.    Pericardium: There is a trivial pericardial effusion circumferential to the heart.    Prior TTE study available for comparison. Prior study date: 11/21/2024.  Changes noted when compared to prior study. Changes include: Slight increase in aortic valve gradients noted..    Today, Ms Ashton states that she has no overt cardiac symptom at her usual level of physical activity that is limited to performing chores around the house. She has no shortness of breath during routine daily activities, denies chest pain, dizziness, syncope, palpitation or lower extremity swelling. She is careful with the amount of fluid she drinks, has restricted salt in her diet and carefully monitors her weight at home that has remained stable around 190-191 lbs. She does nor exert herself. I have encouraged her to start doing some walking slowly by pacing herself and resting as needed to keep her mobility by as much as comfortable feasible. She is compliant with her cPAP and has been getting adequate sleep. Her blood pressure was elevated on this visit but home blood pressure measurements show systolic values ranging from 129 to 134 mmHg. Recent blood work show a low platelet count of 124K, otherwise normal CBC, normal total protein and albumin. She is scheduled for bone and bone marrow biopsies tomorrow, It would be best if the study is monitored by cardiac anesthesiologist. Further cardiac evaluation and treatment will be gauged based on the findings of the biopsies. If cleared, at this point I feel a strategy of admitting to the hospital for trial of disopyramide followed by TAVR would be in the considerations.     Physician Requesting Consult: Celine England MD  Reason for Consult / Principal Problem: HCM, Aortic Stenosis     HPI: Ms Nadeem Ashton is a 79 year old woman with past medical history of hypertension, diabetes, hypercholesterolemia, coronary artery disease (PCI LAD 2008), aortic stenosis, obesity and chronic lung disease (asthma, restrictive disease with remote history of sarcoidosis) who has been experiencing progressive shortness of breath in recent months. Recent workup has included:      ECG: NSR, LAE, LAFB, non-specific T wave changes          Echo 6-:       Left Ventricle: Left ventricular cavity size is normal. Wall thickness is increased. There is mild to moderate concentric hypertrophy. The left ventricular ejection fraction is 70% by visual estimation. Systolic function is vigorous. Diastolic function is mildly abnormal, consistent with grade I (abnormal) relaxation. There is  with a peak gradient of 62.0 mmHg. There is outflow tract dynamic obstruction with valsalva with a peak gradient of 72.0 mmHg.    Right Ventricle: Systolic function is normal.    Aortic Valve: The aortic valve is trileaflet. The leaflets are moderately calcified. There is moderately reduced mobility. There is mild regurgitation. There is moderate stenosis. The aortic valve mean gradient is 28.0 mmHg. The dimensionless velocity index is 0.43. The aortic valve area is 1.32 cm2. The aortic valve velocity is increased in the setting of stenosis and increased flow.    Mitral Valve: There is mild annular calcification.    Aorta: The aortic root is normal in size. The ascending aorta is mildly dilated. The ascending aorta is 4.2 cm.    Prior TTE study available for comparison. Prior study date: 2/27/2023. Changes noted when compared to prior study. Changes include: Slight progression of aortic stenosis noted.     Cardiac monitor (24 hour):  The patient was in sinus rhythm throughout the duration of the study with an average heart rate 59 bpm  No ventricular ectopic activity  Rare supraventricular ectopic activity in the form of single premature atrial contractions  No ventricular or supraventricular arrhythmias  Symptoms did not correlate with any significant ectopy or arrhythmia     Exercise stress echo 8-:      Left Ventricle: Wall thickness is severely increased. 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 The left ventricular ejection fraction is 70%. Systolic function is hyperdynamic. Global  longitudinal strain is reduced at -16%. Wall motion is normal. Diastolic function is mildly abnormal, consistent with grade I (abnormal) relaxation. There is  outflow tract dynamic obstruction at rest with a peak gradient of 53.0 mmHg. There is outflow tract dynamic obstruction with valsalva with a peak gradient of 67.0 mmHg.    Left Atrium: The atrium is mildly dilated.    Aortic Valve: The aortic valve is trileaflet. The leaflets are moderately thickened. The leaflets are moderately calcified. There is moderately reduced mobility. There is moderate stenosis. The aortic valve mean gradient is 26 mmHg. The dimensionless velocity index is 0.42. The aortic valve area is 1.39 cm2.    Mitral Valve: There is systolic anterior motion of the chordal apparatus with late peaking gradient.    Stress ECG: No ST deviation is noted. There were no arrhythmias during recovery. . The stress ECG is negative for ischemia after submaximal exercise, without reproduction of symptoms.    Peak Stress Echo: The peak stress echo showed normal wall motion.     A bicycle protocol stress test was performed. Overall, the patient's exercise capacity was moderately impaired for their age. The patient after exercising for 4 min and 0 sec and had a maximal HR of 112 bpm (79% of MPHR) and 3.0 METS. The patient experienced no angina during the test. The test was stopped because the patient experienced fatigue and dyspnea. The patient reported dyspnea and fatigue during the stress test. Symptoms began during stress and ended during recovery. Blood pressure demonstrated a hypertensive response and heart rate demonstrated a normal response to stress.     9-: Ms Nadeem Ashton has been referred to HCM Clinic due to symptomatic hypertrophic obstructivr cardiomyopathy. I did see her also when she came to [facility] for exercise stress echocardiogram. She had reduced exercise tolerance and was quite symptomatic during low level  exercise with fatigue and dyspnea. 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 It is often difficult to determine the true severity of valvular aortic stenosis in the presence of LVOT obstruction. She would certainly be in need of septal reduction therapy due to the severity of her symptoms and limitations of her activities. A transesophageal echocardiogram with 3D assessment would be most useful to assess the aortic valve directly in order to decide wether catheter-based a surgical approach would best serve her at this time. Her ENRICO is scheduled for 10-2-2024. She also has resistant hypertension and her HOCM and valvular AS limit the pharmacologic choices for BP management. She does have relatively high operative risk (aortic valve replacement and myectomy) and has had prior proximal LAD stenting that would likely affect possibility of alcohol septal ablation. The ENRICO would help decide if mavacamten with potential TAVR later on would be an option to pursue. Elimination of LVOT gradients would help management of her hypertension as well if aortic stenosis is not severe or nearly severe. She has very high BP and treatment of it is limited due to HOCM and AS. She is on Atenolol, Verapamil and Losartan. She was asked to check BP at home and share the results in one week. She was also educated on low salt diet options. She is scheduled for abdominal MRI for abdominal fullness. We discussed of genetic testing and she is interested. We will start process today.   An ECG performed in the office today showed sinus bradycardia and left axis deviation:          Risk stratification:     Nonsustained ventricular tachycardia - No  Severe left ventricular hypertrophy - No  Family history of sudden death - No  Unexplained syncope - No  LVOT obstruction - Yes  Atrial fibrillation and left atrial dilation - No  Age - 79  NYHA - II-III  Myocardial fibrosis - No  CMR  LV systolic dysfunction - No  Apical aneurysm - No     Review of systems: Has no shortness of breath during routine daily activities, denies chest pain, dizziness, syncope, palpitation or lower extremity swelling.     Family history:   Mother:  in her 80s. She had heart disease.  Father:  in age late 70s from thyroid cancer.   Sister:  at age 80 from cardiac arrest. Had cardiac surgery at age 40 with some postoperative complications. Pt does not know if she had HCM.   Brother: Age 55 Has lymphoma.   Children: Son (Samuel) is 55 and has HCM diagnosed during high-school after fainting spell. Daughter (Shruthi Collins) is a retired RN and has Afib (follows with Dr. Meza).   There are 6 grandchildren and 1 great grandson.      Genetic testing: Negative         Devices: none.   Historical Information   Past Medical History:   Diagnosis Date    Abnormal ultrasound of breast     RAFI (acute kidney injury) (HCC) 2024    CAD (coronary artery disease)     Cancer (HCC)     thyroid    Cataract     H/O heart artery stent     History of sarcoidosis     Hyperlipidemia     Hypertension     Hypertensive urgency 2024    Sleep apnea      Past Surgical History:   Procedure Laterality Date    APPENDECTOMY      CARDIAC CATHETERIZATION Left 10/8/2024    Procedure: Cardiac Left Heart Cath;  Surgeon: Chasity Gonzalez DO;  Location: [facility];  Service: Cardiology    CARDIAC CATHETERIZATION  10/8/2024    Procedure: Cardiac catheterization;  Surgeon: Chasity Gonzalez DO;  Location: [facility];  Service: Cardiology    CARDIAC CATHETERIZATION Left 11/15/2024    Procedure: Cardiac Left Heart Cath;  Surgeon: Abilio Jerry MD;  Location: [facility];  Service: Cardiology    CARDIAC CATHETERIZATION N/A 11/15/2024    Procedure: Cardiac temporary pacemaker;  Surgeon: Abilio Jerry MD;  Location: [facility];  Service: Cardiology    CHOLECYSTECTOMY      CORONARY ANGIOPLASTY WITH STENT  PLACEMENT      HYSTERECTOMY      age 43    KNEE SURGERY Bilateral     OOPHORECTOMY Bilateral     age 43     Family History   Problem Relation Age of Onset    No Known Problems Mother     Thyroid cancer Father     No Known Problems Sister     No Known Problems Daughter     Cancer Maternal Grandmother         ear    No Known Problems Maternal Grandfather     No Known Problems Paternal Grandmother     No Known Problems Paternal Grandfather     No Known Problems Maternal Aunt     No Known Problems Maternal Aunt     Leukemia Paternal Aunt      Current Outpatient Medications on File Prior to Visit   Medication Sig Dispense Refill    albuterol (PROVENTIL HFA,VENTOLIN HFA) 90 mcg/act inhaler Inhale 2 puffs every 4 (four) hours as needed for wheezing or shortness of breath      aspirin (ECOTRIN LOW STRENGTH) 81 mg EC tablet Take 81 mg by mouth daily      atenolol (TENORMIN) 25 mg tablet Take 25 mg by mouth daily (Patient taking differently: Take 25 mg by mouth daily If needed, per dr lincoln, do not use if HR is less than 55)      atorvastatin (LIPITOR) 20 mg tablet Take 20 mg by mouth daily      fluticasone (FLONASE) 50 mcg/act nasal spray 1 spray into each nostril daily      furosemide (LASIX) 20 mg tablet Take 1 tablet (20 mg total) by mouth daily 30 tablet 0    losartan (COZAAR) 50 mg tablet Take 1 tablet (50 mg total) by mouth daily 30 tablet 0    metFORMIN (GLUCOPHAGE) 500 mg tablet Take 1 tablet (500 mg total) by mouth 2 (two) times a day with meals 60 tablet 0    methocarbamol (ROBAXIN) 500 mg tablet Take 1 tablet (500 mg total) by mouth 3 (three) times a day as needed for muscle spasms for up to 5 days Prn muscle spasms 15 tablet 0    methylPREDNISolone 4 MG tablet therapy pack Use as directed on package (Patient not taking: Reported on 2/20/2025) 21 tablet 0    potassium chloride (Klor-Con M20) 20 mEq tablet Take 1 tablet (20 mEq total) by mouth daily 30 tablet 0    Synthroid 150 MCG tablet Take 150 mcg by mouth  daily      verapamil (CALAN-SR) 180 mg CR tablet Take 1 tablet (180 mg total) by mouth 2 (two) times a day 60 tablet 0     No current facility-administered medications on file prior to visit.     No Known Allergies  Social History     Substance and Sexual Activity   Alcohol Use Not Currently     Social History     Substance and Sexual Activity   Drug Use Never     Social History     Tobacco Use   Smoking Status Never    Passive exposure: Never   Smokeless Tobacco Never     Objective   Vitals:   Vitals:    03/11/25 1253   BP: (!) 184/64   BP Location: Left arm   Patient Position: Sitting   Cuff Size: Standard   Pulse: (!) 51   SpO2: 94%   Weight: 87.6 kg (193 lb 3.2 oz)   Body surface area is 1.88 meters squared.  Body mass index is 35.34 kg/m².    Invasive Devices       None                 Physical Exam:  GEN: Nadeem Ashton appears well, alert and oriented x 3, pleasant and cooperative   HEENT: pupils equal, round, and reactive to light; extraocular muscles intact  NECK: supple, no carotid bruits   HEART: regular rhythm, normal S1 and S2, harsh 3/6 systolic murmur at the base with radiation to the carotids, +S4 gallop, no clicks or rubs   LUNGS: clear to auscultation bilaterally; no wheezes, rales, or rhonchi   ABDOMEN: normal bowel sounds, soft, no tenderness, no distention  EXTREMITIES: peripheral pulses normal; no clubbing, cyanosis, or edema  NEURO: no focal findings   SKIN: normal without suspicious lesions on exposed skin    Lab Results:   Lab Results   Component Value Date    WBC 4.96 02/22/2025    RBC 4.58 02/22/2025    HGB 13.4 02/22/2025    HCT 42.7 02/22/2025    MCV 93 02/22/2025     (L) 02/22/2025    RDW 14.8 02/22/2025     Lab Results   Component Value Date    K 4.2 02/22/2025     02/22/2025    CO2 30 02/22/2025    BUN 21 02/22/2025    CREATININE 0.90 02/22/2025    EGFR 61 02/22/2025    CALCIUM 9.8 02/22/2025    AST 14 02/22/2025    ALT 12 02/22/2025    ALKPHOS 54 02/22/2025     Lab  Results   Component Value Date    MG 2.0 2024     Lab Results   Component Value Date    HDL 33 (L) 2024    TRIG 81 2024    LDLCALC 73 2024     Lab Results   Component Value Date    QPP8HGBLVLYT 1.441 2024     Imaging:   I have personally reviewed pertinent films in PACS    EKG:     Cardiac testing:   Results for orders placed during the hospital encounter of 18    Echo complete with contrast if indicated    Narrative  [address] [address] [phone]    Transthoracic Echocardiogram  2D, M-mode, Doppler, and Color Doppler    Study date:  2018    Patient: ARSENIO ETIENNE  MR number: [id number]  Account number: [id number]  : 1945  Age: 72 years  Gender: Female  Status: Outpatient  Location: [address] [place] [address]  Height: 62 in  Weight: 213 lb  BP:    Indications: Shortness of breath    Diagnoses: R06.02 - Shortness of breath    Sonographer:  ED Mtz  Referring Physician:  Celine England MD  Group:  [facility]  Interpreting Physician:  Shanita Hua MD    SUMMARY    LEFT VENTRICLE:  Systolic function was vigorous. Ejection fraction was estimated to be 65 %.  There were no regional wall motion abnormalities.  Wall thickness was increased.  Concentric hypertrophy was present.  Doppler parameters were consistent with abnormal left ventricular relaxation (grade 1 diastolic dysfunction).    AORTIC VALVE:  There was mild stenosis. Vmax 2.4 m/s, mean gradient 13 mm Hg, maximum gradient 23 mm Hg.  There was trace to mild regurgitation.    HISTORY: PRIOR HISTORY: Hypertension, high cholesterol, coronary artery disease, stent    PROCEDURE: The study was performed in the [address] [place] [address]. This was a routine study. The transthoracic approach was used. The study included complete 2D imaging, M-mode, complete spectral Doppler, and color Doppler. This  was a  technically difficult study.    LEFT VENTRICLE: Size was normal. Systolic function was vigorous. Ejection fraction was estimated to be 65 %. There were no regional wall motion abnormalities. Wall thickness was increased. Concentric hypertrophy was present. DOPPLER: There  was an increased relative contribution of atrial contraction to ventricular filling. Doppler parameters were consistent with abnormal left ventricular relaxation (grade 1 diastolic dysfunction).    RIGHT VENTRICLE: The size was normal. Systolic function was normal.    LEFT ATRIUM: Size was normal.    RIGHT ATRIUM: Size was normal.    MITRAL VALVE: There was annular calcification. Valve structure was normal. There was normal leaflet separation. DOPPLER: There was no evidence for stenosis. There was trace regurgitation.    AORTIC VALVE: Leaflets exhibited mildly to moderately increased thickness and mild to moderate calcification. The valve was not well visualized. DOPPLER: There was mild stenosis. Vmax 2.4 m/s, mean gradient 13 mm Hg, maximum gradient 23 mm  Hg. There was trace to mild regurgitation.    TRICUSPID VALVE: The valve structure was normal. There was normal leaflet separation. DOPPLER: There was no evidence for stenosis. There was no regurgitation.    PULMONIC VALVE: Leaflets exhibited normal thickness, no calcification, and normal cuspal separation. DOPPLER: The transpulmonic velocity was within the normal range. There was mild regurgitation.    PERICARDIUM: There was no pericardial effusion. The pericardium was normal in appearance.    AORTA: The root exhibited normal size. Not well visualized.    SYSTEMIC VEINS: IVC: The inferior vena cava was normal in size and course. Respirophasic changes were normal.    SYSTEM MEASUREMENT TABLES    2D  %FS: 29.01 %  AV Diam: 3.47 cm  EDV(Teich): 77.21 ml  EF(Cube): 64.22 %  EF(Teich): 56.15 %  ESV(Cube): 25.92 ml  ESV(Teich): 33.86 ml  IVSd: 1.2 cm  LA Area: 17.81 cm2  LA Diam: 3.74 cm  LVEDV MOD  "A4C: 126.24 ml  LVEF MOD A4C: 70.82 %  LVESV MOD A4C: 36.84 ml  LVIDd: 4.17 cm  LVIDs: 2.96 cm  LVLd A4C: 8.96 cm  LVLs A4C: 7.25 cm  LVOT Diam: 1.94 cm  LVPWd: 1.13 cm  RA Area: 10.23 cm2  RV Diam.: 2.95 cm  SV MOD A4C: 89.41 ml  SV(Cube): 46.53 ml  SV(Teich): 43.35 ml    CW  AV Env.Ti: 353.67 ms  AV SV: 574.44 ml  AV VTI: 60.82 cm  AV Vmax: 2.51 m/s  AV Vmean: 1.72 m/s  AV maxP.35 mmHg  AV meanP.44 mmHg    MM  TAPSE: 2.26 cm    PW  JETHRO (VTI): 1.71 cm2  JETHRO Vmax: 1.55 cm2  E': 0.07 m/s  E/E': 13.05  LVOT Env.Ti: 427.6 ms  LVOT VTI: 35.44 cm  LVOT Vmax: 1.32 m/s  LVOT Vmean: 0.83 m/s  LVOT maxP.01 mmHg  LVOT meanPG: 3.32 mmHg  LVSV Dopp: 104.27 ml  MV A Raul: 1.03 m/s  MV Dec Elk: 2.91 m/s2  MV DecT: 295.77 ms  MV E Raul: 0.86 m/s  MV E/A Ratio: 0.84    [employer] [facility] Accredited Echocardiography Laboratory    Prepared and electronically signed by    Shanita Hua MD  Signed 2018 11:14:07    Name: Nadeem Ashton                       : 1945  MRN: [id number]                       Age: 79 y.o.  Patient Status: Outpatient          Gender: female  ECHO Complete With Contrast If Indicated    Height: 5' 2\" (1.575 m)   Weight: 88 kg (194 lb)   BSA: 1.89 m²   Blood Pressure: 130/78    Date of Study: 24   Ordering Provider: Jacob Pearson MD   Clinical Indications: Hypertrophic cardiomyopathy (HCC) [I42.2 (ICD-10-CM)]       Reading Physicians  Performing Staff   Cardiology: Jordi Moreno MD     Tech: Trina Hewitt         Vitals    Height Weight BSA (Calculated - m2) BP Pulse   5' 2\" (1.575 m) 88 kg (194 lb) 1.89 sq meters 130/78 50     PACS Images     Show images for Echo complete w/ contrast if indicated  Study Details    This transthoracic echocardiogram was performed in the echo lab. This was a routine, outpatient study. Study quality was adequate. This was a technically difficult study due to poor acoustic windows. A complete 2D, color flow Doppler, spectral Doppler, 2D, color " flow Doppler, spectral Doppler and strain transthoracic echocardiogram was performed.  The apical, parasternal, subcostal and suprasternal views were obtained.     History    Coronary artery disease. Aortic stenosis. Hypertension. RLD. Chronic kidney disease. Hypertrophic cardiomyopathy. Sleep apnea.FOUZIA.     Interpretation Summary  Show Result Comparison     Left Ventricle: Left ventricular cavity size is normal. Wall thickness is increased. 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 The left ventricular ejection fraction is 70% by visual estimation. Systolic function is vigorous. Global longitudinal strain is normal at -19%.  Reduced strain noted in the basal anteroseptal and septal walls. Diastolic function is mildly abnormal, consistent with grade I (abnormal) relaxation.  Left atrial filling pressure is elevated. There is mild outflow tract dynamic obstruction at rest.    Right Ventricle: Right ventricular cavity size is normal. Systolic function is normal.    Left Atrium: The atrium is moderately dilated (42-48 mL/m2).    Aortic Valve: The aortic valve is trileaflet. The leaflets are moderately thickened. The leaflets are moderately calcified. There is severely reduced mobility. There is mild regurgitation. There is moderate to severe stenosis. The aortic valve peak velocity is 4.0 m/s. The aortic valve mean gradient is 40.0 mmHg. The dimensionless velocity index is 0.40. The aortic valve area is 1.34 cm2. The aortic valve velocity is increased in the setting of stenosis and increased flow.    Mitral Valve: There is mild annular calcification. There is mild regurgitation.    Tricuspid Valve: There is mild regurgitation. The right ventricular systolic pressure is mildly elevated. The estimated right ventricular systolic pressure is 44.00 mmHg.    Aorta: The aortic root is normal in size. The ascending aorta is mildly dilated. The ascending aorta is 4 cm.    Pericardium: There is a trivial  pericardial effusion circumferential to the heart.    Prior TTE study available for comparison. Prior study date: 11/21/2024. Changes noted when compared to prior study. Changes include: Slight increase in aortic valve gradients noted..     Strain was performed to quantify interventricular dyssynchrony and evaluate components of myocardial function due to HCM. Results from the utilization of Strain Analysis are listed in the report below.     Findings    Left Ventricle Left ventricular cavity size is normal. Wall thickness is increased. 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 The left ventricular ejection fraction is 70% by visual estimation. Systolic function is vigorous. Global longitudinal strain is normal at -19%.  Reduced strain noted in the basal anteroseptal and septal walls. Although no diagnostic regional wall motion abnormality was identified, this possibility cannot be completely excluded on the basis of this study. Diastolic function is mildly abnormal, consistent with grade I (abnormal) relaxation.  Left atrial filling pressure is elevated. There is mild outflow tract dynamic obstruction at rest.   Right Ventricle Right ventricular cavity size is normal. Systolic function is normal.   Left Atrium The atrium is moderately dilated (42-48 mL/m2).   Right Atrium The atrium is normal in size.   Aortic Valve The aortic valve is trileaflet. The leaflets are moderately thickened. The leaflets are moderately calcified. There is severely reduced mobility. There is mild regurgitation. There is moderate to severe stenosis. The aortic valve peak velocity is 4.0 m/s. The aortic valve mean gradient is 40.0 mmHg. The dimensionless velocity index is 0.40. The aortic valve area is 1.34 cm2. The aortic valve velocity is increased in the setting of stenosis and increased flow.   Mitral Valve There is mild annular calcification.  There is mild regurgitation. There is no evidence of stenosis.   Tricuspid  Valve Tricuspid valve structure is normal. There is mild regurgitation. There is no evidence of stenosis. The right ventricular systolic pressure is mildly elevated. The estimated right ventricular systolic pressure is 44.00 mmHg.   Pulmonic Valve Pulmonic valve structure is normal. There is trace regurgitation. There is no evidence of stenosis.   Ascending Aorta The aortic root is normal in size. The ascending aorta is mildly dilated. The ascending aorta is 4 cm.   IVC/SVC The right atrial pressure is estimated at 3.0 mmHg. The inferior vena cava is normal in size.   Pericardium There is a trivial pericardial effusion circumferential to the heart.     Left Ventricle Measurements    Function/Volumes   A4C EF 73 %         LVOT stroke volume 150.47         LVOT stroke volume index 76.2 ml/m2         Left ventricular stroke volume (2D) 69 mL         LVOT Cardiac Output 6.86 l/min         LVOT Cardiac Index 3.63 l/min/m2         Dimensions   LVIDd 4.8 cm         LVIDS 3.1 cm         IVSd 1.9 cm         LVPWd 1.4 cm         LVOT area 3.14 cm2         FS 35         Diastolic Filling   MV E' Tissue Velocity Septal 5 cm/s         MV E' Tissue Velocity Lateral 6 cm/s         LA Volume Index (BP) 46.6 mL/m2         E/A ratio 0.71         E wave deceleration time 354 ms         MV Peak E Raul 96 cm/s         MV Peak A Raul 1.36 m/s         Strain   GLS -19 %          Report Measurements   AV LVOT peak gradient 10 mmHg         Other Measurements   Peak Gradient (Valsalva) 56 mmHg         Peak Gradient (Standing) 33 mmHg              Interventricular Septum Measurements    Shunt Ratio   LVOT peak VTI 47.92 cm         LVOT peak raul 1.59 m/s              Right Ventricle Measurements    Dimensions   RVID d 3.5 cm         Tricuspid annular plane systolic excursion 2.8 cm               Left Atrium Measurements    Dimensions   LA size 4.9 cm         LA length (A2C) 5.8 cm         Volumes   LA volume (BP) 88 mL         LA Volume Index  (BP) 46.6 mL/m2               Right Atrium Measurements    Dimensions   RAA A4C 12.8 cm2               Atrial Septum Measurements    Shunt Ratio   LVOT peak VTI 47.92 cm         LVOT peak raul 1.59 m/s               Aortic Valve Measurements    Stenosis   Aortic valve peak velocity 4 m/s         LVOT peak raul 1.59 m/s         Ao .06 cm         LVOT peak VTI 47.92 cm         AV mean gradient 40 mmHg         LVOT mn grad 6 mmHg         AV peak gradient 64 mmHg         AV LVOT peak gradient 10 mmHg         Regurgitation   AV peak gradient 31 mmHg         AV Deceleration Time 2,280 ms         AV regurgitation pressure 1/2 time 661 ms         Area/Dimensions   DVI 0.4         AV valve area 1.34 cm2         AV area by cont VTI 1.3 cm2         AV area peak raul 1.3 cm2         LVOT diameter 2 cm         LVOT area 3.14 cm2               Mitral Valve Measurements    Stenosis   MV stenosis pressure 1/2 time 103 ms         MV valve area p 1/2 method 2.14               Tricuspid Valve Measurements    RVSP Parameters   TR Peak Raul 3.2 m/s         Est. RA pres 3 mmHg         Triscuspid Valve Regurgitation Peak Gradient 41 mmHg         Right Ventricular Peak Systolic Pressure 44 mmHg               Aorta Measurements    Aortic Dimensions   Ao root 3.3 cm         Asc Ao 4 cm               IVC/SVC Measurements    IVC/SVC   Est. RA pres 3 mmHg              Exam Details    Performed Procedure Technologist Supporting Staff Performing Physician   Echo complete w/ strain Trina Hewitt            Appointment Date/Status Modality Department    12/26/2024     Completed EA ECHO 1 EA CAR NON INV           Begin Exam End Exam  End Exam Questionnaires   12/26/2024 10:02 AM 12/26/2024 10:49 AM  PATIENT EDUCATION            All Reviewers List    Jacob Pearson MD on 1/6/2025 11:07 AM     Signed    Electronically signed by Jordi Moreno MD on 12/31/24 at 1034 EST     Counseling / Coordination of Care  Total time spent today 45 minutes.   Greater than 50% of total time was spent with the patient and / or family counseling and / or coordination of care.

## 2025-03-11 ENCOUNTER — OFFICE VISIT (OUTPATIENT)
Dept: CARDIOLOGY CLINIC | Facility: CLINIC | Age: 80
End: 2025-03-11
Payer: COMMERCIAL

## 2025-03-11 VITALS
HEART RATE: 51 BPM | OXYGEN SATURATION: 94 % | DIASTOLIC BLOOD PRESSURE: 64 MMHG | SYSTOLIC BLOOD PRESSURE: 184 MMHG | WEIGHT: 193.2 LBS | BODY MASS INDEX: 35.34 KG/M2

## 2025-03-11 DIAGNOSIS — I10 PRIMARY HYPERTENSION: ICD-10-CM

## 2025-03-11 DIAGNOSIS — I35.0 NONRHEUMATIC AORTIC VALVE STENOSIS: ICD-10-CM

## 2025-03-11 DIAGNOSIS — Z09 HOSPITAL DISCHARGE FOLLOW-UP: Primary | ICD-10-CM

## 2025-03-11 DIAGNOSIS — I50.32 CHRONIC HEART FAILURE WITH PRESERVED EJECTION FRACTION (HCC): ICD-10-CM

## 2025-03-11 DIAGNOSIS — I42.1 HOCM (HYPERTROPHIC OBSTRUCTIVE CARDIOMYOPATHY) (HCC): ICD-10-CM

## 2025-03-11 DIAGNOSIS — R06.02 SHORTNESS OF BREATH: ICD-10-CM

## 2025-03-11 DIAGNOSIS — I10 ESSENTIAL HYPERTENSION: ICD-10-CM

## 2025-03-11 DIAGNOSIS — I42.2 HYPERTROPHIC CARDIOMYOPATHY (HCC): ICD-10-CM

## 2025-03-11 DIAGNOSIS — E78.5 DYSLIPIDEMIA: ICD-10-CM

## 2025-03-11 DIAGNOSIS — I25.10 CORONARY ARTERY DISEASE INVOLVING NATIVE CORONARY ARTERY OF NATIVE HEART WITHOUT ANGINA PECTORIS: ICD-10-CM

## 2025-03-11 DIAGNOSIS — I35.0 SEVERE AORTIC STENOSIS: ICD-10-CM

## 2025-03-11 PROCEDURE — 99215 OFFICE O/P EST HI 40 MIN: CPT | Performed by: INTERNAL MEDICINE

## 2025-03-12 ENCOUNTER — HOSPITAL ENCOUNTER (OUTPATIENT)
Dept: RADIOLOGY | Facility: HOSPITAL | Age: 80
Discharge: HOME | End: 2025-03-12
Attending: RADIOLOGY
Payer: COMMERCIAL

## 2025-03-12 ENCOUNTER — TELEPHONE (OUTPATIENT)
Dept: RADIOLOGY | Facility: HOSPITAL | Age: 80
End: 2025-03-12

## 2025-03-12 ENCOUNTER — HOSPITAL ENCOUNTER (OUTPATIENT)
Dept: RADIOLOGY | Facility: HOSPITAL | Age: 80
Discharge: HOME | End: 2025-03-12
Attending: INTERNAL MEDICINE
Payer: COMMERCIAL

## 2025-03-12 ENCOUNTER — TELEPHONE (OUTPATIENT)
Dept: HEMATOLOGY ONCOLOGY | Facility: CLINIC | Age: 80
End: 2025-03-12

## 2025-03-12 DIAGNOSIS — M89.9 LESION OF BONE OF THORACIC SPINE: ICD-10-CM

## 2025-03-12 LAB
ERYTHROCYTE [DISTWIDTH] IN BLOOD BY AUTOMATED COUNT: 15.1 % (ref 11.6–15.1)
HCT VFR BLD AUTO: 37.6 % (ref 34.8–46.1)
HGB BLD-MCNC: 12.2 G/DL (ref 11.5–15.4)
INR PPP: 1.07 (ref 0.85–1.19)
MCH RBC QN AUTO: 29.4 PG (ref 26.8–34.3)
MCHC RBC AUTO-ENTMCNC: 32.4 G/DL (ref 31.4–37.4)
MCV RBC AUTO: 91 FL (ref 82–98)
PLATELET # BLD AUTO: 115 THOUSANDS/UL (ref 149–390)
PMV BLD AUTO: 9.4 FL (ref 8.9–12.7)
PROTHROMBIN TIME: 14.2 SECONDS (ref 12.3–15)
RBC # BLD AUTO: 4.15 MILLION/UL (ref 3.81–5.12)
WBC # BLD AUTO: 3.42 THOUSAND/UL (ref 4.31–10.16)

## 2025-03-12 PROCEDURE — 85007 BL SMEAR W/DIFF WBC COUNT: CPT | Performed by: RADIOLOGY

## 2025-03-12 PROCEDURE — 85027 COMPLETE CBC AUTOMATED: CPT | Performed by: RADIOLOGY

## 2025-03-12 PROCEDURE — 85610 PROTHROMBIN TIME: CPT | Performed by: RADIOLOGY

## 2025-03-12 RX ORDER — SODIUM CHLORIDE 9 MG/ML
75 INJECTION, SOLUTION INTRAVENOUS CONTINUOUS
Status: CANCELLED | OUTPATIENT
Start: 2025-03-12

## 2025-03-12 RX ORDER — SODIUM CHLORIDE 9 MG/ML
75 INJECTION, SOLUTION INTRAVENOUS CONTINUOUS
Status: DISCONTINUED | OUTPATIENT
Start: 2025-03-12 | End: 2025-03-13 | Stop reason: HOSPADM

## 2025-03-12 RX ADMIN — SODIUM CHLORIDE 75 ML/HR: 0.9 INJECTION, SOLUTION INTRAVENOUS at 09:00

## 2025-03-12 NOTE — PRE-PROCEDURE INSTRUCTIONS
Pre-procedure Instructions for Interventional Radiology  [address] [address]  INTERVENTIONAL RADIOLOGY [phone]    You are scheduled for a/an Thoracic Spine Mass and Bone marrow Biopsy.    On Wednesday 3/19/25.    Your tentative arrival time is 12:30 PM.  Short stay will notify you the day before your procedure with the exact arrival time and the location to arrive.    To prepare for your procedure:  Please arrange for someone to drive you home after the procedure and stay with you until the next morning if you are instructed to do so.  This is typically for patients receiving some type of sedative or anesthetic for the procedure.  DO NOT EAT OR DRINK ANYTHING after midnight on the evening before your procedure including candy & gum.  ONLY SIPS OF WATER with your medications are allowed on the morning of your procedure.  TAKE ALL OF YOUR REGULAR MEDICATIONS THE MORNING OF YOUR PROCEDURE with sips of water!  We may call you to stop some of your blood sugar, blood pressure and blood thinning medications depending on the procedure.  Please take all of these medications unless we instruct you to stop them.  If you have an allergy to x-ray dye, please contact Interventional Radiology for an x-ray dye preparation which usually consists of an oral steroid and Benadryl.  If you wear a Glucose Monitor, you may be asked to remove it for your procedure if we are using x-ray.  These devices need to be removed when we are imaging with x-ray near the device since the radiation can cause the unit to malfunction.  If possible and not too inconvenient, you may want to schedule your exam closer to day 14 of your 14 day device so your device is not wasted.    The day of your procedure:  Bring a list of the medications you take at home.  Bring medications you take for breathing problems (such as inhalers), medications for chest pain, or both.  Bring a case for your glasses or  contacts.  Bring your insurance card and a form of photo ID.  Please leave all valuables such as credit cards and jewelry at home.  Report to the admitting office to the left of the registration desk in the main lobby at the [facility], Entrance B.  You will then be directed to the Short Stay Center.  While your procedure is being performed, your family may wait in the Radiology Waiting Room on the 1st floor in Radiology.  if they need to leave, they may provide a number to be called following the procedure.   Be prepared to stay overnight just in case. Sometimes procedures will indicate the need for further observation or treatment.   If you are scheduled for a follow-up visit with the Interventional Radiologist after your procedure, you will be called with a date and time.    Special Instructions (Medications to stop taking before your procedure etc.):  ASA last dose 3/13/25 and restart 3/20/25. Lasix,Metformin,and Losartan hold AM 3/19/25.

## 2025-03-12 NOTE — DISCHARGE INSTR - LAB
Bone Marrow Biopsy     WHAT YOU NEED TO KNOW:   A bone marrow biopsy is a procedure to remove a small amount of bone marrow from your bone. Bone marrow is the soft tissue inside your bone that helps to make blood cells. The sample is tested for disease or infection.    DISCHARGE INSTRUCTIONS:     1. Limit your activities day of biopsy as directed by your doctor.    2. Use medication as ordered.    3. Return to your normal diet.Small sips of flat soda will help with nausea.    4. Remove band-aid or dressing 24 hours after procedure.    Contact Interventional Radiology at [phone] ([facility] PATIENTS: Contact Interventional Radiology at [phone]) ([facility] PATIENTS: Contact Interventional Radiology at [phone]) if:    1. Difficulty breathing, nausea or vomiting.    2. Chills or fever above 101 F.    3. Pain at biopsy site not relieved by medication.    4. Develop any redness, swelling, heat, unusual drainage, heavy bruising or bleeding from biopsy site.        Moderate Sedation   WHAT YOU NEED TO KNOW:   Moderate sedation, or conscious sedation, is medicine used during procedures to help you feel relaxed and calm. You will be awake and able to follow directions without anxiety or pain. You will remember little to none of the procedure. You may feel tired, weak, or unsteady on your feet after you get sedation. You may also have trouble concentrating or short-term memory loss. These symptoms should go away in 24 hours or less.   DISCHARGE INSTRUCTIONS:   Call 911 or have someone else call for any of the following:   You have sudden trouble breathing.     You cannot be woken.  Seek care immediately if:   You have a severe headache or dizziness.     Your heart is beating faster than usual.  Contact your healthcare provider if:   You have a fever.     You have nausea or are vomiting for more than 8 hours after the procedure.      Your skin is itchy, swollen, or you have a rash.     You have questions or concerns  about your condition or care.  Self-care:   Have someone stay with you for 24 hours. This person can drive you to errands and help you do things around the house. This person can also watch for problems.      Rest and do quiet activities for 24 hours. Do not exercise, ride a bike, or play sports. Stand up slowly to prevent dizziness and falls. Take short walks around the house with another person. Slowly return to your usual activities the next day.      Do not drive or use dangerous machines or tools for 24 hours. You may injure yourself or others. Examples include a lawnmower, saw, or drill. Do not return to work for 24 hours if you use dangerous machines or tools for work.      Do not make important decisions for 24 hours. For example, do not sign important papers or invest money.      Drink liquids as directed. Liquids help flush the sedation medicine out of your body. Ask how much liquid to drink each day and which liquids are best for you.      Eat small, frequent meals to prevent nausea and vomiting. Start with clear liquids such as juice or broth. If you do not vomit after clear liquids, you can eat your usual foods.      Do not drink alcohol or take medicines that make you drowsy. This includes medicines that help you sleep and anxiety medicines. Ask your healthcare provider if it is safe for you to take pain medicine.  Follow up with your healthcare provider as directed: Write down your questions so you remember to ask them during your visits.   © 2017 [employer] Information is for End User's use only and may not be sold, redistributed or otherwise used for commercial purposes. All illustrations and images included in CareNotes® are the copyrighted property of [employer] or [employer].  The above information is an  only. It is not intended as medical advice for individual conditions or treatments. Talk to your doctor, nurse or pharmacist before following any  medical regimen to see if it is safe and effective for you.

## 2025-03-12 NOTE — TELEPHONE ENCOUNTER
Call received by Nadeem.     Patient called to make  aware BMBX that was scheduled today was canceled and rescheduled to 3/19/25. Per patient the appointment was canceled due anesthesia.     Patient called to reschedule follow up as follow up is scheduled for 3/21 and BMBX results wont be in. Attempted to reschedule patient but next available isn't till May.      Please call patient to reschedule.       Thanks!

## 2025-03-17 LAB
BASOPHILS # BLD MANUAL: 0.03 THOUSAND/UL (ref 0–0.1)
BASOPHILS NFR MAR MANUAL: 1 % (ref 0–1)
EOSINOPHIL # BLD MANUAL: 0 THOUSAND/UL (ref 0–0.4)
EOSINOPHIL NFR BLD MANUAL: 0 % (ref 0–6)
LYMPHOCYTES # BLD AUTO: 1.44 THOUSAND/UL (ref 0.6–4.47)
LYMPHOCYTES # BLD AUTO: 32 % (ref 14–44)
MONOCYTES # BLD AUTO: 0.14 THOUSAND/UL (ref 0–1.22)
MONOCYTES NFR BLD: 4 % (ref 4–12)
NEUTROPHILS # BLD MANUAL: 1.81 THOUSAND/UL (ref 1.85–7.62)
NEUTS BAND NFR BLD MANUAL: 7 % (ref 0–8)
NEUTS SEG NFR BLD AUTO: 46 % (ref 43–75)
OVALOCYTES BLD QL SMEAR: PRESENT
PATHOLOGY REVIEW: NO
PLATELET BLD QL SMEAR: ABNORMAL
POIKILOCYTOSIS BLD QL SMEAR: PRESENT
POLYCHROMASIA BLD QL SMEAR: PRESENT
RBC MORPH BLD: PRESENT
VARIANT LYMPHS # BLD AUTO: 10 %

## 2025-03-19 ENCOUNTER — ANESTHESIA EVENT (OUTPATIENT)
Dept: RADIOLOGY | Facility: HOSPITAL | Age: 80
End: 2025-03-19
Payer: COMMERCIAL

## 2025-03-19 ENCOUNTER — ANESTHESIA (OUTPATIENT)
Dept: RADIOLOGY | Facility: HOSPITAL | Age: 80
End: 2025-03-19
Payer: COMMERCIAL

## 2025-03-19 ENCOUNTER — HOSPITAL ENCOUNTER (OUTPATIENT)
Dept: RADIOLOGY | Facility: HOSPITAL | Age: 80
Discharge: HOME | End: 2025-03-19
Attending: RADIOLOGY
Payer: COMMERCIAL

## 2025-03-19 ENCOUNTER — HOSPITAL ENCOUNTER (OUTPATIENT)
Dept: RADIOLOGY | Facility: HOSPITAL | Age: 80
Discharge: HOME | End: 2025-03-19
Attending: INTERNAL MEDICINE
Payer: COMMERCIAL

## 2025-03-19 VITALS
TEMPERATURE: 97.8 F | SYSTOLIC BLOOD PRESSURE: 133 MMHG | HEIGHT: 62 IN | HEART RATE: 52 BPM | BODY MASS INDEX: 35.15 KG/M2 | OXYGEN SATURATION: 92 % | WEIGHT: 191 LBS | DIASTOLIC BLOOD PRESSURE: 54 MMHG | RESPIRATION RATE: 18 BRPM

## 2025-03-19 DIAGNOSIS — D72.819 LEUKOPENIA, UNSPECIFIED TYPE: ICD-10-CM

## 2025-03-19 DIAGNOSIS — M89.9 LESION OF BONE OF THORACIC SPINE: ICD-10-CM

## 2025-03-19 DIAGNOSIS — D69.6 THROMBOCYTOPENIA (HCC): ICD-10-CM

## 2025-03-19 DIAGNOSIS — R16.2 HEPATOSPLENOMEGALY: ICD-10-CM

## 2025-03-19 LAB
GLUCOSE SERPL-MCNC: 110 MG/DL (ref 65–140)
GLUCOSE SERPL-MCNC: 116 MG/DL (ref 65–140)

## 2025-03-19 PROCEDURE — 88341 IMHCHEM/IMCYTCHM EA ADD ANTB: CPT | Performed by: PATHOLOGY

## 2025-03-19 PROCEDURE — 20225 BONE BIOPSY TROCAR/NDL DEEP: CPT

## 2025-03-19 PROCEDURE — 88264 CHROMOSOME ANALYSIS 20-25: CPT | Performed by: INTERNAL MEDICINE

## 2025-03-19 PROCEDURE — 38221 DX BONE MARROW BIOPSIES: CPT

## 2025-03-19 PROCEDURE — NC001 PR NO CHARGE: Performed by: RADIOLOGY

## 2025-03-19 PROCEDURE — 88342 IMHCHEM/IMCYTCHM 1ST ANTB: CPT | Performed by: PATHOLOGY

## 2025-03-19 PROCEDURE — 77003 FLUOROGUIDE FOR SPINE INJECT: CPT

## 2025-03-19 PROCEDURE — 88184 FLOWCYTOMETRY/ TC 1 MARKER: CPT | Performed by: INTERNAL MEDICINE

## 2025-03-19 PROCEDURE — 88237 TISSUE CULTURE BONE MARROW: CPT | Performed by: INTERNAL MEDICINE

## 2025-03-19 PROCEDURE — 88305 TISSUE EXAM BY PATHOLOGIST: CPT | Performed by: PATHOLOGY

## 2025-03-19 PROCEDURE — C1830 POWER BONE MARROW BX NEEDLE: HCPCS

## 2025-03-19 PROCEDURE — 82948 REAGENT STRIP/BLOOD GLUCOSE: CPT

## 2025-03-19 PROCEDURE — 88185 FLOWCYTOMETRY/TC ADD-ON: CPT | Performed by: INTERNAL MEDICINE

## 2025-03-19 PROCEDURE — 77002 NEEDLE LOCALIZATION BY XRAY: CPT

## 2025-03-19 RX ORDER — FENTANYL CITRATE 50 UG/ML
INJECTION, SOLUTION INTRAMUSCULAR; INTRAVENOUS AS NEEDED
Status: DISCONTINUED | OUTPATIENT
Start: 2025-03-19 | End: 2025-03-19

## 2025-03-19 RX ORDER — SODIUM CHLORIDE 9 MG/ML
50 INJECTION, SOLUTION INTRAVENOUS CONTINUOUS
Status: DISCONTINUED | OUTPATIENT
Start: 2025-03-19 | End: 2025-03-20 | Stop reason: HOSPADM

## 2025-03-19 RX ORDER — ALBUTEROL SULFATE 90 UG/1
INHALANT RESPIRATORY (INHALATION) AS NEEDED
Status: DISCONTINUED | OUTPATIENT
Start: 2025-03-19 | End: 2025-03-19

## 2025-03-19 RX ORDER — LIDOCAINE WITH 8.4% SOD BICARB 0.9%(10ML)
SYRINGE (ML) INJECTION AS NEEDED
Status: DISCONTINUED | OUTPATIENT
Start: 2025-03-19 | End: 2025-03-20 | Stop reason: HOSPADM

## 2025-03-19 RX ORDER — HYDROMORPHONE HCL IN WATER/PF 6 MG/30 ML
0.2 PATIENT CONTROLLED ANALGESIA SYRINGE INTRAVENOUS
Status: DISCONTINUED | OUTPATIENT
Start: 2025-03-19 | End: 2025-03-20 | Stop reason: HOSPADM

## 2025-03-19 RX ORDER — PROPOFOL 10 MG/ML
INJECTION, EMULSION INTRAVENOUS AS NEEDED
Status: DISCONTINUED | OUTPATIENT
Start: 2025-03-19 | End: 2025-03-19

## 2025-03-19 RX ORDER — LIDOCAINE HYDROCHLORIDE 10 MG/ML
INJECTION, SOLUTION EPIDURAL; INFILTRATION; INTRACAUDAL; PERINEURAL AS NEEDED
Status: DISCONTINUED | OUTPATIENT
Start: 2025-03-19 | End: 2025-03-19

## 2025-03-19 RX ORDER — ONDANSETRON 2 MG/ML
INJECTION INTRAMUSCULAR; INTRAVENOUS AS NEEDED
Status: DISCONTINUED | OUTPATIENT
Start: 2025-03-19 | End: 2025-03-19

## 2025-03-19 RX ORDER — SODIUM CHLORIDE 9 MG/ML
INJECTION, SOLUTION INTRAVENOUS CONTINUOUS PRN
Status: DISCONTINUED | OUTPATIENT
Start: 2025-03-19 | End: 2025-03-19

## 2025-03-19 RX ORDER — FENTANYL CITRATE/PF 50 MCG/ML
25 SYRINGE (ML) INJECTION
Status: DISCONTINUED | OUTPATIENT
Start: 2025-03-19 | End: 2025-03-20 | Stop reason: HOSPADM

## 2025-03-19 RX ORDER — ROCURONIUM BROMIDE 10 MG/ML
INJECTION, SOLUTION INTRAVENOUS AS NEEDED
Status: DISCONTINUED | OUTPATIENT
Start: 2025-03-19 | End: 2025-03-19

## 2025-03-19 RX ADMIN — ONDANSETRON 4 MG: 2 INJECTION INTRAMUSCULAR; INTRAVENOUS at 15:31

## 2025-03-19 RX ADMIN — Medication 10 ML: at 15:17

## 2025-03-19 RX ADMIN — Medication 10 ML: at 15:02

## 2025-03-19 RX ADMIN — NICARDIPINE HYDROCHLORIDE 200 MCG: 2.5 INJECTION, SOLUTION INTRAVENOUS at 15:35

## 2025-03-19 RX ADMIN — PHENYLEPHRINE HYDROCHLORIDE 50 MCG/MIN: 10 INJECTION INTRAVENOUS at 14:48

## 2025-03-19 RX ADMIN — ROCURONIUM BROMIDE 40 MG: 10 INJECTION, SOLUTION INTRAVENOUS at 14:43

## 2025-03-19 RX ADMIN — PROPOFOL 100 MG: 10 INJECTION, EMULSION INTRAVENOUS at 14:41

## 2025-03-19 RX ADMIN — SUGAMMADEX 200 MG: 100 INJECTION, SOLUTION INTRAVENOUS at 15:37

## 2025-03-19 RX ADMIN — NICARDIPINE HYDROCHLORIDE 200 MCG: 2.5 INJECTION, SOLUTION INTRAVENOUS at 15:41

## 2025-03-19 RX ADMIN — ROCURONIUM BROMIDE 10 MG: 10 INJECTION, SOLUTION INTRAVENOUS at 15:00

## 2025-03-19 RX ADMIN — FENTANYL CITRATE 50 MCG: 50 INJECTION INTRAMUSCULAR; INTRAVENOUS at 14:33

## 2025-03-19 RX ADMIN — ALBUTEROL SULFATE 4 PUFF: 90 AEROSOL, METERED RESPIRATORY (INHALATION) at 14:46

## 2025-03-19 RX ADMIN — PROPOFOL 50 MG: 10 INJECTION, EMULSION INTRAVENOUS at 14:43

## 2025-03-19 RX ADMIN — FENTANYL CITRATE 50 MCG: 50 INJECTION INTRAMUSCULAR; INTRAVENOUS at 15:01

## 2025-03-19 RX ADMIN — LIDOCAINE HYDROCHLORIDE 50 MG: 10 INJECTION, SOLUTION EPIDURAL; INFILTRATION; INTRACAUDAL; PERINEURAL at 14:41

## 2025-03-19 RX ADMIN — NICARDIPINE HYDROCHLORIDE 200 MCG: 2.5 INJECTION, SOLUTION INTRAVENOUS at 15:38

## 2025-03-19 RX ADMIN — SODIUM CHLORIDE: 0.9 INJECTION, SOLUTION INTRAVENOUS at 14:32

## 2025-03-19 NOTE — ANESTHESIA PREPROCEDURE EVALUATION
Procedure:  IR BIOPSY BONE MARROW    Suspected malignancy    Cr 0.90      Left Ventricle: Left ventricular cavity size is normal. Wall thickness is increased. 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 The left ventricular ejection fraction is 70% by visual estimation. Systolic function is vigorous. Global longitudinal strain is normal at -19%.  Reduced strain noted in the basal anteroseptal and septal walls. Diastolic function is mildly abnormal, consistent with grade I (abnormal) relaxation.  Left atrial filling pressure is elevated. There is mild outflow tract dynamic obstruction at rest.    Right Ventricle: Right ventricular cavity size is normal. Systolic function is normal.    Left Atrium: The atrium is moderately dilated (42-48 mL/m2).    Aortic Valve: The aortic valve is trileaflet. The leaflets are moderately thickened. The leaflets are moderately calcified. There is severely reduced mobility. There is mild regurgitation. There is moderate to severe stenosis. The aortic valve peak velocity is 4.0 m/s. The aortic valve mean gradient is 40.0 mmHg. The dimensionless velocity index is 0.40. The aortic valve area is 1.34 cm2. The aortic valve velocity is increased in the setting of stenosis and increased flow.    Mitral Valve: There is mild annular calcification. There is mild regurgitation.    Tricuspid Valve: There is mild regurgitation. The right ventricular systolic pressure is mildly elevated. The estimated right ventricular systolic pressure is 44.00 mmHg.    Aorta: The aortic root is normal in size. The ascending aorta is mildly dilated. The ascending aorta is 4 cm.    Pericardium: There is a trivial pericardial effusion circumferential to the heart.    Prior TTE study available for comparison. Prior study date: 11/21/2024. Changes noted when compared to prior study. Changes include: Slight increase in aortic valve gradients noted..     Strain was performed to quantify interventricular  dyssynchrony and evaluate components of myocardial function due to HCM. Results from the utilization of Strain Analysis are listed in the report below.    Relevant Problems   CARDIO   (+) CAD (coronary artery disease)   (+) Essential hypertension   (+) Nonrheumatic aortic valve stenosis      ENDO   (+) Postoperative hypothyroidism   (+) Type 2 diabetes mellitus with stage 3a chronic kidney disease, without long-term current use of insulin (HCC)      GI/HEPATIC   (+) Fatty liver      /RENAL   (+) Stage 3a chronic kidney disease (HCC)      HEMATOLOGY   (+) Thrombocytopenia (HCC)      PULMONARY   (+) Mild persistent asthma without complication   (+) FOUZIA (obstructive sleep apnea)   (+) Sleep apnea      Cardiovascular/Peripheral Vascular   (+) Acute on chronic diastolic (congestive) heart failure (HCC)   (+) Hypertrophic cardiomyopathy (HCC)      Respiratory/Allergy   (+) Restrictive lung disease      Other   (+) BMI 36.0-36.9,adult      PFT 2020  Interpretation:     Normal Spirometry     Mild restrictive lung disease as indicated by decreased TLC     Mildly reduced diffusion capacity    Physical Exam    Airway    Mallampati score: II  TM Distance: >3 FB  Neck ROM: full     Dental       Cardiovascular      Pulmonary      Other Findings  post-pubertal.      Anesthesia Plan  ASA Score- 4     Anesthesia Type- general with ASA Monitors.         Additional Monitors: arterial line.    Airway Plan: ETT.    Comment: Recent labs personally reviewed:  Lab Results       Component                Value               Date                       WBC                      3.42 (L)            03/12/2025                 HGB                      12.2                03/12/2025                 PLT                      115 (L)             03/12/2025            Lab Results       Component                Value               Date                       K                        4.2                 02/22/2025                 BUN                       21                  02/22/2025                 CREATININE               0.90                02/22/2025            Lab Results       Component                Value               Date                       PTT                      27                  05/11/2020             Lab Results       Component                Value               Date                       INR                      1.07                03/12/2025              Blood type       I, Mary Jane Tang MD, have personally seen and evaluated the patient prior to anesthetic care.  I have reviewed the pre-anesthetic record, medical history, allergies, medications and any other medical records if appropriate to the anesthetic care.  If a CRNA is involved in the case, I have reviewed the CRNA assessment, if present, and agree. I consented the patient for general anesthesia with appropriate airway support as indicated. We reviewed the risks associated including PONV, sore throat, allergic reaction to anesthetics and management plan to address these issues. We discussed the indication and risks associated with any invasive monitors that would be placed. We discussed post op pain control and expectations. We discussed rare complications including hypoxia, perioperative cardiac and neurologic events, and death based on the patient's baseline risk. All questions and concerns were addressed.     .       Plan Factors-Exercise tolerance (METS): <4 METS.    Chart reviewed. EKG reviewed. Imaging results reviewed. Existing labs reviewed. Patient summary reviewed.    Patient is not a current smoker.  Patient did not smoke on day of surgery.    Obstructive sleep apnea risk education given perioperatively.        Induction- intravenous.    Postoperative Plan-         Informed Consent- Anesthetic plan and risks discussed with patient.        NPO Status:  No vitals data found for the desired time range.

## 2025-03-19 NOTE — BRIEF OP NOTE (RAD/CATH)
INTERVENTIONAL RADIOLOGY PROCEDURE NOTE    Date: 3/19/2025    Procedure:   Procedure Summary       Date: 03/19/25 Room / Location: [facility] Interventional Radiology    Anesthesia Start: 1424 Anesthesia Stop:     Procedure: IR BIOPSY BONE MARROW Diagnosis:       Lesion of bone of thoracic spine      Leukopenia, unspecified type      Hepatosplenomegaly      Thrombocytopenia (HCC)      (Hepatosplenomegaly, leukopenia, thrombocytopenia, lytic bone lesions thoracic spine)    Scheduled Providers: Will Muniz MD; Mary Jane Tang MD Responsible Provider: Mary Jane Tang MD    Anesthesia Type: general ASA Status: 4            Preoperative diagnosis:   1. Lesion of bone of thoracic spine    2. Leukopenia, unspecified type    3. Hepatosplenomegaly    4. Thrombocytopenia (HCC)         Postoperative diagnosis: Same.    Surgeon: Will Muniz MD     Assistant: None. No qualified resident was available.    Blood loss: Minimal    Specimens: Right iliac bone marrow. T9 vertebral biopsy.     Findings: Fluoro guided right bone marrow aspiration and core biopsy and T9 right transpedicular core biopsy.    Complications: None immediate.    Anesthesia: local and general

## 2025-03-19 NOTE — INTERVAL H&P NOTE
"Update: (This section must be completed if the H&P was completed greater than 24 hrs to procedure or admission)    H&P reviewed. After examining the patient, I find no changed to the H&P since it had been written.    BP (!) 185/81 Comment: right arm  Pulse 61   Temp (!) 96.8 °F (36 °C) (Temporal)   Resp 18   Ht 5' 2\" (1.575 m)   Wt 86.6 kg (191 lb)   SpO2 95%   BMI 34.93 kg/m²     Patient re-evaluated. Accept as history and physical.    Will Muniz MD/March 19, 2025/3:40 PM  "

## 2025-03-19 NOTE — ANESTHESIA POSTPROCEDURE EVALUATION
Post-Op Assessment Note    CV Status:  Stable    Pain management: adequate       Mental Status:  Alert and awake   Hydration Status:  Euvolemic   PONV Controlled:  Controlled   Airway Patency:  Patent     Post Op Vitals Reviewed: Yes    No anethesia notable event occurred.    Staff: Anesthesiologist, CRNA           Last Filed PACU Vitals:  Vitals Value Taken Time   Temp 98.3    Pulse 61 03/19/25 1604   /69    Resp 16 03/19/25 1604   SpO2 96 % 03/19/25 1604   Vitals shown include unfiled device data.

## 2025-03-19 NOTE — DISCHARGE INSTRUCTIONS
Bone Marrow Biopsy     WHAT YOU NEED TO KNOW:   A bone marrow biopsy is a procedure to remove a small amount of bone marrow from your bone. Bone marrow is the soft tissue inside your bone that helps to make blood cells. The sample is tested for disease or infection.    DISCHARGE INSTRUCTIONS:     1. Limit your activities day of biopsy as directed by your doctor.    2. Use medication as ordered.    3. Return to your normal diet.Small sips of flat soda will help with nausea.    4. Remove band-aid or dressing 24 hours after procedure.    Contact Interventional Radiology at [phone] ([place] PATIENTS: Contact Interventional Radiology at [phone]) ([place] PATIENTS: Contact Interventional Radiology at [phone]) if:    1. Difficulty breathing, nausea or vomiting.    2. Chills or fever above 101 F.    3. Pain at biopsy site not relieved by medication.    4. Develop any redness, swelling, heat, unusual drainage, heavy bruising or bleeding from biopsy site.      POST BIOPSY    Care after your procedure:    1. Limit your activities for 24 hours after your biopsy.    2. No driving day of biopsy.    3. Return to your normal diet.Small sips of flat soda will help with mild nausea.    4. Remove band-aid or dressing 24 hours after procedure.     Contact Interventional Radiology at [phone] ([place] PATIENTS: Contact Interventional Radiology at [phone]) ([place] PATIENTS: Contact Interventional Radiology at [phone]) if:    1. Difficulty breathing, nausea or vomiting.    2. Chills or fever above 101 degrees F.     3. Pain at biopsy site not relieved by medication.     4. Develop any redness, swelling, heat, unusual drainage, heavy bruising or bleeding from biopsy site.

## 2025-03-19 NOTE — ANESTHESIA PROCEDURE NOTES
Arterial Line Insertion    Performed by: Robert James Weiland, CRNA  Authorized by: Mary Jane Tang MD  Preparation: Patient was prepped and draped in the usual sterile fashion.  Indications: hemodynamic monitoring  Orientation:  Left  Location: radial artery  Procedure Details:      Needle gauge: 20  Placement technique:  Ultrasound guided  Number of attempts: 1    Post-procedure:  Post-procedure: dressing applied  Waveform: good waveform  Post-procedure CNS: normal  Patient tolerance: Patient tolerated the procedure well with no immediate complications and patient tolerated the procedure well with no immediate complications

## 2025-03-19 NOTE — SEDATION DOCUMENTATION
IR right iliac crest bone marrow and T9 bone biopsies performed by Dr. Muniz. Anesthesia present throughout case. Patient tolerated procedure well. Report given to PACU RN. Bedrest start time 5712.

## 2025-03-20 NOTE — ANESTHESIA POSTPROCEDURE EVALUATION
Post-Op Assessment Note    CV Status:  Stable    Pain management: adequate       Mental Status:  Alert and awake   Hydration Status:  Euvolemic   PONV Controlled:  Controlled   Airway Patency:  Patent  Two or more mitigation strategies used for obstructive sleep apnea   Post Op Vitals Reviewed: Yes    No anethesia notable event occurred.    Staff: Anesthesiologist         Hypertensive in OR, improved post op    Last Filed PACU Vitals:  Vitals Value Taken Time   Temp 97 °F (36.1 °C) 03/19/25 1700   Pulse 49 03/19/25 1716   /65 03/19/25 1700   Resp 33 03/19/25 1715   SpO2 95 % 03/19/25 1716   Vitals shown include unfiled device data.    Modified Lydia:     Vitals Value Taken Time   Activity 2 03/19/25 1700   Respiration 2 03/19/25 1700   Circulation 2 03/19/25 1700   Consciousness 2 03/19/25 1700   Oxygen Saturation 2 03/19/25 1700     Modified Lydia Score: 10

## 2025-03-24 LAB
MISCELLANEOUS LAB TEST RESULT: NORMAL
MISCELLANEOUS LAB TEST RESULT: NORMAL
SCAN RESULT: NORMAL
SCAN RESULT: NORMAL

## 2025-03-25 PROCEDURE — 88342 IMHCHEM/IMCYTCHM 1ST ANTB: CPT | Performed by: PATHOLOGY

## 2025-03-25 PROCEDURE — 88305 TISSUE EXAM BY PATHOLOGIST: CPT | Performed by: PATHOLOGY

## 2025-03-25 PROCEDURE — 88341 IMHCHEM/IMCYTCHM EA ADD ANTB: CPT | Performed by: PATHOLOGY

## 2025-03-26 ENCOUNTER — TELEPHONE (OUTPATIENT)
Dept: HEMATOLOGY ONCOLOGY | Facility: CLINIC | Age: 80
End: 2025-03-26

## 2025-03-26 DIAGNOSIS — C7B.8 NEUROENDOCRINE CARCINOMA METASTATIC TO BONE (HCC): Primary | ICD-10-CM

## 2025-03-26 DIAGNOSIS — R91.8 LUNG MASS: ICD-10-CM

## 2025-03-26 DIAGNOSIS — C7A.8 NEUROENDOCRINE CARCINOMA METASTATIC TO BONE (HCC): Primary | ICD-10-CM

## 2025-03-26 DIAGNOSIS — R16.2 HEPATOSPLENOMEGALY: ICD-10-CM

## 2025-03-26 DIAGNOSIS — K86.89 PANCREATIC MASS: ICD-10-CM

## 2025-03-26 DIAGNOSIS — M89.9 LESION OF BONE OF THORACIC SPINE: ICD-10-CM

## 2025-03-26 NOTE — TELEPHONE ENCOUNTER
Scheduled PET CT for 4/8/25 at 7:30am. Left message for patient with location, date, time, instructions and call back number. Also let patient know that I added her to the wait list and they will call if anything sooner opens up.      HPI- Patient is a 58 y.o male with PMHx of HTN, HLD, DM who presents to ED c/o Lt upper dental pain intermittently x 3 weeks. Pt states that approx 1 month ago he had root canal with cap placed to Lt upper tooth # 14. Pt states he has been having intermittent Lt tooth sensitivities for past few weeks, today pain was more severe. Pt states he has been using orajel and used almost full bottle of peroxide doing mouth washes. Pt noting Lt side facial swelling and now lip swelling. Pt denies fevers, chills, discharge, slurred speech, n/v/d, trauma, rashes, throat itching/swelling, sob, dysphagia or any other complaints.  Not on ace inhibitor

## 2025-03-26 NOTE — TELEPHONE ENCOUNTER
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  We need this information to determine next course of action i.e. treatment    Pathologist suggested that patient needs a soft tissue biopsy.  There was no actionable biopsy specimen besides the bone on PET/CT in December as well as subsequent MRIs of the spine.    Patient needs a repeat PET/CT.  Please schedule for first available, should be placed on cancellation list as well.    Message left for patient reviewing the above.

## 2025-03-27 LAB — SCAN RESULT: NORMAL

## 2025-03-31 LAB
MISCELLANEOUS LAB TEST RESULT: NORMAL
SCAN RESULT: NORMAL

## 2025-04-04 ENCOUNTER — HOSPITAL ENCOUNTER (OUTPATIENT)
Dept: RADIOLOGY | Age: 80
Discharge: HOME | End: 2025-04-04
Payer: COMMERCIAL

## 2025-04-04 DIAGNOSIS — M89.9 LESION OF BONE OF THORACIC SPINE: ICD-10-CM

## 2025-04-04 DIAGNOSIS — K86.89 PANCREATIC MASS: ICD-10-CM

## 2025-04-04 DIAGNOSIS — C7B.8 NEUROENDOCRINE CARCINOMA METASTATIC TO BONE (HCC): ICD-10-CM

## 2025-04-04 DIAGNOSIS — R91.8 LUNG MASS: ICD-10-CM

## 2025-04-04 DIAGNOSIS — C7A.8 NEUROENDOCRINE CARCINOMA METASTATIC TO BONE (HCC): ICD-10-CM

## 2025-04-04 DIAGNOSIS — R16.2 HEPATOSPLENOMEGALY: ICD-10-CM

## 2025-04-04 PROCEDURE — 78815 PET IMAGE W/CT SKULL-THIGH: CPT

## 2025-04-04 PROCEDURE — A9587 GALLIUM GA-68: HCPCS

## 2025-04-04 NOTE — LETTER
[facility]  [address] [place] [address]      April 11, 2025    MRN: [id number]     Phone: [phone]     Dear Ms. Ashton,    You recently had a(n) Nuclear Medicine performed on 4/4/2025 at  [facility] that was requested by Cinthia Gordon PA-C. The study was reviewed by a radiologist, which is a physician who specializes in medical imaging. The radiologist issued a report describing his or her findings. In that report there was a finding that the radiologist felt warranted further discussion with your health care provider and that discussion would be beneficial to you.      The results were sent to Cinthia Gordon PA-C on 04/07/2025  9:28 AM. We recommend that you contact Cinthia Gordon PA-C at [phone] or set up an appointment to discuss the results of the imaging test. If you have already heard from Cinthia Gordon PA-C regarding the results of your study, you can disregard this letter.     This letter is not meant to alarm you, but intended to encourage you to follow-up on your results with the provider that sent you for the imaging study. In addition, we have enclosed answers to frequently asked questions by other patients who have also received a letter to review results with their health care provider (see page two).      Thank you for choosing [facility] for your medical imaging needs.                                                                                                                                                        FREQUENTLY ASKED QUESTIONS    Why am I receiving this letter?  [place] State Law requires us to notify patients who have findings on imaging exams that may require more testing or follow-up with a health professional within the next 3 months.        How serious is the finding on the imaging test?  This letter is sent to all patients who may need  follow-up or more testing within the next 3 months.  Receiving this letter does not necessarily mean you have a life-threatening imaging finding or disease.  Recommendations in the radiologist’s imaging report are general in nature and it is up to your healthcare provider to say whether those recommendations make sense for your situation.  You are strongly encouraged to talk to your health care provider about the results and ask whether additional steps need to be taken.    Where can I get a copy of the final report for my recent radiology exam?  To get a full copy of the report you can access your records online at [email or web address] or please contact [facility]’s Medical Records Department at [phone] Monday through Friday between 8 am and 6 pm.         What do I need to do now?           Please contact your health care provider who requested the imaging study to discuss what further actions (if any) are needed.  You may have already heard from (your ordering provider) in regard to this test in which case you can disregard this letter.        NOTICE IN ACCORDANCE WITH THE [place] STATE “PATIENT TEST RESULT INFORMATION ACT OF 2018”    You are receiving this notice as a result of a determination by your diagnostic imaging service that further discussions of your test results are warranted and would be beneficial to you.    The complete results of your test or tests have been or will be sent to the health care practitioner that ordered the test or tests. It is recommended that you contact your health care practitioner to discuss your results as soon as possible.

## 2025-04-07 ENCOUNTER — RESULTS FOLLOW-UP (OUTPATIENT)
Dept: HEMATOLOGY ONCOLOGY | Facility: CLINIC | Age: 80
End: 2025-04-07

## 2025-04-10 ENCOUNTER — OFFICE VISIT (OUTPATIENT)
Dept: HEMATOLOGY ONCOLOGY | Facility: CLINIC | Age: 80
End: 2025-04-10
Payer: COMMERCIAL

## 2025-04-10 VITALS
DIASTOLIC BLOOD PRESSURE: 70 MMHG | TEMPERATURE: 97.8 F | BODY MASS INDEX: 35.7 KG/M2 | HEART RATE: 64 BPM | WEIGHT: 194 LBS | HEIGHT: 62 IN | RESPIRATION RATE: 18 BRPM | SYSTOLIC BLOOD PRESSURE: 140 MMHG | OXYGEN SATURATION: 94 %

## 2025-04-10 DIAGNOSIS — R16.1 SPLENOMEGALY: ICD-10-CM

## 2025-04-10 DIAGNOSIS — C85.90 STAGE 4 LOW GRADE LYMPHOMA (HCC): ICD-10-CM

## 2025-04-10 DIAGNOSIS — I25.10 CORONARY ARTERY DISEASE INVOLVING NATIVE HEART WITHOUT ANGINA PECTORIS, UNSPECIFIED VESSEL OR LESION TYPE: ICD-10-CM

## 2025-04-10 DIAGNOSIS — I50.33 ACUTE ON CHRONIC DIASTOLIC (CONGESTIVE) HEART FAILURE (HCC): ICD-10-CM

## 2025-04-10 DIAGNOSIS — C7A.8 NEUROENDOCRINE CARCINOMA METASTATIC TO BONE (HCC): Primary | ICD-10-CM

## 2025-04-10 DIAGNOSIS — Z86.39 HISTORY OF DIABETES MELLITUS: ICD-10-CM

## 2025-04-10 DIAGNOSIS — N18.2 STAGE 2 CHRONIC KIDNEY DISEASE: ICD-10-CM

## 2025-04-10 DIAGNOSIS — I73.9 PERIPHERAL ARTERIAL DISEASE (HCC): ICD-10-CM

## 2025-04-10 DIAGNOSIS — C7B.8 NEUROENDOCRINE CARCINOMA METASTATIC TO BONE (HCC): Primary | ICD-10-CM

## 2025-04-10 DIAGNOSIS — K86.2 PANCREATIC CYST: ICD-10-CM

## 2025-04-10 LAB — MISCELLANEOUS LAB TEST RESULT: NORMAL

## 2025-04-10 PROCEDURE — 99215 OFFICE O/P EST HI 40 MIN: CPT | Performed by: INTERNAL MEDICINE

## 2025-04-10 PROCEDURE — G2211 COMPLEX E/M VISIT ADD ON: HCPCS | Performed by: INTERNAL MEDICINE

## 2025-04-10 NOTE — PATIENT INSTRUCTIONS
I have sent a message to Dr. Alex Marcum to see my patient hopefully this Monday.  Ordered blood work.  Follow-up in 2 weeks.

## 2025-04-10 NOTE — PROGRESS NOTES
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 Alex Marcum to see my patient hopefully this Monday.  Ordered blood work.  Follow-up in 2 weeks.  I was thinking of giving her lanreotide, Xgeva and either Rituxan or BTK inhibitor or to proceed with splenectomy.  I will wait to hear from Dr. Marcum.  I explained all this to patient and her .  Questions answered.  Patient is capable of self-care.  Goal is to treat 2 separate malignant processes and to monitor or biopsy paraspinal nodule and could that be third primary?  Palliative radiation to bony lesions as needed.  I suggested self breast examination, eating healthy foods, activities as tolerated but to avoid falls and trauma.   Patient to continue to follow-up with primary physician and other consultants.  Provided counseling and support.  I used a dictation device to dictate this note and there could be mistakes in my note and for that patient may contact my office.  We had a long discussion.  Complicated case.             Coronary artery disease involving native heart without angina  "pectoris, unspecified vessel or lesion type  Being managed by PCP and cardiologist       Peripheral arterial disease (HCC)  Being managed by PCP       Stage 2 chronic kidney disease  Lab Results   Component Value Date    EGFR 69 04/11/2025    EGFR 61 02/22/2025    EGFR 61 01/22/2025    CREATININE 0.81 04/11/2025    CREATININE 0.90 02/22/2025    CREATININE 0.90 01/22/2025   Being managed by PCP         Pancreatic cyst  To monitor       History of diabetes mellitus                 History of Present Illness   Chief Complaint   Patient presents with    Follow-up   Patient is here with her .  Patient has multiple lesions on PET scan and has severe splenomegaly.  Biopsies from the bone on bone marrow have shown low-grade lymphoma and low-grade neuroendocrine tumor, both stage IV.  Multiple bone lesions.  Surprisingly patient is not symptomatic much from these conditions.  Has some discomfort in lower neck.  No lymphoma related symptoms.  No neuroendocrine related symptoms.  Some fullness on the left side of abdomen.  Has some tiredness.  Minor arthritic symptoms.  Has exertional dyspnea.  Patient states she has heart valve problem and she could be needing surgery but she is not sure if she would like to have surgery.    Pertinent Medical History   See details in HPI  04/10/25:      Review of Systems  Reviewed 12 systems.  Symptoms are as in HPI.  No fevers, chills, bleeding, skin rash, weight loss, night sweats and no swelling of the ankles and no swollen glands.  No other neurological, cardiac, pulmonary, GI and  symptoms other than listed in HPI.      Objective   /70 (BP Location: Left arm, Patient Position: Sitting, Cuff Size: Large)   Pulse 64   Temp 97.8 °F (36.6 °C) (Temporal)   Resp 18   Ht 5' 2\" (1.575 m)   Wt 88 kg (194 lb)   SpO2 94%   BMI 35.48 kg/m²     Pain Screening:  Pain Score: 0-No pain  ECOG   2  Physical Exam  Vitals reviewed.   Constitutional:       General: She is not in acute " distress.     Appearance: Normal appearance. She is not ill-appearing.   HENT:      Head: Normocephalic and atraumatic.      Mouth/Throat:      Comments: No thrush.  Eyes:      General: No scleral icterus.     Conjunctiva/sclera: Conjunctivae normal.   Cardiovascular:      Rate and Rhythm: Normal rate and regular rhythm.      Heart sounds: Murmur (Systolic murmur) heard.   Pulmonary:      Effort: Pulmonary effort is normal. No respiratory distress.      Breath sounds: Normal breath sounds. No rhonchi or rales.   Abdominal:      General: Abdomen is flat. There is no distension.      Palpations: Abdomen is soft. There is mass (Fullness left side of abdomen).      Tenderness: There is no abdominal tenderness.      Comments: No ascites.    Musculoskeletal:         General: No swelling or tenderness. Normal range of motion.      Cervical back: No tenderness.      Right lower leg: No edema.      Left lower leg: No edema.      Comments: No calf tenderness.   Lymphadenopathy:      Cervical: No cervical adenopathy.      Upper Body:      Right upper body: No supraclavicular or axillary adenopathy.      Left upper body: No supraclavicular or axillary adenopathy.   Skin:     General: Skin is warm.      Coloration: Skin is not jaundiced or pale.      Findings: No bruising or rash.      Nails: There is no clubbing.   Neurological:      General: No focal deficit present.      Mental Status: She is alert and oriented to person, place, and time.      Motor: No weakness.      Coordination: Coordination normal.      Gait: Gait normal.   Psychiatric:         Behavior: Behavior normal.         Thought Content: Thought content normal.      Comments: Anxious         Labs: I have reviewed the following labs:  Lab Results   Component Value Date/Time    WBC 3.42 (L) 03/12/2025 09:02 AM    RBC 4.15 03/12/2025 09:02 AM    Hemoglobin 12.2 03/12/2025 09:02 AM    Hematocrit 37.6 03/12/2025 09:02 AM    MCV 91 03/12/2025 09:02 AM    MCH 29.4  03/12/2025 09:02 AM    RDW 15.1 03/12/2025 09:02 AM    Platelets 115 (L) 03/12/2025 09:02 AM    Segmented % 51 12/26/2024 11:08 AM    Lymphocytes % 32 03/12/2025 09:02 AM    Lymphocytes % 43 12/26/2024 11:08 AM    Monocytes % 4 03/12/2025 09:02 AM    Monocytes % 5 12/26/2024 11:08 AM    Eosinophils % 0 03/12/2025 09:02 AM    Eosinophils Relative 1 12/26/2024 11:08 AM    Basophils % 1 03/12/2025 09:02 AM    Basophils Relative 0 12/26/2024 11:08 AM    Immature Grans % 0 12/26/2024 11:08 AM    Absolute Neutrophils 1.80 (L) 12/26/2024 11:08 AM     Lab Results   Component Value Date/Time    Potassium 4.2 02/22/2025 08:53 AM    Chloride 105 02/22/2025 08:53 AM    CO2 30 02/22/2025 08:53 AM    BUN 21 02/22/2025 08:53 AM    Creatinine 0.90 02/22/2025 08:53 AM    Glucose, Fasting 126 (H) 02/22/2025 08:53 AM    Calcium 9.8 02/22/2025 08:53 AM    AST 14 02/22/2025 08:53 AM    ALT 12 02/22/2025 08:53 AM    Alkaline Phosphatase 54 02/22/2025 08:53 AM    Total Protein 7.2 02/22/2025 08:53 AM    Albumin 4.2 02/22/2025 08:53 AM    Total Bilirubin 0.78 02/22/2025 08:53 AM    eGFR 61 02/22/2025 08:53 AM     NM PET CT skull base to mid thigh  Status: Final result     PACS Images - GE     Show images for NM PET CT skull base to mid thigh  PACS Images - Sectra     Show images for NM PET CT skull base to mid thigh    NM PET CT skull base to mid thigh: Result Notes     Cinthia Gordon PA-C  4/7/2025  9:34 AM EDT       To be reviewed with patient at 4/10 appt            Study Result    Result Text   NETSPOT PET/CT SCAN     INDICATION:   C7A.8: Other malignant neuroendocrine tumors  C7B.8: Other secondary neuroendocrine tumors  R91.8: Other nonspecific abnormal finding of lung field  R16.2: Hepatomegaly with splenomegaly, not elsewhere classified  K86.89: Other specified diseases of pancreas  M89.9: Disorder of bone, unspecified, history of thyroid cancer     MODIFIER: PI     COMPARISON: FDG PET/CT dated 12/11/2024, MRI of the  cervical and lumbar spine dated 1/8/2025, MRI of the thoracic spine dated 1/12/2025. CT of abdomen and pelvis dated 9/11/2018.     CELL TYPE: Metastatic neuroendocrine tumor (bx T9 vertebra 3/19/25)     TECHNIQUE:   5.2 mCi Gallium-68 Dotatate Netspot administered IV.  Multiplanar attenuation corrected and non-attenuation corrected PET images were acquired 60 minutes post injection.  Contiguous, low dose, axial CT sections were obtained from the vertex through the femurs for anatomic localization.  Intravenous contrast was not utilized.     FINDINGS:     BRAIN:    Normal pituitary gland uptake is demonstrated.  No acute abnormalities are seen.     HEAD/NECK: Best seen on PET image 88 is asymmetric focal tracer activity involving the right mid to lower neck localizing to a soft tissue nodule measuring approximately 0.9 cm on image 88 of series 3 with max SUV of 3.4 suspicious for tracer avid   malignancy/metastatic disease, possible reflecting small tracer avid lymph node which is poorly characterized on low-dose unenhanced CT and can be further characterized with contrast-enhanced CT/MRI as clinically indicated. Finding could also reflect   thyroid tissue in this patient is status post partial thyroidectomy     CT images: Intracranial atherosclerotic calcification is noted.     CHEST:   No definite focal tracer activity associated patient's known medial juxtapleural left lower lobe lung nodule measuring 2.5 x 2.1 cm on image 145 of series 3 although it is difficult to separate physiologic activity within the adjacent spleen   from the medial portion of this nodule which appears similar in size to 12/11/2024.     Stable 7 mm right upper lobe lung nodule is not tracer avid on image 108 of series 3.     CT images: Atherosclerotic vascular calcifications including those of the coronary arteries are noted. Trace nonspecific pericardial effusion. Stable ectasia of the ascending thoracic aorta.     ABDOMEN: No focal tracer  activity definitive for tracer avid malignancy/metastatic disease. Note is made of mild nonspecific heterogeneous activity involving the proximal gastric wall, possibly physiologic but of uncertain clinical significance in this   patient with no known primary malignancy.     Mild heterogeneous activity involving the pancreas, possibly physiologic; as primary neuroendocrine tumor is not definitively visualized, consider correlation with MRI of the abdomen to exclude subtle underlying pancreatic lesion.     Nonspecific nontracer avid abdominal and retroperitoneal lymphadenopathy is again noted; for example enlarged portacaval lymph node on image 172 series 3 measuring 2.3 cm in short axis and aortocaval retroperitoneal lymph node on image 179 of series 3   measuring 1.2 cm, stable since prior FDG PET/CT.     CT images: Stable severe splenomegaly with a craniocaudal dimension of the spleen measuring approximately 23 cm. Atherosclerotic vascular calcifications are noted. Cholecystectomy. Stable nonspecific right adrenal nodularity. Right renal cyst. Patient's   known pancreatic cyst is poorly characterized on low-dose unenhanced CT and is not tracer avid. Persistent nonspecific central mesenteric stranding. Diverticulosis coli. Probable hepatomegaly.     PELVIS:  There is a physiologic distribution of the radiotracer.     CT images: Hysterectomy.     OSSEOUS STRUCTURES:   There is multifocal (greater than 10) tracer avid, predominantly osteolytic metastatic disease involving the calvarium, cervical spine, thoracic spine, and likely proximal left femur. Few representative lesions include:     -Lytic lesion involving the right posterior calvarium on image 38 of series 3 with max SUV of 6.2  -Lytic lesion involving the right posterior elements of the cervical spine on image 75 of series 3 with max SUV of 7  -Mid thoracic spine on image 105 of series 3 with max SUV of 5.5  -Subtle lesion involving the medial proximal left  femur on image 241 of series 3 with max SUV of 3.0     CT images: Degenerative changes are noted involving the spine.     IMPRESSION:     1.  Multifocal dotatate positive osseous metastatic disease.  2.  Focal tracer activity involving the right mid to lower neck suspicious for additional site of tracer avid malignancy/metastatic disease which is poorly characterized on low-dose unenhanced CT. Consider further evaluation with contrast-enhanced CT/MRI   for further characterization as clinically indicated.  3.  Patient's known left lower and right upper lobe lung nodules are not tracer avid and remain of uncertain clinical significance. Continued short interval follow-up with CT of chest in 3 months is recommended to ensure stability. Tissue sampling can   also be considered.  4.  Stable severe splenomegaly and persistent nontracer avid abdominal and retroperitoneal lymphadenopathy. Findings are nonspecific with differential diagnosis including benign/inflammatory etiologies as well as malignancy such as lymphoma. These   findings are new since most remote CT of abdomen and pelvis dated 9/11/2018. Correlation and continued follow-up is recommended.     5.  Please see above for details and additional findings.     The study was marked in EPIC for significant notification. The study was marked in Epic for follow-up.        Workstation performed: [id number]        Imaging    NM PET CT skull base to mid thigh (Order: [id number]) - 4/    Results  Tissue Exam (Order [id number])  Result Information    Status Priority Source   Edited Result - FINAL (4/7/2025  9:39 AM) Routine Vertebra      Related Results     Leukemia/Lymphoma flow cytometry Final result Collected 3/19/2025 3:28 PM                     Bone Marrow Biopsy/Aspirate Exam Final result 3/19/2025           Final Diagnosis   A -C.  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  -  Hypercellular bone marrow with left shifted myeloid/erythroid maturation, atypical megakaryocytic maturation, and no increase in blasts, see comment.               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  -  Negative for metastatic carcinoma.     Comment: The patient's history of lytic lesions and recently diagnosed metastatic carcinoma is noted.               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 The immunophenotype by flow cytometry and immunohistochemical stains is non-specific and ancillary work-up for further characterization was insufficient and unable to be performed. The differential diagnosis includes but is not limited to marginal zone lymphoma, atypical chronic lymphocytic leukemia/small lymphocytic lymphoma, and lymphoplasmacytic lymphoma. Further work-up can be attempted in the peripheral blood or with lymph node sampling.                Evaluation of the myeloid lineage is challenging in this sample given the involvement of the bone marrow by a B-cell lymphoproliferative disorder. However, morphologic evaluation shows atypical megakaryocytic maturation and left shifted myeloid and erythroid elements. These morphologic features alone are not diagnostic of dysplasia at this time. 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  Close clinical follow-up is advised if the patient were to receive treatment for their metastatic carcinoma therapy effect may be more pronounced in this patient given the underlying clonal hematopoesis  identified in the myeloid lineage.  Finally there is no evidence of metastatic carcinoma at in the reviewed material.       Dr. Estrada is notified of the findings by Dr. Lopes via [employer] Secure Chat on 4/10/25.  Intradepartmental consultation is in agreement (MS).                      Chromosome analysis, leukemia/lymphoma Final result 3/19/20

## 2025-04-11 ENCOUNTER — APPOINTMENT (OUTPATIENT)
Dept: LAB | Facility: HOSPITAL | Age: 80
End: 2025-04-11
Attending: INTERNAL MEDICINE
Payer: COMMERCIAL

## 2025-04-11 DIAGNOSIS — C7A.8 NEUROENDOCRINE CARCINOMA METASTATIC TO BONE (HCC): ICD-10-CM

## 2025-04-11 DIAGNOSIS — C7B.8 NEUROENDOCRINE CARCINOMA METASTATIC TO BONE (HCC): ICD-10-CM

## 2025-04-11 DIAGNOSIS — C85.90 STAGE 4 LOW GRADE LYMPHOMA (HCC): ICD-10-CM

## 2025-04-11 LAB
ALBUMIN SERPL BCG-MCNC: 4.4 G/DL (ref 3.5–5)
ALP SERPL-CCNC: 68 U/L (ref 34–104)
ALT SERPL W P-5'-P-CCNC: 12 U/L (ref 7–52)
ANION GAP SERPL CALCULATED.3IONS-SCNC: 10 MMOL/L (ref 4–13)
ANISOCYTOSIS BLD QL SMEAR: PRESENT
AST SERPL W P-5'-P-CCNC: 16 U/L (ref 13–39)
BASOPHILS # BLD MANUAL: 0 THOUSAND/UL (ref 0–0.1)
BASOPHILS NFR MAR MANUAL: 0 % (ref 0–1)
BILIRUB SERPL-MCNC: 0.62 MG/DL (ref 0.2–1)
BUN SERPL-MCNC: 22 MG/DL (ref 5–25)
CALCIUM SERPL-MCNC: 9.7 MG/DL (ref 8.4–10.2)
CHLORIDE SERPL-SCNC: 105 MMOL/L (ref 96–108)
CO2 SERPL-SCNC: 28 MMOL/L (ref 21–32)
CREAT SERPL-MCNC: 0.81 MG/DL (ref 0.6–1.3)
EOSINOPHIL # BLD MANUAL: 0.03 THOUSAND/UL (ref 0–0.4)
EOSINOPHIL NFR BLD MANUAL: 1 % (ref 0–6)
ERYTHROCYTE [DISTWIDTH] IN BLOOD BY AUTOMATED COUNT: 15.4 % (ref 11.6–15.1)
GFR SERPL CREATININE-BSD FRML MDRD: 69 ML/MIN/1.73SQ M
GLUCOSE P FAST SERPL-MCNC: 126 MG/DL (ref 65–99)
HCT VFR BLD AUTO: 39.6 % (ref 34.8–46.1)
HGB BLD-MCNC: 12.5 G/DL (ref 11.5–15.4)
IGA SERPL-MCNC: 220 MG/DL (ref 66–433)
IGG SERPL-MCNC: 1230 MG/DL (ref 635–1741)
IGM SERPL-MCNC: 23 MG/DL (ref 45–281)
LDH SERPL-CCNC: 126 U/L (ref 140–271)
LYMPHOCYTES # BLD AUTO: 1.37 THOUSAND/UL (ref 0.6–4.47)
LYMPHOCYTES # BLD AUTO: 39 % (ref 14–44)
MCH RBC QN AUTO: 29.3 PG (ref 26.8–34.3)
MCHC RBC AUTO-ENTMCNC: 31.6 G/DL (ref 31.4–37.4)
MCV RBC AUTO: 93 FL (ref 82–98)
MONOCYTES # BLD AUTO: 0.17 THOUSAND/UL (ref 0–1.22)
MONOCYTES NFR BLD: 5 % (ref 4–12)
NEUTROPHILS # BLD MANUAL: 1.77 THOUSAND/UL (ref 1.85–7.62)
NEUTS BAND NFR BLD MANUAL: 2 % (ref 0–8)
NEUTS SEG NFR BLD AUTO: 51 % (ref 43–75)
OVALOCYTES BLD QL SMEAR: PRESENT
PLATELET # BLD AUTO: 107 THOUSANDS/UL (ref 149–390)
PLATELET BLD QL SMEAR: ABNORMAL
PMV BLD AUTO: 9.6 FL (ref 8.9–12.7)
POIKILOCYTOSIS BLD QL SMEAR: PRESENT
POLYCHROMASIA BLD QL SMEAR: PRESENT
POTASSIUM SERPL-SCNC: 4 MMOL/L (ref 3.5–5.3)
PROT SERPL-MCNC: 7.4 G/DL (ref 6.4–8.4)
RBC # BLD AUTO: 4.27 MILLION/UL (ref 3.81–5.12)
RBC MORPH BLD: PRESENT
SODIUM SERPL-SCNC: 143 MMOL/L (ref 135–147)
URATE SERPL-MCNC: 8 MG/DL (ref 2–7.5)
VARIANT LYMPHS # BLD AUTO: 2 %
WBC # BLD AUTO: 3.34 THOUSAND/UL (ref 4.31–10.16)

## 2025-04-11 PROCEDURE — 36415 COLL VENOUS BLD VENIPUNCTURE: CPT

## 2025-04-11 PROCEDURE — 82232 ASSAY OF BETA-2 PROTEIN: CPT

## 2025-04-11 PROCEDURE — 84550 ASSAY OF BLOOD/URIC ACID: CPT

## 2025-04-11 PROCEDURE — 83615 LACTATE (LD) (LDH) ENZYME: CPT

## 2025-04-11 PROCEDURE — 82784 ASSAY IGA/IGD/IGG/IGM EACH: CPT

## 2025-04-11 PROCEDURE — 86316 IMMUNOASSAY TUMOR OTHER: CPT

## 2025-04-11 PROCEDURE — 80053 COMPREHEN METABOLIC PANEL: CPT

## 2025-04-11 PROCEDURE — 84260 ASSAY OF SEROTONIN: CPT

## 2025-04-11 PROCEDURE — 85007 BL SMEAR W/DIFF WBC COUNT: CPT

## 2025-04-11 PROCEDURE — 85027 COMPLETE CBC AUTOMATED: CPT

## 2025-04-13 PROBLEM — I73.9 PERIPHERAL ARTERIAL DISEASE (HCC): Status: ACTIVE | Noted: 2025-04-13

## 2025-04-13 PROBLEM — C85.90: Status: ACTIVE | Noted: 2025-04-13

## 2025-04-13 PROBLEM — R16.2 HEPATOSPLENOMEGALY: Status: ACTIVE | Noted: 2025-04-13

## 2025-04-13 PROBLEM — K86.2 PANCREATIC CYST: Status: ACTIVE | Noted: 2025-04-13

## 2025-04-13 PROBLEM — N18.2 STAGE 2 CHRONIC KIDNEY DISEASE: Status: ACTIVE | Noted: 2025-04-13

## 2025-04-13 PROBLEM — R16.1 SPLENOMEGALY: Status: ACTIVE | Noted: 2025-04-13

## 2025-04-13 PROBLEM — C7B.8 NEUROENDOCRINE CARCINOMA METASTATIC TO BONE (HCC): Status: ACTIVE | Noted: 2025-04-13

## 2025-04-13 PROBLEM — C7A.8 NEUROENDOCRINE CARCINOMA METASTATIC TO BONE (HCC): Status: ACTIVE | Noted: 2025-04-13

## 2025-04-13 PROBLEM — Z86.39 HISTORY OF DIABETES MELLITUS: Status: ACTIVE | Noted: 2025-04-13

## 2025-04-13 LAB — B2 MICROGLOB SERPL-MCNC: 4 MG/L (ref 0.6–2.4)

## 2025-04-13 NOTE — ASSESSMENT & PLAN NOTE
Stage IV disease with extensive bony involvement on lytic bone lesions.  Orders:    Chromogranin A; Future    Serotonin serum; Future

## 2025-04-13 NOTE — ASSESSMENT & PLAN NOTE
With massive splenomegaly  Orders:    Beta 2 microglobulin, serum; Future    CBC and differential; Future    Comprehensive metabolic panel; Future    IgG, IgA, IgM; Future    LD,Blood; Future    Uric acid; Future

## 2025-04-13 NOTE — ASSESSMENT & PLAN NOTE
Lab Results   Component Value Date    EGFR 69 04/11/2025    EGFR 61 02/22/2025    EGFR 61 01/22/2025    CREATININE 0.81 04/11/2025    CREATININE 0.90 02/22/2025    CREATININE 0.90 01/22/2025   Being managed by PCP

## 2025-04-13 NOTE — ASSESSMENT & PLAN NOTE
Wt Readings from Last 3 Encounters:   04/10/25 88 kg (194 lb)   03/19/25 86.6 kg (191 lb)   03/11/25 87.6 kg (193 lb 3.2 oz)   Follows with cardiologist    I have sent a message to Dr. Alex Marcum to see my patient hopefully this Monday.  Ordered blood work.  Follow-up in 2 weeks.  I was thinking of giving her lanreotide, Xgeva and either Rituxan or BTK inhibitor or to proceed with splenectomy.  I will wait to hear from Dr. Marcum.  I explained all this to patient and her .  Questions answered.  Patient is capable of self-care.  Goal is to treat 2 separate malignant processes and to monitor or biopsy paraspinal nodule and could that be third primary?  Palliative radiation to bony lesions as needed.  I suggested self breast examination, eating healthy foods, activities as tolerated but to avoid falls and trauma.   Patient to continue to follow-up with primary physician and other consultants.  Provided counseling and support.  I used a dictation device to dictate this note and there could be mistakes in my note and for that patient may contact my office.  We had a long discussion.  Complicated case.

## 2025-04-14 LAB — CGA SERPL-MCNC: 54.3 NG/ML (ref 0–101.8)

## 2025-04-17 LAB — SEROTONIN PLAS-MCNC: 7 NG/ML (ref 8–217)

## 2025-04-25 ENCOUNTER — OFFICE VISIT (OUTPATIENT)
Dept: HEMATOLOGY ONCOLOGY | Facility: CLINIC | Age: 80
End: 2025-04-25
Payer: COMMERCIAL

## 2025-04-25 VITALS
HEART RATE: 59 BPM | HEIGHT: 62 IN | OXYGEN SATURATION: 95 % | DIASTOLIC BLOOD PRESSURE: 68 MMHG | WEIGHT: 194 LBS | BODY MASS INDEX: 35.7 KG/M2 | RESPIRATION RATE: 18 BRPM | TEMPERATURE: 98.6 F | SYSTOLIC BLOOD PRESSURE: 132 MMHG

## 2025-04-25 DIAGNOSIS — I73.9 PERIPHERAL ARTERIAL DISEASE (HCC): ICD-10-CM

## 2025-04-25 DIAGNOSIS — I38 VALVULAR HEART DISEASE: ICD-10-CM

## 2025-04-25 DIAGNOSIS — I50.33 ACUTE ON CHRONIC DIASTOLIC (CONGESTIVE) HEART FAILURE (HCC): ICD-10-CM

## 2025-04-25 DIAGNOSIS — Z86.39 HISTORY OF DIABETES MELLITUS: ICD-10-CM

## 2025-04-25 DIAGNOSIS — C85.90 STAGE 4 LOW GRADE LYMPHOMA (HCC): Primary | ICD-10-CM

## 2025-04-25 DIAGNOSIS — C7A.8 NEUROENDOCRINE CARCINOMA METASTATIC TO BONE (HCC): ICD-10-CM

## 2025-04-25 DIAGNOSIS — R16.1 SPLENOMEGALY: ICD-10-CM

## 2025-04-25 DIAGNOSIS — N18.2 STAGE 2 CHRONIC KIDNEY DISEASE: ICD-10-CM

## 2025-04-25 DIAGNOSIS — C7B.8 NEUROENDOCRINE CARCINOMA METASTATIC TO BONE (HCC): ICD-10-CM

## 2025-04-25 DIAGNOSIS — E79.0 HYPERURICEMIA: ICD-10-CM

## 2025-04-25 DIAGNOSIS — K86.2 PANCREATIC CYST: ICD-10-CM

## 2025-04-25 DIAGNOSIS — I25.10 CORONARY ARTERY DISEASE INVOLVING NATIVE HEART WITHOUT ANGINA PECTORIS, UNSPECIFIED VESSEL OR LESION TYPE: ICD-10-CM

## 2025-04-25 PROCEDURE — 99214 OFFICE O/P EST MOD 30 MIN: CPT | Performed by: INTERNAL MEDICINE

## 2025-04-25 RX ORDER — ALLOPURINOL 100 MG/1
200 TABLET ORAL DAILY
Qty: 60 TABLET | Refills: 1 | Status: SHIPPED | OUTPATIENT
Start: 2025-04-25

## 2025-04-25 NOTE — PROGRESS NOTES
Name: Nadeem Ashton      : 1945      MRN: [id number]  Encounter Provider: Glen Estrada MD  Encounter Date: 2025   Encounter department: [facility] [place]  :  Assessment & Plan  Stage 4 low grade lymphoma (HCC)  With significantly enlarged spleen.  Patient is being referred to Dr. Marcum.  I sent him a message and he replied that he will take care of her.  Patient is willing to go to [place] and I let him know.  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       Splenomegaly  Probably part of lymphoma       Hyperuricemia  Patient will be started on allopurinol.  Advised hydration.  Orders:    allopurinol (ZYLOPRIM) 100 mg tablet; Take 2 tablets (200 mg total) by mouth daily    Acute on chronic diastolic (congestive) heart failure (HCC)  Wt Readings from Last 3 Encounters:   25 88 kg (194 lb)   04/10/25 88 kg (194 lb)   25 86.6 kg (191 lb)                    Coronary artery disease involving native heart without angina pectoris, unspecified vessel or lesion type  Patient follows with her cardiologist.  She states she will be going for heart surgery.       Valvular heart disease  Patient states she will be going for heart surgery       Peripheral arterial disease (HCC)  Patient follows with specialist and PCP       Stage 2 chronic kidney disease  Lab Results   Component Value Date    EGFR 69 2025    EGFR 61 2025    EGFR 61 2025    CREATININE 0.81 2025    CREATININE 0.90 2025    CREATININE 0.90 2025     Follows with PCP being monitored       Pancreatic cyst  Being monitored       History of diabetes mellitus  Follows with PCP           Return in about 4 weeks (around 2025) for Follow up Sean.  She will be seen after  "her visit with Dr. Marcum to have recommendations for treatment of low-grade lymphoma and if he would like he could also make recommendation for stage IV neuroendocrine tumor.  All discussed with patient in detail.  Questions answered.  See diagnoses, orders and instructions above.  Patient is capable of self-care.  I suggested self breast examination, eating healthy foods, staying active as tolerated but to avoid falls and trauma.  Patient to continue to follow-up with primary physician and other consultants.  Provided counseling and support.  I used a dictation device to dictate this note and there could be mistakes in my note and for that patient may contact my office.      History of Present Illness   Chief Complaint   Patient presents with    Follow-up   Patient is here with her .  She has multiple lytic bone lesions and very much enlarged spleen.  She has some early satiety.  Some disc comfort in the spine.  Bone marrow biopsy showed low-grade lymphoma and also low-grade neuroendocrine tumor.  No flushing or diarrhea.  Has some tiredness and mild arthritic symptoms.  Has exertional dyspnea because of cardiac situation and she will be needing heart surgery.  Patient to be seen with Dr. Marcum as above.    Pertinent Medical History   See details in HPI  04/25/25:      Review of Systems  Reviewed 12 systems.  Symptoms are as in HPI.  No other neurological, cardiac, pulmonary, GI and  symptoms other than listed in HPI.  No fevers, chills, bleeding, skin rash, weight loss, night sweats and no swelling of the ankles and no swollen glands.      Objective   /68 (BP Location: Left arm, Patient Position: Sitting, Cuff Size: Large)   Pulse 59   Temp 98.6 °F (37 °C) (Temporal)   Resp 18   Ht 5' 2\" (1.575 m)   Wt 88 kg (194 lb)   SpO2 95%   BMI 35.48 kg/m²     Pain Screening:  Pain Score: 0-No pain  ECOG   2 because of her heart condition  Physical Exam  Constitutional:       General: She is not in acute " distress.     Appearance: Normal appearance. She is not ill-appearing.   HENT:      Head: Normocephalic and atraumatic.      Mouth/Throat:      Comments: No thrush.  Eyes:      General: No scleral icterus.     Conjunctiva/sclera: Conjunctivae normal.   Cardiovascular:      Rate and Rhythm: Normal rate and regular rhythm.      Heart sounds: Murmur (Systolic murmur) heard.   Pulmonary:      Effort: Pulmonary effort is normal. No respiratory distress.      Breath sounds: Normal breath sounds. No rhonchi or rales.   Abdominal:      General: Abdomen is flat. There is no distension.      Palpations: Abdomen is soft. There is no mass.      Tenderness: There is no abdominal tenderness.      Comments: No ascites.    Musculoskeletal:         General: No swelling or tenderness. Normal range of motion.      Cervical back: Normal range of motion. No rigidity or tenderness.      Right lower leg: No edema.      Left lower leg: No edema.      Comments: No calf tenderness.   Lymphadenopathy:      Cervical: No cervical adenopathy.      Upper Body:      Right upper body: No supraclavicular or axillary adenopathy.      Left upper body: No supraclavicular or axillary adenopathy.   Skin:     General: Skin is warm.      Coloration: Skin is not jaundiced or pale.      Findings: No bruising or rash.      Nails: There is no clubbing.   Neurological:      General: No focal deficit present.      Mental Status: She is alert and oriented to person, place, and time.      Motor: No weakness.      Coordination: Coordination normal.      Gait: Gait (Patient walks slowly) normal.   Psychiatric:         Behavior: Behavior normal.     Anxious.    Labs: I have reviewed the following labs:  Lab Results   Component Value Date/Time    WBC 3.34 (L) 04/11/2025 09:13 AM    RBC 4.27 04/11/2025 09:13 AM    Hemoglobin 12.5 04/11/2025 09:13 AM    Hematocrit 39.6 04/11/2025 09:13 AM    MCV 93 04/11/2025 09:13 AM    MCH 29.3 04/11/2025 09:13 AM    RDW 15.4 (H)  04/11/2025 09:13 AM    Platelets 107 (L) 04/11/2025 09:13 AM    Segmented % 51 12/26/2024 11:08 AM    Lymphocytes % 39 04/11/2025 09:13 AM    Lymphocytes % 43 12/26/2024 11:08 AM    Monocytes % 5 04/11/2025 09:13 AM    Monocytes % 5 12/26/2024 11:08 AM    Eosinophils % 1 04/11/2025 09:13 AM    Eosinophils Relative 1 12/26/2024 11:08 AM    Basophils % 0 04/11/2025 09:13 AM    Basophils Relative 0 12/26/2024 11:08 AM    Immature Grans % 0 12/26/2024 11:08 AM    Absolute Neutrophils 1.80 (L) 12/26/2024 11:08 AM     Lab Results   Component Value Date/Time    Potassium 4.0 04/11/2025 09:13 AM    Chloride 105 04/11/2025 09:13 AM    CO2 28 04/11/2025 09:13 AM    BUN 22 04/11/2025 09:13 AM    Creatinine 0.81 04/11/2025 09:13 AM    Glucose, Fasting 126 (H) 04/11/2025 09:13 AM    Calcium 9.7 04/11/2025 09:13 AM    AST 16 04/11/2025 09:13 AM    ALT 12 04/11/2025 09:13 AM    Alkaline Phosphatase 68 04/11/2025 09:13 AM    Total Protein 7.4 04/11/2025 09:13 AM    Albumin 4.4 04/11/2025 09:13 AM    Total Bilirubin 0.62 04/11/2025 09:13 AM    eGFR 69 04/11/2025 09:13 AM       NM PET CT skull base to mid thigh  Status: Final result     PACS Images - GE     Show images for NM PET CT skull base to mid thigh  PACS Images - Sectra     Show images for NM PET CT skull base to mid thigh    NM PET CT skull base to mid thigh: Result Notes     Cinthia Gordon PA-C  4/7/2025  9:34 AM EDT       To be reviewed with patient at 4/10 appt            Study Result    Result Text   NETSPOT PET/CT SCAN     INDICATION:   C7A.8: Other malignant neuroendocrine tumors  C7B.8: Other secondary neuroendocrine tumors  R91.8: Other nonspecific abnormal finding of lung field  R16.2: Hepatomegaly with splenomegaly, not elsewhere classified  K86.89: Other specified diseases of pancreas  M89.9: Disorder of bone, unspecified, history of thyroid cancer     MODIFIER: PI     COMPARISON: FDG PET/CT dated 12/11/2024, MRI of the cervical and lumbar spine dated  1/8/2025, MRI of the thoracic spine dated 1/12/2025. CT of abdomen and pelvis dated 9/11/2018.     CELL TYPE: Metastatic neuroendocrine tumor (bx T9 vertebra 3/19/25)     TECHNIQUE:   5.2 mCi Gallium-68 Dotatate Netspot administered IV.  Multiplanar attenuation corrected and non-attenuation corrected PET images were acquired 60 minutes post injection.  Contiguous, low dose, axial CT sections were obtained from the vertex through the femurs for anatomic localization.  Intravenous contrast was not utilized.     FINDINGS:     BRAIN:    Normal pituitary gland uptake is demonstrated.  No acute abnormalities are seen.     HEAD/NECK: Best seen on PET image 88 is asymmetric focal tracer activity involving the right mid to lower neck localizing to a soft tissue nodule measuring approximately 0.9 cm on image 88 of series 3 with max SUV of 3.4 suspicious for tracer avid   malignancy/metastatic disease, possible reflecting small tracer avid lymph node which is poorly characterized on low-dose unenhanced CT and can be further characterized with contrast-enhanced CT/MRI as clinically indicated. Finding could also reflect   thyroid tissue in this patient is status post partial thyroidectomy     CT images: Intracranial atherosclerotic calcification is noted.     CHEST:   No definite focal tracer activity associated patient's known medial juxtapleural left lower lobe lung nodule measuring 2.5 x 2.1 cm on image 145 of series 3 although it is difficult to separate physiologic activity within the adjacent spleen   from the medial portion of this nodule which appears similar in size to 12/11/2024.     Stable 7 mm right upper lobe lung nodule is not tracer avid on image 108 of series 3.     CT images: Atherosclerotic vascular calcifications including those of the coronary arteries are noted. Trace nonspecific pericardial effusion. Stable ectasia of the ascending thoracic aorta.     ABDOMEN: No focal tracer activity definitive for tracer  avid malignancy/metastatic disease. Note is made of mild nonspecific heterogeneous activity involving the proximal gastric wall, possibly physiologic but of uncertain clinical significance in this   patient with no known primary malignancy.     Mild heterogeneous activity involving the pancreas, possibly physiologic; as primary neuroendocrine tumor is not definitively visualized, consider correlation with MRI of the abdomen to exclude subtle underlying pancreatic lesion.     Nonspecific nontracer avid abdominal and retroperitoneal lymphadenopathy is again noted; for example enlarged portacaval lymph node on image 172 series 3 measuring 2.3 cm in short axis and aortocaval retroperitoneal lymph node on image 179 of series 3   measuring 1.2 cm, stable since prior FDG PET/CT.     CT images: Stable severe splenomegaly with a craniocaudal dimension of the spleen measuring approximately 23 cm. Atherosclerotic vascular calcifications are noted. Cholecystectomy. Stable nonspecific right adrenal nodularity. Right renal cyst. Patient's   known pancreatic cyst is poorly characterized on low-dose unenhanced CT and is not tracer avid. Persistent nonspecific central mesenteric stranding. Diverticulosis coli. Probable hepatomegaly.     PELVIS:  There is a physiologic distribution of the radiotracer.     CT images: Hysterectomy.     OSSEOUS STRUCTURES:   There is multifocal (greater than 10) tracer avid, predominantly osteolytic metastatic disease involving the calvarium, cervical spine, thoracic spine, and likely proximal left femur. Few representative lesions include:     -Lytic lesion involving the right posterior calvarium on image 38 of series 3 with max SUV of 6.2  -Lytic lesion involving the right posterior elements of the cervical spine on image 75 of series 3 with max SUV of 7  -Mid thoracic spine on image 105 of series 3 with max SUV of 5.5  -Subtle lesion involving the medial proximal left femur on image 241 of series 3  with max SUV of 3.0     CT images: Degenerative changes are noted involving the spine.     IMPRESSION:     1.  Multifocal dotatate positive osseous metastatic disease.  2.  Focal tracer activity involving the right mid to lower neck suspicious for additional site of tracer avid malignancy/metastatic disease which is poorly characterized on low-dose unenhanced CT. Consider further evaluation with contrast-enhanced CT/MRI   for further characterization as clinically indicated.  3.  Patient's known left lower and right upper lobe lung nodules are not tracer avid and remain of uncertain clinical significance. Continued short interval follow-up with CT of chest in 3 months is recommended to ensure stability. Tissue sampling can   also be considered.  4.  Stable severe splenomegaly and persistent nontracer avid abdominal and retroperitoneal lymphadenopathy. Findings are nonspecific with differential diagnosis including benign/inflammatory etiologies as well as malignancy such as lymphoma. These   findings are new since most remote CT of abdomen and pelvis dated 9/11/2018. Correlation and continued follow-up is recommended.     5.  Please see above for details and additional findings.     The study was marked in EPIC for significant notification. The study was marked in Epic for follow-up.        Workstation performed: [id number]        Imaging    NM PET CT skull base to mid thigh (Order: [id number]) - 4/4/2025

## 2025-04-25 NOTE — ASSESSMENT & PLAN NOTE
With significantly enlarged spleen.  Patient is being referred to Dr. Marcum.  I sent him a message and he replied that he will take care of her.  Patient is willing to go to [place] and I let him know.  Orders:    allopurinol (ZYLOPRIM) 100 mg tablet; Take 2 tablets (200 mg total) by mouth daily

## 2025-04-25 NOTE — ASSESSMENT & PLAN NOTE
Lab Results   Component Value Date    EGFR 69 04/11/2025    EGFR 61 02/22/2025    EGFR 61 01/22/2025    CREATININE 0.81 04/11/2025    CREATININE 0.90 02/22/2025    CREATININE 0.90 01/22/2025     Follows with PCP being monitored

## 2025-04-25 NOTE — PATIENT INSTRUCTIONS
Patient is being started on allopurinol 100 mg 2 tablets daily.  I sent a message to Dr. Marcum to see the patient and she is willing to go to [place].  Follow-up in 4 weeks.

## 2025-04-25 NOTE — ASSESSMENT & PLAN NOTE
Wt Readings from Last 3 Encounters:   04/25/25 88 kg (194 lb)   04/10/25 88 kg (194 lb)   03/19/25 86.6 kg (191 lb)

## 2025-05-17 ENCOUNTER — APPOINTMENT (OUTPATIENT)
Dept: LAB | Facility: HOSPITAL | Age: 80
End: 2025-05-17
Attending: INTERNAL MEDICINE
Payer: COMMERCIAL

## 2025-05-17 DIAGNOSIS — N18.6 TYPE 2 DIABETES MELLITUS WITH ESRD (END-STAGE RENAL DISEASE) (HCC): ICD-10-CM

## 2025-05-17 DIAGNOSIS — I42.1 IHSS (IDIOPATHIC HYPERTROPHIC SUBAORTIC STENOSIS) (HCC): ICD-10-CM

## 2025-05-17 DIAGNOSIS — E11.22 TYPE 2 DIABETES MELLITUS WITH ESRD (END-STAGE RENAL DISEASE) (HCC): ICD-10-CM

## 2025-05-17 DIAGNOSIS — I10 HYPERTENSION, ESSENTIAL: ICD-10-CM

## 2025-05-17 DIAGNOSIS — I25.119 ATHEROSCLEROSIS OF NATIVE CORONARY ARTERY WITH ANGINA PECTORIS, UNSPECIFIED WHETHER NATIVE OR TRANSPLANTED HEART (HCC): ICD-10-CM

## 2025-05-17 DIAGNOSIS — E78.5 HYPERLIPIDEMIA, UNSPECIFIED HYPERLIPIDEMIA TYPE: ICD-10-CM

## 2025-05-17 LAB
ALBUMIN SERPL BCG-MCNC: 4.4 G/DL (ref 3.5–5)
ALP SERPL-CCNC: 73 U/L (ref 34–104)
ALT SERPL W P-5'-P-CCNC: 10 U/L (ref 7–52)
ANION GAP SERPL CALCULATED.3IONS-SCNC: 8 MMOL/L (ref 4–13)
AST SERPL W P-5'-P-CCNC: 14 U/L (ref 13–39)
BASOPHILS # BLD AUTO: 0.01 THOUSANDS/ÂΜL (ref 0–0.1)
BASOPHILS NFR BLD AUTO: 0 % (ref 0–1)
BILIRUB SERPL-MCNC: 0.75 MG/DL (ref 0.2–1)
BUN SERPL-MCNC: 21 MG/DL (ref 5–25)
CALCIUM SERPL-MCNC: 9.6 MG/DL (ref 8.4–10.2)
CHLORIDE SERPL-SCNC: 104 MMOL/L (ref 96–108)
CHOLEST SERPL-MCNC: 117 MG/DL (ref ?–200)
CO2 SERPL-SCNC: 28 MMOL/L (ref 21–32)
CREAT SERPL-MCNC: 0.83 MG/DL (ref 0.6–1.3)
EOSINOPHIL # BLD AUTO: 0.02 THOUSAND/ÂΜL (ref 0–0.61)
EOSINOPHIL NFR BLD AUTO: 1 % (ref 0–6)
ERYTHROCYTE [DISTWIDTH] IN BLOOD BY AUTOMATED COUNT: 15.6 % (ref 11.6–15.1)
GFR SERPL CREATININE-BSD FRML MDRD: 67 ML/MIN/1.73SQ M
GLUCOSE P FAST SERPL-MCNC: 150 MG/DL (ref 65–99)
HCT VFR BLD AUTO: 38.7 % (ref 34.8–46.1)
HDLC SERPL-MCNC: 32 MG/DL
HGB BLD-MCNC: 12.2 G/DL (ref 11.5–15.4)
IMM GRANULOCYTES # BLD AUTO: 0.01 THOUSAND/UL (ref 0–0.2)
IMM GRANULOCYTES NFR BLD AUTO: 0 % (ref 0–2)
LDLC SERPL CALC-MCNC: 66 MG/DL (ref 0–100)
LYMPHOCYTES # BLD AUTO: 1.41 THOUSANDS/ÂΜL (ref 0.6–4.47)
LYMPHOCYTES NFR BLD AUTO: 42 % (ref 14–44)
MCH RBC QN AUTO: 29.2 PG (ref 26.8–34.3)
MCHC RBC AUTO-ENTMCNC: 31.5 G/DL (ref 31.4–37.4)
MCV RBC AUTO: 93 FL (ref 82–98)
MONOCYTES # BLD AUTO: 0.23 THOUSAND/ÂΜL (ref 0.17–1.22)
MONOCYTES NFR BLD AUTO: 7 % (ref 4–12)
NEUTROPHILS # BLD AUTO: 1.7 THOUSANDS/ÂΜL (ref 1.85–7.62)
NEUTS SEG NFR BLD AUTO: 50 % (ref 43–75)
NONHDLC SERPL-MCNC: 85 MG/DL
NRBC BLD AUTO-RTO: 0 /100 WBCS
PLATELET # BLD AUTO: 94 THOUSANDS/UL (ref 149–390)
PMV BLD AUTO: 10.7 FL (ref 8.9–12.7)
POTASSIUM SERPL-SCNC: 4.3 MMOL/L (ref 3.5–5.3)
PROT SERPL-MCNC: 7.1 G/DL (ref 6.4–8.4)
RBC # BLD AUTO: 4.18 MILLION/UL (ref 3.81–5.12)
SODIUM SERPL-SCNC: 140 MMOL/L (ref 135–147)
TRIGL SERPL-MCNC: 96 MG/DL (ref ?–150)
TSH SERPL DL<=0.05 MIU/L-ACNC: 1.68 UIU/ML (ref 0.45–4.5)
WBC # BLD AUTO: 3.38 THOUSAND/UL (ref 4.31–10.16)

## 2025-05-17 PROCEDURE — 84443 ASSAY THYROID STIM HORMONE: CPT

## 2025-05-17 PROCEDURE — 36415 COLL VENOUS BLD VENIPUNCTURE: CPT

## 2025-05-17 PROCEDURE — 80061 LIPID PANEL: CPT

## 2025-05-17 PROCEDURE — 85025 COMPLETE CBC W/AUTO DIFF WBC: CPT

## 2025-05-17 PROCEDURE — 80053 COMPREHEN METABOLIC PANEL: CPT

## 2025-05-17 PROCEDURE — 83036 HEMOGLOBIN GLYCOSYLATED A1C: CPT

## 2025-05-18 LAB
EST. AVERAGE GLUCOSE BLD GHB EST-MCNC: 160 MG/DL
HBA1C MFR BLD: 7.2 %

## 2025-05-23 ENCOUNTER — OFFICE VISIT (OUTPATIENT)
Dept: HEMATOLOGY ONCOLOGY | Facility: CLINIC | Age: 80
End: 2025-05-23
Payer: COMMERCIAL

## 2025-05-23 ENCOUNTER — DOCUMENTATION (OUTPATIENT)
Age: 80
End: 2025-05-23

## 2025-05-23 VITALS
OXYGEN SATURATION: 94 % | BODY MASS INDEX: 35.88 KG/M2 | TEMPERATURE: 97.9 F | HEIGHT: 62 IN | RESPIRATION RATE: 18 BRPM | SYSTOLIC BLOOD PRESSURE: 126 MMHG | DIASTOLIC BLOOD PRESSURE: 70 MMHG | WEIGHT: 195 LBS | HEART RATE: 57 BPM

## 2025-05-23 DIAGNOSIS — C7B.8 NEUROENDOCRINE CARCINOMA METASTATIC TO BONE (HCC): ICD-10-CM

## 2025-05-23 DIAGNOSIS — I50.33 ACUTE ON CHRONIC DIASTOLIC (CONGESTIVE) HEART FAILURE (HCC): ICD-10-CM

## 2025-05-23 DIAGNOSIS — C7A.8 NEUROENDOCRINE CARCINOMA METASTATIC TO BONE (HCC): Primary | ICD-10-CM

## 2025-05-23 DIAGNOSIS — D72.819 LEUKOPENIA, UNSPECIFIED TYPE: ICD-10-CM

## 2025-05-23 DIAGNOSIS — K86.2 PANCREATIC CYST: ICD-10-CM

## 2025-05-23 DIAGNOSIS — D69.6 THROMBOCYTOPENIA (HCC): ICD-10-CM

## 2025-05-23 DIAGNOSIS — Z86.39 HISTORY OF DIABETES MELLITUS: ICD-10-CM

## 2025-05-23 DIAGNOSIS — I25.10 CORONARY ARTERY DISEASE INVOLVING NATIVE HEART WITHOUT ANGINA PECTORIS, UNSPECIFIED VESSEL OR LESION TYPE: ICD-10-CM

## 2025-05-23 DIAGNOSIS — C85.90 STAGE 4 LOW GRADE LYMPHOMA (HCC): ICD-10-CM

## 2025-05-23 DIAGNOSIS — C85.90 STAGE 4 LOW GRADE LYMPHOMA (HCC): Primary | ICD-10-CM

## 2025-05-23 DIAGNOSIS — I73.9 PERIPHERAL ARTERIAL DISEASE (HCC): ICD-10-CM

## 2025-05-23 DIAGNOSIS — R16.1 SPLENOMEGALY: ICD-10-CM

## 2025-05-23 DIAGNOSIS — I38 VALVULAR HEART DISEASE: ICD-10-CM

## 2025-05-23 DIAGNOSIS — C7B.8 NEUROENDOCRINE CARCINOMA METASTATIC TO BONE (HCC): Primary | ICD-10-CM

## 2025-05-23 DIAGNOSIS — N18.2 STAGE 2 CHRONIC KIDNEY DISEASE: ICD-10-CM

## 2025-05-23 DIAGNOSIS — E79.0 HYPERURICEMIA: ICD-10-CM

## 2025-05-23 DIAGNOSIS — C7A.8 NEUROENDOCRINE CARCINOMA METASTATIC TO BONE (HCC): ICD-10-CM

## 2025-05-23 PROCEDURE — 99215 OFFICE O/P EST HI 40 MIN: CPT | Performed by: INTERNAL MEDICINE

## 2025-05-23 PROCEDURE — G2211 COMPLEX E/M VISIT ADD ON: HCPCS | Performed by: INTERNAL MEDICINE

## 2025-05-23 RX ORDER — ALLOPURINOL 100 MG/1
200 TABLET ORAL DAILY
Qty: 180 TABLET | Refills: 1 | Status: SHIPPED | OUTPATIENT
Start: 2025-05-23

## 2025-05-23 RX ORDER — ZANUBRUTINIB 80 MG/1
160 CAPSULE, GELATIN COATED ORAL DAILY
Qty: 60 CAPSULE | Refills: 5 | Status: SHIPPED | OUTPATIENT
Start: 2025-05-23

## 2025-05-23 NOTE — ASSESSMENT & PLAN NOTE
Will be starting patient on lanreotide and if agreeable she will have Prolia.  She is not sure about Prolia because of dental problem

## 2025-05-23 NOTE — ASSESSMENT & PLAN NOTE
Lab Results   Component Value Date    EGFR 67 05/17/2025    EGFR 69 04/11/2025    EGFR 61 02/22/2025    CREATININE 0.83 05/17/2025    CREATININE 0.81 04/11/2025    CREATININE 0.90 02/22/2025     Follows with PCP

## 2025-05-23 NOTE — PATIENT INSTRUCTIONS
Informed consent for Zanubrutinib will start on 160 mg daily and to change the dose later.  Please stop Zanubrutinib 3 days before and 3 days after the procedure but if you have bleeding do not restart.  No blood thinners other than baby aspirin that you are taking for coronary stent and please take baby aspirin with food.  Follow-up in 5 weeks.  Please let us know when you get supply of Zanubrutinib to your house by mail.

## 2025-05-23 NOTE — PROGRESS NOTES
Name: Nadeem Ashton      : 1945      MRN: [id number]  Encounter Provider: Glen Estrada MD  Encounter Date: 2025   Encounter department: 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  She is not sure about Prolia because of dental problem       Splenomegaly  Secondary to low-grade lymphoma       Acute on chronic diastolic (congestive) heart failure (HCC)  Wt Readings from Last 3 Encounters:   25 88.5 kg (195 lb)   25 88 kg (194 lb)   04/10/25 88 kg (194 lb)     She follows with her PCP and cardiologist               Coronary artery disease involving native heart without angina pectoris, unspecified vessel or lesion type  She follows with her PCP and cardiologist       Valvular heart disease  She follows with her PCP and cardiologist       Peripheral arterial disease (HCC)  Follows with PCP       Pancreatic cyst  Being monitored       Stage 2 chronic kidney disease  Lab Results   Component Value Date    EGFR 67 2025    EGFR 69 2025    EGFR 61 2025    CREATININE 0.83 2025    CREATININE 0.81 2025    CREATININE 0.90 2025     Follows with PCP       History of diabetes mellitus  Follows with PCP       Leukopenia, unspecified type  Secondary to lymphoma and splenomegaly       Thrombocytopenia (HCC)  Secondary to lymphoma and splenomegaly     Informed consent for Zanubrutinib will  start on 160 mg daily and to change the dose later.  Please stop Zanubrutinib 3 days before and 3 days after the procedure but if you have bleeding do not restart.  No blood thinners other than baby aspirin that you are taking for coronary stent and please take baby aspirin with food.  Follow-up in 5 weeks.  Please let us know when you get supply of Zanubrutinib to your house by mail.  See diagnoses, orders and instructions above.  All the above discussed with patient and her  in detail.  Questions answered.  Patient saw Dr. Marcum recently and he advised to start Zanubrutinib.  Will start on 50% of the dose because she is worried about the treatment and will adjust the dose as needed.  To add lanreotide after next visit.  She is not sure if she will take Prolia.  She is leaning against that.  Patient has signed informed consent for Zanubrutinib after receiving detailed information, verbally and in printed form.  Goal is response to Zanubrutinib and prolongation of survival.  Patient appears to be capable of self-care at this time.  All discussed in detail.  Questions answered.  I suggested self breast examination, eating healthy foods, staying active but to avoid falls and trauma.  Patient to continue to follow-up with primary physician and other consultants.  Provided counseling and support.  I used a dictation device to dictate this note and there could be mistakes in my note and for that patient may contact my office.        Return in about 5 weeks (around 6/27/2025) for Follow up Sean.    History of Present Illness   Chief Complaint   Patient presents with    Follow-up   Patient is here with her .  Recently diagnosed low-grade lymphoma and patient has very much enlarged spleen that has grown in size in the last few years.  Patient also has multiple lytic bone lesions.  She also has low-grade neuroendocrine tumor and she is not symptomatic from that.  No flushing or diarrhea.  Patient has  "tiredness, arthritic symptoms and exertional dyspnea.  She has coronary and valvular heart disease.  Patient follows with her cardiologist and will be seeing a specialist at [facility] in [place].    Pertinent Medical History   See details in HPI.  05/23/25:      Review of Systems  Reviewed 12 systems.  Symptoms are as in HPI.  Presently no fever, chills, bleeding, skin rash, weight loss, night sweats and there is no swelling of the ankles and there is no swollen glands.  Patient at present does not have other neurologic, cardiac, pulmonary, GI and  symptoms other than listed in HPI.      Objective   /70 (BP Location: Left arm, Patient Position: Sitting, Cuff Size: Large)   Pulse 57   Temp 97.9 °F (36.6 °C) (Temporal)   Resp 18   Ht 5' 2\" (1.575 m)   Wt 88.5 kg (195 lb)   SpO2 94%   BMI 35.67 kg/m²     Pain Screening:  Pain Score: 0-No pain  ECOG   2  Physical Exam  Constitutional:       General: She is not in acute distress.     Appearance: Normal appearance. She is not ill-appearing.      Comments: Overweight   HENT:      Head: Normocephalic and atraumatic.      Mouth/Throat:      Comments: No thrush.    Eyes:      General: No scleral icterus.     Conjunctiva/sclera: Conjunctivae normal.       Cardiovascular:      Rate and Rhythm: Normal rate and regular rhythm.      Heart sounds: Murmur heard.   Pulmonary:      Effort: Pulmonary effort is normal. No respiratory distress.      Breath sounds: Rhonchi and rales present.   Abdominal:      General: Abdomen is flat. There is no distension.      Palpations: Abdomen is soft. There is mass.      Tenderness: There is no abdominal tenderness.      Comments: No ascites.  Fullness left side of abdomen     Musculoskeletal:         General: No swelling or tenderness. Normal range of motion.      Cervical back: Normal range of motion. No rigidity or tenderness.      Right lower leg: No edema.      Left lower leg: No edema.      Comments: No " calf tenderness.   Lymphadenopathy:      Cervical: No cervical adenopathy.      Upper Body:      Right upper body: No supraclavicular or axillary adenopathy.      Left upper body: No supraclavicular or axillary adenopathy.     Skin:     General: Skin is warm.      Coloration: Skin is not jaundiced or pale.      Findings: No bruising or rash.      Nails: There is no clubbing.     Neurological:      General: No focal deficit present.      Mental Status: She is alert and oriented to person, place, and time.      Motor: No weakness.      Coordination: Coordination normal.      Gait: Gait abnormal (Patient walks slowly).     Psychiatric:         Behavior: Behavior normal.         Thought Content: Thought content normal.      Comments: Anxious          Labs: I have reviewed the following labs:  Lab Results   Component Value Date/Time    WBC 3.38 (L) 05/17/2025 08:43 AM    RBC 4.18 05/17/2025 08:43 AM    Hemoglobin 12.2 05/17/2025 08:43 AM    Hematocrit 38.7 05/17/2025 08:43 AM    MCV 93 05/17/2025 08:43 AM    MCH 29.2 05/17/2025 08:43 AM    RDW 15.6 (H) 05/17/2025 08:43 AM    Platelets 94 (L) 05/17/2025 08:43 AM    Segmented % 50 05/17/2025 08:43 AM    Lymphocytes % 42 05/17/2025 08:43 AM    Monocytes % 7 05/17/2025 08:43 AM    Eosinophils Relative 1 05/17/2025 08:43 AM    Basophils Relative 0 05/17/2025 08:43 AM    Immature Grans % 0 05/17/2025 08:43 AM    Absolute Neutrophils 1.70 (L) 05/17/2025 08:43 AM     Lab Results   Component Value Date/Time    Potassium 4.3 05/17/2025 08:43 AM    Chloride 104 05/17/2025 08:43 AM    CO2 28 05/17/2025 08:43 AM    BUN 21 05/17/2025 08:43 AM    Creatinine 0.83 05/17/2025 08:43 AM    Glucose, Fasting 150 (H) 05/17/2025 08:43 AM    Calcium 9.6 05/17/2025 08:43 AM    AST 14 05/17/2025 08:43 AM    ALT 10 05/17/2025 08:43 AM    Alkaline Phosphatase 73 05/17/2025 08:43 AM    Total Protein 7.1 05/17/2025 08:43 AM    Albumin 4.4 05/17/2025 08:43 AM    Total Bilirubin 0.75 05/17/2025 08:43 AM     eGFR 67 05/17/2025 08:43 AM     NM PET CT skull base to mid thigh  Status: Final result     PACS Images - GE     Show images for NM PET CT skull base to mid thigh  PACS Images - Sectra     Show images for NM PET CT skull base to mid thigh    NM PET CT skull base to mid thigh: Result Notes     Cinthia Gordon PA-C  4/7/2025  9:34 AM EDT       To be reviewed with patient at 4/10 appt            Study Result    Result Text   NETSPOT PET/CT SCAN     INDICATION:   C7A.8: Other malignant neuroendocrine tumors  C7B.8: Other secondary neuroendocrine tumors  R91.8: Other nonspecific abnormal finding of lung field  R16.2: Hepatomegaly with splenomegaly, not elsewhere classified  K86.89: Other specified diseases of pancreas  M89.9: Disorder of bone, unspecified, history of thyroid cancer     MODIFIER: PI     COMPARISON: FDG PET/CT dated 12/11/2024, MRI of the cervical and lumbar spine dated 1/8/2025, MRI of the thoracic spine dated 1/12/2025. CT of abdomen and pelvis dated 9/11/2018.     CELL TYPE: Metastatic neuroendocrine tumor (bx T9 vertebra 3/19/25)     TECHNIQUE:   5.2 mCi Gallium-68 Dotatate Netspot administered IV.  Multiplanar attenuation corrected and non-attenuation corrected PET images were acquired 60 minutes post injection.  Contiguous, low dose, axial CT sections were obtained from the vertex through the femurs for anatomic localization.  Intravenous contrast was not utilized.     FINDINGS:     BRAIN:    Normal pituitary gland uptake is demonstrated.  No acute abnormalities are seen.     HEAD/NECK: Best seen on PET image 88 is asymmetric focal tracer activity involving the right mid to lower neck localizing to a soft tissue nodule measuring approximately 0.9 cm on image 88 of series 3 with max SUV of 3.4 suspicious for tracer avid   malignancy/metastatic disease, possible reflecting small tracer avid lymph node which is poorly characterized on low-dose unenhanced CT and can be further characterized  with contrast-enhanced CT/MRI as clinically indicated. Finding could also reflect   thyroid tissue in this patient is status post partial thyroidectomy     CT images: Intracranial atherosclerotic calcification is noted.     CHEST:   No definite focal tracer activity associated patient's known medial juxtapleural left lower lobe lung nodule measuring 2.5 x 2.1 cm on image 145 of series 3 although it is difficult to separate physiologic activity within the adjacent spleen   from the medial portion of this nodule which appears similar in size to 12/11/2024.     Stable 7 mm right upper lobe lung nodule is not tracer avid on image 108 of series 3.     CT images: Atherosclerotic vascular calcifications including those of the coronary arteries are noted. Trace nonspecific pericardial effusion. Stable ectasia of the ascending thoracic aorta.     ABDOMEN: No focal tracer activity definitive for tracer avid malignancy/metastatic disease. Note is made of mild nonspecific heterogeneous activity involving the proximal gastric wall, possibly physiologic but of uncertain clinical significance in this   patient with no known primary malignancy.     Mild heterogeneous activity involving the pancreas, possibly physiologic; as primary neuroendocrine tumor is not definitively visualized, consider correlation with MRI of the abdomen to exclude subtle underlying pancreatic lesion.     Nonspecific nontracer avid abdominal and retroperitoneal lymphadenopathy is again noted; for example enlarged portacaval lymph node on image 172 series 3 measuring 2.3 cm in short axis and aortocaval retroperitoneal lymph node on image 179 of series 3   measuring 1.2 cm, stable since prior FDG PET/CT.     CT images: Stable severe splenomegaly with a craniocaudal dimension of the spleen measuring approximately 23 cm. Atherosclerotic vascular calcifications are noted. Cholecystectomy. Stable nonspecific right adrenal nodularity. Right renal cyst. Patient's    known pancreatic cyst is poorly characterized on low-dose unenhanced CT and is not tracer avid. Persistent nonspecific central mesenteric stranding. Diverticulosis coli. Probable hepatomegaly.     PELVIS:  There is a physiologic distribution of the radiotracer.     CT images: Hysterectomy.     OSSEOUS STRUCTURES:   There is multifocal (greater than 10) tracer avid, predominantly osteolytic metastatic disease involving the calvarium, cervical spine, thoracic spine, and likely proximal left femur. Few representative lesions include:     -Lytic lesion involving the right posterior calvarium on image 38 of series 3 with max SUV of 6.2  -Lytic lesion involving the right posterior elements of the cervical spine on image 75 of series 3 with max SUV of 7  -Mid thoracic spine on image 105 of series 3 with max SUV of 5.5  -Subtle lesion involving the medial proximal left femur on image 241 of series 3 with max SUV of 3.0     CT images: Degenerative changes are noted involving the spine.     IMPRESSION:     1.  Multifocal dotatate positive osseous metastatic disease.  2.  Focal tracer activity involving the right mid to lower neck suspicious for additional site of tracer avid malignancy/metastatic disease which is poorly characterized on low-dose unenhanced CT. Consider further evaluation with contrast-enhanced CT/MRI   for further characterization as clinically indicated.  3.  Patient's known left lower and right upper lobe lung nodules are not tracer avid and remain of uncertain clinical significance. Continued short interval follow-up with CT of chest in 3 months is recommended to ensure stability. Tissue sampling can   also be considered.  4.  Stable severe splenomegaly and persistent nontracer avid abdominal and retroperitoneal lymphadenopathy. Findings are nonspecific with differential diagnosis including benign/inflammatory etiologies as well as malignancy such as lymphoma. These   findings are new since most remote  CT of abdomen and pelvis dated 9/11/2018. Correlation and continued follow-up is recommended.     5.  Please see above for details and additional findings.     The study was marked in EPIC for significant notification. The study was marked in Epic for follow-up.        Workstation performed: [id number]        Imaging    NM PET CT skull base to mid thigh (Order: [id number]) - 4/4/202    Results  Bone Marrow Biopsy/Aspirate Exam (Order [id number])  Result Information    Status Priority Source   Final result (4/10/2025  1:03 PM) Routine Iliac Crest, Right      Related Results             CBC and differential Final result Collected 5/17/2025 8:43 AM                  CBC and differential Final result Collected 4/11/2025 9:13 AM                  Tissue Exam Edited Result - FINAL 3/19/2025           Final Diagnosis   A.  Vertebra, T9, biopsy:  -Metastatic neuroendocrine tumor (see note).         Other Results from 3/19/2025     Fingerstick Glucose (POCT) Final result 3/19/2025    Leukemia/Lymphoma flow cytometry Final result 3/19/2025    CLL profile, fish Final result 3/19/2025    FISH Analysis, NHL; [facility] - Miscellaneous Test Final result 3/19/2025    Chromosome analysis, leukemia/lymphoma Final result 3/19/2025    Leukemia/Lymphoma flow cytometry Final result 3/19/2025    EXTRACT & HOLD - TNA - RIGHT ILIAC CREST; [facility] - Miscellaneous Test Final result 3/19/2025    DIRECT HARVEST & HOLD - RIGHT ILIAC CREST; [facility] - Miscellaneous Test Final result 3/19/2025    [facility]alia Comprehensive Heme Cancers; [facility] - Miscellaneous Test Final result 3/19/2025    Fingerstick Glucose (POCT) Final result 3/19/2025       important suggestion  Warning: Additional results from 3/19/2025 are available but are not displayed in this report.     Bone Marrow Biopsy/Aspirate Exam: [id number]  Order: [id number]   Collected 3/19/2025  3:07 PM       Status: Final result    Test Result Released: Yes (seen)    0 Result Notes         Component  Ref  Range & Units (hover)  Resulting Agency   Case Report   Surgical Pathology Report                         Case: [id number]                                   Authorizing Provider:  Glen Estrada MD        Collected:           03/19/2025 1507               Ordering Location:     [facility]      Received:            03/19/2025 [address] [place] [address] Interventional                                                                              Radiology                                                                     Pathologist:           Vadim Lopes MD                                                           Specimens:   A) - Iliac Crest, Right, Core                                                                        B) - Iliac Crest, Right, Clot                                                                        C) - Iliac Crest, Right, Slide                                                            Final Diagnosis   A -C.  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  -  Hypercellular bone marrow with left shifted myeloid/erythroid maturation, atypical megakaryocytic maturation, and no increase in blasts, see comment.               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  -  Negative for metastatic carcinoma.     Comment: The patient's history of lytic lesions and recently diagnosed metastatic carcinoma is noted.               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 The immunophenotype by flow cytometry and immunohistochemical stains is non-specific and ancillary work-up for further characterization was insufficient and unable to be performed. The differential diagnosis includes but is not limited to marginal zone lymphoma,  atypical chronic lymphocytic leukemia/small lymphocytic lymphoma, and lymphoplasmacytic lymphoma. Further work-up can be attempted in the peripheral blood or with lymph node sampling.                Evaluation of the myeloid lineage is challenging in this sample given the involvement of the bone marrow by a B-cell lymphoproliferative disorder. However, morphologic evaluation shows atypical megakaryocytic maturation and left shifted myeloid and erythroid elements. These morphologic features alone are not diagnostic of dysplasia at this time. 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  Close clinical follow-up is advised if the patient were to receive treatment for their metastatic carcinoma therapy effect may be more pronounced in this patient given the underlying clonal hematopoesis identified in the myeloid lineage.  Finally there is no evidence of metastatic carcinoma at in the reviewed material.       Dr. Estrada is notified of the findings by Dr. Lopes via [employer] Secure Chat on 4/10/25.  Intradepartmental consultation is in agreement (MS).            Electronically signed by Vadim Lopes MD on 4/10/2025 at 1303 EDT   Microscopic Description

## 2025-05-23 NOTE — ASSESSMENT & PLAN NOTE
Starting patient on Zanubrutinib  Orders:    allopurinol (ZYLOPRIM) 100 mg tablet; Take 2 tablets (200 mg total) by mouth daily    CBC and differential; Standing    Comprehensive metabolic panel; Standing    Uric acid; Standing

## 2025-05-23 NOTE — ASSESSMENT & PLAN NOTE
Secondary to lymphoma and splenomegaly     Informed consent for Zanubrutinib will start on 160 mg daily and to change the dose later.  Please stop Zanubrutinib 3 days before and 3 days after the procedure but if you have bleeding do not restart.  No blood thinners other than baby aspirin that you are taking for coronary stent and please take baby aspirin with food.  Follow-up in 5 weeks.  Please let us know when you get supply of Zanubrutinib to your house by mail.  See diagnoses, orders and instructions above.  All the above discussed with patient and her  in detail.  Questions answered.  Patient saw Dr. Marcum recently and he advised to start Zanubrutinib.  Will start on 50% of the dose because she is worried about the treatment and will adjust the dose as needed.  To add lanreotide after next visit.  She is not sure if she will take Prolia.  She is leaning against that.  Patient has signed informed consent for Zanubrutinib after receiving detailed information, verbally and in printed form.  Goal is response to Zanubrutinib and prolongation of survival.  Patient appears to be capable of self-care at this time.  All discussed in detail.  Questions answered.  I suggested self breast examination, eating healthy foods, staying active but to avoid falls and trauma.  Patient to continue to follow-up with primary physician and other consultants.  Provided counseling and support.  I used a dictation device to dictate this note and there could be mistakes in my note and for that patient may contact my office.

## 2025-05-23 NOTE — PROGRESS NOTES
Received request from clinical for patient to start on Brukinsa. Auth has been submitted via cover my meds and this is pending and marked as urgent.    (Key: [id number])  PA Case ID #: [id number]    BIN: [id number]  PCN: [id number]  GRP: [id number]  ID: [id number]

## 2025-05-23 NOTE — ASSESSMENT & PLAN NOTE
Wt Readings from Last 3 Encounters:   05/23/25 88.5 kg (195 lb)   04/25/25 88 kg (194 lb)   04/10/25 88 kg (194 lb)     She follows with her PCP and cardiologist

## 2025-06-02 ENCOUNTER — TELEPHONE (OUTPATIENT)
Age: 80
End: 2025-06-02

## 2025-06-02 NOTE — TELEPHONE ENCOUNTER
Ashanti from [employer] calling in reporting the appeal has been authorized for [employer]. Appeal #[id number]. Valid 5/30/25-5/30/26

## 2025-06-18 ENCOUNTER — APPOINTMENT (OUTPATIENT)
Dept: LAB | Facility: HOSPITAL | Age: 80
End: 2025-06-18
Payer: COMMERCIAL

## 2025-06-18 DIAGNOSIS — K74.60 HEPATIC CIRRHOSIS, UNSPECIFIED HEPATIC CIRRHOSIS TYPE, UNSPECIFIED WHETHER ASCITES PRESENT (HCC): ICD-10-CM

## 2025-06-18 LAB
ALBUMIN SERPL BCG-MCNC: 4.4 G/DL (ref 3.5–5)
ALP SERPL-CCNC: 71 U/L (ref 34–104)
ALT SERPL W P-5'-P-CCNC: 11 U/L (ref 7–52)
ANION GAP SERPL CALCULATED.3IONS-SCNC: 10 MMOL/L (ref 4–13)
AST SERPL W P-5'-P-CCNC: 15 U/L (ref 13–39)
BASOPHILS # BLD AUTO: 0.01 THOUSANDS/ÂΜL (ref 0–0.1)
BASOPHILS NFR BLD AUTO: 0 % (ref 0–1)
BILIRUB SERPL-MCNC: 0.48 MG/DL (ref 0.2–1)
BUN SERPL-MCNC: 19 MG/DL (ref 5–25)
CALCIUM SERPL-MCNC: 9.5 MG/DL (ref 8.4–10.2)
CHLORIDE SERPL-SCNC: 105 MMOL/L (ref 96–108)
CO2 SERPL-SCNC: 25 MMOL/L (ref 21–32)
CREAT SERPL-MCNC: 0.8 MG/DL (ref 0.6–1.3)
EOSINOPHIL # BLD AUTO: 0.02 THOUSAND/ÂΜL (ref 0–0.61)
EOSINOPHIL NFR BLD AUTO: 1 % (ref 0–6)
ERYTHROCYTE [DISTWIDTH] IN BLOOD BY AUTOMATED COUNT: 15.2 % (ref 11.6–15.1)
GFR SERPL CREATININE-BSD FRML MDRD: 70 ML/MIN/1.73SQ M
GLUCOSE SERPL-MCNC: 104 MG/DL (ref 65–140)
HCT VFR BLD AUTO: 39.1 % (ref 34.8–46.1)
HGB BLD-MCNC: 12.5 G/DL (ref 11.5–15.4)
IMM GRANULOCYTES # BLD AUTO: 0 THOUSAND/UL (ref 0–0.2)
IMM GRANULOCYTES NFR BLD AUTO: 0 % (ref 0–2)
INR PPP: 1.08 (ref 0.85–1.19)
LYMPHOCYTES # BLD AUTO: 2.03 THOUSANDS/ÂΜL (ref 0.6–4.47)
LYMPHOCYTES NFR BLD AUTO: 53 % (ref 14–44)
MCH RBC QN AUTO: 29.5 PG (ref 26.8–34.3)
MCHC RBC AUTO-ENTMCNC: 32 G/DL (ref 31.4–37.4)
MCV RBC AUTO: 92 FL (ref 82–98)
MONOCYTES # BLD AUTO: 0.2 THOUSAND/ÂΜL (ref 0.17–1.22)
MONOCYTES NFR BLD AUTO: 5 % (ref 4–12)
NEUTROPHILS # BLD AUTO: 1.59 THOUSANDS/ÂΜL (ref 1.85–7.62)
NEUTS SEG NFR BLD AUTO: 41 % (ref 43–75)
NRBC BLD AUTO-RTO: 0 /100 WBCS
PLATELET # BLD AUTO: 98 THOUSANDS/UL (ref 149–390)
PMV BLD AUTO: 9.7 FL (ref 8.9–12.7)
POTASSIUM SERPL-SCNC: 4.1 MMOL/L (ref 3.5–5.3)
PROT SERPL-MCNC: 7.3 G/DL (ref 6.4–8.4)
PROTHROMBIN TIME: 14.4 SECONDS (ref 12.3–15)
RBC # BLD AUTO: 4.24 MILLION/UL (ref 3.81–5.12)
SODIUM SERPL-SCNC: 140 MMOL/L (ref 135–147)
WBC # BLD AUTO: 3.85 THOUSAND/UL (ref 4.31–10.16)

## 2025-06-18 PROCEDURE — 80053 COMPREHEN METABOLIC PANEL: CPT

## 2025-06-18 PROCEDURE — 36415 COLL VENOUS BLD VENIPUNCTURE: CPT

## 2025-06-18 PROCEDURE — 85025 COMPLETE CBC W/AUTO DIFF WBC: CPT

## 2025-06-18 PROCEDURE — 85610 PROTHROMBIN TIME: CPT

## 2025-06-20 ENCOUNTER — HOSPITAL ENCOUNTER (OUTPATIENT)
Dept: CT IMAGING | Facility: HOSPITAL | Age: 80
End: 2025-06-20
Attending: INTERNAL MEDICINE
Payer: COMMERCIAL

## 2025-06-20 DIAGNOSIS — K74.60 CIRRHOSIS OF LIVER (HCC): ICD-10-CM

## 2025-06-20 PROCEDURE — 71260 CT THORAX DX C+: CPT

## 2025-06-20 PROCEDURE — 74177 CT ABD & PELVIS W/CONTRAST: CPT

## 2025-06-20 RX ADMIN — IOHEXOL 75 ML: 350 INJECTION, SOLUTION INTRAVENOUS at 08:14

## 2025-06-23 ENCOUNTER — TELEPHONE (OUTPATIENT)
Age: 80
End: 2025-06-23

## 2025-06-23 NOTE — TELEPHONE ENCOUNTER
Call received by Nadeem       Patient requesting to speak to  RN. Patient did not want to speak to CTS. Per patient she canceled her appointment with  on 6/30 due to getting a second appointment. Per patient she does not want to take the medication provider prescribed and will like to discuss with RN concerns.     Please call patient.       Thank you!

## 2025-06-23 NOTE — TELEPHONE ENCOUNTER
Returned call to pt who advises she does not feel comfortable starting the Brukinsa due to it only treating lymphoma not endocarcinoma, can only take half dose due to being on verapamil, and the potential side effects. She has a cardiology issue that is being worked up currently with Alexis and she will be seeking a second opinion with [facility] oncology for the cancer treatment. She advises she will keep Dr Estrada updated.

## 2025-06-25 ENCOUNTER — TELEPHONE (OUTPATIENT)
Age: 80
End: 2025-06-25

## 2025-06-25 NOTE — TELEPHONE ENCOUNTER
Caller: patient     Doctor: Dr. England    Reason for call: patient said Dr England's office opens at 9am. Would like them to be contacted and then please call her back with an update regarding the imaging.    Call back#: [phone]

## 2025-07-13 ENCOUNTER — HOSPITAL ENCOUNTER (OUTPATIENT)
Dept: MRI IMAGING | Facility: HOSPITAL | Age: 80
Discharge: HOME | End: 2025-07-13
Attending: INTERNAL MEDICINE
Payer: COMMERCIAL

## 2025-07-13 DIAGNOSIS — D49.2 NEOPLASM OF UNSPECIFIED BEHAVIOR OF BONE, SOFT TISSUE, AND SKIN: ICD-10-CM

## 2025-07-13 PROCEDURE — 72157 MRI CHEST SPINE W/O & W/DYE: CPT

## 2025-07-13 PROCEDURE — A9585 GADOBUTROL INJECTION: HCPCS | Performed by: INTERNAL MEDICINE

## 2025-07-13 RX ORDER — GADOBUTROL 604.72 MG/ML
8 INJECTION INTRAVENOUS
Status: COMPLETED | OUTPATIENT
Start: 2025-07-13 | End: 2025-07-13

## 2025-07-13 RX ADMIN — GADOBUTROL 8 ML: 604.72 INJECTION INTRAVENOUS at 16:27

## (undated) DEVICE — CATH DIAG 5FR .045 100CM FR4

## (undated) DEVICE — GUIDEWIRE WHOLEY HI TORQUE INTERM MOD J .035 145CM

## (undated) DEVICE — INTRO SHEATH PEEL AWAY 7FR

## (undated) DEVICE — RADIFOCUS GLIDEWIRE: Brand: GLIDEWIRE

## (undated) DEVICE — DGW .035 FC J3MM 260CM TEF: Brand: EMERALD

## (undated) DEVICE — PINNACLE INTRODUCER SHEATH: Brand: PINNACLE

## (undated) DEVICE — EACH LANGSTON DUAL LUMEN CATHETER IS INDICATED FOR DELIVERY OF CONTRAST MEDIUM IN ANGIOGRAPHIC STUDIES AND FOR SIMULTANEOUS PRESSURE MEASUREMENT FROM TWO SITES. THIS TYPE OF PRESSURE MEASUREMENT IS USEFUL IN DETERMINING TRANSVALVULAR, INTRAVASCULAR AND INTRAVENTRICULAR PRESSURE GRADIENTS.: Brand: LANGSTON® DUAL LUMEN CATHETER

## (undated) DEVICE — RUNTHROUGH NS EXTRA FLOPPY PTCA GUIDEWIRE: Brand: RUNTHROUGH

## (undated) DEVICE — DGW .035 FC STR 150CM TEF: Brand: EMERALD

## (undated) DEVICE — Device

## (undated) DEVICE — MICROPUNCTURE INTRODUCER SET SILHOUETTE TRANSITIONLESS PUSH-PLUS DESIGN - STIFFENED CANNULA WITH NITINOL WIRE GUIDE: Brand: MICROPUNCTURE

## (undated) DEVICE — DGW .035 FC J3MM 150CM TEF: Brand: EMERALD

## (undated) DEVICE — CATH GUIDE LAUNCHER 5FR EBU 3.5

## (undated) DEVICE — CATH GUIDE LAUNCHER 5FR EBU 3.75

## (undated) DEVICE — CATH DIAG 5FR IMPULSE 125CM MPA2

## (undated) DEVICE — MICROPUNCTURE 401

## (undated) DEVICE — CATH GUIDE LAUNCHER 5FR JR 4.0

## (undated) DEVICE — GLIDESHEATH SLENDER STAINLESS STEEL KIT: Brand: GLIDESHEATH SLENDER

## (undated) DEVICE — GLIDESHEATH BASIC HYDROPHILIC COATED INTRODUCER SHEATH: Brand: GLIDESHEATH

## (undated) DEVICE — RADIFOCUS OPTITORQUE ANGIOGRAPHIC CATHETER: Brand: OPTITORQUE

## (undated) DEVICE — CATH GUIDE LAUNCHER 6FR JR4 110CM

## (undated) DEVICE — CATH DIAG 5FR IMPULSE 110CM PIG